# Patient Record
Sex: FEMALE | Race: WHITE | HISPANIC OR LATINO | Employment: OTHER | URBAN - METROPOLITAN AREA
[De-identification: names, ages, dates, MRNs, and addresses within clinical notes are randomized per-mention and may not be internally consistent; named-entity substitution may affect disease eponyms.]

---

## 2017-01-05 ENCOUNTER — OFFICE VISIT (OUTPATIENT)
Dept: OBGYN | Facility: CLINIC | Age: 32
End: 2017-01-05
Attending: OBSTETRICS & GYNECOLOGY
Payer: COMMERCIAL

## 2017-01-05 ENCOUNTER — OFFICE VISIT (OUTPATIENT)
Dept: OBGYN | Facility: CLINIC | Age: 32
End: 2017-01-05
Attending: ADVANCED PRACTICE MIDWIFE
Payer: COMMERCIAL

## 2017-01-05 VITALS
SYSTOLIC BLOOD PRESSURE: 102 MMHG | BODY MASS INDEX: 42.69 KG/M2 | WEIGHT: 226.1 LBS | HEIGHT: 61 IN | DIASTOLIC BLOOD PRESSURE: 70 MMHG

## 2017-01-05 DIAGNOSIS — O26.21 HIGH RISK PREGNANCY IN FIRST TRIMESTER DUE TO RECURRENT PREGNANCY LOSS: ICD-10-CM

## 2017-01-05 DIAGNOSIS — O26.21 HIGH RISK PREGNANCY IN FIRST TRIMESTER DUE TO RECURRENT PREGNANCY LOSS: Primary | ICD-10-CM

## 2017-01-05 DIAGNOSIS — R05.9 COUGH: ICD-10-CM

## 2017-01-05 DIAGNOSIS — R30.0 DYSURIA: ICD-10-CM

## 2017-01-05 DIAGNOSIS — K21.9 GASTROESOPHAGEAL REFLUX DISEASE WITHOUT ESOPHAGITIS: ICD-10-CM

## 2017-01-05 PROCEDURE — 99211 OFF/OP EST MAY X REQ PHY/QHP: CPT | Mod: 25

## 2017-01-05 PROCEDURE — 76819 FETAL BIOPHYS PROFIL W/O NST: CPT | Mod: ZF

## 2017-01-05 PROCEDURE — 87086 URINE CULTURE/COLONY COUNT: CPT | Performed by: ADVANCED PRACTICE MIDWIFE

## 2017-01-05 RX ORDER — LORATADINE 10 MG/1
10 TABLET ORAL DAILY
Qty: 20 TABLET | Refills: 1 | Status: ON HOLD | OUTPATIENT
Start: 2017-01-05 | End: 2017-01-27

## 2017-01-05 ASSESSMENT — PAIN SCALES - GENERAL: PAINLEVEL: MODERATE PAIN (4)

## 2017-01-05 NOTE — Clinical Note
2017       RE: Dai Guadarrama  [address]  [place] [address]     Dear Colleague,    Thank you for referring your patient, Dai Guadarrama, to the [facility] at [facility]. Please see a copy of my visit note below.    31 year old,  , presents at 33 3/7 weeks in pregnancy complicated by h/o IUFD  for biophysical assessment.    Fetal Breathing Movements (FBM): Normal - 2  Gross Body Movements (GBM): Normal - 2  Fetal Tone (FT): Normal - 2  AFV: Pocket of amniotic fluid > or = to 2 cm x 2 cm - 2  Quantitative Amniotic Fluid Volume Total: 19.2 cm      BPP 8/8.  FHR = 133bpm Normal.   MCA Not done.  NST Not done.     Single fetus in cephalic presentation.  Placenta anterior and grade 0.    Recommend weekly assessment.    ANGEL Watson MD

## 2017-01-05 NOTE — PROGRESS NOTES
"Subjective:      31 year old  at 33w3d presentst for a routine prenatal appointment.  Pt c/o of cough,  sore throat, runny nose for past week. Has trouble sleeping d/t cough.   No vaginal bleeding or leakage of fluid.  Notes contractions, 1 per hour. Pos fetal movement., though she endoreses less movement.    Had BPP today.   Does have HAs with her URI, NO visual changes, RUQ or Has lots of heartburn.      Had Tdap  Objective:  Filed Vitals:    17 1625   BP: 102/70   Height: 1.562 m (5' 1.5\")   Weight: 102.558 kg (226 lb 1.6 oz)   , see ob flowsheet    Lungs CTA bilaterally  HR RRR  Throat: mild erythema, tonsils +2, no exudate seen    Assessment/Plan     Encounter Diagnoses   Name Primary?     High risk pregnancy in first trimester due to recurrent pregnancy loss Yes     Cough      Gastroesophageal reflux disease without esophagitis      Dysuria      No orders of the defined types were placed in this encounter.     Orders Placed This Encounter   Medications     loratadine (CLARITIN) 10 MG tablet     Sig: Take 1 tablet (10 mg) by mouth daily     Dispense:  20 tablet     Refill:  1     ranitidine (ZANTAC) 150 MG tablet     Sig: Take 1 tablet (150 mg) by mouth 2 times daily     Dispense:  60 tablet     Refill:  1     ABO   Date Value Ref Range Status   2016 O  Final     RH(D)   Date Value Ref Range Status   2016  Pos  Final     ANTIBODY SCREEN   Date Value Ref Range Status   2016 Neg  Final     - Reviewed total weight gain, encouraged continued healthy diet and exercise.     - Discussed OTC meds that safe for her URI. Encourage fluids, honey for cough suppression, Claritin.  -Zantac BID for heartburn  - Reviewed importance of daily fetal kick count and why/how to contact provider. Emphasized calling or coming if patient has any fetal movement concerns.     - Reviewed why/how to contact provider if headache/visual changes/RUQ or epigastric pain, decreased fetal movement, vaginal " bleeding, leakage of fluid or more than 4 contractions in an hour.     Patient education/orders or handouts today:  PTL signs/symptoms  Reviewed GBS screening at 35-36 wks.    Return to clinic in 2 weeks and prn if questions or concerns.     CHANELLE KhalilM

## 2017-01-05 NOTE — NURSING NOTE
Chief Complaint   Patient presents with     Prenatal Care   33.3 weeks ob visit   Noticing more pelvic pressure and cramping.    has a cold   With a cough for last week.  Unable to sleep-  Will give recommendations for pregnancy safe cough medications.

## 2017-01-05 NOTE — PROGRESS NOTES
31 year old,  , presents at 33 3/7 weeks in pregnancy complicated by h/o IUFD  for biophysical assessment.    Fetal Breathing Movements (FBM): Normal - 2  Gross Body Movements (GBM): Normal - 2  Fetal Tone (FT): Normal - 2  AFV: Pocket of amniotic fluid > or = to 2 cm x 2 cm - 2  Quantitative Amniotic Fluid Volume Total: 19.2 cm      BPP 8/8.  FHR = 133bpm Normal.   MCA Not done.  NST Not done.     Single fetus in cephalic presentation.  Placenta anterior and grade 0.    Recommend weekly assessment.    ANGEL Watson MD

## 2017-01-07 LAB
BACTERIA SPEC CULT: NORMAL
MICRO REPORT STATUS: NORMAL
SPECIMEN SOURCE: NORMAL

## 2017-01-12 ENCOUNTER — HOSPITAL ENCOUNTER (OUTPATIENT)
Facility: CLINIC | Age: 32
Discharge: HOME | End: 2017-01-12
Attending: OBSTETRICS & GYNECOLOGY | Admitting: OBSTETRICS & GYNECOLOGY
Payer: COMMERCIAL

## 2017-01-12 ENCOUNTER — OFFICE VISIT (OUTPATIENT)
Dept: OBGYN | Facility: CLINIC | Age: 32
End: 2017-01-12
Attending: OBSTETRICS & GYNECOLOGY
Payer: COMMERCIAL

## 2017-01-12 VITALS
DIASTOLIC BLOOD PRESSURE: 57 MMHG | BODY MASS INDEX: 42.69 KG/M2 | TEMPERATURE: 97.6 F | WEIGHT: 226.1 LBS | RESPIRATION RATE: 20 BRPM | SYSTOLIC BLOOD PRESSURE: 99 MMHG | HEIGHT: 61 IN

## 2017-01-12 DIAGNOSIS — O26.21 HIGH RISK PREGNANCY IN FIRST TRIMESTER DUE TO RECURRENT PREGNANCY LOSS: ICD-10-CM

## 2017-01-12 PROBLEM — Z36.89 ENCOUNTER FOR TRIAGE IN PREGNANT PATIENT: Status: ACTIVE | Noted: 2017-01-12

## 2017-01-12 LAB — A1 MICROGLOB PLACENTAL VAG QL: NEGATIVE

## 2017-01-12 PROCEDURE — T1013 SIGN LANG/ORAL INTERPRETER: HCPCS | Mod: U3,ZF

## 2017-01-12 PROCEDURE — 76819 FETAL BIOPHYS PROFIL W/O NST: CPT | Mod: ZF

## 2017-01-12 PROCEDURE — T1013 SIGN LANG/ORAL INTERPRETER: HCPCS | Mod: U3

## 2017-01-12 PROCEDURE — 84112 EVAL AMNIOTIC FLUID PROTEIN: CPT | Performed by: OBSTETRICS & GYNECOLOGY

## 2017-01-12 PROCEDURE — 99214 OFFICE O/P EST MOD 30 MIN: CPT

## 2017-01-12 NOTE — IP AVS SNAPSHOT
[facility]    [address]    [place] [address]    Phone:  [phone]                                       After Visit Summary   1/12/2017    Dai Guadarrama    MRN: [id number]           After Visit Summary Signature Page     I have received my discharge instructions, and my questions have been answered. I have discussed any challenges I see with this plan with the nurse or doctor.    ..........................................................................................................................................  Patient/Patient Representative Signature      ..........................................................................................................................................  Patient Representative Print Name and Relationship to Patient    ..................................................               ................................................  Date                                            Time    ..........................................................................................................................................  Reviewed by Signature/Title    ...................................................              ..............................................  Date                                                            Time

## 2017-01-12 NOTE — IP AVS SNAPSHOT
MRN:[id number]                      After Visit Summary   1/12/2017    Dai Guadarrama    MRN: [id number]           Thank you!     Thank you for choosing [facility] for your care. Our goal is always to provide you with excellent care. Hearing back from our patients is one way we can continue to improve our services. Please take a few minutes to complete the written survey that you may receive in the mail after you visit with us. Thank you!        Patient Information     Date Of Birth          1985        About your hospital stay     You were admitted on:  January 12, 2017 You last received care in the:   [facility]    You were discharged on:  January 12, 2017        Reason for your hospital stay       You were here for a decrease in the fluid surrounding your baby. Your bag of water is still intact.                  Who to Call     For medical emergencies, please call 911.  For non-urgent questions about your medical care, please call your primary care provider or clinic, None          Attending Provider     Provider    Sharmin Louis MD       Primary Care Provider    Physician No Ref-Primary       No address on file        After Care Instructions     Activity       Your activity upon discharge: activity as tolerated            Diet       Regular diet            Discharge Instructions       Return to triage if you have decreased fetal movement, leaking of fluid, vaginal bleeding, contractions, headache, change in vision, or severe abdominal pain.                  Follow-up Appointments     [facility]ult [facility] Follow-up and recommended labs and tests       Follow up with your OB/GYN as scheduled.                  Your next 10 appointments already scheduled     Jan 12, 2017  6:00 PM   [facility] with Dai Saab    Services Department ([facility])    [address] [place] [id number]               Jan 19, 2017   3:00 PM   [facility]   [facility] ([facility])    [facility] [address]  [address]  [facility] [address]   [phone]            Jan 19, 2017  3:30 PM   RETURN OB with Vicky Salazar MD   [facility] ([facility])    [facility] [address]  [address]  [facility] [address]   [phone]            Jan 26, 2017  3:00 PM   [facility]   [facility] ([facility])    [facility] [address]  [address]  [facility] [address]   [phone]            Jan 26, 2017  3:30 PM   RETURN OB with Vicky Salazar MD   [facility] ([facility])    [facility] [address]  [address]  [facility] [address]   [phone]            Feb 02, 2017  3:00 PM   [facility]   [facility] ([facility])    [facility] [address]  [address]  [facility] [address]   [phone]            Feb 02, 2017  3:30 PM   RETURN OB with Vicky Salazar MD   [facility] ([facility])    [facility] [address]  [address]  [facility] [address]   [phone]            Feb 09, 2017  3:00 PM   [facility]   [facility] ([facility])    [facility] [address]  [address]  [facility] [address]   [phone]            Feb 09, 2017  3:30 PM   RETURN OB with Madelyn Awad MD   [facility] ([facility])    [facility] [address]  [address]  [facility] [address]   [phone]            Feb 16, 2017  3:00 PM   ULTRASOUND with [facility] ULTRASOUND    [facility] ([facility])    [facility] [address]  [address]  [address]  [facility] [address] [phone]              Further instructions from your care team       Discharge Instruction for Undelivered Patients      You were seen for: Membrane Assessment  We Consulted: Dr. Louis  You had (Test or Medicine):monitioring, Amnisure,      Diet:   Drink 8 to 12 glasses of liquids (milk, juice, water) every day.  You may eat meals and snacks.     Activity:  Call your doctor or nurse midwife if your baby is moving less than usual.     Call your provider if you notice:  Swelling in your face or increased swelling in your hands or legs.  Headaches that are not relieved by Tylenol (acetaminophen).  Changes in your vision (blurring: seeing spots or stars.)  Nausea (sick to your stomach) and vomiting (throwing up).   Weight gain of 5 pounds or more per week.  Heartburn that doesn't go away.  Signs of bladder infection: pain when you urinate (use the toilet), need to go more often and more urgently.  The bag of parada (rupture of membranes) breaks, or you notice leaking in your underwear.  Bright red blood in your underwear.  Abdominal (lower belly) or stomach pain.  For first baby: Contractions (tightening) less than 5 minutes apart for one hour or more.  Second (plus) baby: Contractions (tightening) less than 10 minutes apart and getting stronger.  *If less than 34 weeks: Contractions (tightenings) more than 6 times in one hour.  Increase or change in vaginal discharge (note the color and amount)      Follow-up:  As scheduled in the clinic          Pending Results     No orders found from 1/11/2017 to 1/13/2017.            Statement of Approval     Ordered          01/12/17 1739  I have reviewed and agree with all the recommendations and orders detailed in this document.   EFFECTIVE NOW     Approved and electronically signed by:  Yessenia Barros MD            "  Admission Information        Provider Department Dept Phone    2017 Sharmin Louis MD [facility] [phone]      Your Vitals Were     Blood Pressure Last Period                99/57 mmHg 2016 (Approximate)          MyChart Information     "[employer]hart lets you send messages to your doctor, view your test results, renew your prescriptions, schedule appointments and more. To sign up, go to [email or web address] . Click on \"Log in\" on the left side of the screen, which will take you to the Welcome page. Then click on \"Sign up Now\" on the right side of the page.     You will be asked to enter the access code listed below, as well as some personal information. Please follow the directions to create your username and password.     Your access code is: [email or web address]  Expires: 3/22/2017  9:30 AM     Your access code will  in 90 days. If you need help or a new code, please call your [facility] clinic or [phone].        Care EveryWhere ID     This is your Care EveryWhere ID. This could be used by other organizations to access your [facility] medical records  [id number]           Review of your medicines      CONTINUE these medicines which have NOT CHANGED        Dose / Directions    loratadine 10 MG tablet   Commonly known as:  CLARITIN   Used for:  Cough        Dose:  10 mg   Take 1 tablet (10 mg) by mouth daily   Quantity:  20 tablet   Refills:  1       prenatal multivitamin  plus iron 27-0.8 MG Tabs per tablet   Used for:  Recurrent pregnancy loss, delivered        Dose:  1 tablet   Take 1 tablet by mouth daily   Quantity:  100 tablet   Refills:  3       ranitidine 150 MG tablet   Commonly known as:  ZANTAC   Used for:  Gastroesophageal reflux disease without esophagitis        Dose:  150 mg   Take 1 tablet (150 mg) by mouth 2 times daily   Quantity:  60 tablet   Refills:  1                Protect others around you: Learn how to safely use, store and throw away your medicines at " www.disposemymeds.org.             Medication List: This is a list of all your medications and when to take them. Check marks below indicate your daily home schedule. Keep this list as a reference.      Medications           Morning Afternoon Evening Bedtime As Needed    loratadine 10 MG tablet   Commonly known as:  CLARITIN   Take 1 tablet (10 mg) by mouth daily                                prenatal multivitamin  plus iron 27-0.8 MG Tabs per tablet   Take 1 tablet by mouth daily                                ranitidine 150 MG tablet   Commonly known as:  ZANTAC   Take 1 tablet (150 mg) by mouth 2 times daily

## 2017-01-12 NOTE — Clinical Note
2017       RE: Dai Guadarrama  [address]  [facility] [facility] [address]     Dear Colleague,    Thank you for referring your patient, Dai Guadarrama, to the [facility] at [facility]. Please see a copy of my visit note below.    31 year old,  , presents at 34 3/7 weeks in pregnancy complicated by h/o IUFD for biophysical assessment.    Fetal Breathing Movements (FBM): Normal - 2  Gross Body Movements (GBM): Normal - 2  Fetal Tone (FT): Normal - 2  AFV: Pocket of amniotic fluid > or = to 2 cm x 2 cm - 2  Quantitative Amniotic Fluid Volume Total: 8.5cm, decreased from 19.2cm.       BPP 8/8.  FHR = 161bpm    MCA Not done.  NST Not done.     Single fetus in transverse presentation, head to maternal right.  Placenta anterior and grade 1.    Recommend evaluation for possible PPROM with decrease in YARIZTA.    ANGEL Nicholson MD

## 2017-01-12 NOTE — PROGRESS NOTES
31 year old,  , presents at 34 3/7 weeks in pregnancy complicated by h/o IUFD for biophysical assessment.    Fetal Breathing Movements (FBM): Normal - 2  Gross Body Movements (GBM): Normal - 2  Fetal Tone (FT): Normal - 2  AFV: Pocket of amniotic fluid > or = to 2 cm x 2 cm - 2  Quantitative Amniotic Fluid Volume Total: 8.5cm, decreased from 19.2cm.       BPP 8/8.  FHR = 161bpm    MCA Not done.  NST Not done.     Single fetus in transverse presentation, head to maternal right.  Placenta anterior and grade 1.    Recommend evaluation for possible PPROM with decrease in YARITZA.    ANGEL Nicholson MD

## 2017-01-12 NOTE — DISCHARGE INSTRUCTIONS
Discharge Instruction for Undelivered Patients      You were seen for: Membrane Assessment  We Consulted: Dr. Louis  You had (Test or Medicine):monitioring, Amnisure,      Diet:   Drink 8 to 12 glasses of liquids (milk, juice, water) every day.  You may eat meals and snacks.     Activity:  Call your doctor or nurse midwife if your baby is moving less than usual.     Call your provider if you notice:  Swelling in your face or increased swelling in your hands or legs.  Headaches that are not relieved by Tylenol (acetaminophen).  Changes in your vision (blurring: seeing spots or stars.)  Nausea (sick to your stomach) and vomiting (throwing up).   Weight gain of 5 pounds or more per week.  Heartburn that doesn't go away.  Signs of bladder infection: pain when you urinate (use the toilet), need to go more often and more urgently.  The bag of parada (rupture of membranes) breaks, or you notice leaking in your underwear.  Bright red blood in your underwear.  Abdominal (lower belly) or stomach pain.  For first baby: Contractions (tightening) less than 5 minutes apart for one hour or more.  Second (plus) baby: Contractions (tightening) less than 10 minutes apart and getting stronger.  *If less than 34 weeks: Contractions (tightenings) more than 6 times in one hour.  Increase or change in vaginal discharge (note the color and amount)      Follow-up:  As scheduled in the clinic

## 2017-01-12 NOTE — PROGRESS NOTES
OB Triage Note    CC: Change in YARITZA    Subjective: Dai Guadarrama is a 31 year old  at 34w3d by 7w3d US who presents from US for decreased YARITZA (was 19.2 on ; now 8.5) . She denies loss of fluid, contractions, vaginal bleeding. Normal fetal movement.  On ROS, she endorses mild nausea, a headache yesterday, abdominal soreness, and intermittent anterior thigh numbness.     Her pregnancy has been complicated by:   - Hx of IUFD at 32wga  - Placenta previa, now resolved  - Morbid obesity, BMI 42  - Failed GCT, passed GTT  - Small, 3x2mm Aneurysm. Per neurology, surgical treatment not indicated per neurology; she is cleared to labor and bear down for delivery.    OB history:  Obstetric History       T0      TAB0   SAB2   E0   M0   L0       # Outcome Date GA Lbr Spike/2nd Weight Sex Delivery Anes PTL Lv   4 Current            3   32w0d   F I.U. FETAL D   ND   2 SAB            1 SAB               Obstetric Comments   No children currently       Past Medical History   Diagnosis Date     Stillbirth      32 weeks     Recurrent pregnancy loss (CODE)      Retained placenta      Pregnancy related nausea and vomiting, antepartum 2016     two ED visits.     Migraine with aura      Tachycardia        Past Surgical History   Procedure Laterality Date     No history of surgery       C each add tooth extraction       bad reaction to anesthia for local        Prescriptions prior to admission   Medication Sig Dispense Refill Last Dose     loratadine (CLARITIN) 10 MG tablet Take 1 tablet (10 mg) by mouth daily 20 tablet 1 2017 at Unknown time     ranitidine (ZANTAC) 150 MG tablet Take 1 tablet (150 mg) by mouth 2 times daily 60 tablet 1 2017 at Unknown time     Prenatal Vit-Fe Fumarate-FA (PRENATAL MULTIVITAMIN  PLUS IRON) 27-0.8 MG TABS Take 1 tablet by mouth daily 100 tablet 3 2017 at Unknown time        No Known Allergies    Objective   Filed Vitals:    17 1600   BP: 99/57      General: alert, NAD  CV: regular rate and rhythm, no murmurs noted  Lungs: clear bilaterally, no crackles or wheezes  Abdomen: soft, gravid, non-tender  Extremities: non-tender, trace edema     FHT: baseline 150, moderate variability, no accelerations, no decelerations  [place]: 0 ctx in 10 min    Labs/imaging: Amnisure negative    A/P: 31 year old  at 34w3d by 7w3d US presenting for decreased YARITZA, need to r/o ROM. Category 1 FHT    Decreased YARITZA  - Was 19.2 on ; 8.5 on   - Amnisure negative    Fetal well-being  - Category 1 FHT    Rupture of membranes ruled out. Discharged patient with labor precautions. Instructed to follow up in clinic on  as scheduled.     Patient seen and care plan discussed under supervision of Dr. Louis.     Yessenia Barros MD   Resident Physician, PGY1  Obstetrics, Gynecology, and Women's Health

## 2017-01-13 NOTE — PLAN OF CARE
D: Patient presents to labor and delivery stating she had a ultrasound today and the fluid was low in the clinic. Admission completed, placed on the monitor, and Dr. Clemons notified of patient arrival.

## 2017-01-13 NOTE — PLAN OF CARE
D: Written discharge instructions reviewed with  and patient. No questions at this time and will follow up on January 19th in the clinic. P: Discharge to home.

## 2017-01-19 ENCOUNTER — OFFICE VISIT (OUTPATIENT)
Dept: OBGYN | Facility: CLINIC | Age: 32
End: 2017-01-19
Attending: OBSTETRICS & GYNECOLOGY
Payer: COMMERCIAL

## 2017-01-19 VITALS
DIASTOLIC BLOOD PRESSURE: 68 MMHG | BODY MASS INDEX: 42.18 KG/M2 | SYSTOLIC BLOOD PRESSURE: 101 MMHG | HEART RATE: 65 BPM | HEIGHT: 62 IN | WEIGHT: 229.2 LBS

## 2017-01-19 DIAGNOSIS — O09.291 HISTORY OF INTRAUTERINE FETAL DEATH, CURRENTLY PREGNANT IN FIRST TRIMESTER: Primary | ICD-10-CM

## 2017-01-19 DIAGNOSIS — O26.21 HIGH RISK PREGNANCY IN FIRST TRIMESTER DUE TO RECURRENT PREGNANCY LOSS: Primary | ICD-10-CM

## 2017-01-19 DIAGNOSIS — O26.21 HIGH RISK PREGNANCY IN FIRST TRIMESTER DUE TO RECURRENT PREGNANCY LOSS: ICD-10-CM

## 2017-01-19 PROBLEM — Z36.89 ENCOUNTER FOR TRIAGE IN PREGNANT PATIENT: Status: RESOLVED | Noted: 2017-01-12 | Resolved: 2017-01-19

## 2017-01-19 PROCEDURE — 76816 OB US FOLLOW-UP PER FETUS: CPT | Mod: ZF

## 2017-01-19 PROCEDURE — 99212 OFFICE O/P EST SF 10 MIN: CPT | Mod: ZF

## 2017-01-19 PROCEDURE — 76819 FETAL BIOPHYS PROFIL W/O NST: CPT | Mod: ZF

## 2017-01-19 PROCEDURE — 87653 STREP B DNA AMP PROBE: CPT | Performed by: OBSTETRICS & GYNECOLOGY

## 2017-01-19 NOTE — PROGRESS NOTES
31 year old,  , presents at 35 3/7 weeks in pregnancy complicated by h/o IUFD for biophysical assessment.    Fetal Breathing Movements (FBM): Normal - 2  Gross Body Movements (GBM): Normal - 2  Fetal Tone (FT): Normal - 2  AFV: Pocket of amniotic fluid > or = to 2 cm x 2 cm - 2  Quantitative Amniotic Fluid Volume Total: 11.8 cm      BPP 8/8.  UAR = 150bpm Normal.   MCA Not done.  NST Not done.       USEGA = 36 2/7 weeks.  EFW = 2,986 grams, 79 % for 35 weeks.        Single fetus in transverse presentation.  Placenta anterior and grade 1.    Appropriate interval growth, AGA  Recommend weekly assessment.    ANGEL Nicholson MD

## 2017-01-19 NOTE — NURSING NOTE
Chief Complaint   Patient presents with     Prenatal Care     35 weeks and 3 days     Patient is feeling very tired lately.

## 2017-01-19 NOTE — Clinical Note
"2017       RE: Dai Guadarrama  [address]  [place] [address]     Dear Colleague,    Thank you for referring your patient, Dai Guadarrama, to the [facility] at [facility]. Please see a copy of my visit note below.    Doing well. Good movement, no ctx, lof, vb. Transverse today on US.   O: /68 mmHg  Pulse 65  Ht 1.562 m (5' 1.5\")  Wt 103.964 kg (229 lb 3.2 oz)  BMI 42.61 kg/m2  LMP 2016 (Approximate)  Cvx: closed/ long, no presenting part  A/P: 31 year old  @35w3d HRP  H/o IUFD: weekly BPP, plan delivery at 39 weeks  Transverse presentation: Reviewed option of ECV, due to h/o IUFD declines. Will continue to monitor presentation with weekly BPP. CS if remains transverse.   GBS today  Labor precautions reviewed  RTC 1 week    Vicky Salazar MD        "

## 2017-01-19 NOTE — Clinical Note
2017       RE: Dai Guadarrama  [address]  [place] [address]     Dear Colleague,    Thank you for referring your patient, Dai Guadarrama, to the [facility] at [facility]. Please see a copy of my visit note below.    31 year old,  , presents at 35 3/7 weeks in pregnancy complicated by h/o IUFD for biophysical assessment.    Fetal Breathing Movements (FBM): Normal - 2  Gross Body Movements (GBM): Normal - 2  Fetal Tone (FT): Normal - 2  AFV: Pocket of amniotic fluid > or = to 2 cm x 2 cm - 2  Quantitative Amniotic Fluid Volume Total: 11.8 cm      BPP 8/8.  UAR = 150bpm Normal.   MCA Not done.  NST Not done.       USEGA = 36 2/7 weeks.  EFW = 2,986 grams, 79 % for 35 weeks.        Single fetus in transverse presentation.  Placenta anterior and grade 1.    Appropriate interval growth, AGA  Recommend weekly assessment.    ANGEL Nicholson MD    Again, thank you for allowing me to participate in the care of your patient.      Sincerely,    [employer]

## 2017-01-20 LAB
GP B STREP DNA SPEC QL NAA+PROBE: ABNORMAL
SPECIMEN SOURCE: ABNORMAL

## 2017-01-20 NOTE — PROGRESS NOTES
"Doing well. Good movement, no ctx, lof, vb. Transverse today on US.   O: /68 mmHg  Pulse 65  Ht 1.562 m (5' 1.5\")  Wt 103.964 kg (229 lb 3.2 oz)  BMI 42.61 kg/m2  LMP 2016 (Approximate)  Cvx: closed/ long, no presenting part  A/P: 31 year old  @35w3d HRP  H/o IUFD: weekly BPP, plan delivery at 39 weeks  Transverse presentation: Reviewed option of ECV, due to h/o IUFD declines. Will continue to monitor presentation with weekly BPP. CS if remains transverse.   GBS today  Labor precautions reviewed  RTC 1 week    Vicky Salazar MD    "

## 2017-01-22 ENCOUNTER — HOSPITAL ENCOUNTER (OUTPATIENT)
Facility: CLINIC | Age: 32
Discharge: HOME | End: 2017-01-22
Attending: OBSTETRICS & GYNECOLOGY | Admitting: OBSTETRICS & GYNECOLOGY
Payer: COMMERCIAL

## 2017-01-22 VITALS — TEMPERATURE: 97.7 F | RESPIRATION RATE: 18 BRPM | SYSTOLIC BLOOD PRESSURE: 116 MMHG | DIASTOLIC BLOOD PRESSURE: 76 MMHG

## 2017-01-22 DIAGNOSIS — O21.9 NAUSEA/VOMITING IN PREGNANCY: Primary | ICD-10-CM

## 2017-01-22 PROBLEM — O47.9 IRREGULAR UTERINE CONTRACTIONS: Status: ACTIVE | Noted: 2017-01-22

## 2017-01-22 LAB
ALBUMIN UR-MCNC: 10 MG/DL
APPEARANCE UR: CLEAR
BACTERIA SPEC CULT: NORMAL
BILIRUB UR QL STRIP: NEGATIVE
C TRACH DNA SPEC QL NAA+PROBE: NORMAL
COLOR UR AUTO: YELLOW
GLUCOSE UR STRIP-MCNC: NEGATIVE MG/DL
HGB UR QL STRIP: NEGATIVE
KETONES UR STRIP-MCNC: 40 MG/DL
LEUKOCYTE ESTERASE UR QL STRIP: NEGATIVE
MICRO REPORT STATUS: NORMAL
MICRO REPORT STATUS: NORMAL
MUCOUS THREADS #/AREA URNS LPF: PRESENT /LPF
N GONORRHOEA DNA SPEC QL NAA+PROBE: NORMAL
NITRATE UR QL: NEGATIVE
PH UR STRIP: 6 PH (ref 5–7)
RBC #/AREA URNS AUTO: 2 /HPF (ref 0–2)
SP GR UR STRIP: 1.02 (ref 1–1.03)
SPECIMEN SOURCE: NORMAL
SQUAMOUS #/AREA URNS AUTO: 1 /HPF (ref 0–1)
URN SPEC COLLECT METH UR: ABNORMAL
UROBILINOGEN UR STRIP-MCNC: NORMAL MG/DL (ref 0–2)
WBC #/AREA URNS AUTO: 3 /HPF (ref 0–2)
WET PREP SPEC: NORMAL

## 2017-01-22 PROCEDURE — 87591 N.GONORRHOEAE DNA AMP PROB: CPT | Performed by: OBSTETRICS & GYNECOLOGY

## 2017-01-22 PROCEDURE — 99214 OFFICE O/P EST MOD 30 MIN: CPT | Mod: 25

## 2017-01-22 PROCEDURE — 87210 SMEAR WET MOUNT SALINE/INK: CPT | Performed by: OBSTETRICS & GYNECOLOGY

## 2017-01-22 PROCEDURE — 25000125 ZZHC RX 250: Performed by: OBSTETRICS & GYNECOLOGY

## 2017-01-22 PROCEDURE — 81001 URINALYSIS AUTO W/SCOPE: CPT | Performed by: OBSTETRICS & GYNECOLOGY

## 2017-01-22 PROCEDURE — 87491 CHLMYD TRACH DNA AMP PROBE: CPT | Performed by: OBSTETRICS & GYNECOLOGY

## 2017-01-22 PROCEDURE — 59025 FETAL NON-STRESS TEST: CPT

## 2017-01-22 RX ORDER — ONDANSETRON 4 MG/1
4 TABLET, ORALLY DISINTEGRATING ORAL EVERY 6 HOURS PRN
Qty: 40 TABLET | Refills: 1 | Status: ON HOLD
Start: 2017-01-22 | End: 2017-01-27

## 2017-01-22 RX ORDER — ONDANSETRON 4 MG/1
4-8 TABLET, ORALLY DISINTEGRATING ORAL EVERY 6 HOURS PRN
Qty: 40 TABLET | Refills: 1 | Status: SHIPPED | OUTPATIENT
Start: 2017-01-22 | End: 2017-01-22

## 2017-01-22 RX ORDER — ONDANSETRON 4 MG/1
4-8 TABLET, ORALLY DISINTEGRATING ORAL EVERY 6 HOURS PRN
Status: DISCONTINUED | OUTPATIENT
Start: 2017-01-22 | End: 2017-01-22 | Stop reason: HOSPADM

## 2017-01-22 RX ORDER — ONDANSETRON 2 MG/ML
4 INJECTION INTRAMUSCULAR; INTRAVENOUS EVERY 6 HOURS PRN
Status: DISCONTINUED | OUTPATIENT
Start: 2017-01-22 | End: 2017-01-22 | Stop reason: HOSPADM

## 2017-01-22 RX ADMIN — ONDANSETRON 8 MG: 4 TABLET, ORALLY DISINTEGRATING ORAL at 02:19

## 2017-01-22 NOTE — IP AVS SNAPSHOT
[facility]    [address]    [place] [address]    Phone:  [phone]                                       After Visit Summary   1/22/2017    Dai Guadarrama    MRN: [id number]           After Visit Summary Signature Page     I have received my discharge instructions, and my questions have been answered. I have discussed any challenges I see with this plan with the nurse or doctor.    ..........................................................................................................................................  Patient/Patient Representative Signature      ..........................................................................................................................................  Patient Representative Print Name and Relationship to Patient    ..................................................               ................................................  Date                                            Time    ..........................................................................................................................................  Reviewed by Signature/Title    ...................................................              ..............................................  Date                                                            Time

## 2017-01-22 NOTE — DISCHARGE INSTRUCTIONS
Discharge Instruction for Undelivered Patients      You were seen for: Labor Assessment  We Consulted: Dr. Clemons  You had (Test or Medicine): Electronic fetal monitoring, urine analysis    Diet:   Drink 8 to 12 glasses of liquids (milk, juice, water) every day.  You may eat meals and snacks.     Activity:  Count fetal kicks everyday (see handout)  Call your doctor or nurse midwife if your baby is moving less than usual.     Call your provider if you notice:  Swelling in your face or increased swelling in your hands or legs.  Headaches that are not relieved by Tylenol (acetaminophen).  Changes in your vision (blurring: seeing spots or stars.)  Nausea (sick to your stomach) and vomiting (throwing up).   Weight gain of 5 pounds or more per week.  Heartburn that doesn't go away.  Signs of bladder infection: pain when you urinate (use the toilet), need to go more often and more urgently.  The bag of parada (rupture of membranes) breaks, or you notice leaking in your underwear.  Bright red blood in your underwear.  Abdominal (lower belly) or stomach pain.  For first baby: Contractions (tightening) less than 5 minutes apart for one hour or more.  Second (plus) baby: Contractions (tightening) less than 10 minutes apart and getting stronger.  *If less than 34 weeks: Contractions (tightenings) more than 6 times in one hour.  Increase or change in vaginal discharge (note the color and amount)      Follow-up:  As scheduled in the clinic

## 2017-01-22 NOTE — PROGRESS NOTES
Data: [facility] at 0[facility].   Reason for maternal/fetal assessment per patient is Rule Out Labor  . Patient is a . Prenatal record reviewed.      Obstetric History       T0      TAB0   SAB2   E0   M0   L0       # Outcome Date GA Lbr Spike/2nd Weight Sex Delivery Anes PTL Lv   4 Current            3   32w0d   F I.U. FETAL D   ND   2 SAB            1 SAB               Obstetric Comments   No children currently      Medical History:   Past Medical History   Diagnosis Date     Stillbirth      32 weeks     Recurrent pregnancy loss (CODE)      Retained placenta      Pregnancy related nausea and vomiting, antepartum 2016     two ED visits.     Migraine with aura      Tachycardia    . Gestational Age 35w6d. VSS. Cervix: closed, long and high.  Fetal movement present. Patient denies backache, vaginal discharge, pelvic pressure, UTI symptoms, GI problems, bloody show, vaginal bleeding, edema, headache, visual disturbances, epigastric or URQ pain, abdominal pain, rupture of membranes. Pt reports cramping and nausea. Support persons present.  Action: Verbal consent for EFM. Triage assessment completed. EFM applied for 2.5 hours. Uterine assessment revealed occasional irritability and a few contractions. Fetal assessment: Presumed adequate fetal oxygenation documented (see flow record). Patient education pamphlets given on fetal movement counts. Patient instructed to report change in fetal movement, vaginal leaking of fluid or bleeding, abdominal pain, or any concerns related to the pregnancy to her nurse/physician.   Response: Dr. Clemons informed of UA/lab results. Plan per provider is to discharge the patient to home. Patient verbalized understanding of education and verbalized agreement with plan. Discharged ambulatory at 0420.

## 2017-01-22 NOTE — PROGRESS NOTES
[facility]  OB Triage Note    CC: Contractions     HPI: Ms. Dai Guadarrama is a 31 year old  at 35w6d by LMP c/w 7w3d US who presents to triage with contractions. She reports that she has been brett every few minutes for the past hour. She denies any loss of fluid, vaginal bleeding  She denies any leaking fluid, vaginal bleeding or vaginal discharge.  + Good fetal movement. She reports that she has also been feeling nauseous today and threw upx2. She continues to feel nauseous and has acid reflux. She is taking zantac which she feels helps.     Obstetric Complications  - Hx of IUFD at 32wga  - Placenta previa, now resolved  - Morbid obesity, BMI 42  - Failed GCT, passed GTT  - Small, 3x2mm Aneurysm. Per neurology, surgical treatment not indicated per neurology; she is cleared to labor and bear down for delivery.    O:  Patient Vitals for the past 24 hrs:   BP Resp   17 0130 116/76 mmHg 18   Gen: Well-appearing, NAD  CV: Regular rate  Pulm: Breathing comfortably on room air  Abd: Soft, gravid, non-tender    Cervix: Closed/long/high with no presenting part    FHT: , mod fermin, + accels, no decels  Sauget: none    Labs:  Wet prep: neg  Component      Latest Ref Rng 2017   Color Urine       Yellow   Appearance Urine       Clear   Glucose Urine      NEG mg/dL Negative   Bilirubin Urine      NEG Negative   Ketones Urine      NEG mg/dL 40 (A)   Specific Gravity Urine      1.003 - 1.035 1.016   Blood Urine      NEG Negative   pH Urine      5.0 - 7.0 pH 6.0   Protein Albumin Urine      NEG mg/dL 10 (A)   Urobilinogen mg/dL      0.0 - 2.0 mg/dL Normal   Nitrite Urine      NEG Negative   Leukocyte Esterase Urine      NEG Negative   Source       Midstream Urine   WBC Urine      0 - 2 /HPF 3 (H)   RBC Urine      0 - 2 /HPF 2   Squamous Epithelial /HPF Urine      0 - 1 /HPF 1   Mucous Urine      NEG /LPF Present (A)       A/P:  31 year old  at 35w6d by LMP c/w  7w3d US who presents to triage for evaluation of  labor.   Serial cervical exams revealed cervix is closed/long/high. Tocometry quiet. Pt reports resolution of contractions during her triage evaluation.   Zofran ODT given with relief of nausea. Prescription sent to pharmacy.   UA and wet prep without signs of infection. GC/Chlam pending.   Labor precautions discussed.     FWB:  - Category 1 FHT, reactive    Follow-up as scheduled next week.     Nichole Clemons MD  Ob/Gyn, PGY-3  2017, 2:00 AM    Staff MD Note    I appreciate the note by Dr. Clemons.  Any necessary changes have been made by me.  I evaluated the patient with the resident and agree with the assessment and plan.    Noreen Harris MD

## 2017-01-22 NOTE — IP AVS SNAPSHOT
MRN:[id number]                      After Visit Summary   1/22/2017    Dai Guadarrama    MRN: [id number]           Thank you!     Thank you for choosing [facility] for your care. Our goal is always to provide you with excellent care. Hearing back from our patients is one way we can continue to improve our services. Please take a few minutes to complete the written survey that you may receive in the mail after you visit with us. Thank you!        Patient Information     Date Of Birth          1985        About your hospital stay     You were admitted on:  January 22, 2017 You last received care in the:   [facility]    You were discharged on:  January 22, 2017        Reason for your hospital stay       Evaluations of your contractions.                  Who to Call     For medical emergencies, please call 911.  For non-urgent questions about your medical care, please call your primary care provider or clinic, None          Attending Provider     Provider    Noreen Harris MD       Primary Care Provider    Physician No Ref-Primary       No address on file         When to contact your care team       Worsening contractions, vaginal bleeding/leaking, decreased fetal movement.                  After Care Instructions     Activity       Your activity upon discharge: as tolerated            Diet       Follow this diet upon discharge: Regular                  Follow-up Appointments     Follow Up and recommended labs and tests       As scheduled with your primary OB this week.                  Your next 10 appointments already scheduled     Jan 26, 2017  3:00 PM   ULTRASOUND with [facility] ULTRASOUND   [facility] ([facility])    [facility] [address] [facility] [address]   [phone]            Jan 26, 2017  3:30 PM   RETURN OB with Vicky Salazar MD   [facility] ([facility])     [facility] [address]  [address]  [facility] [address]   [phone]            Feb 02, 2017  3:00 PM   [facility]   [facility] ([facility])    [facility]  [facility] [address]   [phone]            Feb 02, 2017  3:30 PM   RETURN OB with Vicky Salazar MD   [facility] ([facility])    [facility] [address]  [facility] [address]   [phone]            Feb 09, 2017  3:00 PM   ULTRASOUND with [facility] ULTRASOUND   [facility] ([facility])    [facility] [address]  [facility] [address]   [phone]            Feb 09, 2017  3:30 PM   RETURN OB with Madelyn Awad MD   [facility] ([facility])    [facility] [address]  [address]  [facility] [address]   [phone]            Feb 16, 2017  3:00 PM   ULTRASOUND with [facility] ULTRASOUND   [facility] ([facility])    [facility] [address]  [address]  [facility] [address]   [phone]            Feb 16, 2017  3:30 PM   RETURN OB with Sharmin Louis MD   [facility] ([facility])    [facility]  [facility] [address]   [phone]            Feb 23, 2017  3:00 PM   [facility]   [facility] ([facility])    [facility] [address]  [address]  [facility] [address]   [phone]            Feb 23, 2017  3:30 PM   RETURN OB with Silvina Barrera MD   [facility] ([facility])    [facility] [address]  [address]  [place] [place]  [facility] [address] [phone]              Further instructions from your care team       Discharge Instruction for Undelivered Patients      You were seen for: Labor Assessment  We Consulted: Dr. Clemons  You had (Test or Medicine): Electronic fetal monitoring, urine analysis    Diet:   Drink 8 to 12 glasses of liquids (milk, juice, water) every day.  You may eat meals and snacks.     Activity:  Count fetal kicks everyday (see handout)  Call your doctor or nurse midwife if your baby is moving less than usual.     Call your provider if you notice:  Swelling in your face or increased swelling in your hands or legs.  Headaches that are not relieved by Tylenol (acetaminophen).  Changes in your vision (blurring: seeing spots or stars.)  Nausea (sick to your stomach) and vomiting (throwing up).   Weight gain of 5 pounds or more per week.  Heartburn that doesn't go away.  Signs of bladder infection: pain when you urinate (use the toilet), need to go more often and more urgently.  The bag of parada (rupture of membranes) breaks, or you notice leaking in your underwear.  Bright red blood in your underwear.  Abdominal (lower belly) or stomach pain.  For first baby: Contractions (tightening) less than 5 minutes apart for one hour or more.  Second (plus) baby: Contractions (tightening) less than 10 minutes apart and getting stronger.  *If less than 34 weeks: Contractions (tightenings) more than 6 times in one hour.  Increase or change in vaginal discharge (note the color and amount)      Follow-up:  As scheduled in the clinic          Pending Results     Date and Time Order Name Status Description    1/22/2017 0203 Chlamydia trachomatis PCR In process     1/22/2017 0203 Neisseria gonorrhoea PCR In process             Statement of Approval     Ordered          01/22/17 0407  I have reviewed and agree with all the recommendations and orders detailed in this document.   EFFECTIVE NOW     Approved and  "electronically signed by:  Nichole Clemons MD             Admission Information        Provider Department Dept Phone    2017 Noreen Harris MD  [facility] [phone]      Your Vitals Were     Blood Pressure Temperature Respirations Last Period          116/76 mmHg 97.7  F (36.5  C) (Oral) 18 2016 (Approximate)        MyChart Information     [facility] lets you send messages to your doctor, view your test results, renew your prescriptions, schedule appointments and more. To sign up, go to [email or web address] . Click on \"Log in\" on the left side of the screen, which will take you to the Welcome page. Then click on \"Sign up Now\" on the right side of the page.     You will be asked to enter the access code listed below, as well as some personal information. Please follow the directions to create your username and password.     Your access code is: [email or web address]  Expires: 3/22/2017  9:30 AM     Your access code will  in 90 days. If you need help or a new code, please call your [facility] clinic or [phone].        Care EveryWhere ID     This is your Care EveryWhere ID. This could be used by other organizations to access your [facility] medical records  [id number]           Review of your medicines      START taking        Dose / Directions    ondansetron 4 MG ODT tab   Commonly known as:  ZOFRAN-ODT   Used for:  Nausea/vomiting in pregnancy        Dose:  4 mg   Take 1 tablet (4 mg) by mouth every 6 hours as needed for nausea   Quantity:  40 tablet   Refills:  1         CONTINUE these medicines which have NOT CHANGED        Dose / Directions    loratadine 10 MG tablet   Commonly known as:  CLARITIN   Used for:  Cough        Dose:  10 mg   Take 1 tablet (10 mg) by mouth daily   Quantity:  20 tablet   Refills:  1       prenatal multivitamin  plus iron 27-0.8 MG Tabs per tablet   Used for:  Recurrent pregnancy loss, delivered        Dose:  1 tablet   Take 1 tablet by mouth daily   Quantity:  100 " tablet   Refills:  3       ranitidine 150 MG tablet   Commonly known as:  ZANTAC   Used for:  Gastroesophageal reflux disease without esophagitis        Dose:  150 mg   Take 1 tablet (150 mg) by mouth 2 times daily   Quantity:  60 tablet   Refills:  1            Where to get your medicines      These medications were [facility]  [address] [facility] [address]     Phone:  [phone]    - ondansetron 4 MG ODT tab             Protect others around you: Learn how to safely use, store and throw away your medicines at www.disposemymeds.org.             Medication List: This is a list of all your medications and when to take them. Check marks below indicate your daily home schedule. Keep this list as a reference.      Medications           Morning Afternoon Evening Bedtime As Needed    loratadine 10 MG tablet   Commonly known as:  CLARITIN   Take 1 tablet (10 mg) by mouth daily                                ondansetron 4 MG ODT tab   Commonly known as:  ZOFRAN-ODT   Take 1 tablet (4 mg) by mouth every 6 hours as needed for nausea   Last time this was given:  8 mg on 1/22/2017  2:19 AM                                prenatal multivitamin  plus iron 27-0.8 MG Tabs per tablet   Take 1 tablet by mouth daily                                ranitidine 150 MG tablet   Commonly known as:  ZANTAC   Take 1 tablet (150 mg) by mouth 2 times daily                                          More Information        Oncología: cómo controlar las náuseas y el vómito  Las náuseas y el vómito son efectos secundarios muy comunes de la quimioterapia y de la radioterapia. Los efectos secundarios se presentan cuando el tratamiento afecta también a ciertas células normales además de las células cancerosas. En kennedi cristal, se arlette afectadas las células que recubren el estómago y la parte del cerebro que controla el vómito.  Llame al médico si:    Las náuseas o el vómito fierro 24 horas o más.    Tiene  dificultad para retener los líquidos.   Los medicamentos le pueden ayudar    Muchas veces se pueden prevenir o controlar las náuseas o el vómito tomando ciertos medicamentos (antieméticos). El médico puede indicarle que tome los antieméticos antes o después del tratamiento.  Consejos sobre comidas    Si ya tiene un medicamento para controlar las náuseas, tómelo antes de las comidas, según lo indicado.    Evite las comidas con mucha grasa o aceite cuando sienta náuseas.    Coma porciones pequeñas a lo karen de todo el día; coma despacio.    Pídale a alguien que se siente con usted mientras come para evitar pensar en que siente náuseas.    Coma alimentos que estén a temperatura ambiente o más fríos para evitar los olores eliezer.    Coma alimentos secos, tatiana tostadas, galletas o pretzels; alimentos ligeros y frescos tatiana puré de manzana; y comidas blandas, tatiana lorraine o alex sin piel.  Otras maneras de sentirse mejor    Salga a suman aire fresco, o a caminar.    Hable con un amigo, escuche música o korin televisión.    Respire lenta y profundamente varias veces.    Coma a la maikel de justa hollis o en un ambiente que le resulte relajante.    Siga alguna técnica, tatiana imaginaciones guiadas, que lo ayude a relajarse. Imagínese que está en un lugar hermoso y tranquilo, o en el lugar en donde más le gustaría estar.    [id number] [employer]. [address] [place] [address]. Todos los derechos reservados. Esta información no pretende sustituir la atención médica profesional. Sólo martin médico puede diagnosticar y tratar un problema de chino.                Recuento de patadas  Es normal que le preocupe la chino de martin bebé. Para saber si el bebé está elissa, usted puede anotar las veces que usted siente avelina pataditas en un registro de movimientos todos los días. Normalmente es posible sentir que el bebé se mueve a partir de la semana 20 del embarazo. No olvide llevar los registros de los movimientos del bebé a  todas las citas que tenga con martin proveedor de atención médica.     Cómo contar los movimientos    Escoja justa hora cuando el bebé esté activo, tatiana por ejemplo después de justa comida.    Siéntese cómodamente o acuéstese de lado.    La primera vez que el bebé se mueva, anote la hora.    Cuente cada movimiento hasta que el bebé se haya movido 10 veces. ([place] puede llevar entre 20 minutos y 2 horas.)    Si el bebé no se ha movido 4 veces en 1 hora, dése justa palmadita en el abdomen para despertarlo.    Anote la hora en que sienta el décimo movimiento del bebé.    Trate de hacer esto a la misma hora todos los días.  Cuándo debe llamar al proveedor de atención médica  Llame a martin proveedor de atención médica en el acto en cualquiera de los siguientes casos:     Si el bebé se mueve menos de 10 veces en 1 horas mientras usted está llevando la cuenta de las pataditas.    Si el bebé se mueve con mucha menos frecuencia que en días anteriores.    Si usted no ha sentido movimientos del bebé en todo el día.    [id number] [facility], [address] [place] [address]. Todos los derechos reservados. Esta información no pretende sustituir la atención médica profesional. Sólo martin médico puede diagnosticar y tratar un problema de chino.

## 2017-01-26 ENCOUNTER — OFFICE VISIT (OUTPATIENT)
Dept: OBGYN | Facility: CLINIC | Age: 32
End: 2017-01-26
Attending: OBSTETRICS & GYNECOLOGY
Payer: COMMERCIAL

## 2017-01-26 ENCOUNTER — HOSPITAL ENCOUNTER (OUTPATIENT)
Facility: CLINIC | Age: 32
Discharge: HOME | End: 2017-01-27
Attending: OBSTETRICS & GYNECOLOGY | Admitting: OBSTETRICS & GYNECOLOGY
Payer: COMMERCIAL

## 2017-01-26 VITALS
SYSTOLIC BLOOD PRESSURE: 117 MMHG | HEART RATE: 106 BPM | BODY MASS INDEX: 42.4 KG/M2 | HEIGHT: 62 IN | DIASTOLIC BLOOD PRESSURE: 75 MMHG | WEIGHT: 230.4 LBS

## 2017-01-26 DIAGNOSIS — O26.21 HIGH RISK PREGNANCY IN FIRST TRIMESTER DUE TO RECURRENT PREGNANCY LOSS: ICD-10-CM

## 2017-01-26 DIAGNOSIS — O09.93 HRP (HIGH RISK PREGNANCY), THIRD TRIMESTER: Primary | ICD-10-CM

## 2017-01-26 PROBLEM — Z36.89 ENCOUNTER FOR TRIAGE IN PREGNANT PATIENT: Status: ACTIVE | Noted: 2017-01-26

## 2017-01-26 PROBLEM — O47.9 IRREGULAR UTERINE CONTRACTIONS: Status: RESOLVED | Noted: 2017-01-22 | Resolved: 2017-01-26

## 2017-01-26 PROCEDURE — 76819 FETAL BIOPHYS PROFIL W/O NST: CPT | Mod: ZF

## 2017-01-26 NOTE — Clinical Note
"2017       RE: Dai Guadarrama  [address]  [facility] [address]     Dear Colleague,    Thank you for referring your patient, Dai Guadarrama, to the [facility] at [facility]. Please see a copy of my visit note below.    Seen in triage last week with contractions, no evidence of labor and cervix closed. Continuing to have irregular contractions and pelvic pressure. Good movement, no lof, vb. Some swelling.     O: /75 mmHg  Pulse 106  Ht 1.562 m (5' 1.5\")  Wt 104.509 kg (230 lb 6.4 oz)  BMI 42.83 kg/m2  LMP 2016 (Approximate)  BPP 8/8, cephalic  Cvx: 1/long    A/P: 31 year old  @36w3d h/o IUFD  Reviewed labor precautions, has IOL scheduled for 39 weeks   GBS positive, discussed need for PCN in labor  RTC 1 week      Vicky Salazar MD        "

## 2017-01-26 NOTE — PROGRESS NOTES
31 year old,  , presents at 36 3/7 weeks in pregnancy complicated by h/o IUFD for biophysical assessment.    Fetal Breathing Movements (FBM): Normal - 2  Gross Body Movements (GBM): Normal - 2  Fetal Tone (FT): Normal - 2  AFV: Pocket of amniotic fluid > or = to 2 cm x 2 cm - 2  Quantitative Amniotic Fluid Volume Total: 10.3 cm      BPP 8/8.  FHR = 160bpm Normal.   MCA Not done.  NST Not done.     SIngle fetus in cephalic presentation.  Placenta anterior and grade 1.    Recommend continued weekly assessment.    ANGEL Nicholson MD

## 2017-01-26 NOTE — IP AVS SNAPSHOT
MRN:[id number]                      After Visit Summary   1/26/2017    Dai Guadarrama    MRN: [id number]           Thank you!     Thank you for choosing [facility] for your care. Our goal is always to provide you with excellent care. Hearing back from our patients is one way we can continue to improve our services. Please take a few minutes to complete the written survey that you may receive in the mail after you visit with us. Thank you!        Patient Information     Date Of Birth          1985        About your hospital stay     You were admitted on:  January 26, 2017 You last received care in the:   [facility]    You were discharged on:  January 27, 2017        Reason for your hospital stay       Decreased fetal movement.                  Who to Call     For medical emergencies, please call 911.  For non-urgent questions about your medical care, please call your primary care provider or clinic, None          Attending Provider     Provider    Joya Beck MD       Primary Care Provider    Physician No Ref-Primary       No address on file         When to contact your care team       Regular painful contractions, vaginal bleeding/leaking, decreased fetal movement.                  After Care Instructions     Activity       Your activity upon discharge: Normal activity            Diet       Follow this diet upon discharge: Regular                  Follow-up Appointments     Adult [facility] Follow-up and recommended labs and tests       As scheduled.     Appointments on [facility] and/or [facility] (with [facility] or [facility] provider or service). Call [phone] if you haven't heard regarding these appointments within 7 days of discharge.                  Your next 10 appointments already scheduled     Feb 02, 2017  3:00 PM   ULTRASOUND with [facility] ULTRASOUND   [facility] ([facility])    [facility] [address]  [place] [address]   [phone]            Feb 02, 2017  3:30 PM   RETURN OB with Vicky Salazar MD   [facility] ([facility])    [facility] [address]  [place] [address]   [phone]            Feb 09, 2017  3:00 PM   ULTRASOUND with [facility] ULTRASOUND   [facility] ([facility])    [facility] [address]  [place] [address]   [phone]            Feb 09, 2017  3:30 PM   RETURN OB with Madelyn Awad MD   [facility] ([facility])    [facility] [address]  [place] [address]   [phone]            Feb 16, 2017  3:00 PM   ULTRASOUND with [facility] ULTRASOUND   [facility] ([facility])    [facility] [address]  [place] [address]   [phone]            Feb 16, 2017  3:30 PM   RETURN OB with Sharmin Louis MD   [facility] ([facility])    [facility] [address]  [place] [address]   [phone]            Feb 23, 2017  3:00 PM   ULTRASOUND with [facility] ULTRASOUND   [facility] ([facility])    [facility] [address]  [place] [address]   [phone]            Feb 23, 2017  3:30 PM   RETURN OB with Silvina Barrera MD   [facility] ([facility])    [facility] [address]  [place] [address]   [phone]              Further instructions from your care team       Discharge Instruction for Undelivered Patients      You were seen for: Fetal Assessment  We Consulted: Dr Beck and Dr Clemons  You had (Test or Medicine):NST     Diet:   Drink 8 to 12 glasses  "of liquids (milk, juice, water) every day.  You may eat meals and snacks.     Activity:  Count fetal kicks everyday (see handout)  Call your doctor or nurse midwife if your baby is moving less than usual.     Call your provider if you notice:  Swelling in your face or increased swelling in your hands or legs.  Headaches that are not relieved by Tylenol (acetaminophen).  Changes in your vision (blurring: seeing spots or stars.)  Nausea (sick to your stomach) and vomiting (throwing up).   Weight gain of 5 pounds or more per week.  Heartburn that doesn't go away.  Signs of bladder infection: pain when you urinate (use the toilet), need to go more often and more urgently.  The bag of parada (rupture of membranes) breaks, or you notice leaking in your underwear.  Bright red blood in your underwear.  Abdominal (lower belly) or stomach pain.  For first baby: Contractions (tightening) less than 5 minutes apart for one hour or more.  Increase or change in vaginal discharge (note the color and amount)      Follow-up:  As scheduled in the clinic          Pending Results     No orders found for last 2 day(s).            Statement of Approval     Ordered          01/26/17 9428  I have reviewed and agree with all the recommendations and orders detailed in this document.   EFFECTIVE NOW     Approved and electronically signed by:  Nichole Clemons MD             Admission Information        Provider Department Dept Phone    1/26/2017 Joya Beck MD [facility] [phone]      Your Vitals Were     Blood Pressure Pulse Temperature    96/58 mmHg 106 97.6  F (36.4  C) (Oral)    Respirations Height Weight    18 1.52 m (4' 11.84\") 113.399 kg (250 lb)    BMI (Body Mass Index) Last Period       49.08 kg/m2 05/16/2016 (Approximate)       MyChart Information     [employer] lets you send messages to your doctor, view your test results, renew your prescriptions, schedule appointments and more. To sign up, go to " "[email or web address] . Click on \"Log in\" on the left side of the screen, which will take you to the Welcome page. Then click on \"Sign up Now\" on the right side of the page.     You will be asked to enter the access code listed below, as well as some personal information. Please follow the directions to create your username and password.     Your access code is: [email or web address]  Expires: 3/22/2017  9:30 AM     Your access code will  in 90 days. If you need help or a new code, please call your [facility] clinic or [phone].        Care EveryWhere ID     This is your Care EveryWhere ID. This could be used by other organizations to access your [facility] medical records  [id number]           Review of your medicines      CONTINUE these medicines which have NOT CHANGED        Dose / Directions    loratadine 10 MG tablet   Commonly known as:  CLARITIN   Used for:  Cough        Dose:  10 mg   Take 1 tablet (10 mg) by mouth daily   Quantity:  20 tablet   Refills:  1       ondansetron 4 MG ODT tab   Commonly known as:  ZOFRAN-ODT   Used for:  Nausea/vomiting in pregnancy        Dose:  4 mg   Take 1 tablet (4 mg) by mouth every 6 hours as needed for nausea   Quantity:  40 tablet   Refills:  1       prenatal multivitamin  plus iron 27-0.8 MG Tabs per tablet   Used for:  Recurrent pregnancy loss, delivered        Dose:  1 tablet   Take 1 tablet by mouth daily   Quantity:  100 tablet   Refills:  3       ranitidine 150 MG tablet   Commonly known as:  ZANTAC   Used for:  Gastroesophageal reflux disease without esophagitis        Dose:  150 mg   Take 1 tablet (150 mg) by mouth 2 times daily   Quantity:  60 tablet   Refills:  1                Protect others around you: Learn how to safely use, store and throw away your medicines at www.disposemymeds.org.             Medication List: This is a list of all your medications and when to take them. Check marks below indicate your daily home schedule. Keep this list as a " reference.      Medications           Morning Afternoon Evening Bedtime As Needed    loratadine 10 MG tablet   Commonly known as:  CLARITIN   Take 1 tablet (10 mg) by mouth daily                                ondansetron 4 MG ODT tab   Commonly known as:  ZOFRAN-ODT   Take 1 tablet (4 mg) by mouth every 6 hours as needed for nausea                                prenatal multivitamin  plus iron 27-0.8 MG Tabs per tablet   Take 1 tablet by mouth daily                                ranitidine 150 MG tablet   Commonly known as:  ZANTAC   Take 1 tablet (150 mg) by mouth 2 times daily                                          More Information        Recuento de patadas  Es normal que le preocupe la chino de martin bebé. Para saber si el bebé está elissa, usted puede anotar las veces que usted siente avelina pataditas en un registro de movimientos todos los días. Normalmente es posible sentir que el bebé se mueve a partir de la semana 20 del embarazo. No olvide llevar los registros de los movimientos del bebé a todas las citas que tenga con martin proveedor de atención médica.     Cómo contar los movimientos    Escoja justa hora cuando el bebé esté activo, tatiana por ejemplo después de justa comida.    Siéntese cómodamente o acuéstese de lado.    La primera vez que el bebé se mueva, anote la hora.    Cuente cada movimiento hasta que el bebé se haya movido 10 veces. ([place] puede llevar entre 20 minutos y 2 horas.)    Si el bebé no se ha movido 4 veces en 1 hora, dése justa palmadita en el abdomen para despertarlo.    Anote la hora en que sienta el décimo movimiento del bebé.    Trate de hacer esto a la misma hora todos los días.  Cuándo debe llamar al proveedor de atención médica  Llame a martin proveedor de atención médica en el acto en cualquiera de los siguientes casos:     Si el bebé se mueve menos de 10 veces en 1 horas mientras usted está llevando la cuenta de las pataditas.    Si el bebé se mueve con mucha menos frecuencia que en días  anteriores.    Si usted no ha sentido movimientos del bebé en todo el día.    2716-0717 [facility], [address] [place] [address], [place] [address]. Todos los derechos reservados. Esta información no pretende sustituir la atención médica profesional. Sólo martin médico puede diagnosticar y tratar un problema de chino.

## 2017-01-26 NOTE — Clinical Note
2017       RE: Dai Guadarrama  [address]  [place] [address]     Dear Colleague,    Thank you for referring your patient, Dai Guadarrama, to the [facility] at [facility]. Please see a copy of my visit note below.    31 year old,  , presents at 36 3/7 weeks in pregnancy complicated by h/o IUFD for biophysical assessment.    Fetal Breathing Movements (FBM): Normal - 2  Gross Body Movements (GBM): Normal - 2  Fetal Tone (FT): Normal - 2  AFV: Pocket of amniotic fluid > or = to 2 cm x 2 cm - 2  Quantitative Amniotic Fluid Volume Total: 10.3 cm      BPP 8/8.  FHR = 160bpm Normal.   MCA Not done.  NST Not done.     SIngle fetus in cephalic presentation.  Placenta anterior and grade 1.    Recommend continued weekly assessment.    ANGEL Nicholsno MD

## 2017-01-26 NOTE — PROGRESS NOTES
"Seen in triage last week with contractions, no evidence of labor and cervix closed. Continuing to have irregular contractions and pelvic pressure. Good movement, no lof, vb. Some swelling.     O: /75 mmHg  Pulse 106  Ht 1.562 m (5' 1.5\")  Wt 104.509 kg (230 lb 6.4 oz)  BMI 42.83 kg/m2  LMP 2016 (Approximate)  BPP 8/8, cephalic  Cvx: 1/long    A/P: 31 year old  @36w3d h/o IUFD  Reviewed labor precautions, has IOL scheduled for 39 weeks   GBS positive, discussed need for PCN in labor  RTC 1 week  Vicky Salazar MD    "

## 2017-01-26 NOTE — IP AVS SNAPSHOT
[facility]    [address]    [place] [address]    Phone:  [phone]                                       After Visit Summary   1/26/2017    Dai Guadarrama    MRN: [id number]           After Visit Summary Signature Page     I have received my discharge instructions, and my questions have been answered. I have discussed any challenges I see with this plan with the nurse or doctor.    ..........................................................................................................................................  Patient/Patient Representative Signature      ..........................................................................................................................................  Patient Representative Print Name and Relationship to Patient    ..................................................               ................................................  Date                                            Time    ..........................................................................................................................................  Reviewed by Signature/Title    ...................................................              ..............................................  Date                                                            Time

## 2017-01-27 VITALS
WEIGHT: 250 LBS | BODY MASS INDEX: 49.08 KG/M2 | TEMPERATURE: 97.6 F | HEART RATE: 106 BPM | HEIGHT: 60 IN | SYSTOLIC BLOOD PRESSURE: 96 MMHG | DIASTOLIC BLOOD PRESSURE: 58 MMHG | RESPIRATION RATE: 18 BRPM

## 2017-01-27 PROCEDURE — 99213 OFFICE O/P EST LOW 20 MIN: CPT | Mod: 25

## 2017-01-27 PROCEDURE — 59025 FETAL NON-STRESS TEST: CPT

## 2017-01-27 NOTE — PROVIDER NOTIFICATION
01/27/17 0000   Provider Notification   Provider Name/Title Kaci   Method of Notification In Department   Notification Reason Other (Comment)   Difficult to get cont. tracing and have not recorded a reactive NST as a result. Will extend monitoring and wait to discharge until obtain reactive NST.

## 2017-01-27 NOTE — DISCHARGE INSTRUCTIONS
Discharge Instruction for Undelivered Patients      You were seen for: Fetal Assessment  We Consulted: Dr Beck and Dr Clemons  You had (Test or Medicine):NST     Diet:   Drink 8 to 12 glasses of liquids (milk, juice, water) every day.  You may eat meals and snacks.     Activity:  Count fetal kicks everyday (see handout)  Call your doctor or nurse midwife if your baby is moving less than usual.     Call your provider if you notice:  Swelling in your face or increased swelling in your hands or legs.  Headaches that are not relieved by Tylenol (acetaminophen).  Changes in your vision (blurring: seeing spots or stars.)  Nausea (sick to your stomach) and vomiting (throwing up).   Weight gain of 5 pounds or more per week.  Heartburn that doesn't go away.  Signs of bladder infection: pain when you urinate (use the toilet), need to go more often and more urgently.  The bag of parada (rupture of membranes) breaks, or you notice leaking in your underwear.  Bright red blood in your underwear.  Abdominal (lower belly) or stomach pain.  For first baby: Contractions (tightening) less than 5 minutes apart for one hour or more.  Increase or change in vaginal discharge (note the color and amount)      Follow-up:  As scheduled in the clinic

## 2017-01-27 NOTE — PLAN OF CARE
Data: [facility]32.   Reason for maternal/fetal assessment per patient is Decreased Fetal Movement  . Patient is a . Prenatal record reviewed.      Obstetric History       T0      TAB0   SAB2   E0   M0   L0       # Outcome Date GA Lbr Spike/2nd Weight Sex Delivery Anes PTL Lv   4 Current            3   32w0d   F I.U. FETAL D   ND   2 SAB            1 SAB               Obstetric Comments   No children currently      Medical History:   Past Medical History   Diagnosis Date     Stillbirth      32 weeks     Recurrent pregnancy loss (CODE)      Retained placenta      Pregnancy related nausea and vomiting, antepartum 2016     two ED visits.     Migraine with aura      Tachycardia      Aneurysm (H)    . Gestational Age 36w4d. VSS. Cervix: closed.  Fetal movement increased since Pt arrived. Patient denies backache, vaginal discharge, pelvic pressure, UTI symptoms, GI problems, bloody show, vaginal bleeding, headache, visual disturbances, epigastric or URQ pain, rupture of membranes. Support persons Kevin is present.  Action: Verbal consent for EFM. Triage assessment completed. EFM applied for decreased fetal movement. Uterine assessment shows occasional contraction. Fetal assessment: Presumed adequate fetal oxygenation documented (see flow record). Patient education pamphlets given on fetal movement counts and when to seek medical attention. Patient instructed to report change in fetal movement, vaginal leaking of fluid or bleeding, abdominal pain, or any concerns related to the pregnancy to her nurse/physician.   Response: Dr. Clemons and Dr Beck informed of c/o decreased fetal movement and lower abdominal pain. Plan per provider is discharge home after reactive NST. Will not repeat labs as they were done a couple of days age and WNL. Comfort measures for lower abdominal pain. Patient verbalized understanding of education and verbalized agreement with  plan. Discharged ambulatory at 0045.

## 2017-01-27 NOTE — PROGRESS NOTES
Data: Patient presented to [facility] at 2232.   Reason for maternal/fetal assessment per patient is Decreased Fetal Movement  .  Patient is a . Prenatal record reviewed.      Obstetric History       T0      TAB0   SAB2   E0   M0   L0       # Outcome Date GA Lbr Spike/2nd Weight Sex Delivery Anes PTL Lv   4 Current            3   32w0d   F I.U. FETAL D   ND   2 SAB            1 SAB               Obstetric Comments   No children currently   . Medical history:   Past Medical History   Diagnosis Date     Stillbirth      32 weeks     Recurrent pregnancy loss (CODE)      Retained placenta      Pregnancy related nausea and vomiting, antepartum 2016     two ED visits.     Migraine with aura      Tachycardia    . Gestational Age 36w3d. VSS. Patient reports decreased fetal movement, states she last felt the baby move 30 minutes ago. She also reports constant, throbbing lower abdominal pain. She states she is brett once and hour and they aren't too painful. Patient denies cramping, backache, vaginal discharge, pelvic pressure, UTI symptoms, GI problems, bloody show, vaginal bleeding, headache, visual disturbances, epigastric or URQ pain, abdominal pain, rupture of membranes. Support persons Perry present. She is eating and drinking normally.   Action: Verbal consent for EFM. Triage assessment completed. EFM applied. Uterine assessment TOCO. Fetal assessment: Presumed adequate fetal oxygenation documented (see flow record).   Response: Dr. Clemons informed of patient arrival and reported symptoms. Plan per provider is to continue fetal monitoring. Patient verbalized agreement with plan. Oriented to room and call light.

## 2017-01-27 NOTE — PROGRESS NOTES
"[facility]  OB Triage Note    CC:       Contractions, decreased fetal movement     HPI:      Ms. Dai Guadarrama is a 31 year old  at 36w3d by LMP c/w 7w3d US who presents to triage with decreased fetal movement. Ms. Granados reports that her baby is typically very active at night and she has not yet felt her move this evening. She also notes rare contractions and a pressure/pain sensation across her entire uterus. She is very nervous because she was told she was 1cm dilated in clinic. She is worried this is putting her baby at risk of IUFD. She denies any loss of fluid, vaginal bleeding or leaking, or vaginal discharge. She denies any urinary symptoms. Eating and drinking without issue. Feeling tired.     Obstetric Complications  - Hx of IUFD at 32wga  - Placenta previa, now resolved  - Morbid obesity, BMI 42  - Failed GCT, passed GTT  - Small, 3x2mm Aneurysm. Per neurology, surgical treatment not indicated per neurology; she is cleared to labor and bear down for delivery.    O:  VS: /69 mmHg  Pulse 106  Temp(Src) 97.6  F (36.4  C) (Oral)  Resp 18  Ht 1.52 m (4' 11.84\")  Wt 113.399 kg (250 lb)  BMI 49.08 kg/m2  LMP 2016 (Approximate)  Gen:     Well-appearing, NAD  CV:       Mild tachycardia   Pulm:    Breathing comfortably on room air  Abd:     Soft, gravid, non-tender    Cervix:  External cervical os 1cm dilated, internal cervical os closed/long/high without a presenting part.   FHT:     , mod fermin, + accels, no decels  [place]:    none    Labs: None    A/P:  31 year old  at 36w3d by LMP c/w 7w3d US who presents to triage for evaluation of decreased fetal movement. FHT Cat 1 reactive and patient noted fetal movement at the end of her triage evaluation.   Serial cervical exams revealed cervix is closed/long/high. Tocometry quiet.   Previous triage evaluation on  with UA, wet prep and GC/Chlam negative - no indication to repeat these examinations " at this time.     Labor precautions discussed.      Follow-up as scheduled 2/2/2017.   Discussed with Dr. Beck.     Nichole Clemons MD  Ob/Gyn, PGY-3  1/26/2017          Appreciate Dr. Clemons' note above, patient's history and physical exam findings reviewed by me. I agree with the note above.   Joya Beck MD

## 2017-02-02 ENCOUNTER — HOSPITAL ENCOUNTER (INPATIENT)
Facility: CLINIC | Age: 32
LOS: 3 days | Discharge: HOME HEALTH | End: 2017-02-05
Attending: OBSTETRICS & GYNECOLOGY | Admitting: OBSTETRICS & GYNECOLOGY
Payer: COMMERCIAL

## 2017-02-02 ENCOUNTER — OFFICE VISIT (OUTPATIENT)
Dept: OBGYN | Facility: CLINIC | Age: 32
End: 2017-02-02
Attending: OBSTETRICS & GYNECOLOGY
Payer: COMMERCIAL

## 2017-02-02 VITALS
BODY MASS INDEX: 44.76 KG/M2 | DIASTOLIC BLOOD PRESSURE: 71 MMHG | SYSTOLIC BLOOD PRESSURE: 105 MMHG | HEART RATE: 104 BPM | WEIGHT: 228 LBS

## 2017-02-02 DIAGNOSIS — O26.21 HIGH RISK PREGNANCY IN FIRST TRIMESTER DUE TO RECURRENT PREGNANCY LOSS: Primary | ICD-10-CM

## 2017-02-02 DIAGNOSIS — O09.291 HISTORY OF INTRAUTERINE FETAL DEATH, CURRENTLY PREGNANT IN FIRST TRIMESTER: ICD-10-CM

## 2017-02-02 DIAGNOSIS — O26.20 RECURRENT PREGNANCY LOSS, DELIVERED: ICD-10-CM

## 2017-02-02 DIAGNOSIS — O09.93 HRP (HIGH RISK PREGNANCY), THIRD TRIMESTER: Primary | ICD-10-CM

## 2017-02-02 DIAGNOSIS — D62 ANEMIA DUE TO BLOOD LOSS, ACUTE: ICD-10-CM

## 2017-02-02 PROBLEM — O28.3 ABNORMAL FETAL ULTRASOUND: Status: ACTIVE | Noted: 2017-02-02

## 2017-02-02 LAB
ABO + RH BLD: NORMAL
ABO + RH BLD: NORMAL
ALBUMIN UR-MCNC: NEGATIVE MG/DL
APPEARANCE UR: CLEAR
BASOPHILS # BLD AUTO: 0 10E9/L (ref 0–0.2)
BASOPHILS NFR BLD AUTO: 0.2 %
BILIRUB UR QL STRIP: NEGATIVE
BLD GP AB SCN SERPL QL: NORMAL
BLOOD BANK CMNT PATIENT-IMP: NORMAL
COLOR UR AUTO: NORMAL
DIFFERENTIAL METHOD BLD: ABNORMAL
EOSINOPHIL # BLD AUTO: 0.1 10E9/L (ref 0–0.7)
EOSINOPHIL NFR BLD AUTO: 0.8 %
ERYTHROCYTE [DISTWIDTH] IN BLOOD BY AUTOMATED COUNT: 15.6 % (ref 10–15)
GLUCOSE UR STRIP-MCNC: NEGATIVE MG/DL
HCT VFR BLD AUTO: 35.5 % (ref 35–47)
HGB BLD-MCNC: 11.2 G/DL (ref 11.7–15.7)
HGB UR QL STRIP: NEGATIVE
IMM GRANULOCYTES # BLD: 0.1 10E9/L (ref 0–0.4)
IMM GRANULOCYTES NFR BLD: 0.9 %
KETONES UR STRIP-MCNC: NEGATIVE MG/DL
LEUKOCYTE ESTERASE UR QL STRIP: NEGATIVE
LYMPHOCYTES # BLD AUTO: 2.5 10E9/L (ref 0.8–5.3)
LYMPHOCYTES NFR BLD AUTO: 25.8 %
MCH RBC QN AUTO: 28.4 PG (ref 26.5–33)
MCHC RBC AUTO-ENTMCNC: 31.5 G/DL (ref 31.5–36.5)
MCV RBC AUTO: 90 FL (ref 78–100)
MONOCYTES # BLD AUTO: 0.4 10E9/L (ref 0–1.3)
MONOCYTES NFR BLD AUTO: 3.8 %
NEUTROPHILS # BLD AUTO: 6.6 10E9/L (ref 1.6–8.3)
NEUTROPHILS NFR BLD AUTO: 68.5 %
NITRATE UR QL: NEGATIVE
NRBC # BLD AUTO: 0 10*3/UL
NRBC BLD AUTO-RTO: 0 /100
PH UR STRIP: 6.5 PH (ref 5–7)
PLATELET # BLD AUTO: 384 10E9/L (ref 150–450)
RBC # BLD AUTO: 3.95 10E12/L (ref 3.8–5.2)
SP GR UR STRIP: 1.01 (ref 1–1.03)
SPECIMEN EXP DATE BLD: NORMAL
URN SPEC COLLECT METH UR: NORMAL
UROBILINOGEN UR STRIP-MCNC: NORMAL MG/DL (ref 0–2)
WBC # BLD AUTO: 9.6 10E9/L (ref 4–11)

## 2017-02-02 PROCEDURE — 12000032 ZZH R&B OB CRITICAL UMMC

## 2017-02-02 PROCEDURE — 76819 FETAL BIOPHYS PROFIL W/O NST: CPT | Mod: ZF

## 2017-02-02 PROCEDURE — 86850 RBC ANTIBODY SCREEN: CPT | Performed by: OBSTETRICS & GYNECOLOGY

## 2017-02-02 PROCEDURE — 85025 COMPLETE CBC W/AUTO DIFF WBC: CPT | Performed by: OBSTETRICS & GYNECOLOGY

## 2017-02-02 PROCEDURE — 86901 BLOOD TYPING SEROLOGIC RH(D): CPT | Performed by: OBSTETRICS & GYNECOLOGY

## 2017-02-02 PROCEDURE — 86900 BLOOD TYPING SEROLOGIC ABO: CPT | Performed by: OBSTETRICS & GYNECOLOGY

## 2017-02-02 PROCEDURE — 99212 OFFICE O/P EST SF 10 MIN: CPT | Mod: ZF

## 2017-02-02 PROCEDURE — 86780 TREPONEMA PALLIDUM: CPT | Performed by: OBSTETRICS & GYNECOLOGY

## 2017-02-02 PROCEDURE — 36415 COLL VENOUS BLD VENIPUNCTURE: CPT | Performed by: OBSTETRICS & GYNECOLOGY

## 2017-02-02 PROCEDURE — 25900017 H RX MED GY IP 259 OP 259 PS 637: Performed by: OBSTETRICS & GYNECOLOGY

## 2017-02-02 PROCEDURE — 81003 URINALYSIS AUTO W/O SCOPE: CPT | Performed by: OBSTETRICS & GYNECOLOGY

## 2017-02-02 RX ORDER — NALOXONE HYDROCHLORIDE 0.4 MG/ML
.1-.4 INJECTION, SOLUTION INTRAMUSCULAR; INTRAVENOUS; SUBCUTANEOUS
Status: DISCONTINUED | OUTPATIENT
Start: 2017-02-02 | End: 2017-02-04

## 2017-02-02 RX ORDER — OXYTOCIN/0.9 % SODIUM CHLORIDE 30/500 ML
100-340 PLASTIC BAG, INJECTION (ML) INTRAVENOUS CONTINUOUS PRN
Status: DISCONTINUED | OUTPATIENT
Start: 2017-02-02 | End: 2017-02-04

## 2017-02-02 RX ORDER — ONDANSETRON 2 MG/ML
4 INJECTION INTRAMUSCULAR; INTRAVENOUS EVERY 6 HOURS PRN
Status: DISCONTINUED | OUTPATIENT
Start: 2017-02-02 | End: 2017-02-04

## 2017-02-02 RX ORDER — PENICILLIN G POTASSIUM 5000000 [IU]/1
5 INJECTION, POWDER, FOR SOLUTION INTRAMUSCULAR; INTRAVENOUS ONCE
Status: COMPLETED | OUTPATIENT
Start: 2017-02-02 | End: 2017-02-03

## 2017-02-02 RX ORDER — OXYTOCIN 10 [USP'U]/ML
10 INJECTION, SOLUTION INTRAMUSCULAR; INTRAVENOUS
Status: DISCONTINUED | OUTPATIENT
Start: 2017-02-02 | End: 2017-02-04

## 2017-02-02 RX ORDER — ACETAMINOPHEN 325 MG/1
650 TABLET ORAL EVERY 4 HOURS PRN
Status: DISCONTINUED | OUTPATIENT
Start: 2017-02-02 | End: 2017-02-04

## 2017-02-02 RX ORDER — LIDOCAINE 40 MG/G
CREAM TOPICAL
Status: DISCONTINUED | OUTPATIENT
Start: 2017-02-02 | End: 2017-02-04

## 2017-02-02 RX ORDER — METHYLERGONOVINE MALEATE 0.2 MG/ML
200 INJECTION INTRAVENOUS
Status: DISCONTINUED | OUTPATIENT
Start: 2017-02-02 | End: 2017-02-04

## 2017-02-02 RX ORDER — PRENATAL VIT/IRON FUM/FOLIC AC 27MG-0.8MG
1 TABLET ORAL DAILY
Qty: 100 TABLET | Refills: 3 | Status: CANCELLED | OUTPATIENT
Start: 2017-02-02

## 2017-02-02 RX ORDER — OXYCODONE AND ACETAMINOPHEN 5; 325 MG/1; MG/1
1 TABLET ORAL
Status: DISCONTINUED | OUTPATIENT
Start: 2017-02-02 | End: 2017-02-04

## 2017-02-02 RX ORDER — MISOPROSTOL 100 UG/1
25 TABLET ORAL
Status: DISCONTINUED | OUTPATIENT
Start: 2017-02-02 | End: 2017-02-04

## 2017-02-02 RX ORDER — CARBOPROST TROMETHAMINE 250 UG/ML
250 INJECTION, SOLUTION INTRAMUSCULAR
Status: DISCONTINUED | OUTPATIENT
Start: 2017-02-02 | End: 2017-02-04

## 2017-02-02 RX ORDER — IBUPROFEN 800 MG/1
800 TABLET, FILM COATED ORAL
Status: DISCONTINUED | OUTPATIENT
Start: 2017-02-02 | End: 2017-02-04

## 2017-02-02 RX ORDER — FENTANYL CITRATE 50 UG/ML
50-100 INJECTION, SOLUTION INTRAMUSCULAR; INTRAVENOUS
Status: DISCONTINUED | OUTPATIENT
Start: 2017-02-02 | End: 2017-02-04

## 2017-02-02 RX ADMIN — Medication 25 MCG: at 20:40

## 2017-02-02 RX ADMIN — Medication 25 MCG: at 22:48

## 2017-02-02 ASSESSMENT — PAIN SCALES - GENERAL: PAINLEVEL: MILD PAIN (2)

## 2017-02-02 NOTE — Clinical Note
2017       RE: Dai Guadarrama  [address]  [place] [address]     Dear Colleague,    Thank you for referring your patient, Dai Guadarrama, to the [facility] at [facility]. Please see a copy of my visit note below.    Seen in triage for contractions a few times, still uncomfortable and not sleeping well. Good movement.   O: /71 mmHg  Pulse 104  Wt 103.42 kg (228 lb)  LMP 2016 (Approximate)  Breastfeeding? No  BPP , YARITZA 6 (<5%ile for GA)  A/P: 31 year old  @37w3d HRP  [place]: recommend IOL, patient agreeable  GBS negative  Will go home to get things and present to BP this evening.     Vicky Salazar MD

## 2017-02-02 NOTE — IP AVS SNAPSHOT
MRN:[id number]                      After Visit Summary   2/2/2017    Dai Guadarrama    MRN: [id number]           Thank you!     Thank you for choosing [facility] for your care. Our goal is always to provide you with excellent care. Hearing back from our patients is one way we can continue to improve our services. Please take a few minutes to complete the written survey that you may receive in the mail after you visit with us. Thank you!        Patient Information     Date Of Birth          1985        About your hospital stay     You were admitted on:  February 2, 2017 [facility]    You were discharged on:  February 5, 2017       Who to Call     For medical emergencies, please call 911.  For non-urgent questions about your medical care, please call your primary care provider or clinic, None          Attending Provider     Provider    Vicky Salazar MD [place], MD Renan Diaz, Maria Eugenia Hare MD       Primary Care Provider    Physician No Ref-Primary       No address on file        Your next 10 appointments already scheduled     Feb 06, 2017  8:30 AM   [facility] with Elissa Peralta    Services Department ([facility])    [address] [place] [id number]               Feb 09, 2017  3:00 PM   ULTRASOUND with [facility] ULTRASOUND   [facility] ([facility])    [place] [facility] [address]  [address] [place] [id number]   [phone]            Feb 09, 2017  3:30 PM   RETURN OB with Madelyn Awad MD   [facility] ([facility])    [place] [facility] [address]  [address] [place] [id number]   [phone]            Feb 16, 2017  3:00 PM   ULTRASOUND with [facility] ULTRASOUND   [facility] ([facility]  [facility] [address]  [address]  [address]  [facility] [address]   [phone]            Feb 16, 2017  3:30 PM   RETURN OB with Sharmin Louis MD   [facility] ([facility])    [place] [facility] [address]  [address]  [facility] [address]   [phone]            Feb 23, 2017  3:00 PM   ULTRASOUND with [facility] ULTRASOUND   [facility] ([facility])    [place] [facility] [address]  [address]  [address]  [facility] [address]   [phone]            Feb 23, 2017  3:30 PM   RETURN OB with Silvina Barrera MD   [facility] ([facility])    [place] [facility] [address]  [address]  [address]  [facility] [address]   [phone]              Further instructions from your care team       Vaginal Delivery Discharge Instructions: Setswana  Actividad:     Pida a los miembros de martin charlene y amigos que la ayuden cuando lo necesite.    No ponga nada en martin vagina hasta que martin médico lo permita.    Tómese las próximas semanas con calma para que martin cuerpo tenga tiempo de recuperarse. En chuck momento puede hacer cualquier actividad que sienta que puede.    No conduzca si está tomando píldoras para el dolor recetadas por martin médico. Puede conducir si está tomando píldoras de venta mark para el dolor.    Llame a martin proveedor de atención médica si tiene alguno de estos síntomas:    Empapa justa toalla femenina con ermelinda en el correr de 1 hora o ve coágulos más grandes que justa pelota de golf.    Sangrado que dura más de 6 semanas.    Tiene justa secreción vaginal que huele mal.     Fiebre de 100.4  F (38  C) o más (temperatura tomada bajo martin lengua) con o sin escalofríos     Dolor, calambres o sensibilidad graves en la región inferior de martin vientre.    Aumento del dolor, hinchazón, enrojecimiento o líquido alrededor de avelina puntos.    Necesidad más frecuente o urgente  de orinar (hacer pis), o ardor al hacerlo.    Enrojecimiento, hinchazón o dolor alrededor de justa vena en martin pierna.    Problemas para amamantar o un área enrojecida o dolorosa en martin pecho.    Dolor que aumenta o no se va de justa episiotomía o desgarro en el perineo.    Náuseas y vómitos    Dolor en el pecho y tos o dificultad para respirar.    Problemas para manejar la tristeza, ansiedad o depresión.     Si le preocupa hacerse daño o hacerle daño al bebé, llame al médico de inmediato.     Tiene preguntas o inquietudes después de regresar a casa.    Mantenga avelina celestina limpias:  Lávese siempre las celestina antes de tocar el área de martin perineo y los puntos.  Griffith ayuda a reducir martin riesgo de infección.  Si avelina celestina no están sucias, puede usar un gel de alcohol para limpiarse las celestina. Mantenga avelina uñas cortas y limpias.      Vaginal Delivery Discharge Instructions  Activity:     Ask family and friends for help when you need it.    Do not place anything in your vagina until your doctor approves.    Take it easy for the next few weeks to allow your body to recover. You may do any activities you feel up to at that point.    Do not drive while taking pain pills prescribed by your doctor. You may drive if taking over-the-counter pain pills.    Call your health care provider if you have any of these symptoms:    You soak a sanitary pad with blood within 1 hour, or you see blood clots larger than a golf ball.    Bleeding that lasts more than 6 weeks.    You have vaginal discharge that smells bad.     A fever of 100.4  F (38  C) or higher (temperature taken under your tongue), with or without chills     Severe, pain, cramping or tenderness in your lower belly area.    Increased pain, swelling, redness or fluid around your stitches.    A more frequent or urgent need to urinate (pee), or it burns when you pee.    Redness, swelling or pain around a vein in your leg.    Problems breastfeeding, or a red or painful area on your  breast.    Pain that increases or does not go away from an episiotomy or perineal tear.    Nausea and vomiting.    Chest pain and cough or are gasping for air.    Problems coping with sadness, anxiety, or depression.     If you have any concerns about hurting yourself or the baby, call your doctor right away.     You have questions or concerns after you return home.    Keep your hands clean:  Always wash your hands before touching your perineal area and stitches.  This helps reduce your risk of infection.  If your hands aren t dirty, you may use an alcohol hand-rub to clean your hands. Keep your nails clean and short.      Postpartum Vaginal Delivery Instructions    Activity       Ask family and friends for help when you need it.    Do not place anything in your vagina for 6 weeks.    You are not restricted on other activities, but take it easy for a few weeks to allow your body to recover from delivery.  You are able to do any activities you feel up to that point.    No driving until you have stopped taking your pain medications (usually two weeks after delivery).     Call your health care provider if you have any of these symptoms:       Increased pain, swelling, redness, or fluid around your stiches from an episiotomy or perineal tear.    A fever above 100.4 F (38 C) with or without chills when placing a thermometer under your tongue.    You soak a sanitary pad with blood within 1 hour, or you see blood clots larger than a golf ball.    Bleeding that lasts more than 6 weeks.    Vaginal discharge that smells bad.    Severe pain, cramping or tenderness in your lower belly area.    A need to urinate more frequently (use the toilet more often), more urgently (use the toilet very quickly), or it burns when you urinate.    Nausea and vomiting.    Redness, swelling or pain around a vein in your leg.    Problems breastfeeding or a red or painful area on your breast.    Chest pain and cough or are gasping for  "air.    Problems coping with sadness, anxiety, or depression.  If you have any concerns about hurting yourself or the baby, call your provider immediately.     You have questions or concerns after you return home.     Keep your hands clean:  Always wash your hands before touching your perineal area and stitches.  This helps reduce your risk of infection.  If your hands aren't dirty, you may use an alcohol hand-rub to clean your hands. Keep your nails clean and short.        Pending Results     No orders found from 2017 to 2/3/2017.            Statement of Approval     Ordered          17 1023  I have reviewed and agree with all the recommendations and orders detailed in this document.   EFFECTIVE NOW     Approved and electronically signed by:  Maria Eugenia Urrutia MD             Admission Information        Provider Department Dept Phone    2017 Maria Eugenia Urrutia MD [facility] [phone]      Your Vitals Were     Blood Pressure Pulse Temperature Respirations    113/65 mmHg 87 98.3  F (36.8  C) (Oral) 20    Height Weight BMI (Body Mass Index) Pulse Oximetry    1.6 m (5' 2.99\") 102.967 kg (227 lb) 40.22 kg/m2 98%    Last Period             2016 (Approximate)         MyChart Information     [facility] lets you send messages to your doctor, view your test results, renew your prescriptions, schedule appointments and more. To sign up, go to [email or web address] . Click on \"Log in\" on the left side of the screen, which will take you to the Welcome page. Then click on \"Sign up Now\" on the right side of the page.     You will be asked to enter the access code listed below, as well as some personal information. Please follow the directions to create your username and password.     Your access code is: [email or web address]  Expires: 3/22/2017  9:30 AM     Your access code will  in 90 days. If you need help or a new code, please call your [place] clinic or [phone].        Care EveryWhere ID  "    This is your Care EveryWhere ID. This could be used by other organizations to access your [place] medical records  [id number]           Review of your medicines      START taking        Dose / Directions    ferrous sulfate 325 (65 FE) MG tablet   Commonly known as:  IRON SUPPLEMENT   Used for:  Anemia due to blood loss, acute        Dose:  325 mg   Take 1 tablet (325 mg) by mouth 2 times daily   Quantity:  90 tablet   Refills:  0       ibuprofen 400 MG tablet   Commonly known as:  ADVIL/MOTRIN   Used for:   (normal spontaneous vaginal delivery)        Dose:  400-800 mg   Take 1-2 tablets (400-800 mg) by mouth every 6 hours as needed for other (cramping)   Quantity:  60 tablet   Refills:  1       senna-docusate 8.6-50 MG per tablet   Commonly known as:  SENOKOT-S;PERICOLACE   Used for:   (normal spontaneous vaginal delivery)        Dose:  1-2 tablet   Take 1-2 tablets by mouth 2 times daily   Quantity:  60 tablet   Refills:  1         CONTINUE these medicines which have NOT CHANGED        Dose / Directions    prenatal multivitamin  plus iron 27-0.8 MG Tabs per tablet   Used for:  Recurrent pregnancy loss, delivered        Dose:  1 tablet   Take 1 tablet by mouth daily   Quantity:  100 tablet   Refills:  3       ranitidine 150 MG tablet   Commonly known as:  ZANTAC   Used for:  Gastroesophageal reflux disease without esophagitis        Dose:  150 mg   Take 1 tablet (150 mg) by mouth 2 times daily   Quantity:  60 tablet   Refills:  1            Where to get your medicines      These medications were sent to [facility] [place], [place] - [address]  [address] [address]     Phone:  [phone]    - ferrous sulfate 325 (65 FE) MG tablet  - ibuprofen 400 MG tablet  - senna-docusate 8.6-50 MG per tablet             Protect others around you: Learn how to safely use, store and throw away your medicines at www.disposemymeds.org.             Medication List: This is  a list of all your medications and when to take them. Check marks below indicate your daily home schedule. Keep this list as a reference.      Medications           Morning Afternoon Evening Bedtime As Needed    ferrous sulfate 325 (65 FE) MG tablet   Commonly known as:  IRON SUPPLEMENT   Take 1 tablet (325 mg) by mouth 2 times daily                                ibuprofen 400 MG tablet   Commonly known as:  ADVIL/MOTRIN   Take 1-2 tablets (400-800 mg) by mouth every 6 hours as needed for other (cramping)   Last time this was given:  800 mg on 2/5/2017  7:56 AM                                prenatal multivitamin  plus iron 27-0.8 MG Tabs per tablet   Take 1 tablet by mouth daily                                ranitidine 150 MG tablet   Commonly known as:  ZANTAC   Take 1 tablet (150 mg) by mouth 2 times daily   Last time this was given:  150 mg on 2/3/2017  6:34 PM                                senna-docusate 8.6-50 MG per tablet   Commonly known as:  SENOKOT-S;PERICOLACE   Take 1-2 tablets by mouth 2 times daily   Last time this was given:  2 tablets on 2/5/2017  7:56 AM

## 2017-02-02 NOTE — PROGRESS NOTES
31 year old,  , presents at 37 3/7 weeks in pregnancy complicated by h/o IUFD for biophysical assessment.    Fetal Breathing Movements (FBM): Normal - 2  Gross Body Movements (GBM): Normal - 2  Fetal Tone (FT): Normal - 2  AFV: Pocket of amniotic fluid > or = to 2 cm x 2 cm - 2  Quantitative Amniotic Fluid Volume Total: 6.6 cm. <5%      BPP 8/8.  FHR = 162bpm Normal.   MCA Not done.  NST Not done.     Single fetus in cephalic presentation.  Placenta anterior and grade 1.    Recommend induction of labor given oligohydramnios.    ANGEL Watson MD

## 2017-02-02 NOTE — Clinical Note
2017       RE: Dai Guadarrama  [address]  [facility] [address]     Dear Colleague,    Thank you for referring your patient, Dai Guadarrama, to the [facility] at [facility]. Please see a copy of my visit note below.    31 year old,  , presents at 37 3/7 weeks in pregnancy complicated by h/o IUFD for biophysical assessment.    Fetal Breathing Movements (FBM): Normal - 2  Gross Body Movements (GBM): Normal - 2  Fetal Tone (FT): Normal - 2  AFV: Pocket of amniotic fluid > or = to 2 cm x 2 cm - 2  Quantitative Amniotic Fluid Volume Total: 6.6 cm. <5%      BPP 8/8.  FHR = 162bpm Normal.   MCA Not done.  NST Not done.     Single fetus in cephalic presentation.  Placenta anterior and grade 1.    Recommend induction of labor given oligohydramnios.    ANGEL Watson MD

## 2017-02-02 NOTE — NURSING NOTE
Chief Complaint   Patient presents with     Prenatal Care     FREDY 37 weeks and 3 days   Alis Arora LPN

## 2017-02-02 NOTE — IP AVS SNAPSHOT
[facility]    [address] [facility] [address]    Phone:  [phone]                                       After Visit Summary   2/2/2017    Dai Guadarrama    MRN: [id number]           After Visit Summary Signature Page     I have received my discharge instructions, and my questions have been answered. I have discussed any challenges I see with this plan with the nurse or doctor.    ..........................................................................................................................................  Patient/Patient Representative Signature      ..........................................................................................................................................  Patient Representative Print Name and Relationship to Patient    ..................................................               ................................................  Date                                            Time    ..........................................................................................................................................  Reviewed by Signature/Title    ...................................................              ..............................................  Date                                                            Time

## 2017-02-03 ENCOUNTER — OFFICE VISIT (OUTPATIENT)
Dept: INTERPRETER SERVICES | Facility: CLINIC | Age: 32
End: 2017-02-03

## 2017-02-03 ENCOUNTER — ANESTHESIA (OUTPATIENT)
Dept: OBGYN | Facility: CLINIC | Age: 32
End: 2017-02-03
Payer: COMMERCIAL

## 2017-02-03 ENCOUNTER — ANESTHESIA EVENT (OUTPATIENT)
Dept: OBGYN | Facility: CLINIC | Age: 32
End: 2017-02-03
Payer: COMMERCIAL

## 2017-02-03 LAB — T PALLIDUM IGG+IGM SER QL: NEGATIVE

## 2017-02-03 PROCEDURE — 25000132 ZZH RX MED GY IP 250 OP 250 PS 637: Performed by: OBSTETRICS & GYNECOLOGY

## 2017-02-03 PROCEDURE — T1013 SIGN LANG/ORAL INTERPRETER: HCPCS | Mod: U3

## 2017-02-03 PROCEDURE — 25000125 ZZHC RX 250: Performed by: ANESTHESIOLOGY

## 2017-02-03 PROCEDURE — 25000125 ZZHC RX 250

## 2017-02-03 PROCEDURE — 25800025 ZZH RX 258: Performed by: OBSTETRICS & GYNECOLOGY

## 2017-02-03 PROCEDURE — 25000125 ZZHC RX 250: Performed by: OBSTETRICS & GYNECOLOGY

## 2017-02-03 PROCEDURE — 25000128 H RX IP 250 OP 636: Performed by: OBSTETRICS & GYNECOLOGY

## 2017-02-03 PROCEDURE — 12000032 ZZH R&B OB CRITICAL UMMC

## 2017-02-03 PROCEDURE — 25900017 H RX MED GY IP 259 OP 259 PS 637: Performed by: OBSTETRICS & GYNECOLOGY

## 2017-02-03 RX ORDER — ONDANSETRON 2 MG/ML
4 INJECTION INTRAMUSCULAR; INTRAVENOUS EVERY 6 HOURS PRN
Status: DISCONTINUED | OUTPATIENT
Start: 2017-02-03 | End: 2017-02-04

## 2017-02-03 RX ORDER — NALOXONE HYDROCHLORIDE 0.4 MG/ML
.1-.4 INJECTION, SOLUTION INTRAMUSCULAR; INTRAVENOUS; SUBCUTANEOUS
Status: DISCONTINUED | OUTPATIENT
Start: 2017-02-03 | End: 2017-02-04

## 2017-02-03 RX ORDER — OXYTOCIN 10 [USP'U]/ML
INJECTION, SOLUTION INTRAMUSCULAR; INTRAVENOUS
Status: COMPLETED
Start: 2017-02-03 | End: 2017-02-04

## 2017-02-03 RX ORDER — LIDOCAINE 40 MG/G
CREAM TOPICAL
Status: DISCONTINUED | OUTPATIENT
Start: 2017-02-03 | End: 2017-02-03

## 2017-02-03 RX ORDER — OXYTOCIN/0.9 % SODIUM CHLORIDE 30/500 ML
PLASTIC BAG, INJECTION (ML) INTRAVENOUS
Status: DISCONTINUED
Start: 2017-02-03 | End: 2017-02-04 | Stop reason: HOSPADM

## 2017-02-03 RX ORDER — TERBUTALINE SULFATE 1 MG/ML
0.25 INJECTION, SOLUTION SUBCUTANEOUS
Status: DISCONTINUED | OUTPATIENT
Start: 2017-02-03 | End: 2017-02-04

## 2017-02-03 RX ORDER — CALCIUM CARBONATE 500 MG/1
500 TABLET, CHEWABLE ORAL 3 TIMES DAILY PRN
Status: DISCONTINUED | OUTPATIENT
Start: 2017-02-03 | End: 2017-02-04

## 2017-02-03 RX ORDER — NALBUPHINE HYDROCHLORIDE 10 MG/ML
2.5-5 INJECTION, SOLUTION INTRAMUSCULAR; INTRAVENOUS; SUBCUTANEOUS EVERY 6 HOURS PRN
Status: DISCONTINUED | OUTPATIENT
Start: 2017-02-03 | End: 2017-02-04

## 2017-02-03 RX ORDER — EPHEDRINE SULFATE 50 MG/ML
5 INJECTION, SOLUTION INTRAMUSCULAR; INTRAVENOUS; SUBCUTANEOUS
Status: DISCONTINUED | OUTPATIENT
Start: 2017-02-03 | End: 2017-02-04

## 2017-02-03 RX ORDER — SODIUM CHLORIDE, SODIUM LACTATE, POTASSIUM CHLORIDE, CALCIUM CHLORIDE 600; 310; 30; 20 MG/100ML; MG/100ML; MG/100ML; MG/100ML
INJECTION, SOLUTION INTRAVENOUS CONTINUOUS
Status: DISCONTINUED | OUTPATIENT
Start: 2017-02-03 | End: 2017-02-04

## 2017-02-03 RX ORDER — ONDANSETRON 4 MG/1
4 TABLET, ORALLY DISINTEGRATING ORAL EVERY 6 HOURS PRN
Status: DISCONTINUED | OUTPATIENT
Start: 2017-02-03 | End: 2017-02-04

## 2017-02-03 RX ORDER — LIDOCAINE HYDROCHLORIDE AND EPINEPHRINE 15; 5 MG/ML; UG/ML
INJECTION, SOLUTION EPIDURAL PRN
Status: DISCONTINUED | OUTPATIENT
Start: 2017-02-03 | End: 2017-02-04 | Stop reason: HOSPADM

## 2017-02-03 RX ORDER — OXYTOCIN/0.9 % SODIUM CHLORIDE 30/500 ML
1-24 PLASTIC BAG, INJECTION (ML) INTRAVENOUS CONTINUOUS
Status: DISCONTINUED | OUTPATIENT
Start: 2017-02-03 | End: 2017-02-04

## 2017-02-03 RX ORDER — ALUMINA, MAGNESIA, AND SIMETHICONE 2400; 2400; 240 MG/30ML; MG/30ML; MG/30ML
30 SUSPENSION ORAL EVERY 4 HOURS PRN
Status: DISCONTINUED | OUTPATIENT
Start: 2017-02-03 | End: 2017-02-04

## 2017-02-03 RX ADMIN — SODIUM CHLORIDE, POTASSIUM CHLORIDE, SODIUM LACTATE AND CALCIUM CHLORIDE: 600; 310; 30; 20 INJECTION, SOLUTION INTRAVENOUS at 17:20

## 2017-02-03 RX ADMIN — RANITIDINE HYDROCHLORIDE 150 MG: 150 TABLET, FILM COATED ORAL at 18:34

## 2017-02-03 RX ADMIN — SODIUM CHLORIDE, POTASSIUM CHLORIDE, SODIUM LACTATE AND CALCIUM CHLORIDE: 600; 310; 30; 20 INJECTION, SOLUTION INTRAVENOUS at 11:10

## 2017-02-03 RX ADMIN — Medication 10 ML/HR: at 21:42

## 2017-02-03 RX ADMIN — Medication 25 MCG: at 03:00

## 2017-02-03 RX ADMIN — PENICILLIN G POTASSIUM 5 MILLION UNITS: 5000000 POWDER, FOR SOLUTION INTRAMUSCULAR; INTRAPLEURAL; INTRATHECAL; INTRAVENOUS at 11:10

## 2017-02-03 RX ADMIN — Medication 2.5 MILLION UNITS: at 23:08

## 2017-02-03 RX ADMIN — Medication 2.5 MILLION UNITS: at 19:20

## 2017-02-03 RX ADMIN — Medication 2.5 MILLION UNITS: at 15:15

## 2017-02-03 RX ADMIN — Medication 10 ML/HR: at 21:41

## 2017-02-03 RX ADMIN — Medication 25 MCG: at 00:52

## 2017-02-03 RX ADMIN — FENTANYL CITRATE 100 MCG: 50 INJECTION, SOLUTION INTRAMUSCULAR; INTRAVENOUS at 19:23

## 2017-02-03 RX ADMIN — OXYTOCIN-SODIUM CHLORIDE 0.9% IV SOLN 30 UNIT/500ML 2 MILLI-UNITS/MIN: 30-0.9/5 SOLUTION at 11:55

## 2017-02-03 RX ADMIN — LIDOCAINE HYDROCHLORIDE,EPINEPHRINE BITARTRATE 3 ML: 15; .005 INJECTION, SOLUTION EPIDURAL; INFILTRATION; INTRACAUDAL; PERINEURAL at 21:33

## 2017-02-03 RX ADMIN — SODIUM CHLORIDE, POTASSIUM CHLORIDE, SODIUM LACTATE AND CALCIUM CHLORIDE 250 ML: 600; 310; 30; 20 INJECTION, SOLUTION INTRAVENOUS at 21:27

## 2017-02-03 RX ADMIN — Medication 25 MCG: at 05:00

## 2017-02-03 RX ADMIN — FENTANYL CITRATE 100 MCG: 50 INJECTION, SOLUTION INTRAMUSCULAR; INTRAVENOUS at 16:30

## 2017-02-03 NOTE — PROVIDER NOTIFICATION
02/02/17 2242   Provider Notification   Provider Name/Title G2   Method of Notification Electronic Page   Request Evaluate - Remote   Notification Reason Status Update   Pt brett every 4-5 minutes. Spoke with G3 Dr. Clemons in department. Ok per Dr. Clemons for patient to have another dose of miso. Miso given at 2249.

## 2017-02-03 NOTE — PROGRESS NOTES
Seen in triage for contractions a few times, still uncomfortable and not sleeping well. Good movement.   O: /71 mmHg  Pulse 104  Wt 103.42 kg (228 lb)  LMP 2016 (Approximate)  Breastfeeding? No  BPP /, YARITZA 6 (<5%ile for GA)  A/P: 31 year old  @37w3d HRP  Oligo: recommend IOL, patient agreeable  GBS negative  Will go home to get things and present to BP this evening.     Vicky Salazar MD

## 2017-02-03 NOTE — PLAN OF CARE
Problem: Goal Outcome Summary  Goal: Goal Outcome Summary  Outcome: No Change  Patient came in this evening for IOL for oligohydramnios. Starting induction with cervical ripening. Oral miso given x2.  VSS. Category one tracing. Pt complaining of s.sx of UTI. Urine to be sent to lab for UA. Patient's  Perry is supportive and will spend the night here with her. Continue cares as ordered.

## 2017-02-03 NOTE — PROGRESS NOTES
[facility]  Labor Progress Note    Subjective:  Using fentanyl for pain with good relief.    Objective:   Patient Vitals for the past 4 hrs:   BP Temp Temp src Resp   17 1545 111/69 mmHg 98  F (36.7  C) Oral -   17 1515 114/60 mmHg - - 20   17 1430 111/67 mmHg - - -   17 1400 - 98.1  F (36.7  C) Oral -   17 1335 111/73 mmHg - - -     SVE: /-2    FHT: Baseline 145, moderate variability, accelerations present, no decelerations  [place]: q3-5 min contractions    A/P: 31 year old  at 37w4d by LMP c/w 7w3d US who presents for induction of labor due to YARITZA <5%ile  in the setting of an IUFD at 32wk in her previous pregnancy.  Her pregnancy is also complicated by: morbid obesity, failed GCT/Passed GTT, and history of small aneurysm for which she was cleared by neurology.    1. Labor  - s/p cytotec x5  - Now on pitocin, currently at 12mu. Continue titration.  - PCN started for GBS positive status.  - Anticipate   2. Pain: -per patient request  3.  PNC:   - Rh positive. Rubella immune. No intervention indicated.  - GBS positive. PCN in active labor.  4. Fetal Well Being   - Category I FHT. Reactive and reassuring   - Continue EFM and [place]    Mel Davila MD PGY3  Department of OB/GYN  2/3/2017

## 2017-02-03 NOTE — PROVIDER NOTIFICATION
02/03/17 0137   Provider Notification   Provider Name/Title Dr. Clemons   Method of Notification Phone   Notification Reason Status Update   Plan for a 4th dose of oral Miso at 0300 and SVE at 0500

## 2017-02-03 NOTE — PROGRESS NOTES
"G3 Strip Review:   VS: /76 mmHg  Temp(Src) 97.6  F (36.4  C) (Oral)  Resp 20  Ht 1.6 m (5' 2.99\")  Wt 102.967 kg (227 lb)  BMI 40.22 kg/m2  LMP 05/16/2016 (Approximate)  FHT: Baseline 130, mod variability, +accels, no decels  [place]: no contractions  Cat 1 FHT - continue po miso for cervical ripening  Nichoel Clemons  OB/GYN Resident  PGY-3  "

## 2017-02-03 NOTE — PROGRESS NOTES
[facility]  Labor Progress Note    Subjective:  Patient complains of increasing contractions    Objective:   Patient Vitals for the past 4 hrs:   BP Temp Temp src Resp   17 0755 109/59 mmHg 98.3  F (36.8  C) Oral -   17 0630 109/69 mmHg - - 20     SVE: 3/50/-3/ant/soft    FHT: Baseline 140, moderate variability, accelerations present, no decelerations  [place]: q3-4 min contractions    Assessment/Plan:  Ms. Dai Guadarrama is a 31 year old  at 37w4d by LMP c/w 7w3d US who presents for induction of labor due to YARITZA <5%ile  in the setting of an IUFD at 32wk in her previous pregnancy.  Her pregnancy is also complicated by: morbid obesity, failed GCT/Passed GTT, and history of small aneurysm for which she was cleared by neurology.    - Labor   - Admit to L&D for IOL d/t oligohydramnios and h/o IUFD.   - PNC:     - Rh positive. Rubella immune. No intervention indicated.    - GBS positive. PCN in active labor.   - Fen/GI: Clear liquid diet, IVF   - SVE: 3/50/-3. Membranes: intact   - Plan:     - S/p Cytotec x 5.    - Begin pitocin augmentation. Titrate per protocol.   - Pain management: undecided    -Fetal Well Being   - Category I FHT. Reactive and reassuring   - Continue EFM and [place]    Loretta Mccall MD  OB/GYN Resident, PGY-2  2/3/2017 9:06 AM

## 2017-02-03 NOTE — PLAN OF CARE
Problem: Goal Outcome Summary  Goal: Goal Outcome Summary  Outcome: Improving  Pt stable, VS WDL. Ctx increasing in discomfort for pt, declined pain medication. PO miso x3 throughout shift. SVE 2/50/-3 per Dr. Clemons.

## 2017-02-03 NOTE — PROVIDER NOTIFICATION
02/03/17 1350   Provider Notification   Provider Name/Title Dr Giovanni Garcia notified of pt c/o pressure and pain in bladder.  ml fluid bolus ordered and started.

## 2017-02-03 NOTE — H&P
[facility]  OB History and Physical      Dai Guadarrama MRN# [id number]   Age: 31 year old YOB: 1985     CC:  Induction of labor    HPI:  Ms. Dai Guadarrama is a 31 year old  at 37w3d by LMP c/w 7w3d US who presents for induction of labor due to YARITZA <5%ile  in the setting of an IUFD at 32wk in her previous pregnancy.  She reports feeling very well and is excited to be here for induction. She denies regular contractions, vaginal bleeding, and loss of fluid.   + normal fetal movement.    Pregnancy Complications:  - Hx of IUFD at 32wga  - Placenta previa, now resolved  - Morbid obesity, BMI 42  - Failed GCT, passed GTT  - Small, 3x2mm Aneurysm. Per neurology, surgical treatment not indicated per neurology; she is cleared to labor and bear down for delivery.    Prenatal Labs:   Lab Results   Component Value Date    ABO O 2016    RH  Pos 2016    AS Neg 2016    HEPBANG Nonreactive 2016    CHPCRT  2017     Negative   Negative for C. trachomatis rRNA by transcription mediated amplification.   A negative result by transcription mediated amplification does not preclude the   presence of C. trachomatis infection because results are dependent on proper   and adequate collection, absence of inhibitors, and sufficient rRNA to be   detected.      GCPCRT  2017     Negative   Negative for N. gonorrhoeae rRNA by transcription mediated amplification.   A negative result by transcription mediated amplification does not preclude the   presence of N. gonorrhoeae infection because results are dependent on proper   and adequate collection, absence of inhibitors, and sufficient rRNA to be   detected.      TREPAB Negative 2016    HGB 11.5* 2016       GBS Status:   Lab Results   Component Value Date    GBS * 2017     Positive  Positive: GBS DNA detected, presumed positive for GBS.   Assay performed on incubated broth culture  of specimen using mParticle real-time   PCR.         Ultrasounds  2017 7w3d Dating US, HORTENCIA 2017 MFM Comp 19w0d single living IUP, cephalic, anterior placenta - complete previa, YARITZA 12.4cm, normal anatomy  2016 29w3d BPP 8/8, placenta anterior 3.9cm from internal os  2017 37w2d 8/8 BPP, YARITZA 6/6cm, cephalic presentation       OB History  Obstetric History       T0      TAB0   SAB2   E0   M0   L0       # Outcome Date GA Lbr Spike/2nd Weight Sex Delivery Anes PTL Lv   4 Current            3   32w0d   F I.U. FETAL D   ND   2 SAB            1 SAB               Obstetric Comments   No children currently       PMHx:   Past Medical History   Diagnosis Date     Stillbirth      32 weeks     Recurrent pregnancy loss (CODE)      Retained placenta      Pregnancy related nausea and vomiting, antepartum 2016     two ED visits.     Migraine with aura      Tachycardia      Aneurysm (H)      PSHx:   Past Surgical History   Procedure Laterality Date     No history of surgery       C each add tooth extraction       bad reaction to anesthia for local      Meds:   Prescriptions prior to admission   Medication Sig Dispense Refill Last Dose     ranitidine (ZANTAC) 150 MG tablet Take 1 tablet (150 mg) by mouth 2 times daily 60 tablet 1 2017 at Unknown time     Prenatal Vit-Fe Fumarate-FA (PRENATAL MULTIVITAMIN  PLUS IRON) 27-0.8 MG TABS Take 1 tablet by mouth daily 100 tablet 3 2017 at Unknown time     Allergies:  No Known Allergies   FmHx:   Family History   Problem Relation Age of Onset     Anxiety Disorder No family hx of      MENTAL ILLNESS No family hx of      Substance Abuse No family hx of      Anesthesia Reaction No family hx of      Asthma No family hx of      OSTEOPOROSIS No family hx of      Genetic Disorder No family hx of      Thyroid Disease No family hx of      Hyperlipidemia No family hx of      CEREBROVASCULAR DISEASE No family hx of      Breast Cancer No family  "hx of      Colon Cancer No family hx of      Prostate Cancer No family hx of      Other Cancer No family hx of      Depression No family hx of      DIABETES No family hx of      Coronary Artery Disease No family hx of      Hypertension No family hx of      SocHx: She denies any tobacco, alcohol, or other drug use during this pregnancy.    ROS:   Complete 10-point ROS negative except as noted in HPI.     PE:  Vit: Patient Vitals for the past 4 hrs:   BP Temp Temp src Resp Height Weight   17 - 97.5  F (36.4  C) Oral 22 1.6 m (5' 2.99\") 102.967 kg (227 lb)   17 116/72 mmHg - - - - -      Gen: Well-appearing, NAD, comfortable   CV: Regular rate  Pulm: Breathing comfortably on room air   Abd: Soft, gravid, non-tender, abdominal obesity   Cx: C/long/high     Pres:  cephalic by BSUS  EFW:  7lbs by Leopold's   Memb: intact              FHT: Baseline 145, mod variability, + accelerations, no decelerations   [place]: no contractions in 10 minutes      Assessment  Ms. Dai Guadarrama is a 31 year old  at 37w3d by LMP c/w 7w3d US who presents for induction of labor due to decreased YARITZA (6.6cm) in the setting of an IUFD at 32wk in her previous pregnancy.     Plan  Admit to L&D. Place PIV. Draw T&S, CBC, RPR.   Labor: Anticipate . Plan for cervical ripening with po miso. Plan to recheck cervix once uncomfortable.   FWB: Category 1 FHT.  Continue EFM and toco  Pain: Discussed options, at maternal discretion.   GBS+: start PCN in active labor   Failed GCT, passed GTT  PNC: Rh +, Rubella immune, Placenta anterior   Fen/GI: Reg diet, prn anti-emetics     The patient was discussed with Dr. Salazar who is in agreement with the treatment plan.    Nichole Clemons  OB/GYN Resident  PGY-3    Appreciate note by Dr. Clemons. Patient has been seen and examined by me separate from the resident, agree with above note.     Vicky Salazar MD  11:02 PM      "

## 2017-02-03 NOTE — PROGRESS NOTES
[facility]  Labor Progress Note    Subjective:  In to see patient- she was sleeping soundly. Did not awaken.    Objective:   Patient Vitals for the past 4 hrs:   BP Temp Temp src   17 1230 116/72 mmHg - -   17 1155 103/63 mmHg - -   17 1130 - 98.2  F (36.8  C) Oral   17 1000 109/66 mmHg - -     SVE: 3/50/-3/ant/soft, did not recheck at this time    FHT: Baseline 145, moderate variability, accelerations present, no decelerations  [place]: q3-5 min contractions    A/P: 31 year old  at 37w4d by LMP c/w 7w3d US who presents for induction of labor due to YARITZA <5%ile  in the setting of an IUFD at 32wk in her previous pregnancy.  Her pregnancy is also complicated by: morbid obesity, failed GCT/Passed GTT, and history of small aneurysm for which she was cleared by neurology.    1. Labor  - s/p cytotec x5  - Now on pitocin, currently at 6mu. Continue titration.  - PCN started for GBS positive status.  - Anticipate   2. Pain: -per patient request  3.  PNC:   - Rh positive. Rubella immune. No intervention indicated.  - GBS positive. PCN in active labor.  4. Fetal Well Being   - Category I FHT. Reactive and reassuring   - Continue EFM and [place]    Mel Davila MD PGY3  Department of OB/GYN  2/3/2017 1:36 PM

## 2017-02-03 NOTE — PROGRESS NOTES
SPIRITUAL HEALTH SERVICES  SPIRITUAL ASSESSMENT Progress Note  [facility] ([facility]) Labor and Delivery     REFERRAL SOURCE: Patient request by listing hospital  at admission.    Dai declined [facility] support at this time but will let her RN know if she would like us to return.    PLAN: [facility] remains available for the duration of Dai's hospitalization.     Philomena Em  Chaplain Resident  Pager [phone]

## 2017-02-03 NOTE — PROVIDER NOTIFICATION
02/03/17 0435   Provider Notification   Provider Name/Title Dr. Clemons   Method of Notification At Bedside   Notification Reason SVE   2/50/-3  Plan for another oral Miso at 0500

## 2017-02-03 NOTE — PROGRESS NOTES
"Labor Progress Note:  S: Pt feeling some cramping. No LOF or vaginal bleeding.   VS: /73 mmHg  Temp(Src) 97.7  F (36.5  C) (Oral)  Resp 20  Ht 1.6 m (5' 2.99\")  Wt 102.967 kg (227 lb)  BMI 40.22 kg/m2  LMP 05/16/2016 (Approximate)  FHT: Baseline 150, mod variability, +accels, no decels  [place]: 1-2 ctx in 10 min   SVE: 2/50/-3, soft anterior   Cat 1 FHT - continue po miso for cervical ripening  Nichole Clemons  OB/GYN Resident  PGY-3    Staff MD Note    I appreciate the note by Dr. Clemons.  Any necessary changes have been made by me.  I evaluated the patient with the resident and agree with the assessment and plan.    Noreen Harris MD    "

## 2017-02-04 PROBLEM — O41.00X0 OLIGOHYDRAMNIOS: Status: ACTIVE | Noted: 2017-02-04

## 2017-02-04 LAB
APTT PPP: 32 SEC (ref 22–37)
ERYTHROCYTE [DISTWIDTH] IN BLOOD BY AUTOMATED COUNT: 15.5 % (ref 10–15)
FIBRINOGEN PPP-MCNC: 608 MG/DL (ref 200–420)
HCT VFR BLD AUTO: 35.9 % (ref 35–47)
HGB BLD-MCNC: 11.2 G/DL (ref 11.7–15.7)
INR PPP: 1.05 (ref 0.86–1.14)
MCH RBC QN AUTO: 28.1 PG (ref 26.5–33)
MCHC RBC AUTO-ENTMCNC: 31.2 G/DL (ref 31.5–36.5)
MCV RBC AUTO: 90 FL (ref 78–100)
PLATELET # BLD AUTO: 334 10E9/L (ref 150–450)
RBC # BLD AUTO: 3.99 10E12/L (ref 3.8–5.2)
WBC # BLD AUTO: 12.5 10E9/L (ref 4–11)

## 2017-02-04 PROCEDURE — T1013 SIGN LANG/ORAL INTERPRETER: HCPCS | Mod: U3

## 2017-02-04 PROCEDURE — 25000132 ZZH RX MED GY IP 250 OP 250 PS 637

## 2017-02-04 PROCEDURE — 85730 THROMBOPLASTIN TIME PARTIAL: CPT | Performed by: OBSTETRICS & GYNECOLOGY

## 2017-02-04 PROCEDURE — 3E0R3CZ INTRODUCTION OF REGIONAL ANESTHETIC INTO SPINAL CANAL, PERCUTANEOUS APPROACH: ICD-10-PCS | Performed by: OBSTETRICS & GYNECOLOGY

## 2017-02-04 PROCEDURE — 36415 COLL VENOUS BLD VENIPUNCTURE: CPT | Performed by: OBSTETRICS & GYNECOLOGY

## 2017-02-04 PROCEDURE — 25000132 ZZH RX MED GY IP 250 OP 250 PS 637: Performed by: OBSTETRICS & GYNECOLOGY

## 2017-02-04 PROCEDURE — 85610 PROTHROMBIN TIME: CPT | Performed by: OBSTETRICS & GYNECOLOGY

## 2017-02-04 PROCEDURE — 25000128 H RX IP 250 OP 636

## 2017-02-04 PROCEDURE — 25000125 ZZHC RX 250: Performed by: OBSTETRICS & GYNECOLOGY

## 2017-02-04 PROCEDURE — 25000128 H RX IP 250 OP 636: Performed by: OBSTETRICS & GYNECOLOGY

## 2017-02-04 PROCEDURE — 00HU33Z INSERTION OF INFUSION DEVICE INTO SPINAL CANAL, PERCUTANEOUS APPROACH: ICD-10-PCS | Performed by: OBSTETRICS & GYNECOLOGY

## 2017-02-04 PROCEDURE — 85384 FIBRINOGEN ACTIVITY: CPT | Performed by: OBSTETRICS & GYNECOLOGY

## 2017-02-04 PROCEDURE — 12000030 ZZH R&B OB INTERMEDIATE UMMC

## 2017-02-04 PROCEDURE — 10907ZC DRAINAGE OF AMNIOTIC FLUID, THERAPEUTIC FROM PRODUCTS OF CONCEPTION, VIA NATURAL OR ARTIFICIAL OPENING: ICD-10-PCS | Performed by: OBSTETRICS & GYNECOLOGY

## 2017-02-04 PROCEDURE — 72200001 ZZH LABOR CARE VAGINAL DELIVERY SINGLE

## 2017-02-04 PROCEDURE — 85027 COMPLETE CBC AUTOMATED: CPT | Performed by: OBSTETRICS & GYNECOLOGY

## 2017-02-04 RX ORDER — AMOXICILLIN 250 MG
1-2 CAPSULE ORAL 2 TIMES DAILY
Status: DISCONTINUED | OUTPATIENT
Start: 2017-02-04 | End: 2017-02-05 | Stop reason: HOSPADM

## 2017-02-04 RX ORDER — NALOXONE HYDROCHLORIDE 0.4 MG/ML
.1-.4 INJECTION, SOLUTION INTRAMUSCULAR; INTRAVENOUS; SUBCUTANEOUS
Status: DISCONTINUED | OUTPATIENT
Start: 2017-02-04 | End: 2017-02-05 | Stop reason: HOSPADM

## 2017-02-04 RX ORDER — ONDANSETRON 4 MG/1
4 TABLET, ORALLY DISINTEGRATING ORAL EVERY 6 HOURS PRN
Status: DISCONTINUED | OUTPATIENT
Start: 2017-02-04 | End: 2017-02-05 | Stop reason: HOSPADM

## 2017-02-04 RX ORDER — OXYCODONE HYDROCHLORIDE 5 MG/1
5-10 TABLET ORAL
Status: DISCONTINUED | OUTPATIENT
Start: 2017-02-04 | End: 2017-02-05 | Stop reason: HOSPADM

## 2017-02-04 RX ORDER — HYDROCORTISONE 2.5 %
CREAM (GRAM) TOPICAL 3 TIMES DAILY PRN
Status: DISCONTINUED | OUTPATIENT
Start: 2017-02-04 | End: 2017-02-05 | Stop reason: HOSPADM

## 2017-02-04 RX ORDER — ACETAMINOPHEN 325 MG/1
650 TABLET ORAL EVERY 4 HOURS PRN
Status: DISCONTINUED | OUTPATIENT
Start: 2017-02-04 | End: 2017-02-05 | Stop reason: HOSPADM

## 2017-02-04 RX ORDER — LANOLIN 100 %
OINTMENT (GRAM) TOPICAL
Status: DISCONTINUED | OUTPATIENT
Start: 2017-02-04 | End: 2017-02-05 | Stop reason: HOSPADM

## 2017-02-04 RX ORDER — OXYTOCIN 10 [USP'U]/ML
10 INJECTION, SOLUTION INTRAMUSCULAR; INTRAVENOUS
Status: DISCONTINUED | OUTPATIENT
Start: 2017-02-04 | End: 2017-02-05 | Stop reason: HOSPADM

## 2017-02-04 RX ORDER — MISOPROSTOL 200 UG/1
400 TABLET ORAL
Status: DISCONTINUED | OUTPATIENT
Start: 2017-02-04 | End: 2017-02-05 | Stop reason: HOSPADM

## 2017-02-04 RX ORDER — IBUPROFEN 400 MG/1
400-800 TABLET, FILM COATED ORAL EVERY 6 HOURS PRN
Status: DISCONTINUED | OUTPATIENT
Start: 2017-02-04 | End: 2017-02-05 | Stop reason: HOSPADM

## 2017-02-04 RX ORDER — OXYTOCIN/0.9 % SODIUM CHLORIDE 30/500 ML
340 PLASTIC BAG, INJECTION (ML) INTRAVENOUS CONTINUOUS PRN
Status: DISCONTINUED | OUTPATIENT
Start: 2017-02-04 | End: 2017-02-05 | Stop reason: HOSPADM

## 2017-02-04 RX ORDER — BISACODYL 10 MG
10 SUPPOSITORY, RECTAL RECTAL DAILY PRN
Status: DISCONTINUED | OUTPATIENT
Start: 2017-02-06 | End: 2017-02-05 | Stop reason: HOSPADM

## 2017-02-04 RX ORDER — OXYTOCIN/0.9 % SODIUM CHLORIDE 30/500 ML
100 PLASTIC BAG, INJECTION (ML) INTRAVENOUS CONTINUOUS
Status: DISCONTINUED | OUTPATIENT
Start: 2017-02-04 | End: 2017-02-05 | Stop reason: HOSPADM

## 2017-02-04 RX ADMIN — IBUPROFEN 800 MG: 400 TABLET ORAL at 08:02

## 2017-02-04 RX ADMIN — IBUPROFEN 800 MG: 400 TABLET ORAL at 19:58

## 2017-02-04 RX ADMIN — ACETAMINOPHEN 650 MG: 325 TABLET, FILM COATED ORAL at 19:58

## 2017-02-04 RX ADMIN — FENTANYL CITRATE 100 MCG: 50 INJECTION, SOLUTION INTRAMUSCULAR; INTRAVENOUS at 04:33

## 2017-02-04 RX ADMIN — ACETAMINOPHEN 650 MG: 325 TABLET, FILM COATED ORAL at 00:25

## 2017-02-04 RX ADMIN — ACETAMINOPHEN 650 MG: 325 TABLET, FILM COATED ORAL at 10:04

## 2017-02-04 RX ADMIN — MISOPROSTOL: 200 TABLET ORAL at 04:34

## 2017-02-04 RX ADMIN — Medication 2.5 MILLION UNITS: at 03:34

## 2017-02-04 RX ADMIN — SENNOSIDES AND DOCUSATE SODIUM 2 TABLET: 8.6; 5 TABLET ORAL at 19:58

## 2017-02-04 RX ADMIN — SENNOSIDES AND DOCUSATE SODIUM 2 TABLET: 8.6; 5 TABLET ORAL at 08:02

## 2017-02-04 RX ADMIN — IBUPROFEN 800 MG: 400 TABLET ORAL at 14:28

## 2017-02-04 RX ADMIN — OXYTOCIN: 10 INJECTION, SOLUTION INTRAMUSCULAR; INTRAVENOUS at 04:34

## 2017-02-04 RX ADMIN — ACETAMINOPHEN 650 MG: 325 TABLET, FILM COATED ORAL at 14:28

## 2017-02-04 NOTE — PLAN OF CARE
Problem: Goal Outcome Summary  Goal: Goal Outcome Summary  Outcome: Therapy, progress toward functional goals as expected  VSS. Membranes intact, no bleeding or mucus present. Category 1 fetal tracing. Pt speaks Romanian and has had  present throughout shift. Pt continuously complains of pain,aching, and pressure in bladder and urinating 150-200 mL every 30-90 minutes - MD aware. Pt comfortable with an epidural. Plan is to progress towards .

## 2017-02-04 NOTE — L&D DELIVERY NOTE
Delivery Summary  Ms. Darwin Guadarrama is a 30 yo  who was admitted at 37w5d by LMP c/w 11w1d US for induction of labor as indicated by low YARITZA with a history of an IUFD at 32wk gestation. Her pregnancy was further complicated by a resolved placenta previa, morbid obesity (BMMI 40.2), failed GCT/passed GTT, and a small 3mm brain aneurysm. She was cleared for vaginal delivery and pushing by Neuro Surgery. Her cervix was unfavorable on admission and she received 5x doses of po misoporstol. Once favorable, pitocin was started and AROM was completed. She progressed to complete dilation and pushed to a slow crown. She delivered a vigorous female infant via , APGARs 9 and 9. Weight pending due to skin-to-skin. The placenta delivered via gentle cord traction and was noted to be intact with a 3V cord. She had no vaginal lacerations. She was noted to have a postpartum hemorrhage which was managed IM pitocin (IV had infiltrated), rectal cytotec and IM methergine. The cervix was run with ring forceps and a lower uterine sweep was performed. Her fundus became firm with the uterotonics. QBL 914cc. Plan for STAT coags now. Dr. Urrutia was present for the delivery.     I was present for the above  and delivery of placenta complicated by PPH secondary to uterine atony that resolved with the above measure.    Maria Eugenia Urrutia MD, FACOG      aDi Guadarrama MRN# [id number]   Age: 31 year old YOB: 1985     Labor Event Times:    Labor Onset Date 2017       Labor Onset Time 1:30 AM    Dilation Complete Date 2017    Dilation Complete Time 4:00 AM       Start Pushing Date         Start Pushing Time             Labor Length:    1st Stage (hrs/min) 2.00  30.00    2nd Stage (hrs/min) 0.00  13.00    3rd Stage (hrs/min) 0.00  10.00        Labor Events:     Labor No    Indicator      Rupture Date 2017    Rupture Time 1:30 am    Rupture Type Artificial rupture of membranes [5]    Fluid Color  Clear    Labor Type Induction    Induction Misoprostol;Oxytocin    Induction Indication Medical        Augmentation      Labor Complications      Additional Complications      Management of Labor IV Antibiotics       Antibiotics Penicillin    IV Antibiotic Given Greater than 4 hours prior to delivery    Additional Management      Fetal Status Prior to  Delivery Category 2    Fetal Status Comments Late decelerations once complete dilation, resolved with pushing.         Cervical Ripening:    Date       Time       Type           Delivery:    Episiotomy None    Local Anesthetic         Lacerations None    Sponge Count Correct        Needle Count Correct      Final Count by: Kadie Brown, RN  Dr. Clemons    Sutures      Vaginal delivery est. blood loss (mL      Surgical or additional est. blood loss (mL)      Combined est. blood loss (mL):      Packing Intentionally Left In            Information for the patient's :  Darwin Guadarrama, Baby1 Dai [[id number]]       Delivery  2017 4:13 AM by  Vaginal, Spontaneous Delivery  Sex:  female Gestational Age: 37w5d  Delivery Clinician:  Maria Eugenia Urrutia  Living?: Yes          APGARS  One minute Five minutes Ten minutes   Skin color: 1   1        Heart rate: 2   2        Grimace: 2   2        Muscle tone: 2   2        Breathin   2        Totals: 9  9         Presentation/position: Vertex    Occiput Anterior  Resuscitation and Interventions: Method:  None  Oxygen Type:     Intubation Time:   # of Attempts:     ETT Size:        Tracheal Suction:     Tracheal returns:      [place] Care at Delivery:   viable female with lusty cry, baby to mothers abdomen, dried and stimulated.        Cord information: 3 Vessels   Disposition of cord blood: Lab    Blood gases sent? No  Complications: None   Placenta: Delivered: 2017 4:23 AM  Spontaneous    appearance.  Comments:  .  Disposition: Hospital disposal  [place] Measurements:  Weight: 7 lb 7 oz (3374 g)   "Height: 21.25\"  Head circumference: 34.3 cm  Chest circumference:     Temperature:     Other providers: Resident Nichole Clemons  OB/GYN Resident  PGY-3        "

## 2017-02-04 NOTE — PLAN OF CARE
Problem: Goal Outcome Summary  Goal: Goal Outcome Summary  Outcome: No Change  Transferred to [facility].  present, briefly oriented to room. Vitals sign and fundus WDL.

## 2017-02-04 NOTE — PLAN OF CARE
Patient reporting increased pain and discomfort.  Requesting an epidural for pain relief. Dr. Leif KENT Resident called and in room at 2100. Patient and procedure correctly identified/ verified with DONTE. Consent signed. 1000 mL fluid bolus given prior to epidural placement.  Epidural placed by DONTE Resident without complication. Test dose/ bolus given by DONTE, patient tolerated well. Patient rates pain after procedure as negligible.

## 2017-02-04 NOTE — ANESTHESIA PREPROCEDURE EVALUATION
Anesthesia Evaluation       history and physical reviewed .      No history of anesthetic complications     ROS/MED HX    ENT/Pulmonary:  - neg pulmonary ROS     Neurologic: Comment: Hx of 3 mm brain aneurysm, cleared for VD by neurology      Cardiovascular:  - neg cardiovascular ROS       METS/Exercise Tolerance:     Hematologic:         Musculoskeletal:         GI/Hepatic:     (+) GERD       Renal/Genitourinary:         Endo:         Psychiatric:         Infectious Disease:         Malignancy:         Other:                          Hx of obesity           Anesthesia Plan      History & Physical Review      ASA Status:  .  OB Epidural Asa: 2       Plan for      Relevant risks, benefits, alternatives and the anesthetic plan were discussed with patient/family or family representative. All questions were answered and there was agreement to proceed.    Naima Yadav MD  Anesthesiology Resident CA-2        Postoperative Care      Consents  Anesthetic plan, risks, benefits and alternatives discussed with:  Patient..

## 2017-02-04 NOTE — PROGRESS NOTES
"[facility]  MATERNAL CHILD HEALTH   SOCIAL WORK PSYCHOSOCIAL ASSESSMENT       DATA:     Dai Guadarrama is a 31 year old, , single, partnered woman who just delivered her first living child, Marjorie, at 37+4 weeks gestation. Dai came in for an early IOL due to a history of an IUFD at 35+4 weeks gestation several years ago. Dai also has a history of 2 SABs. Dai is Tunisian speaking and needs an . FOB is Perry Keane, he is involved and supportive. Dai reports she is doing \"well\" and is \"happy\". She denied any current psychosocial concerns or mental health symptoms.     INTERVENTION:     SW consult due to maternal anxiety/history of IUFD. SW completed a chart review. SW attempted to meet with the family with a Tunisian in person , but the  did not arrive. SW then met with the family with the help of an iPad  service. SW introduced myself and my role. Gathered a brief psychosocial assessment. Assessed for current coping, mood, concerns, strengths, mental health symptoms. Provided  psychoeducation on  mood disorders and when to seek treatment.     ASSESSMENT:     Parents were reserved and minimally engaged with SW. They did not have any stated needs.     PLAN:     No further SW intervention planned.       Alea Craft [facility]   Social Worker  Maternal Child Health    Phone: [phone]  Pager:  [phone]  "

## 2017-02-04 NOTE — PLAN OF CARE
Data: Dai Guadarrama transferred to [id number] via wheelchair at 0630. Baby transferred via parent's arms.  Action: Receiving unit notified of transfer: Yes. Patient and family notified of room change. Report given to NEFTALY Diaz at 0635. Belongings sent to receiving unit. Accompanied by Registered Nurse. Oriented patient to surroundings. Call light within reach. ID bands double-checked with receiving RN.  Response: Patient tolerated transfer and is stable.

## 2017-02-04 NOTE — PLAN OF CARE
Vaginal Delivery Note   of viable Female with Dr. Clemons and Dr. Urrutia in attendance.  NICU/Nursery RN not present.  Infant with spontaneous cry, to mother's abdomen, dried and stimulated.    Placenta delivered, 991 ml hemorrhage post delivery, IM Methergine, rectal miso, IM pitocin (d/t IV infiltrating) and IV pitocin administered .  No laceration, no repair, henri cares provided.  Mother and baby in stable condition.

## 2017-02-04 NOTE — PLAN OF CARE
Problem: Goal Outcome Summary  Goal: Goal Outcome Summary  Outcome: Improving  Pt vitals & PP assessments are stable. Up to BR & voiding. Breast feeding with staff assist. Will continue on supportive cares & assist with family needs.

## 2017-02-04 NOTE — ANESTHESIA PROCEDURE NOTES
Epidural Procedure Note    Staff:     Anesthesiologist:  MANNY PIERCE    Resident/CRNA:  LOIS GOVEA    Procedure performed by resident/CRNA in the presence of a teaching physician    Location: OB and floor   Pre-procedure checklist:   patient identified, IV checked, site marked, risks and benefits discussed, informed consent, monitors and equipment checked, pre-op evaluation, at physician/surgeon's request and post-op pain management      Correct Patient: Yes      Correct Position: Yes      Correct Site: Yes      Correct Procedure: Yes      Correct Laterality:  Yes    Site Marked:  Yes  Procedure:     Procedure:  Epidural catheter    ASA:  2    Diagnosis:  Labor pain    Position:  Sitting    Sterile Prep: chloraprep, mask, sterile gloves and patient draped      Insertion site:  L3-4    Local skin infiltration:  1% lidocaine    amount (mL):  4    Approach:  Midline    Needle gauge (G):  17    Block Needle Type:  Touhy    Injection Technique:  LORT saline    YARELI at (cm):  6    Attempts:  1    Redirects:  0    Catheter gauge (G):  19    Threaded to cm at skin:  11    Paresthesias:  No    Aspiration negative for Heme or CSF: Yes      Test dose (mL):  3     Local anesthetic:  Lidocaine 1.5% w/ 1:200,000 epinephrine    Test dose time:  21:33    Test dose negative for signs of intravascular, subdural or intrathecal injection: Yes

## 2017-02-04 NOTE — DISCHARGE SUMMARY
[facility] Discharge Summary    Dai Guadarrama MRN# [id number]   Age: 31 year old YOB: 1985     Date of Admission:  2017  Date of Discharge:  2017  Admitting Physician:  Vicky Salazar MD  Discharge Physician:  Maria Eugenia Urrutia MD    Admit Dx:   - Intrauterine pregnancy at 37w5d   - IOL d/t YARITZA <5% in setting of prior IUFD  - Hx of IUFD at 32w  - Placenta previa, resolved  - Morbid obesity, BMI 42  - Failed GCT, passed GTT  - Small, 3x2mm Aneurysm. Per neurology, surgical treatment not indicated per neurology; she is cleared to labor and bear down for delivery.    Discharge Dx:  - Same as above, s/p   - Post-partum hemorrhage (EBL: 914 cc) with acute blood loss anemia    Procedures:  - Spontaneous vaginal delivery  - Epidural analgesia    Admit HPI:  Ms. Dai Guadarrama is a 31 year old  at 37w3d by LMP c/w 7w3d US who presents for induction of labor due to YARITZA <5%ile  in the setting of an IUFD at 32wk in her previous pregnancy.  She reports feeling very well and is excited to be here for induction. She denies regular contractions, vaginal bleeding, and loss of fluid. + normal fetal movement.    Please see her Admission H&P and Delivery Summary for further details.    Labor Course:  Ms. Darwin Guadarrama is a 30 yo  who was admitted at 37w5d by LMP c/w 11w1d US for induction of labor as indicated by low YARITZA with a history of an IUFD at 32wk gestation. Her pregnancy was further complicated by a resolved placenta previa, morbid obesity (BMI 40.2), failed GCT/passed GTT, and a small 3mm brain aneurysm. She was cleared for vaginal delivery and pushing by Neuro Surgery. Her cervix was unfavorable on admission and she received 5x doses of po misoporstol. Once favorable, pitocin was started and AROM was completed. She progressed to complete dilation and pushed to a slow crown. She delivered a vigorous female infant via , APGARs 9 and  9. Weight pending due to skin-to-skin. The placenta delivered via gentle cord traction and was noted to be intact with a 3V cord. She had no vaginal lacerations. She was noted to have a postpartum hemorrhage which was managed IM pitocin (IV had infiltrated), rectal cytotec and IM methergine. The cervix was run with ring forceps and a lower uterine sweep was performed. Her fundus became firm with the uterotonics. QBL 914cc. Plan for STAT coags now. Dr. Urrutia was present for the delivery.    Postpartum Course:  Her postpartum course was complicated by post-partum hemorrhage, for which she received pitocin, methergine and misoprostol. On PPD#1, she was meeting all of her postpartum goals and deemed stable for discharge. She was voiding without difficulty, tolerating a regular diet without nausea and vomiting, her pain was well controlled on oral pain medicines and her lochia was appropriate. Her hemoglobin prior to delivery was 11.2 and after delivery was 9.5. Her Rh status was positive, and Rhogam was not indicated.     Discharge Medications:   Dai Grace   Home Medication Instructions REED:[id number]    Printed on:02/05/17 1230   Medication Information                      ferrous sulfate (IRON SUPPLEMENT) 325 (65 FE) MG tablet  Take 1 tablet (325 mg) by mouth 2 times daily             ibuprofen (ADVIL/MOTRIN) 400 MG tablet  Take 1-2 tablets (400-800 mg) by mouth every 6 hours as needed for other (cramping)             Prenatal Vit-Fe Fumarate-FA (PRENATAL MULTIVITAMIN  PLUS IRON) 27-0.8 MG TABS  Take 1 tablet by mouth daily             ranitidine (ZANTAC) 150 MG tablet  Take 1 tablet (150 mg) by mouth 2 times daily             senna-docusate (SENOKOT-S;PERICOLACE) 8.6-50 MG per tablet  Take 1-2 tablets by mouth 2 times daily                   Discharge/Disposition:  Dai Guadarrama was discharged to home in stable condition with the following instructions/medications:  1) Call for temperature >  100.4, bright red vaginal bleeding >1 pad an hour x 2 hours, foul smelling vaginal discharge, pain not controlled by usual oral pain meds, persistent nausea and vomiting not controlled on medications  2) She declines contraception, will discuss at 6 week PP visit.  3) For feeding she decided to breastfeed.  4) She was instructed to follow-up with her primary OB in 6 weeks for a routine postpartum visit.    Caitlyn Palmer MD  OB/GYN Resident PGY3  02/05/2017      The patient was seen and examined by me on the day of discharge.  I have reviewed and agree with the above note.    Maria Eugenia Urrutia MD, FACOG

## 2017-02-05 VITALS
HEIGHT: 63 IN | WEIGHT: 227 LBS | SYSTOLIC BLOOD PRESSURE: 113 MMHG | RESPIRATION RATE: 20 BRPM | HEART RATE: 87 BPM | BODY MASS INDEX: 40.22 KG/M2 | TEMPERATURE: 98.3 F | OXYGEN SATURATION: 98 % | DIASTOLIC BLOOD PRESSURE: 65 MMHG

## 2017-02-05 LAB — HGB BLD-MCNC: 9.5 G/DL (ref 11.7–15.7)

## 2017-02-05 PROCEDURE — 25000132 ZZH RX MED GY IP 250 OP 250 PS 637: Performed by: OBSTETRICS & GYNECOLOGY

## 2017-02-05 PROCEDURE — 36415 COLL VENOUS BLD VENIPUNCTURE: CPT | Performed by: OBSTETRICS & GYNECOLOGY

## 2017-02-05 PROCEDURE — 85018 HEMOGLOBIN: CPT | Performed by: OBSTETRICS & GYNECOLOGY

## 2017-02-05 PROCEDURE — T1013 SIGN LANG/ORAL INTERPRETER: HCPCS | Mod: U3

## 2017-02-05 RX ORDER — FERROUS SULFATE 325(65) MG
325 TABLET ORAL 2 TIMES DAILY
Qty: 90 TABLET | Refills: 0 | Status: SHIPPED
Start: 2017-02-05 | End: 2017-04-03

## 2017-02-05 RX ORDER — AMOXICILLIN 250 MG
1-2 CAPSULE ORAL 2 TIMES DAILY
Qty: 60 TABLET | Refills: 1 | Status: SHIPPED
Start: 2017-02-05 | End: 2017-04-03

## 2017-02-05 RX ORDER — IBUPROFEN 400 MG/1
400-800 TABLET, FILM COATED ORAL EVERY 6 HOURS PRN
Qty: 60 TABLET | Refills: 1 | Status: SHIPPED
Start: 2017-02-05 | End: 2017-04-20

## 2017-02-05 RX ADMIN — ACETAMINOPHEN 650 MG: 325 TABLET, FILM COATED ORAL at 02:01

## 2017-02-05 RX ADMIN — IBUPROFEN 800 MG: 400 TABLET ORAL at 02:01

## 2017-02-05 RX ADMIN — IBUPROFEN 800 MG: 400 TABLET ORAL at 07:56

## 2017-02-05 RX ADMIN — SENNOSIDES AND DOCUSATE SODIUM 2 TABLET: 8.6; 5 TABLET ORAL at 07:56

## 2017-02-05 NOTE — PROGRESS NOTES
"S:  Doing well this morning, denies pain, minimal bleeding.  Feels ready to go home.    O: /65 mmHg  Pulse 87  Temp(Src) 98.3  F (36.8  C) (Oral)  Resp 20  Ht 1.6 m (5' 2.99\")  Wt 102.967 kg (227 lb)  BMI 40.22 kg/m2  SpO2 98%  LMP 2016 (Approximate)  Breastfeeding? Unknown  gen-pleasant, NAD  abd-soft, FF, NT  extr-NT, tr edema harper  HEMOGLOBIN   Date Value Ref Range Status   2017 9.5* 11.7 - 15.7 g/dL Final     A:  PPD #1 s/p  complicated by PPH.  Her bleeding has been minimal since the immediate postpartum period and she is asymptomatic from her ABLA.  Declines contraception at this time, will discuss at her postpartum visit.    P:  Discharge home today, home care referral for early discharge.  Follow up in clinic in 6 weeks for routine postpartum visit.    Maria Eugenia Urrutia MD, FACOG    "

## 2017-02-05 NOTE — PLAN OF CARE
Problem: Goal Outcome Summary  Goal: Goal Outcome Summary  Outcome: Adequate for Discharge Date Met:  02/05/17  Pt discharged to home with baby. D/c instructions given & reviewed with . ID bands double checked. [facility] to visit with family. Pt verbalized understanding her discharge plan & had no further questions. Instructed to F/U at clinic within 6 weeks for PP check.

## 2017-02-05 NOTE — PLAN OF CARE
Problem: Goal Outcome Summary  Goal: Goal Outcome Summary  Outcome: Improving  Data: Vital signs within normal limits. Postpartum checks within normal limits - see flow record. Patient eating and drinking normally. Patient able to empty bladder independently and is up ambulating. No apparent signs of infection.  Patient performing self cares and is able to care for infant.  Action: Patient medicated during the shift for pain-See MAR.   Response: Positive attachment behaviors observed with infant.  scheduled for morning.    Plan: Continue to assess and follow plan of care.

## 2017-02-05 NOTE — DISCHARGE INSTRUCTIONS
Vaginal Delivery Discharge Instructions: Faroese  Actividad:     Pida a los miembros de martin charlene y amigos que la ayuden cuando lo necesite.    No ponga nada en martin vagina hasta que martin médico lo permita.    Tómese las próximas semanas con calma para que martin cuerpo tenga tiempo de recuperarse. En chuck momento puede hacer cualquier actividad que sienta que puede.    No conduzca si está tomando píldoras para el dolor recetadas por martin médico. Puede conducir si está tomando píldoras de venta mark para el dolor.    Llame a martin proveedor de atención médica si tiene alguno de estos síntomas:    Empapa justa toalla femenina con ermelinda en el correr de 1 hora o ve coágulos más grandes que justa pelota de golf.    Sangrado que dura más de 6 semanas.    Tiene justa secreción vaginal que huele mal.     Fiebre de 100.4  F (38  C) o más (temperatura tomada bajo martin lengua) con o sin escalofríos     Dolor, calambres o sensibilidad graves en la región inferior de martin vientre.    Aumento del dolor, hinchazón, enrojecimiento o líquido alrededor de avelina puntos.    Necesidad más frecuente o urgente de orinar (hacer pis), o ardor al hacerlo.    Enrojecimiento, hinchazón o dolor alrededor de justa vena en martin pierna.    Problemas para amamantar o un área enrojecida o dolorosa en martin pecho.    Dolor que aumenta o no se va de justa episiotomía o desgarro en el perineo.    Náuseas y vómitos    Dolor en el pecho y tos o dificultad para respirar.    Problemas para manejar la tristeza, ansiedad o depresión.     Si le preocupa hacerse daño o hacerle daño al bebé, llame al médico de inmediato.     Tiene preguntas o inquietudes después de regresar a casa.    Mantenga avelina celestina limpias:  Lávese siempre las celestina antes de tocar el área de martin perineo y los puntos.  [place] ayuda a reducir martin riesgo de infección.  Si avelina celestina no están sucias, puede usar un gel de alcohol para limpiarse las celestina. Mantenga avelina uñas cortas y limpias.      Vaginal Delivery Discharge  Instructions  Activity:     Ask family and friends for help when you need it.    Do not place anything in your vagina until your doctor approves.    Take it easy for the next few weeks to allow your body to recover. You may do any activities you feel up to at that point.    Do not drive while taking pain pills prescribed by your doctor. You may drive if taking over-the-counter pain pills.    Call your health care provider if you have any of these symptoms:    You soak a sanitary pad with blood within 1 hour, or you see blood clots larger than a golf ball.    Bleeding that lasts more than 6 weeks.    You have vaginal discharge that smells bad.     A fever of 100.4  F (38  C) or higher (temperature taken under your tongue), with or without chills     Severe, pain, cramping or tenderness in your lower belly area.    Increased pain, swelling, redness or fluid around your stitches.    A more frequent or urgent need to urinate (pee), or it burns when you pee.    Redness, swelling or pain around a vein in your leg.    Problems breastfeeding, or a red or painful area on your breast.    Pain that increases or does not go away from an episiotomy or perineal tear.    Nausea and vomiting.    Chest pain and cough or are gasping for air.    Problems coping with sadness, anxiety, or depression.     If you have any concerns about hurting yourself or the baby, call your doctor right away.     You have questions or concerns after you return home.    Keep your hands clean:  Always wash your hands before touching your perineal area and stitches.  This helps reduce your risk of infection.  If your hands aren t dirty, you may use an alcohol hand-rub to clean your hands. Keep your nails clean and short.      Postpartum Vaginal Delivery Instructions    Activity       Ask family and friends for help when you need it.    Do not place anything in your vagina for 6 weeks.    You are not restricted on other activities, but take it easy for a  few weeks to allow your body to recover from delivery.  You are able to do any activities you feel up to that point.    No driving until you have stopped taking your pain medications (usually two weeks after delivery).     Call your health care provider if you have any of these symptoms:       Increased pain, swelling, redness, or fluid around your stiches from an episiotomy or perineal tear.    A fever above 100.4 F (38 C) with or without chills when placing a thermometer under your tongue.    You soak a sanitary pad with blood within 1 hour, or you see blood clots larger than a golf ball.    Bleeding that lasts more than 6 weeks.    Vaginal discharge that smells bad.    Severe pain, cramping or tenderness in your lower belly area.    A need to urinate more frequently (use the toilet more often), more urgently (use the toilet very quickly), or it burns when you urinate.    Nausea and vomiting.    Redness, swelling or pain around a vein in your leg.    Problems breastfeeding or a red or painful area on your breast.    Chest pain and cough or are gasping for air.    Problems coping with sadness, anxiety, or depression.  If you have any concerns about hurting yourself or the baby, call your provider immediately.     You have questions or concerns after you return home.     Keep your hands clean:  Always wash your hands before touching your perineal area and stitches.  This helps reduce your risk of infection.  If your hands aren't dirty, you may use an alcohol hand-rub to clean your hands. Keep your nails clean and short.

## 2017-02-05 NOTE — PLAN OF CARE
Problem: Goal Outcome Summary  Goal: Goal Outcome Summary  Outcome: No Change  Extensive teaching about colostrum and infant needs along with how to set up a pump and pump use given to pt and FOB via ipad .  Pt. States cramping with breast stimulation.  Needs more reinforcement that colostrum is enough and exactly what the baby needs.  Pt. States through FOB she will breast feed when she feels like she can.

## 2017-02-06 ENCOUNTER — DOCUMENTATION ONLY (OUTPATIENT)
Dept: CARE COORDINATION | Facility: CLINIC | Age: 32
End: 2017-02-06

## 2017-02-06 NOTE — PROGRESS NOTES
[facility] utilizes a different electronic medical record.  The documentation below has been copied from the home care chart following a Maternal Child Health Home visit.      DATE OF ASSESSMENT 2/6/17  DATE OF DELIVERY 2/4/17  SAFETY CONCERNS/CAREGIVER SUPPORT none, safe home, working smoke detector, supportive spouse.  OTHER CHILDREN IN HOME primip  TYPE OF DELIVERY vaginal with perineum intact,  PP hemmorhage.  3mm brain aneurysm.  Vaginal delivery cleared by neuro, Patient needs to call Neuro Clinic for follow up appt.   Patient c/o of muscle soreness on abdomen and back.  States it feels like her abdomen is bruised.  Small quarter size bruise noted below umbilicus.  States she thinks it is related to the hemmorhage when they were massing her abdomen.  Reviewed.  Patient is afebrile and denies other s/s of infection.  Educated on s/s of infection and when to notify MD.      VITAL SIGNS  BLOOD PRESSURE 124/64  HEART RATE 64  TEMPERATURE 98.1  RESPIRATION RATE 16  PAIN muscle soreness. 3 /10 without medication, 1 /10 after medication. She is taking Ibuprofen as directed PNR  for pain control and states that it is effective in managing her pain. States she is getting mild stomach upset when she is taking Ibuprofen.  Educated patient to take with food and if does not improve to contact MD.  CARDIOPULMONARY regular rate and rhythm no murmur heard, breath sounds normal.   GI ASSESSMENT soft nontender abdomen.  Bowel Movements soft, regular.  Stopping stool softeners.    ASSESSMENT voiding without difficulty  APPETITE good, reinforced eating 300 - 500 extra calories per day, staying well hydrated   BREAST ASSESSMENT nipples are sore and red but not cracked or bleeding.   LACTATION breasts have not transitioned in milk , non tender, attempting to breastfeeding baby and then supplementing with formula via bottle after feeding.  States baby will stay at breast for about 10 minutes.  Encouraged  patient to pump for 10 minutes after feeding to help encourage milk supply.  Patient has not been pumping as no milk has been present.  Reviewed rationale for pumping after feeding.    LOCHIA light period like flow, red, no clots. Knows to call provider for large clots or soaking through a pad in an hour  NEUROLOGICAL denies numbness, tingling and headaches   MUSCULOSKELETAL no issues, active range of motion in all extremities, talked about blood clot prevention in the the legs and calves   SKIN negative for pigmentation, brusing, lesions, or rashes   COPING SKILLS/MOOD/ANXIETY Postpartum mood disorders discussed, left screening sheet with patient to fill out. Patient states she feels well supported right now. Difference between postpartum blues, depression, and psychosis all discussed,   EMOTIONAL/SOCIAL/SPIRITUAL CONCERNS none   EDUCATION PROVIDED see above remarks, [place] breastfeeding resources, breastfeeding teaching done, Reviewed latch, feeding positions, pumping and storage of breastmilk, perineum care, general postpartum warning signs to notify MD for,  postpartum depression screening, hand hygeine,  sleep patterns.   REFERRALS/PLAN OF CARE mom will call to make 6 week PP appt with Dr. Urrutia  EMERGENCY PLAN call provider office with any questions or concerns, mom knows OB and PEDS nurse triage line phone number for any questions or concerns. Family aware to call 911 in case of emergency.

## 2017-02-08 ENCOUNTER — HOSPITAL ENCOUNTER (EMERGENCY)
Facility: CLINIC | Age: 32
Discharge: HOME | End: 2017-02-08
Attending: EMERGENCY MEDICINE

## 2017-02-08 VITALS
RESPIRATION RATE: 18 BRPM | HEART RATE: 108 BPM | SYSTOLIC BLOOD PRESSURE: 115 MMHG | TEMPERATURE: 98.7 F | DIASTOLIC BLOOD PRESSURE: 66 MMHG | OXYGEN SATURATION: 97 %

## 2017-04-03 ENCOUNTER — OFFICE VISIT (OUTPATIENT)
Dept: OBGYN | Facility: CLINIC | Age: 32
End: 2017-04-03
Attending: MIDWIFE
Payer: COMMERCIAL

## 2017-04-03 VITALS
HEIGHT: 63 IN | WEIGHT: 208.7 LBS | BODY MASS INDEX: 36.98 KG/M2 | SYSTOLIC BLOOD PRESSURE: 120 MMHG | DIASTOLIC BLOOD PRESSURE: 83 MMHG | HEART RATE: 76 BPM

## 2017-04-03 DIAGNOSIS — G43.709 CHRONIC MIGRAINE WITHOUT AURA WITHOUT STATUS MIGRAINOSUS, NOT INTRACTABLE: ICD-10-CM

## 2017-04-03 DIAGNOSIS — R10.2 PELVIC PAIN IN FEMALE: ICD-10-CM

## 2017-04-03 DIAGNOSIS — B96.89 BV (BACTERIAL VAGINOSIS): ICD-10-CM

## 2017-04-03 DIAGNOSIS — I72.9 ANEURYSM (H): ICD-10-CM

## 2017-04-03 DIAGNOSIS — R30.0 DYSURIA: ICD-10-CM

## 2017-04-03 DIAGNOSIS — Z30.09 GENERAL COUNSELING AND ADVICE ON FEMALE CONTRACEPTION: ICD-10-CM

## 2017-04-03 DIAGNOSIS — N76.0 BV (BACTERIAL VAGINOSIS): ICD-10-CM

## 2017-04-03 LAB
CLUE CELLS: ABNORMAL
TRICHOMONAS (WET PREP): NEGATIVE
YEAST (WET PREP): NEGATIVE

## 2017-04-03 PROCEDURE — 87591 N.GONORRHOEAE DNA AMP PROB: CPT | Performed by: ADVANCED PRACTICE MIDWIFE

## 2017-04-03 PROCEDURE — 87210 SMEAR WET MOUNT SALINE/INK: CPT | Mod: ZF | Performed by: ADVANCED PRACTICE MIDWIFE

## 2017-04-03 PROCEDURE — 87088 URINE BACTERIA CULTURE: CPT | Performed by: ADVANCED PRACTICE MIDWIFE

## 2017-04-03 PROCEDURE — 87086 URINE CULTURE/COLONY COUNT: CPT | Performed by: ADVANCED PRACTICE MIDWIFE

## 2017-04-03 PROCEDURE — 87186 SC STD MICRODIL/AGAR DIL: CPT | Performed by: ADVANCED PRACTICE MIDWIFE

## 2017-04-03 PROCEDURE — 87491 CHLMYD TRACH DNA AMP PROBE: CPT | Performed by: ADVANCED PRACTICE MIDWIFE

## 2017-04-03 NOTE — NURSING NOTE
SUBJECTIVE:   Dai Guadarrama is here for her 6-week postpartum checkup.     PHQ-9 score:   Hx of Abuse:  No    Delivery Date: 17.    Delivering provider:  Maria Eugenia Urrutia MD.    Type of delivery:  .  Perineum:  tear, with repair.     Delivery complications: None  Infant gender:  girl, weight 7 pounds 7 oz.  Feeding Method:  Bottlefed.  Complications reported with feeding:  none, infant thriving .    Bleeding:  None.  Duration:  2 weeks.  Menses resumed:  Yes   Bowel/Urinary problems:  No    Contraception Planned:  oral contraceptive  She  has had intercourse since delivery and experienced  No discomfort.  .

## 2017-04-03 NOTE — MR AVS SNAPSHOT
After Visit Summary   4/3/2017    Dai Guadarrama    MRN: [id number]           Patient Information     Date Of Birth          1985        Visit Information        Provider Department      4/3/2017 1:30 PM Umm Lopes APRN CNM; [facility]        Today's Diagnoses     Pelvic pain in female    -  1    Routine postpartum follow-up        Postpartum care and examination of lactating mother        Chronic migraine without aura without status migrainosus, not intractable        Aneurysm (H)        Dysuria           Follow-ups after your visit        Follow-up notes from your care team     Call patient with results Return in about 2 weeks (around 4/17/2017) for Follow up on pelvic discomfort .      Your next 10 appointments already scheduled     Apr 20, 2017  3:00 PM CDT   Return Visit with Nhi Barry MD   [facility] ([facility])    [facility] [address]  [address]  [facility] [id number] [phone]              Who to contact     Please call your clinic at [phone] to:    Ask questions about your health    Make or cancel appointments    Discuss your medicines    Learn about your test results    Speak to your doctor   If you have compliments or concerns about an experience at your clinic, or if you wish to file a complaint, please contact [facility] Patient Relations at [phone] or email us at [email or web address]         Additional Information About Your Visit        MyChart Information     Ecinityhart is an electronic gateway that provides easy, online access to your medical records. With Empressrt, you can request a clinic appointment, read your test results, renew a prescription or communicate with your care team.     To sign up for Ecinityhart visit the website at [email or web address]   You will be asked to  "enter the access code listed below, as well as some personal information. Please follow the directions to create your username and password.     Your access code is: [email or web address]  Expires: 2017  9:00 AM     Your access code will  in 90 days. If you need help or a new code, please contact your [facility] or call [phone] for assistance.        Care EveryWhere ID     This is your Care EveryWhere ID. This could be used by other organizations to access your [facility] medical records  [id number]        Your Vitals Were     Pulse Height Last Period BMI (Body Mass Index)          76 1.6 m (5' 2.99\") 2017 (Exact Date) 36.98 kg/m2         Blood Pressure from Last 3 Encounters:   17 120/83   17 115/66   17 113/65    Weight from Last 3 Encounters:   17 94.7 kg (208 lb 11.2 oz)   17 103 kg (227 lb)   17 103.4 kg (228 lb)              We Performed the Following     Chlamydia by PCR     Neisseria gonorrhoeae PCR     Urine Culture Aerobic Bacterial     Wet Prep POCT          Today's Medication Changes          These changes are accurate as of: 4/3/17  3:17 PM.  If you have any questions, ask your nurse or doctor.               Stop taking these medicines if you haven't already. Please contact your care team if you have questions.     ferrous sulfate 325 (65 FE) MG tablet   Commonly known as:  IRON SUPPLEMENT   Stopped by:  Umm Lopes APRN CNM           prenatal multivitamin  plus iron 27-0.8 MG Tabs per tablet   Stopped by:  Umm Lopes APRN CNM           ranitidine 150 MG tablet   Commonly known as:  ZANTAC   Stopped by:  Umm Lopes APRN CNM           senna-docusate 8.6-50 MG per tablet   Commonly known as:  SENOKOT-S;PERICOLACE   Stopped by:  Umm Lopes APRN CNM                    Primary Care Provider    Physician No Ref-Primary       No address on file        Thank you!     Thank you for choosing " [facility]  for your care. Our goal is always to provide you with excellent care. Hearing back from our patients is one way we can continue to improve our services. Please take a few minutes to complete the written survey that you may receive in the mail after your visit with us. Thank you!             Your Updated Medication List - Protect others around you: Learn how to safely use, store and throw away your medicines at www.disposemymeds.org.          This list is accurate as of: 4/3/17  3:17 PM.  Always use your most recent med list.                   Brand Name Dispense Instructions for use    ibuprofen 400 MG tablet    ADVIL/MOTRIN    60 tablet    Take 1-2 tablets (400-800 mg) by mouth every 6 hours as needed for other (cramping)

## 2017-04-03 NOTE — LETTER
"4/3/2017       RE: Dai Guadarrama  [address]  [place] [address]     Dear Colleague,    Thank you for referring your patient, Dai Guadarrama, to the [facility] at [facility]. Please see a copy of my visit note below.      Nursing Notes:   Hailey Reidfer  4/3/2017  2:36 PM  Signed  SUBJECTIVE:   Dai Guadarrama is here for her 6-week postpartum checkup.     PHQ-9 score:   Hx of Abuse:  No    Delivery Date: 17.    Delivering provider:  Maria Eugenia Urrutia MD.    Type of delivery:  .  Perineum:  tear, with repair.     Delivery complications: None  Infant gender:  girl, weight 7 pounds 7 oz.  Feeding Method:  Bottlefed.  Complications reported with feeding:  none, infant thriving .    Bleeding:  None.  Duration:  2 weeks.  Menses resumed:  Yes   Bowel/Urinary problems:  No    Contraception Planned:  oral contraceptive  She  has had intercourse since delivery and experienced  No discomfort.  .         ================================================================  1) Pt would like to discuss BC options. Worried about weight gain.     2) Sleep has been better in last month, taking some naps with baby during day too. Pt is home alone with baby during week and has trouble eating well. Skipping breakfast as not hungry. Eating no vegetables or fruit. Diet is largely rice, salmon, ham, eggs.    ROS: 10 point ROS neg other than the symptoms noted above in the HPI.   CONSTITUTIONAL: negative for, sweats, chills and fevers  : negative for dyspareunia  MUSCULO-SKELETAL: back pain  PSYCHIATRIC: negative for, anxiety and depression    EXAM:  /83  Pulse 76  Ht 1.6 m (5' 2.99\")  Wt 94.7 kg (208 lb 11.2 oz)  LMP 2017 (Exact Date)  BMI 36.98 kg/m2    General: healthy, alert and no distress  Psych: NEGATIVE  Last PHQ-9 score on record= 0  Breasts:  No S/S of yeast or mastitis. No longer lactating.   Abdomen: Benign, " Soft, non-tender, No masses, organomegaly and Diastasis less than 1-2 FB  Incision:  None   Vulva:  Normal genitalia and Bartholin's, Urethra, [place]'s normal  Vagina:  normal with good muscle tone and with clear discharge  Cervix:  Multiparous,, no lesions and pink, moist, closed, without lesion. +CMT    Uterus:  fully involuted. Slightly tender to deep palpation.   Adnexa:  Within normal limits and No masses, nodularity, tenderness  Recto-vaginal:   anus normal    Wet prep - +clue cells, negative Trich, Negative yeast.  PH 5.5.  +KOH whiff.     ASSESSMENT:   Encounter Diagnoses   Name Primary?     Postpartum care and examination of lactating mother Yes     Routine postpartum follow-up      Pelvic pain in female      Chronic migraine without aura without status migrainosus, not intractable      Aneurysm (H)      Dysuria      General counseling and advice on female contraception      BV (bacterial vaginosis)       Normal postpartum exam after   Pregnancy was complicated by:  Group B Strep, Hx of IUFD, brain aneurysm.      PLAN:  (R10.2) Pelvic pain in female  - Plan: Wet Prep POCT, Neisseria gonorrhoeae PCR,   Chlamydia by PCR, Urine Culture Aerobic         Bacterial.   Flagyl PO BID x7 days for BV.  RX sent to pharmacy on file.  Avoid alcohol while taking pills and for 24 hours after last dose.  Pt refused presumptive treatment for PID.   Urine culture in process, will treat based on sensitivities.  Reviewed warning s/s and how/why to access emergency services prn if fever, flu like symptoms, or increase in pelvic discomfort.       Contraception methods discussed at length. Pt desires method with no side effect of weight gain, but uneasy about insertion of Nexplanon or IUDs. Wants to conceive in about a year, would like OCPs. Discussed weight gain as side effect, pt OK with this. Recommended referral to Neuro to discuss hx of aneurysm and safety of OCPs. Pt will see same physician she saw in pregnancy (see EPIC  note Dec 2016).  Plans condoms, feels she can use consistently.    Discussed need for healthy diet. Leafy greens with iron for her recovery postpartum, and possible preconception period. Advised keeping fruits at arms length at home so she can snack when feeling hungry and taking care of Marjorie.    Signs and symptoms of postpartum depression/anxiety discussed and resources offered    Exercise encouraged, walking outside. Aim for 30 minutes of activity 5 days a week.   Return to clinic in 2 weeks for follow-up and prn. Call with questions or concerns.     I, Rizwana Venegas, am serving as a scribe; to document services personally performed by CHANELLE Merrill CNM based on data collection and the provider's statements to me.     Rizwana Venegas, MPH RN [place]    The encounter was performed by me and scribed by the SNM. The scribed note accurately reflects my personal services and decisions made by me. -   Umm ROCA CNM

## 2017-04-03 NOTE — PROGRESS NOTES
"  Nursing Notes:   Merry Reid  4/3/2017  2:36 PM  Signed  SUBJECTIVE:   Dai Guadarrama is here for her 6-week postpartum checkup.     PHQ-9 score:   Hx of Abuse:  No    Delivery Date: 17.    Delivering provider:  Maria Eugenia Urrutia MD.    Type of delivery:  .  Perineum:  tear, with repair.     Delivery complications: None  Infant gender:  girl, weight 7 pounds 7 oz.  Feeding Method:  Bottlefed.  Complications reported with feeding:  none, infant thriving .    Bleeding:  None.  Duration:  2 weeks.  Menses resumed:  Yes   Bowel/Urinary problems:  No    Contraception Planned:  oral contraceptive  She  has had intercourse since delivery and experienced  No discomfort.  .         ================================================================  1) Pt would like to discuss BC options. Worried about weight gain.     2) Sleep has been better in last month, taking some naps with baby during day too. Pt is home alone with baby during week and has trouble eating well. Skipping breakfast as not hungry. Eating no vegetables or fruit. Diet is largely rice, salmon, ham, eggs.    ROS: 10 point ROS neg other than the symptoms noted above in the HPI.   CONSTITUTIONAL: negative for, sweats, chills and fevers  : negative for dyspareunia  MUSCULO-SKELETAL: back pain  PSYCHIATRIC: negative for, anxiety and depression    EXAM:  /83  Pulse 76  Ht 1.6 m (5' 2.99\")  Wt 94.7 kg (208 lb 11.2 oz)  LMP 2017 (Exact Date)  BMI 36.98 kg/m2    General: healthy, alert and no distress  Psych: NEGATIVE  Last PHQ-9 score on record= 0  Breasts:  No S/S of yeast or mastitis. No longer lactating.   Abdomen: Benign, Soft, non-tender, No masses, organomegaly and Diastasis less than 1-2 FB  Incision:  None   Vulva:  Normal genitalia and Bartholin's, Urethra, [place]'s normal  Vagina:  normal with good muscle tone and with clear discharge  Cervix:  Multiparous,, no lesions and pink, moist, closed, without lesion. +CMT  "   Uterus:  fully involuted. Slightly tender to deep palpation.   Adnexa:  Within normal limits and No masses, nodularity, tenderness  Recto-vaginal:   anus normal    Wet prep - +clue cells, negative Trich, Negative yeast.  PH 5.5.  +KOH whiff.     ASSESSMENT:   Encounter Diagnoses   Name Primary?     Postpartum care and examination of lactating mother Yes     Routine postpartum follow-up      Pelvic pain in female      Chronic migraine without aura without status migrainosus, not intractable      Aneurysm (H)      Dysuria      General counseling and advice on female contraception      BV (bacterial vaginosis)       Normal postpartum exam after   Pregnancy was complicated by:  Group B Strep, Hx of IUFD, brain aneurysm.      PLAN:  (R10.2) Pelvic pain in female  - Plan: Wet Prep POCT, Neisseria gonorrhoeae PCR,   Chlamydia by PCR, Urine Culture Aerobic         Bacterial.   Flagyl PO BID x7 days for BV.  RX sent to pharmacy on file.  Avoid alcohol while taking pills and for 24 hours after last dose.  Pt refused presumptive treatment for PID.   Urine culture in process, will treat based on sensitivities.  Reviewed warning s/s and how/why to access emergency services prn if fever, flu like symptoms, or increase in pelvic discomfort.       Contraception methods discussed at length. Pt desires method with no side effect of weight gain, but uneasy about insertion of Nexplanon or IUDs. Wants to conceive in about a year, would like OCPs. Discussed weight gain as side effect, pt OK with this. Recommended referral to Neuro to discuss hx of aneurysm and safety of OCPs. Pt will see same physician she saw in pregnancy (see EPIC note Dec 2016).  Plans condoms, feels she can use consistently.    Discussed need for healthy diet. Leafy greens with iron for her recovery postpartum, and possible preconception period. Advised keeping fruits at arms length at home so she can snack when feeling hungry and taking care of  Marjorie.    Signs and symptoms of postpartum depression/anxiety discussed and resources offered    Exercise encouraged, walking outside. Aim for 30 minutes of activity 5 days a week.   Return to clinic in 2 weeks for follow-up and prn. Call with questions or concerns.     I, Rizwana Veneags, am serving as a scribe; to document services personally performed by CHANELLE Merrill CNM based on data collection and the provider's statements to me.     Rizwana Venegas, MPH RN [place]    The encounter was performed by me and scribed by the SNM. The scribed note accurately reflects my personal services and decisions made by me. -   Umm ROCA CNM

## 2017-04-04 LAB
C TRACH DNA SPEC QL NAA+PROBE: NORMAL
N GONORRHOEA DNA SPEC QL NAA+PROBE: NORMAL
SPECIMEN SOURCE: NORMAL
SPECIMEN SOURCE: NORMAL

## 2017-04-05 DIAGNOSIS — R35.0 URINARY FREQUENCY: Primary | ICD-10-CM

## 2017-04-05 PROBLEM — Z30.9 CONTRACEPTIVE MANAGEMENT: Status: ACTIVE | Noted: 2017-04-05

## 2017-04-05 PROBLEM — N76.0 BV (BACTERIAL VAGINOSIS): Status: ACTIVE | Noted: 2017-04-05

## 2017-04-05 PROBLEM — Z36.89 ENCOUNTER FOR TRIAGE IN PREGNANT PATIENT: Status: RESOLVED | Noted: 2017-01-26 | Resolved: 2017-04-05

## 2017-04-05 PROBLEM — O28.3 ABNORMAL FETAL ULTRASOUND: Status: RESOLVED | Noted: 2017-02-02 | Resolved: 2017-04-05

## 2017-04-05 PROBLEM — O41.00X0 OLIGOHYDRAMNIOS: Status: RESOLVED | Noted: 2017-02-04 | Resolved: 2017-04-05

## 2017-04-05 PROBLEM — B96.89 BV (BACTERIAL VAGINOSIS): Status: ACTIVE | Noted: 2017-04-05

## 2017-04-05 LAB
BACTERIA SPEC CULT: ABNORMAL
Lab: ABNORMAL
MICRO REPORT STATUS: ABNORMAL
MICROORGANISM SPEC CULT: ABNORMAL
SPECIMEN SOURCE: ABNORMAL

## 2017-04-05 RX ORDER — NITROFURANTOIN 25; 75 MG/1; MG/1
100 CAPSULE ORAL 2 TIMES DAILY
Qty: 20 CAPSULE | Refills: 0 | Status: SHIPPED | OUTPATIENT
Start: 2017-04-05 | End: 2018-05-03

## 2017-04-05 RX ORDER — METRONIDAZOLE 500 MG/1
500 TABLET ORAL 2 TIMES DAILY
Qty: 14 TABLET | Refills: 0 | Status: SHIPPED | OUTPATIENT
Start: 2017-04-05 | End: 2017-04-20

## 2017-04-11 ENCOUNTER — TELEPHONE (OUTPATIENT)
Dept: OBGYN | Facility: CLINIC | Age: 32
End: 2017-04-11

## 2017-04-11 NOTE — TELEPHONE ENCOUNTER
----- Message from CHANELLE Allen CNM sent at 4/10/2017  5:10 PM CDT -----  Eddie Danielle for the delay - I have consulted our physician team who will discuss and update.  I recommend her following up as well with her neurology team for a routine postpartum follow up visit.      We discussed at her postpartum visit and she said she had the contact information and would call.    Thank you!  Umm  ----- Message -----     From: Sabra Norton RN     Sent: 4/6/2017   1:44 PM       To: CHANELLE Allen CNM,  I called this patient with , and she was not sure if you were going to check with Neurologist about the safety of OCP. She thought you would talk to them, or does she need to make another consult with them? Please clarify, thank you Sabra HIGGINBOTHAM

## 2017-04-11 NOTE — TELEPHONE ENCOUNTER
Spoke to Dai via [facility]  and informed her she needs to make a f/u appt with her Neurologist.  Also informed her providers should have an answer about whether she can take an OCP or not at her scheduled next visit at [facility] 4/20/17.Pt indicated understanding and agreed with plan.

## 2017-04-20 ENCOUNTER — OFFICE VISIT (OUTPATIENT)
Dept: OBGYN | Facility: CLINIC | Age: 32
End: 2017-04-20
Payer: COMMERCIAL

## 2017-04-20 VITALS
DIASTOLIC BLOOD PRESSURE: 78 MMHG | HEART RATE: 87 BPM | WEIGHT: 207 LBS | BODY MASS INDEX: 36.68 KG/M2 | SYSTOLIC BLOOD PRESSURE: 114 MMHG

## 2017-04-20 DIAGNOSIS — N96 RECURRENT MISCARRIAGES DUE TO LUTEAL PHASE DEFECT, NOT PREGNANT: Primary | ICD-10-CM

## 2017-04-20 PROCEDURE — 99212 OFFICE O/P EST SF 10 MIN: CPT | Mod: ZF

## 2017-04-20 ASSESSMENT — PAIN SCALES - GENERAL: PAINLEVEL: NO PAIN (0)

## 2017-04-20 NOTE — NURSING NOTE
Chief Complaint   Patient presents with     RECHECK     Concerns about medications   Alis Arora LPN

## 2017-04-20 NOTE — MR AVS SNAPSHOT
After Visit Summary   2017    Dai Guadarrama    MRN: [id number]           Patient Information     Date Of Birth          1985        Visit Information        Provider Department      2017 2:45 PM Nhi Barry MD; [facility] [employer] [facility]        Care Instructions    You will need the progesterone medication for future pregnancies.        Follow-ups after your visit        Who to contact     Please call your clinic at [phone] to:    Ask questions about your health    Make or cancel appointments    Discuss your medicines    Learn about your test results    Speak to your doctor   If you have compliments or concerns about an experience at your clinic, or if you wish to file a complaint, please contact [facility] Patient Relations at [phone] or email us at [email or web address]         Additional Information About Your Visit        MyChart Information     [email or web address] is an electronic gateway that provides easy, online access to your medical records. With K[employer], you can request a clinic appointment, read your test results, renew a prescription or communicate with your care team.     To sign up for [email or web address] visit the website at [email or web address]   You will be asked to enter the access code listed below, as well as some personal information. Please follow the directions to create your username and password.     Your access code is: [email or web address]  Expires: 2017  9:00 AM     Your access code will  in 90 days. If you need help or a new code, please contact your [facility] or call [phone] for assistance.        Care EveryWhere ID     This is your Care EveryWhere ID. This could be used by other organizations to access your [place] medical records  [id number]        Your Vitals Were     Pulse Last Period Breastfeeding? BMI (Body Mass Index)           87 03/22/2017 (Exact Date) No 36.68 kg/m2         Blood Pressure from Last 3 Encounters:   04/20/17 114/78   04/03/17 120/83   02/07/17 115/66    Weight from Last 3 Encounters:   04/20/17 93.9 kg (207 lb)   04/03/17 94.7 kg (208 lb 11.2 oz)   02/02/17 103 kg (227 lb)              Today, you had the following     No orders found for display         Today's Medication Changes          These changes are accurate as of: 4/20/17  3:30 PM.  If you have any questions, ask your nurse or doctor.               Stop taking these medicines if you haven't already. Please contact your care team if you have questions.     ibuprofen 400 MG tablet   Commonly known as:  ADVIL/MOTRIN   Stopped by:  Nhi Barry MD           metroNIDAZOLE 500 MG tablet   Commonly known as:  FLAGYL   Stopped by:  Nhi Barry MD                    Primary Care Provider    Physician No Ref-Primary       No address on file        Thank you!     Thank you for choosing [facility]  for your care. Our goal is always to provide you with excellent care. Hearing back from our patients is one way we can continue to improve our services. Please take a few minutes to complete the written survey that you may receive in the mail after your visit with us. Thank you!             Your Updated Medication List - Protect others around you: Learn how to safely use, store and throw away your medicines at www.disposemymeds.org.          This list is accurate as of: 4/20/17  3:30 PM.  Always use your most recent med list.                   Brand Name Dispense Instructions for use    nitrofurantoin (macrocrystal-monohydrate) 100 MG capsule    MACROBID    20 capsule    Take 1 capsule (100 mg) by mouth 2 times daily

## 2017-04-21 NOTE — PROGRESS NOTES
Patient presents with a medication question    Patient name: Dai Guadarrama  MRN: [id number]  : 1985      S: Patient is feeling well. She is wondering if she will need to use progesterone at the beginning of a period to prevent early pregnancy loss    O: /78  Pulse 87  Wt 93.9 kg (207 lb)  LMP 2017 (Exact Date)  Breastfeeding? No  BMI 36.68 kg/m2  Gen: comfortable, no distress  Affect appropriate.    A/P: 31 year old  who presents for a medication question. Interview performed with . We discussed the recommendation of beginning progesterone with a positive UPT and continuing through 8 weeks.  It does not ensure that pregnancy will occur as this a presumed lack of hormone supplementation. We discussed starting PNC when coming to that point and patient has received a flu shot this year.     Patient voiced her understanding. Will return for annual visit in 1 year or preconception visit, whichever comes first.  Patient discussed with Dr. Urrutia.  Nhi Barry MD 2017 7:23 PM     The patient was seen in resident continuity clinic by Dr. Barry.  I have reviewed the history and exam, the assessment and plan were jointly made.     Maria Eugenia Urrutia MD, FACOG

## 2018-04-09 ENCOUNTER — TELEPHONE (OUTPATIENT)
Dept: OBGYN | Facility: CLINIC | Age: 33
End: 2018-04-09

## 2018-04-09 DIAGNOSIS — N96 HISTORY OF RECURRENT MISCARRIAGES: Primary | ICD-10-CM

## 2018-04-09 NOTE — TELEPHONE ENCOUNTER
"Spoke with patient's  via  services who states Maxine has positive UPT and it was discussed to begin progesterone when she becomes pregnant. Reports LMP 2/28/18. Positive UPT today.     Dr. Barry's plan from 4/2017: \"We discussed the recommendation of beginning progesterone with a positive UPT and continuing through 8 weeks.\"    Advised we can order progesterone for patient. They would also like a pregnancy consultation appt with an MD this week. Advised can schedule this    At close of phone call pt's  informed nurse that they do not have active insurance. Advised we will hold off on moving forward with appt and progesterone at this time and will have financial counselor call them today. Pt's  agrees with this plan    Spoke with Ramesh in financial counseling who reports he will call patient today  "

## 2018-04-09 NOTE — TELEPHONE ENCOUNTER
Ramesh in financial counselling called to advise it is appropriate to schedule patient.    Sent progesterone to [facility] [address] per patient request.    Scheduled patient with Dr. Mcintosh on Friday at 2pm.

## 2018-04-13 ENCOUNTER — OFFICE VISIT (OUTPATIENT)
Dept: OBGYN | Facility: CLINIC | Age: 33
End: 2018-04-13
Payer: MEDICAID

## 2018-04-13 VITALS
DIASTOLIC BLOOD PRESSURE: 76 MMHG | SYSTOLIC BLOOD PRESSURE: 109 MMHG | HEIGHT: 63 IN | BODY MASS INDEX: 40.56 KG/M2 | WEIGHT: 228.9 LBS | HEART RATE: 96 BPM

## 2018-04-13 DIAGNOSIS — O09.291 HISTORY OF INTRAUTERINE FETAL DEATH, CURRENTLY PREGNANT IN FIRST TRIMESTER: ICD-10-CM

## 2018-04-13 DIAGNOSIS — O26.20 RECURRENT PREGNANCY LOSS, CURRENTLY PREGNANT: ICD-10-CM

## 2018-04-13 DIAGNOSIS — O26.21 RECURRENT PREGNANCY LOSS IN PREGNANT PATIENT IN FIRST TRIMESTER, ANTEPARTUM: ICD-10-CM

## 2018-04-13 DIAGNOSIS — Z3A.01 6 WEEKS GESTATION OF PREGNANCY: Primary | ICD-10-CM

## 2018-04-13 LAB
HCG UR QL: POSITIVE
INTERNAL QC OK POCT: YES

## 2018-04-13 PROCEDURE — 81025 URINE PREGNANCY TEST: CPT | Mod: ZF | Performed by: INTERNAL MEDICINE

## 2018-04-13 PROCEDURE — G0463 HOSPITAL OUTPT CLINIC VISIT: HCPCS | Mod: ZF

## 2018-04-13 PROCEDURE — 76801 OB US < 14 WKS SINGLE FETUS: CPT | Mod: ZF

## 2018-04-13 NOTE — PROGRESS NOTES
32 year old female, , with LMP 18, 6 3/7  Estimated Date of Delivery: 18 presents for confirmation of dates and assessment of viability. This study was done transvaginally.    Measurements     CRL = 2.0 mm = 5 3/ weeks  EGA.   HORTENCIA = 18.     Fetal anatomy appears normal for gestational age.     Fetal/Fetal Cardiac Activity: Absent.      Implantation: Intrauterine.     Cervix = 4.2 cm      Maternal structures :      Left ovarian cysts x 2:    1). Simple cyst- 3.1 x 2.5 x 2.8cm    2). Hemorrhagic cyst with internal echoes- 2.4 x 2.4 x 2.0cm    Impression: Intrauterine pregnancy at 6w3d by LMP consistent with crown rump length today. Two left ovarian cysts, one simple 3.1cm, one hemorrhagic in appearance, 2.4cm. No fetal cardiac activity seen today.    Recommend repeat US with next visit to document cardiac activity.    Recommend comprehensive fetal scan at 18 to 20 weeks with re-evaluation of left ovarian cysts.    ANGEL Watson MD

## 2018-04-13 NOTE — MR AVS SNAPSHOT
"              After Visit Summary   4/13/2018    Dai Guadarrama    MRN: [id number]           Patient Information     Date Of Birth          1985        Visit Information        Provider Department      4/13/2018 2:30 PM [facility] ULTRASOUND [facility]        Today's Diagnoses     Recurrent pregnancy loss in pregnant patient in first trimester, antepartum           Follow-ups after your visit        Your next 10 appointments already scheduled     May 03, 2018  2:30 PM CDT   ULTRASOUND with [facility] ULTRASOUND II   [facility] ([facility])    [facility] [address]  [facility] [address]   [phone]            May 03, 2018  3:00 PM CDT   OB INTAKE with CHANELLE Mary CNM   [facility]    [facility] [address]  [facility] [address]   [phone]           Congratulations! You're Pregnant.  At [facility] we think of you as part of our team, working together toward a successful pregnancy and a healthy baby.  You might already have questions about all the different things that are happening to your body.  We have attached a link to our book \"The Expectant Family- From Pregnancy through Childbirth\" [email or web address] [email or web address] Depot/403666.pdf to help answer some of those concerns. (You will be given a hard copy at your first visit in clinic)  Chapter 2 (page 20) is a must read- from questions about morning sickness, headaches, warning signs to look for, to:\"why do I have to go to the bathroom so often?\"   Our Doctors, Resident Physicians, Certified nurse midwives do work closely together as a team both in clinic and in the hospital to make this experience remarkable. Our patients have on numerous occasions expressed their satisfaction in knowing that there is always a provider at the hospital or on the phone no " matter what time of the day it is, to talk, triage or deliver their babies.  Your time is very important to us, so we will like you to know what to expect.  Routine Prenatal Visit Schedule:  Visit 1: 8 Weeks - Dating Ultrasound and Intake with Nurse  Visit 2: 10-12 Weeks- First Prenatal Visit & Exam with Midwife or Resident Physician, scheduling for early genetic screening at [facility] (optional)  Visit 3: 16-18 Weeks 18-20 Weeks- Level II Ultrasound done at [facility]- 4th Floor  Visit 4: 22-24 weeks  Visit 5: 28 Weeks- Extended Education Visit with Midwife or Resident Physician  Visit 6: 32 Weeks  Visit 7: 36 Weeks  Visit 8-11: 38-41 Weeks (Weekly Visits)  Changes can or may occur during your pregnancy. Your provider may ask you to come in more often for testing or monitoring frequently than stated above.  If you still have questions our triage nurses will be more than welcome to help you. Send us a message via MY Chart, our secure health Portal or give us a call anytime at [phone].  Thank You for allowing us to be part of your care.  Yours sincerely,  [facility]              Who to contact     Please call your clinic at [phone] to:    Ask questions about your health    Make or cancel appointments    Discuss your medicines    Learn about your test results    Speak to your doctor            Additional Information About Your Visit        MyChart Information     Ezoic is an electronic gateway that provides easy, online access to your medical records. With Ezoic, you can request a clinic appointment, read your test results, renew a prescription or communicate with your care team.     To sign up for AGRIMAPSt visit the website at [email or web address]   You will be asked to enter the access code listed below, as well as some personal information. Please follow the directions to create your username and password.     Your access code is: [email or web address]  Expires: 7/9/2018   6:31 AM     Your access code will  in 90 days. If you need help or a new code, please contact your [facility] or call [phone] for assistance.        Care EveryWhere ID     This is your Care EveryWhere ID. This could be used by other organizations to access your [place] medical records  [id number]        Your Vitals Were     Last Period                   2018 (Exact Date)            Blood Pressure from Last 3 Encounters:   18 109/76   17 114/78   17 120/83    Weight from Last 3 Encounters:   18 103.8 kg (228 lb 14.4 oz)   17 93.9 kg (207 lb)   17 94.7 kg (208 lb 11.2 oz)              We Performed the Following     Dating ultrasound less than 14 weeks gestation Transab (Single) - 28780 (In Clinic)          Today's Medication Changes          These changes are accurate as of 18  4:27 PM.  If you have any questions, ask your nurse or doctor.               These medicines have changed or have updated prescriptions.        Dose/Directions    * progesterone 200 MG VA SUPP   This may have changed:  Another medication with the same name was added. Make sure you understand how and when to take each.   Used for:  History of recurrent miscarriages   Changed by:  Naima Mcintosh MD        Dose:  200 mg   Place 1 suppository (200 mg) vaginally At Bedtime for 8 weeks   Quantity:  60 suppository   Refills:  0       * progesterone 200 MG VA SUPP   Commonly known as:  FIRST-PROGESTERONE    This may have changed:  You were already taking a medication with the same name, and this prescription was added. Make sure you understand how and when to take each.   Used for:  Recurrent pregnancy loss, currently pregnant   Changed by:  Naima Mcintosh MD        Dose:  200 mg   Place 1 suppository (200 mg) vaginally daily   Quantity:  20 suppository   Refills:  0       * Notice:  This list has 2 medication(s) that are the same as other  medications prescribed for you. Read the directions carefully, and ask your doctor or other care provider to review them with you.         Where to get your medicines      These medications were sent to [facility] [address] [facility] [address] [place] [address] AT [facility] [address]  [address] [facility], [facility] [address]     Phone:  [phone]     progesterone 200 MG VA SUPP                Primary Care Provider Fax #    Physician No Ref-Primary [phone]       No address on file        Equal Access to Services     AISHA SONG : Hadii isaiah ku hadasho Soomaali, waaxda luqadaha, qaybta kaalmada adeegyada, waxay shardain hayvaleria moreno . So [facility] [phone].    ATENCIÓN: Si habla español, tiene a martin disposición servicios gratuitos de asistencia lingüística. Llame al [phone].    We comply with applicable federal civil rights laws and [place] laws. We do not discriminate on the basis of race, color, national origin, age, disability, sex, sexual orientation, or gender identity.            Thank you!     Thank you for choosing [facility]  for your care. Our goal is always to provide you with excellent care. Hearing back from our patients is one way we can continue to improve our services. Please take a few minutes to complete the written survey that you may receive in the mail after your visit with us. Thank you!             Your Updated Medication List - Protect others around you: Learn how to safely use, store and throw away your medicines at www.disposemymeds.org.          This list is accurate as of 4/13/18  4:27 PM.  Always use your most recent med list.                   Brand Name Dispense Instructions for use Diagnosis    nitroFURantoin (macrocrystal-monohydrate) 100 MG capsule    MACROBID    20 capsule    Take 1 capsule (100 mg) by mouth 2 times daily    Urinary frequency       * progesterone 200 MG VA SUPP     60 suppository    Place 1 suppository  (200 mg) vaginally At Bedtime for 8 weeks    History of recurrent miscarriages       * progesterone 200 MG VA SUPP    FIRST-PROGESTERONE     20 suppository    Place 1 suppository (200 mg) vaginally daily    Recurrent pregnancy loss, currently pregnant       * Notice:  This list has 2 medication(s) that are the same as other medications prescribed for you. Read the directions carefully, and ask your doctor or other care provider to review them with you.

## 2018-04-13 NOTE — PROGRESS NOTES
[facility]    HPI: 32 year old  at 6w3d by LMP of 18 here to confirm pregnancy. She had a positive pregnancy test at home and is hoping for an ultrasound today. She has a history of two first trimester spontaneous abortions and an IUFD at 32w0d and is concerned about whether this is a healthy pregnancy or not. She was seen by Dr. Barry in 2017 to discuss the use of progesterone in pregnancy. Dr. Barry advised her to start progesterone at the time of a positive pregnancy test and to continue through 8 weeks of pregnancy. Ms. Kinjal Guadarraam called the clinic for a prescription but did not pick the medication up due to lack of insurance.     Ms. Kinjal Guadarrama reports feeling well overall. She denies vaginal bleeding. She reports occasional lower abdominal cramping that is worse when she is walking. She denies alcohol, tobacco, and drug use. She is not currently taking a prenatal vitamin.    Pregnancy history notable for:  - Stillbirth in    - 2 first trimester SABs  - Term  ()    OBHx  Obstetric History       T1      L1     SAB2   TAB0   Ectopic0   Multiple0   Live Births2       # Outcome Date GA Lbr Spike/2nd Weight Sex Delivery Anes PTL Lv   5 Current            4 Term 17 37w5d 02:30 / 00:13 3.374 kg (7 lb 7 oz) F Vag-Spont EPI N KASEY      Name: KINJAL GUADARRAMA,BABYIva SANCHEZ      Apgar1:  9                Apgar5: 9   3   32w0d   F I.U. 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 Denies alcohol, tobacco, and drug use.    ROS: 10-Point ROS negative except as noted in HPI    Physical Exam  /76 (BP Location: Right arm, Patient Position:  "Chair)  Pulse 96  Ht 1.588 m (5' 2.5\")  Wt 103.8 kg (228 lb 14.4 oz)  LMP 2018 (Exact Date)  BMI 41.2 kg/m2  Gen: Well-appearing, NAD  Lungs: Breathing comfortably on room air     TVUS (18): CRL 2.0 mm = 5w3d. LMP places patient at 6w3d. No cardiac activity seen.    Assessment/Plan:  Ms. Dai Guadarrama is a 32 year old  at 6w3d by LMP here to discuss early pregnancy. TVUS showed an IUP with CRL of 2mm, still too small to reliably see cardiac activity. Patient strongly desires progesterone supplementation after her discussion with Dr. Barry one year ago. Prescription sent for vaginal progesterone to take until 8 weeks of pregnancy. She will return to clinic in approximately 2 weeks for a repeat early pregnancy ultrasound and for her OB intake visit.     Naima Mcintosh MD  OB/GYN, PGY2  18    [facility] Staff Note:  Patient was seen by the resident in Continuity of Care Clinic.  I reviewed the history & exam.  The patient's assessment and plan were made jointly.    Denice Smith MD  18    "

## 2018-04-13 NOTE — NURSING NOTE
Chief Complaint   Patient presents with     Consult For     Positive home pregnancy test. Consult for progesterone.       See TESSA Sheriff 4/13/2018

## 2018-04-13 NOTE — MR AVS SNAPSHOT
"              After Visit Summary   4/13/2018    Dai Guadarrama    MRN: [id number]           Patient Information     Date Of Birth          1985        Visit Information        Provider Department      4/13/2018 1:45 PM Naima Mcintosh MD; [facility] LANGUAGE SERVICES [facility]        Today's Diagnoses     6 weeks gestation of pregnancy    -  1    History of intrauterine fetal death, currently pregnant in first trimester        Recurrent pregnancy loss, currently pregnant          Care Instructions    A prescription was sent to [facility] for vaginal progesterone. You can take this daily until 8 weeks of pregnancy (for the next 12 days).    Please schedule an appointment in 2-3 weeks for a repeat ultrasound and to have your first prenatal visit          Follow-ups after your visit        Your next 10 appointments already scheduled     May 03, 2018  2:30 PM CDT   [facility]   [facility] ([facility])    [place] [facility] [address]  [facility] [address] [phone]            May 03, 2018  3:00 PM CDT   OB INTAKE with CHANELLE Mary CNM   [facility] ([facility])    [place] [facility] [address]  [address]  [facility] [id number] [phone]           Congratulations! You're Pregnant.  At [facility] we think of you as part of our team, working together toward a successful pregnancy and a healthy baby.  You might already have questions about all the different things that are happening to your body.  We have attached a link to our book \"The Expectant Family- From Pregnancy through Childbirth\" http://www.easyfolio/693691.pdf to help answer some of those concerns. (You will be given a hard copy at your first visit in clinic)  Chapter 2 (page 20) is a must read- from questions about morning sickness, headaches, " "warning signs to look for, to:\"why do I have to go to the bathroom so often?\"   Our Doctors, Resident Physicians, Certified nurse midwives do work closely together as a team both in clinic and in the hospital to make this experience remarkable. Our patients have on numerous occasions expressed their satisfaction in knowing that there is always a provider at the hospital or on the phone no matter what time of the day it is, to talk, triage or deliver their babies.  Your time is very important to us, so we will like you to know what to expect.  Routine Prenatal Visit Schedule:  Visit 1: 8 Weeks - Dating Ultrasound and Intake with Nurse  Visit 2: 10-12 Weeks- First Prenatal Visit & Exam with Midwife or Resident Physician, scheduling for early genetic screening at [facility] (optional)  Visit 3: 16-18 Weeks 18-20 Weeks- Level II Ultrasound done at [facility]- 4th Floor  Visit 4: 22-24 weeks  Visit 5: 28 Weeks- Extended Education Visit with Midwife or Resident Physician  Visit 6: 32 Weeks  Visit 7: 36 Weeks  Visit 8-11: 38-41 Weeks (Weekly Visits)  Changes can or may occur during your pregnancy. Your provider may ask you to come in more often for testing or monitoring frequently than stated above.  If you still have questions our triage nurses will be more than welcome to help you. Send us a message via MY Chart, our secure health Portal or give us a call anytime at [phone].  Thank You for allowing us to be part of your care.  Yours sincerely,  [facility]              Who to contact     Please call your clinic at [phone] to:    Ask questions about your health    Make or cancel appointments    Discuss your medicines    Learn about your test results    Speak to your doctor            Additional Information About Your Visit        [employer] Information     [employer] is an electronic gateway that provides easy, online access to your medical records. With [employer], you can request a clinic " "appointment, read your test results, renew a prescription or communicate with your care team.     To sign up for [employer] visit the website at [email or web address]   You will be asked to enter the access code listed below, as well as some personal information. Please follow the directions to create your username and password.     Your access code is: [email or web address]  Expires: 2018  6:31 AM     Your access code will  in 90 days. If you need help or a new code, please contact your [facility] or call [phone] for assistance.        Care EveryWhere ID     This is your Care EveryWhere ID. This could be used by other organizations to access your [place] medical records  [id number]        Your Vitals Were     Pulse Height Last Period BMI (Body Mass Index)          96 1.588 m (5' 2.5\") 2018 (Exact Date) 41.2 kg/m2         Blood Pressure from Last 3 Encounters:   18 109/76   17 114/78   17 120/83    Weight from Last 3 Encounters:   18 103.8 kg (228 lb 14.4 oz)   17 93.9 kg (207 lb)   17 94.7 kg (208 lb 11.2 oz)              We Performed the Following     hCG qual urine POCT          Today's Medication Changes          These changes are accurate as of 18 11:59 PM.  If you have any questions, ask your nurse or doctor.               These medicines have changed or have updated prescriptions.        Dose/Directions    * progesterone 200 MG VA SUPP   This may have changed:  Another medication with the same name was added. Make sure you understand how and when to take each.   Used for:  History of recurrent miscarriages   Changed by:  Naima Mcintosh MD        Dose:  200 mg   Place 1 suppository (200 mg) vaginally At Bedtime for 8 weeks   Quantity:  60 suppository   Refills:  0       * progesterone 200 MG VA SUPP   Commonly known as:  FIRST-PROGESTERONE    This may have changed:  You were already taking a medication with the " same name, and this prescription was added. Make sure you understand how and when to take each.   Used for:  Recurrent pregnancy loss, currently pregnant   Changed by:  Naima Mcintosh MD        Dose:  200 mg   Place 1 suppository (200 mg) vaginally daily   Quantity:  12 suppository   Refills:  0       * Notice:  This list has 2 medication(s) that are the same as other medications prescribed for you. Read the directions carefully, and ask your doctor or other care provider to review them with you.         Where to get your medicines      These medications were sent to [facility] [address] [facility] [address] [place] [address] AVE [address] AT [facility] [address] [facility]  [address], [facility] [address]     Phone:  [phone]     progesterone 200 MG VA SUPP                Primary Care Provider Fax #    Physician No Ref-Primary [phone]       No address on file        Equal Access to Services     AISHA SONG : Mariano croona Sojakob, waaxda mar, qaybta kaalrosario stauffer, kodi moreno . So [facility] [phone].    ATENCIÓN: Si habla español, tiene a martin disposición servicios gratuitos de asistencia lingüística. Llame al [phone].    We comply with applicable federal civil rights laws and [place] laws. We do not discriminate on the basis of race, color, national origin, age, disability, sex, sexual orientation, or gender identity.            Thank you!     Thank you for choosing [facility]  for your care. Our goal is always to provide you with excellent care. Hearing back from our patients is one way we can continue to improve our services. Please take a few minutes to complete the written survey that you may receive in the mail after your visit with us. Thank you!             Your Updated Medication List - Protect others around you: Learn how to safely use, store and throw away your medicines at www.disposemymeds.org.          This list is  accurate as of 4/13/18 11:59 PM.  Always use your most recent med list.                   Brand Name Dispense Instructions for use Diagnosis    nitroFURantoin (macrocrystal-monohydrate) 100 MG capsule    MACROBID    20 capsule    Take 1 capsule (100 mg) by mouth 2 times daily    Urinary frequency       * progesterone 200 MG VA SUPP     60 suppository    Place 1 suppository (200 mg) vaginally At Bedtime for 8 weeks    History of recurrent miscarriages       * progesterone 200 MG VA SUPP    FIRST-PROGESTERONE     12 suppository    Place 1 suppository (200 mg) vaginally daily    Recurrent pregnancy loss, currently pregnant       * Notice:  This list has 2 medication(s) that are the same as other medications prescribed for you. Read the directions carefully, and ask your doctor or other care provider to review them with you.

## 2018-04-13 NOTE — PATIENT INSTRUCTIONS
A prescription was sent to [facility] for vaginal progesterone. You can take this daily until 8 weeks of pregnancy (for the next 12 days).    Please schedule an appointment in 2-3 weeks for a repeat ultrasound and to have your first prenatal visit

## 2018-05-03 ENCOUNTER — OFFICE VISIT (OUTPATIENT)
Dept: OBGYN | Facility: CLINIC | Age: 33
End: 2018-05-03
Attending: MIDWIFE
Payer: MEDICAID

## 2018-05-03 VITALS
WEIGHT: 225 LBS | HEIGHT: 63 IN | HEART RATE: 100 BPM | DIASTOLIC BLOOD PRESSURE: 70 MMHG | BODY MASS INDEX: 39.87 KG/M2 | SYSTOLIC BLOOD PRESSURE: 114 MMHG

## 2018-05-03 DIAGNOSIS — O36.80X0 ENCOUNTER TO DETERMINE FETAL VIABILITY OF PREGNANCY, SINGLE OR UNSPECIFIED FETUS: Primary | ICD-10-CM

## 2018-05-03 DIAGNOSIS — O09.91 SUPERVISION OF HIGH RISK PREGNANCY IN FIRST TRIMESTER: Primary | ICD-10-CM

## 2018-05-03 DIAGNOSIS — O09.291 HISTORY OF INTRAUTERINE FETAL DEATH, CURRENTLY PREGNANT IN FIRST TRIMESTER: ICD-10-CM

## 2018-05-03 DIAGNOSIS — I72.9 ANEURYSM (H): ICD-10-CM

## 2018-05-03 DIAGNOSIS — O21.0 HYPEREMESIS GRAVIDARUM: ICD-10-CM

## 2018-05-03 PROBLEM — N76.0 BV (BACTERIAL VAGINOSIS): Status: RESOLVED | Noted: 2017-04-05 | Resolved: 2018-05-03

## 2018-05-03 PROBLEM — R35.0 URINARY FREQUENCY: Status: RESOLVED | Noted: 2017-04-05 | Resolved: 2018-05-03

## 2018-05-03 PROBLEM — B96.89 BV (BACTERIAL VAGINOSIS): Status: RESOLVED | Noted: 2017-04-05 | Resolved: 2018-05-03

## 2018-05-03 PROBLEM — Z30.9 CONTRACEPTIVE MANAGEMENT: Status: RESOLVED | Noted: 2017-04-05 | Resolved: 2018-05-03

## 2018-05-03 LAB
ABO + RH BLD: NORMAL
ABO + RH BLD: NORMAL
ANION GAP SERPL CALCULATED.3IONS-SCNC: 7 MMOL/L (ref 3–14)
BASOPHILS # BLD AUTO: 0 10E9/L (ref 0–0.2)
BASOPHILS NFR BLD AUTO: 0.1 %
BLD GP AB SCN SERPL QL: NORMAL
BLOOD BANK CMNT PATIENT-IMP: NORMAL
BUN SERPL-MCNC: 10 MG/DL (ref 7–30)
CALCIUM SERPL-MCNC: 8.7 MG/DL (ref 8.5–10.1)
CHLORIDE SERPL-SCNC: 106 MMOL/L (ref 94–109)
CO2 SERPL-SCNC: 23 MMOL/L (ref 20–32)
CREAT SERPL-MCNC: 0.7 MG/DL (ref 0.52–1.04)
DIFFERENTIAL METHOD BLD: ABNORMAL
EOSINOPHIL # BLD AUTO: 0.1 10E9/L (ref 0–0.7)
EOSINOPHIL NFR BLD AUTO: 0.9 %
ERYTHROCYTE [DISTWIDTH] IN BLOOD BY AUTOMATED COUNT: 14.7 % (ref 10–15)
GFR SERPL CREATININE-BSD FRML MDRD: >90 ML/MIN/1.7M2
GLUCOSE SERPL-MCNC: 86 MG/DL (ref 70–99)
HBA1C MFR BLD: 5.8 % (ref 0–6.4)
HCT VFR BLD AUTO: 41.4 % (ref 35–47)
HGB BLD-MCNC: 13.3 G/DL (ref 11.7–15.7)
IMM GRANULOCYTES # BLD: 0 10E9/L (ref 0–0.4)
IMM GRANULOCYTES NFR BLD: 0.3 %
LYMPHOCYTES # BLD AUTO: 3 10E9/L (ref 0.8–5.3)
LYMPHOCYTES NFR BLD AUTO: 27.2 %
MCH RBC QN AUTO: 27.7 PG (ref 26.5–33)
MCHC RBC AUTO-ENTMCNC: 32.1 G/DL (ref 31.5–36.5)
MCV RBC AUTO: 86 FL (ref 78–100)
MONOCYTES # BLD AUTO: 0.6 10E9/L (ref 0–1.3)
MONOCYTES NFR BLD AUTO: 5.6 %
NEUTROPHILS # BLD AUTO: 7.4 10E9/L (ref 1.6–8.3)
NEUTROPHILS NFR BLD AUTO: 65.9 %
NRBC # BLD AUTO: 0 10*3/UL
NRBC BLD AUTO-RTO: 0 /100
PLATELET # BLD AUTO: 407 10E9/L (ref 150–450)
POTASSIUM SERPL-SCNC: 3.8 MMOL/L (ref 3.4–5.3)
RBC # BLD AUTO: 4.8 10E12/L (ref 3.8–5.2)
SODIUM SERPL-SCNC: 136 MMOL/L (ref 133–144)
SPECIMEN EXP DATE BLD: NORMAL
TSH SERPL DL<=0.005 MIU/L-ACNC: 2.36 MU/L (ref 0.4–4)
WBC # BLD AUTO: 11.2 10E9/L (ref 4–11)

## 2018-05-03 PROCEDURE — G0463 HOSPITAL OUTPT CLINIC VISIT: HCPCS | Mod: 25,ZF

## 2018-05-03 PROCEDURE — 86780 TREPONEMA PALLIDUM: CPT | Performed by: MIDWIFE

## 2018-05-03 PROCEDURE — 86850 RBC ANTIBODY SCREEN: CPT | Performed by: MIDWIFE

## 2018-05-03 PROCEDURE — 86762 RUBELLA ANTIBODY: CPT | Performed by: MIDWIFE

## 2018-05-03 PROCEDURE — 82306 VITAMIN D 25 HYDROXY: CPT | Performed by: MIDWIFE

## 2018-05-03 PROCEDURE — 86787 VARICELLA-ZOSTER ANTIBODY: CPT | Performed by: MIDWIFE

## 2018-05-03 PROCEDURE — 86901 BLOOD TYPING SEROLOGIC RH(D): CPT | Performed by: MIDWIFE

## 2018-05-03 PROCEDURE — 86900 BLOOD TYPING SEROLOGIC ABO: CPT | Performed by: MIDWIFE

## 2018-05-03 PROCEDURE — 80048 BASIC METABOLIC PNL TOTAL CA: CPT | Performed by: MIDWIFE

## 2018-05-03 PROCEDURE — 84443 ASSAY THYROID STIM HORMONE: CPT | Performed by: MIDWIFE

## 2018-05-03 PROCEDURE — 87389 HIV-1 AG W/HIV-1&-2 AB AG IA: CPT | Performed by: MIDWIFE

## 2018-05-03 PROCEDURE — 83036 HEMOGLOBIN GLYCOSYLATED A1C: CPT | Performed by: MIDWIFE

## 2018-05-03 PROCEDURE — 85025 COMPLETE CBC W/AUTO DIFF WBC: CPT | Performed by: MIDWIFE

## 2018-05-03 PROCEDURE — 87086 URINE CULTURE/COLONY COUNT: CPT | Performed by: MIDWIFE

## 2018-05-03 PROCEDURE — T1013 SIGN LANG/ORAL INTERPRETER: HCPCS | Mod: U3,ZF

## 2018-05-03 PROCEDURE — G0499 HEPB SCREEN HIGH RISK INDIV: HCPCS | Performed by: MIDWIFE

## 2018-05-03 PROCEDURE — 36415 COLL VENOUS BLD VENIPUNCTURE: CPT | Performed by: MIDWIFE

## 2018-05-03 PROCEDURE — 76817 TRANSVAGINAL US OBSTETRIC: CPT | Mod: ZF

## 2018-05-03 RX ORDER — ONDANSETRON 4 MG/1
4-8 TABLET, ORALLY DISINTEGRATING ORAL EVERY 8 HOURS PRN
Qty: 20 TABLET | Refills: 3 | Status: SHIPPED | OUTPATIENT
Start: 2018-05-03 | End: 2018-08-01

## 2018-05-03 RX ORDER — PRENATAL VIT/IRON FUM/FOLIC AC 27MG-0.8MG
1 TABLET ORAL DAILY
COMMUNITY
End: 2018-12-05

## 2018-05-03 ASSESSMENT — PAIN SCALES - GENERAL: PAINLEVEL: NO PAIN (0)

## 2018-05-03 NOTE — PROGRESS NOTES
[facility] OB Intake note  Subjective   32 year old woman presents to clinic for initiation of OB care.  Patient's last menstrual period was 2018 (exact date).    at 7w6d by Estimated Date of Delivery: Dec 14, 2018 based on US confirms.  Reviewed dating ultrasound.   Pregnancy is planned.      Symptoms since LMP include nausea and vomiting.  Patient has tried these relief measures: diet modification, small frequent meals, increased rest and increased fluids.    - Genetic/Infection questionnaire completed, risks include hx stillbirth at 32 wks    Have you traveled during the pregnancy?No  Have your sexual partner(s) travelled during the pregnancy?No      - Current Medications PNV    Current Outpatient Prescriptions   Medication Sig Dispense Refill     ondansetron (ZOFRAN ODT) 4 MG ODT tab Take 1-2 tablets (4-8 mg) by mouth every 8 hours as needed for nausea 20 tablet 3     Prenatal Vit-Fe Fumarate-FA (PRENATAL MULTIVITAMIN PLUS IRON) 27-0.8 MG TABS per tablet Take 1 tablet by mouth daily       progesterone 200 MG VA SUPP Place 1 suppository (200 mg) vaginally At Bedtime for 8 weeks (Patient not taking: Reported on 2018) 60 suppository 0         - Co-morbids hx stillbirth , hx aneurysm, hx IOL oligohydramnios 37 wks  BMI 41  Plan early GCT when nausea resolves   Past Medical History:   Diagnosis Date     Aneurysm (H)      Migraine headache without aura      Pregnancy related nausea and vomiting, antepartum     two ED visits.     Recurrent pregnancy loss (CODE)      Retained placenta      Stillbirth     32 weeks     Tachycardia      - Risk for GDM -  Personal history of GDM, BMI>30, h/o prediabetes/glucose intolerance, first degree relative with GDM or DM     - The patient does not h/o Pre Eclampsia, Current multi fetal gestation, Pre Gestational Diabetes (Type 1 or Type 2), chronic hypertension, renal disease, Autoimmune disease (systematic lupus erythematosus, antiphospholipid syndrome) so WILL  NOT start low dose aspirin (81mg) starting between 12 and 28 weeks to prevent preeclampsia.    - The patient  does not have a history of spontaneous  birth so  WILL NOT consider progesterone starting at 16-20 weeks and/or serial transvaginal cervical length ultrasounds from 16-24 weeks.         PERSONAL/SOCIAL HISTORY    Lives lives with their family.  Employment: Homemaker .  Her job involves moderate activity . Busy toddler   Additional items: None    Objective  -VS: reviewed and within normal limits   -General appearance: no acute distress, patient is comfortable   NEUROLOGICAL/PSYCHIATRIC   - Orientated x3,   -Mood and affect: : normal     Assessment/Plan  Dai was seen today for prenatal care.    Diagnoses and all orders for this visit:    Supervision of high risk pregnancy in first trimester  -     ondansetron (ZOFRAN ODT) 4 MG ODT tab; Take 1-2 tablets (4-8 mg) by mouth every 8 hours as needed for nausea  -     TSH with free T4 reflex  -     Basic Metabolic Panel  -     25- OH-Vitamin D  -     ABO/Rh Type and Screen  -     CBC with Platelets Differential  -     Hepatitis B Surface Antigen  -     HIV Antigen Antibody Combo  -     Rubella Antibody IgG Quantitative  -     Treponema Abs w Reflex to RPR and Titer  -     Urine Culture Aerobic Bacterial  -     Varicella Zoster Virus Antibody IgG    Hyperemesis gravidarum  -     ondansetron (ZOFRAN ODT) 4 MG ODT tab; Take 1-2 tablets (4-8 mg) by mouth every 8 hours as needed for nausea  -     TSH with free T4 reflex  -     Basic Metabolic Panel  -     25- OH-Vitamin D  -     ABO/Rh Type and Screen  -     CBC with Platelets Differential  -     Hepatitis B Surface Antigen  -     HIV Antigen Antibody Combo  -     Rubella Antibody IgG Quantitative  -     Treponema Abs w Reflex to RPR and Titer  -     Urine Culture Aerobic Bacterial  -     Varicella Zoster Virus Antibody IgG    Aneurysm (H)    History of intrauterine fetal death, currently pregnant in first  trimester        32 year old  7w6d weeks of pregnancy with HORTENCIA of Dec 14, 2018 by LMP of Patient's last menstrual period was 2018 (exact date)..  c/w  Outpatient Encounter Prescriptions as of 5/3/2018   Medication Sig Dispense Refill     ondansetron (ZOFRAN ODT) 4 MG ODT tab Take 1-2 tablets (4-8 mg) by mouth every 8 hours as needed for nausea 20 tablet 3     Prenatal Vit-Fe Fumarate-FA (PRENATAL MULTIVITAMIN PLUS IRON) 27-0.8 MG TABS per tablet Take 1 tablet by mouth daily       progesterone 200 MG VA SUPP Place 1 suppository (200 mg) vaginally At Bedtime for 8 weeks (Patient not taking: Reported on 2018) 60 suppository 0     [DISCONTINUED] nitrofurantoin, macrocrystal-monohydrate, (MACROBID) 100 MG capsule Take 1 capsule (100 mg) by mouth 2 times daily (Patient not taking: Reported on 2018) 20 capsule 0     [DISCONTINUED] progesterone (FIRST-PROGESTERONE ) 200 MG VA SUPP Place 1 suppository (200 mg) vaginally daily 12 suppository 0     No facility-administered encounter medications on file as of 5/3/2018.       Orders Placed This Encounter   Procedures     TSH with free T4 reflex     Basic Metabolic Panel     25- OH-Vitamin D     CBC with Platelets Differential     Hepatitis B Surface Antigen     HIV Antigen Antibody Combo     Rubella Antibody IgG Quantitative     Treponema Abs w Reflex to RPR and Titer     Varicella Zoster Virus Antibody IgG     ABO/Rh Type and Screen                   Orders Placed This Encounter   Procedures     TSH with free T4 reflex     Basic Metabolic Panel     25- OH-Vitamin D     CBC with Platelets Differential     Hepatitis B Surface Antigen     HIV Antigen Antibody Combo     Rubella Antibody IgG Quantitative     Treponema Abs w Reflex to RPR and Titer     Varicella Zoster Virus Antibody IgG     ABO/Rh Type and Screen     - Oriented to Practice, types of care, and how to reach a provider.  Pt prefers  MD .  - Patient received 1st trimester new OB education  packet complete with aide of The Expectant Family booklet including information on genetic screening test options.  - Patient declines all genetic screening.   Couple will consider and notify if desires to schedule 1st trim screen   - Patient was encouraged to start prenatal vitamins as tolerated.    - Patient was sent to lab for routine OB labs including Hgb A1C, BMP, TSHR,  plan early GCT in future.      -- Reviewed risk for diabetes in pregnancy, pt agrees to early 1 hour  or plan for NOB visit. hgb A1C drawn   - Discuss with MD ? recommendation for low dose aspirin daily follow up at NOB visit.  After 1st trimester   - Pregnancy concerns to be addressed by provider at new OB exam include: hx Stillbirth   Reviewed MFM consultation from 2016 per Dr. Quintero recommendation for growth US, and weekly BPPs at 32 wks.     Pt to RTO for NOB visit in 2-3 weeks and prn if questions or concerns    CHANELLE Mary CNM

## 2018-05-03 NOTE — LETTER
5/3/2018       RE: Dai Guadarrama  [address]  [facility] [address]     Dear Colleague,    Thank you for referring your patient, Dai Guadarrama, to the [facility] at [facility]. Please see a copy of my visit note below.    32 year old female, ,8 2/7 weeks by early ultrasound. Estimated Date of Delivery: 2018,  presents for confirmation of dates and assessment of viability. This study was done transvaginally.    Measurements     CRL = 14.5 mm = 7 6/7 weeks  EGA.        Fetal anatomy appears normal for gestational age.     Fetal/Fetal Cardiac Activity: Present.  FHR = 156bpm.     Implantation: Intrauterine.     Cervix = 3.9 cm      Maternal structures - left ovarian cysts - 1.) Simple - 3.0 x 2.3 x 1.7cm  2.) hemorrhagic - 2.0 x 1.9 x 1.7cm .    Impression: IUP at 8w2d by LMP c/w today's ultrasound.  2 left ovarian cysts noted    Recommend comprehensive scan at 18 to 20 weeks.    ANGEL Nicholson MD    Again, thank you for allowing me to participate in the care of your patient.      Sincerely,    Yamile Anderson

## 2018-05-03 NOTE — LETTER
May 3, 2018    To Whom It May Concern:    Dai Guadarrama is being seen in our clinic for prenatal care.      Her Estimated Date of Delivery: Dec 11, 2018.    The first day the third trimester: Sept 15, 2018  LMP:  Patient's last menstrual period was 02/28/2018 (exact date).     Sincerely,        Rosa Ansari CNM

## 2018-05-03 NOTE — MR AVS SNAPSHOT
After Visit Summary   5/3/2018    Dai Guadarrama    MRN: [id number]           Patient Information     Date Of Birth          1985        Visit Information        Provider Department      5/3/2018 3:00 PM Yamile Anderson; Manasa Ansari APRN CNM [facility]        Today's Diagnoses     Supervision of high risk pregnancy in first trimester    -  1    Hyperemesis gravidarum           Follow-ups after your visit        Follow-up notes from your care team     Return in about 2 weeks (around 5/17/2018) for New OB.      Your next 10 appointments already scheduled     May 24, 2018  3:00 PM CDT   NEW OB with CHANELLE Mary CNM   [facility] ([facility])    [facility] [address]  [address] [facility] [address] [phone]              Who to contact     Please call your clinic at [phone] to:    Ask questions about your health    Make or cancel appointments    Discuss your medicines    Learn about your test results    Speak to your doctor            Additional Information About Your Visit        MyChart Information     [employer] [facility] [employer] gives you secure access to your electronic health record. If you see a primary care provider, you can also send messages to your care team and make appointments. If you have questions, please call your primary care clinic.  If you do not have a primary care provider, please call [phone] and they will assist you.      [employer] is an electronic gateway that provides easy, online access to your medical records. With [employer] [facility] [employer], you can request a clinic appointment, read your test results, renew a prescription or communicate with your care team.     To access your existing account, please contact your [facility] or call [phone] for assistance.        Care EveryWhere ID     This is your Care EveryWhere ID. This could be used by other  "organizations to access your [facility] medical records  [id number]        Your Vitals Were     Pulse Height Last Period BMI (Body Mass Index)          100 1.588 m (5' 2.52\") 02/28/2018 (Exact Date) 40.47 kg/m2         Blood Pressure from Last 3 Encounters:   05/03/18 114/70   04/13/18 109/76   04/20/17 114/78    Weight from Last 3 Encounters:   05/03/18 102.1 kg (225 lb)   04/13/18 103.8 kg (228 lb 14.4 oz)   04/20/17 93.9 kg (207 lb)              We Performed the Following     25- OH-Vitamin D     ABO/Rh Type and Screen     Basic Metabolic Panel     CBC with Platelets Differential     Hepatitis B Surface Antigen     HIV Antigen Antibody Combo     Rubella Antibody IgG Quantitative     Treponema Abs w Reflex to RPR and Titer     TSH with free T4 reflex     Urine Culture Aerobic Bacterial     Varicella Zoster Virus Antibody IgG          Today's Medication Changes          These changes are accurate as of 5/3/18  3:54 PM.  If you have any questions, ask your nurse or doctor.               Start taking these medicines.        Dose/Directions    ondansetron 4 MG ODT tab   Commonly known as:  ZOFRAN ODT   Used for:  Hyperemesis gravidarum, Supervision of high risk pregnancy in first trimester   Started by:  Manasa Ansari APRN CNM        Dose:  4-8 mg   Take 1-2 tablets (4-8 mg) by mouth every 8 hours as needed for nausea   Quantity:  20 tablet   Refills:  3         These medicines have changed or have updated prescriptions.        Dose/Directions    progesterone 200 MG VA SUPP   This may have changed:  Another medication with the same name was removed. Continue taking this medication, and follow the directions you see here.   Used for:  History of recurrent miscarriages   Changed by:  Manasa Ansari APRN CNM        Dose:  200 mg   Place 1 suppository (200 mg) vaginally At Bedtime for 8 weeks   Quantity:  60 suppository   Refills:  0         Stop taking these medicines if you haven't already. Please contact your care " team if you have questions.     nitroFURantoin (macrocrystal-monohydrate) 100 MG capsule   Commonly known as:  MACROBID   Stopped by:  Manasa Ansari APRN CNM                Where to get your medicines      These medications [facility] to [id number] - [facility] [address] AT [facility] [address]  [address], [place] [address]     Phone:  [phone]     ondansetron 4 MG ODT tab                Primary Care Provider Fax #    Physician No Ref-Primary [phone]       No address on file        Equal Access to Services     [facility] : Hadii isaiah bailey hadasho Soomaali, waaxda luqadaha, qaybta kaalmada gaganyada, kodi moreno . So [facility] [phone].    ATENCIÓN: Si habla español, tiene a martin disposición servicios gratuitos de asistencia lingüística. [facility] [phone].    We comply with applicable federal civil rights laws and [place] laws. We do not discriminate on the basis of race, color, national origin, age, disability, sex, sexual orientation, or gender identity.            Thank you!     Thank you for choosing [facility]  for your care. Our goal is always to provide you with excellent care. Hearing back from our patients is one way we can continue to improve our services. Please take a few minutes to complete the written survey that you may receive in the mail after your visit with us. Thank you!             Your Updated Medication List - Protect others around you: Learn how to safely use, store and throw away your medicines at www.disposemymeds.org.          This list is accurate as of 5/3/18  3:54 PM.  Always use your most recent med list.                   Brand Name Dispense Instructions for use Diagnosis    ondansetron 4 MG ODT tab    ZOFRAN ODT    20 tablet    Take 1-2 tablets (4-8 mg) by mouth every 8 hours as needed for nausea    Hyperemesis gravidarum, Supervision of high risk pregnancy in first  trimester       prenatal multivitamin plus iron 27-0.8 MG Tabs per tablet      Take 1 tablet by mouth daily    Supervision of high risk pregnancy in first trimester, Hyperemesis gravidarum       progesterone 200 MG VA SUPP     60 suppository    Place 1 suppository (200 mg) vaginally At Bedtime for 8 weeks    History of recurrent miscarriages

## 2018-05-03 NOTE — MR AVS SNAPSHOT
After Visit Summary   5/3/2018    Dai Guadarrama    MRN: [id number]           Patient Information     Date Of Birth          1985        Visit Information        Provider Department      5/3/2018 2:30 PM Yamile Anderson; [facility]  Services Department        Today's Diagnoses     Encounter to determine fetal viability of pregnancy, single or unspecified fetus    -  1       Follow-ups after your visit        Your next 10 appointments already scheduled     May 24, 2018  3:00 PM CDT   NEW OB with CHANELLE Mary CNM   [facility] ([facility])    [facility] [address]  [address]  [address]  [facility] [place] [id number] [phone]              Who to contact     Please call your clinic at [phone] to:    Ask questions about your health    Make or cancel appointments    Discuss your medicines    Learn about your test results    Speak to your doctor            Additional Information About Your Visit        [employer] Information     [employer] gives you secure access to your electronic health record. If you see a primary care provider, you can also send messages to your care team and make appointments. If you have questions, please call your primary care clinic.  If you do not have a primary care provider, please call [phone] and they will assist you.      [employer] is an electronic gateway that provides easy, online access to your medical records. With [employer], you can request a clinic appointment, read your test results, renew a prescription or communicate with your care team.     To access your existing account, please contact your [facility] or call [phone] for assistance.        Care EveryWhere ID     This is your Care EveryWhere ID. This could be used by other organizations to access your [place] medical records  [id number]        Your Vitals Were     Last Period                    02/28/2018 (Exact Date)            Blood Pressure from Last 3 Encounters:   05/03/18 114/70   04/13/18 109/76   04/20/17 114/78    Weight from Last 3 Encounters:   05/03/18 102.1 kg (225 lb)   04/13/18 103.8 kg (228 lb 14.4 oz)   04/20/17 93.9 kg (207 lb)                 Today's Medication Changes          These changes are accurate as of 5/3/18  4:30 PM.  If you have any questions, ask your nurse or doctor.               Start taking these medicines.        Dose/Directions    ondansetron 4 MG ODT tab   Commonly known as:  ZOFRAN ODT   Used for:  Hyperemesis gravidarum, Supervision of high risk pregnancy in first trimester, Aneurysm (H), History of intrauterine fetal death, currently pregnant in first trimester   Started by:  Manasa Ansari APRN CNM        Dose:  4-8 mg   Take 1-2 tablets (4-8 mg) by mouth every 8 hours as needed for nausea   Quantity:  20 tablet   Refills:  3         These medicines have changed or have updated prescriptions.        Dose/Directions    progesterone 200 MG VA SUPP   This may have changed:  Another medication with the same name was removed. Continue taking this medication, and follow the directions you see here.   Used for:  History of recurrent miscarriages   Changed by:  Manasa Ansari APRN CNM        Dose:  200 mg   Place 1 suppository (200 mg) vaginally At Bedtime for 8 weeks   Quantity:  60 suppository   Refills:  0         Stop taking these medicines if you haven't already. Please contact your care team if you have questions.     nitroFURantoin (macrocrystal-monohydrate) 100 MG capsule   Commonly known as:  MACROBID   Stopped by:  Manasa Ansari APRN CNM                Where to get your medicines      These medications were sent to [facility] [address] [place], [place] - [address] AT [facility] [address]  [address], [facility] [address]     Phone:  [phone]     ondansetron 4 MG ODT tab                Primary Care Provider Fax #     Physician No Ref-Primary [phone]       No address on file        Equal Access to Services     GRACENITA NOHEMI : Hadii aad ku hadmollysumi Kamran, washayda ludavinaleonardha, qareubenta stefanisree estuardojimiluis, kodi espinal dilanpilar marteconor mattcecilyasiya alcaraz. So [facility] [phone].    ATENCIÓN: Si habla español, tiene a martin disposición servicios gratuitos de asistencia lingüística. Llame al [phone].    We comply with applicable federal civil rights laws and [place] laws. We do not discriminate on the basis of race, color, national origin, age, disability, sex, sexual orientation, or gender identity.            Thank you!     Thank you for choosing [facility]  for your care. Our goal is always to provide you with excellent care. Hearing back from our patients is one way we can continue to improve our services. Please take a few minutes to complete the written survey that you may receive in the mail after your visit with us. Thank you!             Your Updated Medication List - Protect others around you: Learn how to safely use, store and throw away your medicines at www.disposemymeds.org.          This list is accurate as of 5/3/18  4:30 PM.  Always use your most recent med list.                   Brand Name Dispense Instructions for use Diagnosis    ondansetron 4 MG ODT tab    ZOFRAN ODT    20 tablet    Take 1-2 tablets (4-8 mg) by mouth every 8 hours as needed for nausea    Hyperemesis gravidarum, Supervision of high risk pregnancy in first trimester, Aneurysm (H), History of intrauterine fetal death, currently pregnant in first trimester       prenatal multivitamin plus iron 27-0.8 MG Tabs per tablet      Take 1 tablet by mouth daily    Supervision of high risk pregnancy in first trimester, Hyperemesis gravidarum, Aneurysm (H), History of intrauterine fetal death, currently pregnant in first trimester       progesterone 200 MG VA SUPP     60 suppository    Place 1 suppository (200 mg) vaginally At Bedtime for 8  weeks    History of recurrent miscarriages

## 2018-05-03 NOTE — PROGRESS NOTES
32 year old female, ,8 2/7 weeks by early ultrasound. Estimated Date of Delivery: 2018,  presents for confirmation of dates and assessment of viability. This study was done transvaginally.    Measurements     CRL = 14.5 mm = 7 6/7 weeks  EGA.        Fetal anatomy appears normal for gestational age.     Fetal/Fetal Cardiac Activity: Present.  FHR = 156bpm.     Implantation: Intrauterine.     Cervix = 3.9 cm      Maternal structures - left ovarian cysts - 1.) Simple - 3.0 x 2.3 x 1.7cm  2.) hemorrhagic - 2.0 x 1.9 x 1.7cm .    Impression: IUP at 8w2d by LMP c/w today's ultrasound.  2 left ovarian cysts noted    Recommend comprehensive scan at 18 to 20 weeks.    ANGEL Nicholson MD

## 2018-05-03 NOTE — LETTER
5/3/2018       RE: Dai Guadarrama  [address]  [facility] [address]     Dear Colleague,    Thank you for referring your patient, Dai Guadarrama, to the [facility] at [facility]. Please see a copy of my visit note below.    [facility] OB Intake note  Subjective   32 year old woman presents to clinic for initiation of OB care.  Patient's last menstrual period was 2018 (exact date).    at 7w6d by Estimated Date of Delivery: Dec 14, 2018 based on US confirms.  Reviewed dating ultrasound.   Pregnancy is planned.      Symptoms since LMP include nausea and vomiting.  Patient has tried these relief measures: diet modification, small frequent meals, increased rest and increased fluids.    - Genetic/Infection questionnaire completed, risks include hx stillbirth at 32 wks    Have you traveled during the pregnancy?No  Have your sexual partner(s) travelled during the pregnancy?No      - Current Medications PNV    Current Outpatient Prescriptions   Medication Sig Dispense Refill     ondansetron (ZOFRAN ODT) 4 MG ODT tab Take 1-2 tablets (4-8 mg) by mouth every 8 hours as needed for nausea 20 tablet 3     Prenatal Vit-Fe Fumarate-FA (PRENATAL MULTIVITAMIN PLUS IRON) 27-0.8 MG TABS per tablet Take 1 tablet by mouth daily       progesterone 200 MG VA SUPP Place 1 suppository (200 mg) vaginally At Bedtime for 8 weeks (Patient not taking: Reported on 2018) 60 suppository 0         - Co-morbids hx stillbirth , hx aneurysm, hx IOL oligohydramnios 37 wks  BMI 41  Plan early GCT when nausea resolves   Past Medical History:   Diagnosis Date     Aneurysm (H)      Migraine headache without aura      Pregnancy related nausea and vomiting, antepartum     two ED visits.     Recurrent pregnancy loss (CODE)      Retained placenta      Stillbirth     32 weeks     Tachycardia      - Risk for GDM -  Personal history of GDM, BMI>30, h/o  prediabetes/glucose intolerance, first degree relative with GDM or DM     - The patient does not h/o Pre Eclampsia, Current multi fetal gestation, Pre Gestational Diabetes (Type 1 or Type 2), chronic hypertension, renal disease, Autoimmune disease (systematic lupus erythematosus, antiphospholipid syndrome) so WILL NOT start low dose aspirin (81mg) starting between 12 and 28 weeks to prevent preeclampsia.    - The patient  does not have a history of spontaneous  birth so  WILL NOT consider progesterone starting at 16-20 weeks and/or serial transvaginal cervical length ultrasounds from 16-24 weeks.         PERSONAL/SOCIAL HISTORY    Lives lives with their family.  Employment: Homemaker .  Her job involves moderate activity . Busy toddler   Additional items: None    Objective  -VS: reviewed and within normal limits   -General appearance: no acute distress, patient is comfortable   NEUROLOGICAL/PSYCHIATRIC   - Orientated x3,   -Mood and affect: : normal     Assessment/Plan  Dai was seen today for prenatal care.    Diagnoses and all orders for this visit:    Supervision of high risk pregnancy in first trimester  -     ondansetron (ZOFRAN ODT) 4 MG ODT tab; Take 1-2 tablets (4-8 mg) by mouth every 8 hours as needed for nausea  -     TSH with free T4 reflex  -     Basic Metabolic Panel  -     25- OH-Vitamin D  -     ABO/Rh Type and Screen  -     CBC with Platelets Differential  -     Hepatitis B Surface Antigen  -     HIV Antigen Antibody Combo  -     Rubella Antibody IgG Quantitative  -     Treponema Abs w Reflex to RPR and Titer  -     Urine Culture Aerobic Bacterial  -     Varicella Zoster Virus Antibody IgG    Hyperemesis gravidarum  -     ondansetron (ZOFRAN ODT) 4 MG ODT tab; Take 1-2 tablets (4-8 mg) by mouth every 8 hours as needed for nausea  -     TSH with free T4 reflex  -     Basic Metabolic Panel  -     25- OH-Vitamin D  -     ABO/Rh Type and Screen  -     CBC with Platelets Differential  -      Hepatitis B Surface Antigen  -     HIV Antigen Antibody Combo  -     Rubella Antibody IgG Quantitative  -     Treponema Abs w Reflex to RPR and Titer  -     Urine Culture Aerobic Bacterial  -     Varicella Zoster Virus Antibody IgG    Aneurysm (H)    History of intrauterine fetal death, currently pregnant in first trimester        32 year old  7w6d weeks of pregnancy with HORTENCIA of Dec 14, 2018 by LMP of Patient's last menstrual period was 2018 (exact date)..  c/w  Outpatient Encounter Prescriptions as of 5/3/2018   Medication Sig Dispense Refill     ondansetron (ZOFRAN ODT) 4 MG ODT tab Take 1-2 tablets (4-8 mg) by mouth every 8 hours as needed for nausea 20 tablet 3     Prenatal Vit-Fe Fumarate-FA (PRENATAL MULTIVITAMIN PLUS IRON) 27-0.8 MG TABS per tablet Take 1 tablet by mouth daily       progesterone 200 MG VA SUPP Place 1 suppository (200 mg) vaginally At Bedtime for 8 weeks (Patient not taking: Reported on 2018) 60 suppository 0     [DISCONTINUED] nitrofurantoin, macrocrystal-monohydrate, (MACROBID) 100 MG capsule Take 1 capsule (100 mg) by mouth 2 times daily (Patient not taking: Reported on 2018) 20 capsule 0     [DISCONTINUED] progesterone (FIRST-PROGESTERONE ) 200 MG VA SUPP Place 1 suppository (200 mg) vaginally daily 12 suppository 0     No facility-administered encounter medications on file as of 5/3/2018.       Orders Placed This Encounter   Procedures     TSH with free T4 reflex     Basic Metabolic Panel     25- OH-Vitamin D     CBC with Platelets Differential     Hepatitis B Surface Antigen     HIV Antigen Antibody Combo     Rubella Antibody IgG Quantitative     Treponema Abs w Reflex to RPR and Titer     Varicella Zoster Virus Antibody IgG     ABO/Rh Type and Screen                   Orders Placed This Encounter   Procedures     TSH with free T4 reflex     Basic Metabolic Panel     25- OH-Vitamin D     CBC with Platelets Differential     Hepatitis B Surface Antigen      HIV Antigen Antibody Combo     Rubella Antibody IgG Quantitative     Treponema Abs w Reflex to RPR and Titer     Varicella Zoster Virus Antibody IgG     ABO/Rh Type and Screen     - Oriented to Practice, types of care, and how to reach a provider.  Pt prefers  MD .  - Patient received 1st trimester new OB education packet complete with aide of [employer] Expectant [employer] booklet including information on genetic screening test options.  - Patient declines all genetic screening.   Couple will consider and notify if desires to schedule 1st trim screen   - Patient was encouraged to start prenatal vitamins as tolerated.    - Patient was sent to lab for routine OB labs including Hgb A1C, BMP, TSHR,  plan early GCT in future.      -- Reviewed risk for diabetes in pregnancy, pt agrees to early 1 hour  or plan for NOB visit. hgb A1C drawn   - Discuss with MD ? recommendation for low dose aspirin daily follow up at NOB visit.  After 1st trimester   - Pregnancy concerns to be addressed by provider at new OB exam include: hx Stillbirth   Reviewed MFM consultation from 2016 per Dr. Quintero recommendation for growth US, and weekly BPPs at 32 wks.     Pt to RTO for NOB visit in 2-3 weeks and prn if questions or concerns      Again, thank you for allowing me to participate in the care of your patient.      Sincerely,    CHANELLE Mary CNM

## 2018-05-04 LAB
BACTERIA SPEC CULT: ABNORMAL
DEPRECATED CALCIDIOL+CALCIFEROL SERPL-MC: 21 UG/L (ref 20–75)
HBV SURFACE AG SERPL QL IA: NONREACTIVE
HIV 1+2 AB+HIV1 P24 AG SERPL QL IA: NONREACTIVE
Lab: ABNORMAL
RUBV IGG SERPL IA-ACNC: 175 IU/ML
SPECIMEN SOURCE: ABNORMAL
T PALLIDUM AB SER QL: NONREACTIVE
VZV IGG SER QL IA: 5.2 AI (ref 0–0.8)

## 2018-05-06 DIAGNOSIS — B95.1 GROUP B STREPTOCOCCUS URINARY TRACT INFECTION AFFECTING PREGNANCY IN FIRST TRIMESTER: Primary | ICD-10-CM

## 2018-05-06 DIAGNOSIS — O23.41 GROUP B STREPTOCOCCUS URINARY TRACT INFECTION AFFECTING PREGNANCY IN FIRST TRIMESTER: Primary | ICD-10-CM

## 2018-05-06 RX ORDER — AMOXICILLIN 500 MG/1
500 CAPSULE ORAL 2 TIMES DAILY
Qty: 30 CAPSULE | Refills: 0 | Status: SHIPPED | OUTPATIENT
Start: 2018-05-06 | End: 2018-05-24

## 2018-05-11 ENCOUNTER — TELEPHONE (OUTPATIENT)
Dept: OBGYN | Facility: CLINIC | Age: 33
End: 2018-05-11

## 2018-05-11 ENCOUNTER — HOSPITAL ENCOUNTER (EMERGENCY)
Facility: CLINIC | Age: 33
Discharge: HOME | End: 2018-05-11
Attending: EMERGENCY MEDICINE | Admitting: EMERGENCY MEDICINE
Payer: MEDICAID

## 2018-05-11 VITALS
TEMPERATURE: 98.1 F | DIASTOLIC BLOOD PRESSURE: 74 MMHG | BODY MASS INDEX: 22.21 KG/M2 | OXYGEN SATURATION: 99 % | SYSTOLIC BLOOD PRESSURE: 104 MMHG | WEIGHT: 123.5 LBS | RESPIRATION RATE: 14 BRPM | HEART RATE: 82 BPM

## 2018-05-11 DIAGNOSIS — O21.9 NAUSEA AND VOMITING IN PREGNANCY PRIOR TO 22 WEEKS GESTATION: Primary | ICD-10-CM

## 2018-05-11 DIAGNOSIS — O21.0 HYPEREMESIS GRAVIDARUM: ICD-10-CM

## 2018-05-11 LAB
ALBUMIN UR-MCNC: 30 MG/DL
APPEARANCE UR: ABNORMAL
BACTERIA #/AREA URNS HPF: ABNORMAL /HPF
BILIRUB UR QL STRIP: NEGATIVE
COLOR UR AUTO: YELLOW
GLUCOSE UR STRIP-MCNC: NEGATIVE MG/DL
HGB UR QL STRIP: NEGATIVE
KETONES UR STRIP-MCNC: NEGATIVE MG/DL
LEUKOCYTE ESTERASE UR QL STRIP: ABNORMAL
MUCOUS THREADS #/AREA URNS LPF: PRESENT /LPF
NITRATE UR QL: NEGATIVE
PH UR STRIP: 7.5 PH (ref 5–7)
RBC #/AREA URNS AUTO: 2 /HPF (ref 0–2)
SOURCE: ABNORMAL
SP GR UR STRIP: 1.03 (ref 1–1.03)
SQUAMOUS #/AREA URNS AUTO: 32 /HPF (ref 0–1)
UROBILINOGEN UR STRIP-MCNC: 2 MG/DL (ref 0–2)
WBC #/AREA URNS AUTO: 28 /HPF (ref 0–5)

## 2018-05-11 PROCEDURE — 96374 THER/PROPH/DIAG INJ IV PUSH: CPT | Performed by: EMERGENCY MEDICINE

## 2018-05-11 PROCEDURE — 96361 HYDRATE IV INFUSION ADD-ON: CPT | Performed by: EMERGENCY MEDICINE

## 2018-05-11 PROCEDURE — 87086 URINE CULTURE/COLONY COUNT: CPT | Performed by: EMERGENCY MEDICINE

## 2018-05-11 PROCEDURE — 81001 URINALYSIS AUTO W/SCOPE: CPT | Performed by: EMERGENCY MEDICINE

## 2018-05-11 PROCEDURE — 99284 EMERGENCY DEPT VISIT MOD MDM: CPT | Mod: 25 | Performed by: EMERGENCY MEDICINE

## 2018-05-11 PROCEDURE — 99284 EMERGENCY DEPT VISIT MOD MDM: CPT | Mod: Z6 | Performed by: EMERGENCY MEDICINE

## 2018-05-11 PROCEDURE — 25000128 H RX IP 250 OP 636: Performed by: EMERGENCY MEDICINE

## 2018-05-11 RX ORDER — PROCHLORPERAZINE MALEATE 5 MG
TABLET ORAL
Qty: 90 TABLET | Refills: 1 | Status: SHIPPED | OUTPATIENT
Start: 2018-05-11 | End: 2018-05-24

## 2018-05-11 RX ADMIN — PROCHLORPERAZINE EDISYLATE 5 MG: 5 INJECTION INTRAMUSCULAR; INTRAVENOUS at 16:12

## 2018-05-11 RX ADMIN — SODIUM CHLORIDE 1000 ML: 9 INJECTION, SOLUTION INTRAVENOUS at 16:12

## 2018-05-11 ASSESSMENT — ENCOUNTER SYMPTOMS
DYSURIA: 0
WEAKNESS: 1
EYE REDNESS: 0
LIGHT-HEADEDNESS: 1
COLOR CHANGE: 0
ARTHRALGIAS: 0
ABDOMINAL PAIN: 0
FREQUENCY: 0
CONFUSION: 0
FEVER: 0
SHORTNESS OF BREATH: 0
DIFFICULTY URINATING: 0
HEADACHES: 0
VOMITING: 1
NECK STIFFNESS: 0
CHILLS: 1
NAUSEA: 1

## 2018-05-11 NOTE — ED PROVIDER NOTES
[facility] EMERGENCY DEPARTMENT ([facility])    18       History     Chief Complaint   Patient presents with     Hyperemesis     8 weeks pregnant and vomiting.     HPI  Dai Guadarrama is a 32 year old  female who is currently 8 weeks pregnant by ultrasound and presents to the Emergency Department today for evaluation of hyperemesis.  The patient states that for the past 4 days she has been unable to eat or drink anything as she vomits everything back up.  She states that she is vomiting approximately 10 times a day.  Patient has Zofran at home; however, this has not been improving her symptoms.  Patient called her OB/GYN today and patient was told to come to the Emergency Department to get IV fluids.  The patient reports that she has had nausea and vomiting with previous pregnancies; however, she has never required IV fluids in the past.  She states that she is vomiting more and feels weak this time.  She also notes that she is having some mild lightheadedness with standing for prolonged periods of time.  Patient was also recently started on amoxicillin for a UTI.  She was told to receive her IV fluids before starting this amoxicillin.  The patient states that she has been having chills, but denies any fevers, dysuria, urgency or frequency.    I have reviewed the Medications, Allergies, Past Medical and Surgical History, and Social History in the [facility] system.    Past Medical History:   Diagnosis Date     Aneurysm (H)      Migraine headache without aura      Pregnancy related nausea and vomiting, antepartum 2016    two ED visits.     Recurrent pregnancy loss (CODE)      Retained placenta 2014     Stillbirth 2014    32 weeks     Tachycardia        Past Surgical History:   Procedure Laterality Date     C EACH ADD TOOTH EXTRACTION      bad reaction to anesthia for local      NO HISTORY OF SURGERY         Family History   Problem Relation Age of Onset     Anxiety Disorder No family hx of       MENTAL ILLNESS No family hx of      Substance Abuse No family hx of      Anesthesia Reaction No family hx of      Asthma No family hx of      OSTEOPOROSIS No family hx of      Genetic Disorder No family hx of      Thyroid Disease No family hx of      Hyperlipidemia No family hx of      CEREBROVASCULAR DISEASE No family hx of      Breast Cancer No family hx of      Colon Cancer No family hx of      Prostate Cancer No family hx of      Other Cancer No family hx of      Depression No family hx of      DIABETES No family hx of      Coronary Artery Disease No family hx of      Hypertension No family hx of        Social History   Substance Use Topics     Smoking status: Never Smoker     Smokeless tobacco: Never Used     Alcohol use 0.0 oz/week     0 Standard drinks or equivalent per week      Comment: rare alcohol use now nothing in pregnancy       No current facility-administered medications for this encounter.      Current Outpatient Prescriptions   Medication     ondansetron (ZOFRAN ODT) 4 MG ODT tab     Prenatal Vit-Fe Fumarate-FA (PRENATAL MULTIVITAMIN PLUS IRON) 27-0.8 MG TABS per tablet     amoxicillin (AMOXIL) 500 MG capsule     prochlorperazine (COMPAZINE) 5 MG tablet     progesterone 200 MG VA SUPP      No Known Allergies      Review of Systems   Constitutional: Positive for chills. Negative for fever.   HENT: Negative for congestion.    Eyes: Negative for redness.   Respiratory: Negative for shortness of breath.    Cardiovascular: Negative for chest pain.   Gastrointestinal: Positive for nausea and vomiting. Negative for abdominal pain.   Genitourinary: Negative for difficulty urinating, dysuria, frequency and urgency.   Musculoskeletal: Negative for arthralgias and neck stiffness.   Skin: Negative for color change.   Neurological: Positive for weakness (generalized) and light-headedness. Negative for headaches.   Psychiatric/Behavioral: Negative for confusion.   All other systems reviewed and are  negative.      Physical Exam   BP: 114/76  Pulse: 97  Temp: 98  F (36.7  C)  Resp: 16  Weight: 56 kg (123 lb 8 oz)  SpO2: 98 %      Physical Exam   Constitutional: No distress.   HENT:   Head: Atraumatic.   Mouth/Throat: Oropharynx is clear and moist. No oropharyngeal exudate.   Eyes: Pupils are equal, round, and reactive to light. No scleral icterus.   Cardiovascular: Normal heart sounds and intact distal pulses.    Pulmonary/Chest: Breath sounds normal. No respiratory distress.   Abdominal: Soft. Bowel sounds are normal. There is no tenderness.   Musculoskeletal: She exhibits no edema or tenderness.   Skin: Skin is warm. No rash noted. She is not diaphoretic.       ED Course   3:27 PM  The patient was seen and examined by Nando Flowers MD in Room ED20.     ED Course     Procedures        Results for orders placed or performed during the hospital encounter of 05/11/18   UA with Microscopic reflex to Culture   Result Value Ref Range    Color Urine Yellow     Appearance Urine Slightly Cloudy     Glucose Urine Negative NEG^Negative mg/dL    Bilirubin Urine Negative NEG^Negative    Ketones Urine Negative NEG^Negative mg/dL    Specific Gravity Urine 1.028 1.003 - 1.035    Blood Urine Negative NEG^Negative    pH Urine 7.5 (H) 5.0 - 7.0 pH    Protein Albumin Urine 30 (A) NEG^Negative mg/dL    Urobilinogen mg/dL 2.0 0.0 - 2.0 mg/dL    Nitrite Urine Negative NEG^Negative    Leukocyte Esterase Urine Large (A) NEG^Negative    Source Midstream Urine     WBC Urine 28 (H) 0 - 5 /HPF    RBC Urine 2 0 - 2 /HPF    Bacteria Urine Many (A) NEG^Negative /HPF    Squamous Epithelial /HPF Urine 32 (H) 0 - 1 /HPF    Mucous Urine Present (A) NEG^Negative /LPF            Labs Ordered and Resulted from Time of ED Arrival Up to the Time of Departure from the ED   ROUTINE UA WITH MICROSCOPIC REFLEX TO CULTURE - Abnormal; Notable for the following:        Result Value    pH Urine 7.5 (*)     Protein Albumin Urine 30 (*)     Leukocyte Esterase  Urine Large (*)     WBC Urine 28 (*)     Bacteria Urine Many (*)     Squamous Epithelial /HPF Urine 32 (*)     Mucous Urine Present (*)     All other components within normal limits   URINE CULTURE AEROBIC BACTERIAL            Assessments & Plan (with Medical Decision Making)   32-year-old female who is approximately 8 weeks pregnant presents with recurrent nausea and vomiting for the past 4 days.  Differential includes gastroenteritis, hyperemesis gravidarum, pregnancy related vomiting, other.  Her exam reveals normal vital signs and is otherwise unremarkable.  Urinalysis was contaminated but revealed no urinary ketones but slightly high specific gravity.  Patient was treated with 1 L of IV fluid as well as Compazine and felt improved.  She has a prescription for Compazine waiting for her at the pharmacy which she was encouraged to use as Zofran was ineffective in controlling her symptoms.  I recommended follow-up with the primary physician.    I have reviewed the nursing notes.    I have reviewed the findings, diagnosis, plan and need for follow up with the patient.    New Prescriptions    No medications on file       Final diagnoses:   Hyperemesis gravidarum     IGary, am serving as a trained medical scribe to document services personally performed by Nando Flowers MD, based on the provider's statements to me.   Nando SOL MD, was physically present and have reviewed and verified the accuracy of this note documented by Gary Hopkins.    5/11/2018   [facility], [place], EMERGENCY DEPARTMENT     Nando Flowers MD  05/11/18 [id number]

## 2018-05-11 NOTE — ED AVS SNAPSHOT
[facility], [place], Emergency Department    [address]    [facility] [facility] [place] [place] [address]    Phone:  [phone]    Fax:  [phone]                                       Dai Guadarrama   MRN: [id number]    Department:  [facility], Emergency Department   Date of Visit:  5/11/2018           After Visit Summary Signature Page     I have received my discharge instructions, and my questions have been answered. I have discussed any challenges I see with this plan with the nurse or doctor.    ..........................................................................................................................................  Patient/Patient Representative Signature      ..........................................................................................................................................  Patient Representative Print Name and Relationship to Patient    ..................................................               ................................................  Date                                            Time    ..........................................................................................................................................  Reviewed by Signature/Title    ...................................................              ..............................................  Date                                                            Time

## 2018-05-11 NOTE — DISCHARGE INSTRUCTIONS
"  Hiperemesis gravídica  La hiperemesis gravídica es justa forma grave de \"náusea matutina\" que puede afectar a algunas mujeres lilibeth el embarazo. Puede desarrollarse alrededor de la quinta semana y durar hasta la semana número dieciséis del embarazo. En algunas mujeres, puede durar más tiempo. Los síntomas incluyen vómito y náuseas graves. Eso puede llevar a problemas tales tatiana pérdida de peso y deshidratación.  No se sabe con claridad qué es lo que causa la hiperemesis gravídica. Puede deberse a los niveles de hormonas que suben al comienzo del embarazo. Si los síntomas son graves, puede convertirse en justa seria amenaza para la mamá y para el feto. Por lo tanto, siga atentamente las sugerencias que se describen abajo.  Si los síntomas no logran controlarse con las medidas que usted avelino en martin casa, quizás necesiten hospitalizarla. Puede que le den líquidos y medicamentos por vía intravenosa (IV).  Cuidados en martin casa    Dieta  ? Lleve un registro de los alimentos que come y de cómo estos afectan avelina síntomas. Evite los alimentos que le provocan los síntomas.  ? Coma varias comidas más pequeñas a lo karen del día en lugar de lanie comidas grandes. [place] ayuda a evitar que el estómago quede vacío, lo que puede empeorar las náuseas.  ? Elija comidas altas en carbohidratos. También puede ayudarle comer alimentos altos en proteínas. Evite los alimentos grasosos o muy condimentados.  ? Antes de levantarse de la cama por las mañanas, intente comer unas galletas o pan dhiraj seco. Eso puede ayudarle a asentar el estómago.  ? Elinor líquidos transparentes, fríos. Elinor pequeñas cantidades de líquidos con electrolitos, tatiana las bebidas deportivas. Eso también puede ayudarle.    Medicamentos  ? De ser necesario, martin proveedor de atención médica puede indicarle ciertos medicamentos para ayudar a aliviar las náuseas y el vómito. También puede que le aconsejen consumir vitamina B6 y jengibre. No pruebe ningún medicamento de venta " mark ni remedio casero sin hablar damir con martin proveedor.  Visita de control  Programe justa visita de control con martin proveedor de atención médica o según le hayan indicado.  Cuándo debe buscar atención médica  Llame a martin proveedor de atención médica de inmediato si sucede cualquiera de las siguientes cosas:    Signos de deshidratación (boca seca, sed extrema, orina oscura o poca orina, mareo, debilidad o desmayo)    Vómito que no se detiene    Incapacidad de mantener los líquidos en el estómago    Diarrea frecuente    Pérdida de peso o no aumentar de peso en un período de dos semanas    Dolor intenso y carla en la parte inferior derecha del abdomen    Fiebre de 100.4  F (38  C) o más, o según le haya indicado martin proveedor  Date Last Reviewed: 8/20/2015 2000-2017 [employer]. [address] [place] [id number]. Todos los derechos reservados. Esta información no pretende sustituir la atención médica profesional. Sólo martin médico puede diagnosticar y tratar un problema de chino.          Hiperemesis gravídica (náuseas matutinas eliezer)    Las náuseas y el vómito son comunes lilibeth el embarazo y generalmente se los conoce tatiana  náuseas matutinas , adriano pueden presentarse en cualquier momento del día. Sin embargo, las náuseas y el vómito eliezer que no se alivian no son normales y pueden llevar a justa deshidratación y pérdida de peso. [place] puede ser peligroso tanto para la madre tatiana para el bebé. La hiperemesis gravídica (hiperemesis gravidarum o HEG) es un término médico que designa al vómito y las náuseas intensos del embarazo. Si usted tiene hiperemesis gravídica, martin proveedor de atención médica puede suman medidas para mantenerlos a usted y a martin bebé seguros. Además, también puede ayudar a aliviar lo síntomas.   Síntomas de la hiperemesis gravídica  Llame a martin proveedor de atención médica de inmediato si sospecha que tiene hiperemesis gravídica: Los síntomas incluyen:    Incapacidad de  mantener los líquidos en el estómago    Náuseas intensas y que fierro más allá de los primeros meses    Incapacidad de vaciar la vejiga     La orina es oscura y concentrada     Desmayos   Qué causa la hiperemesis gravídica?  Se annalise que las náuseas y los vómitos del embarazo se deben a un aumento de ciertos niveles de hormonas. No se conoce a ciencia cierta qué la causa, adriano es más probable que se presente en mujeres que están gestando dos o más bebés. Martin proveedor de atención médica le solicitará algunas pruebas para descartar ciertas afecciones que pueden provocar náuseas y vómitos intensos.  Cómo aliviar las náuseas matutinas  Para ayudar a combatir las náuseas, samson comidas más pequeñas con frecuencia. [place] ayuda a evitar que el estómago quede vacío, lo que puede empeorar las náuseas. Elija alimentos secos, por ejemplo, galletas. Pruebe tomando líquidos fríos y amberly de a sorbos. Y consulte a martin proveedor de atención médica sobre la posibilidad de suman vitamina B6 o jengibre para ayudar a aliviar las náuseas. Martin proveedor podría recomendarle que pruebe tomando vitamina B6 y un medicamento llamado doxilamina para aliviar las náuseas. En algunos casos, los tratamientos alternativos tatiana la acupuntura son efectivos para ayudar a manejar las náuseas del embarazo.  El tratamiento de la hiperemesis gravídica  Apunta a aliviar los síntomas y a prevenir la pérdida de peso y la deshidratación. Si usted está deshidratada o está bajando de peso, es necesario suman medidas para protegerla a usted y proteger a martin bebé. Lo más probable es que la ingresen en un hospital por lo menos por un período breve. Es posible que allí le administren líquidos intravenosos (IV) para rehidratarla. También es posible que le receten medicamentos para aliviar las náuseas. En los casos muy graves, puede ser necesaria justa hospitalización más larga. En chuck cristal se podría usar nutrición IV o alimentación por sonda. Si esto es necesario, martin  proveedor de atención médica le dará más información.  Recuperación y seguimiento  La hiperemesis gravídica se puede manejar con tratamiento. Rex los controles con martin proveedor de atención médica para asegurarse de no devolver los líquidos y de subir de peso en un evelin saludable.  Cuándo llamar a martin proveedor de atención médica  Llame a martin proveedor de atención médica de inmediato si tiene alguno de los siguientes síntomas:    Señales de deshidratación, que incluyen sed extrema, dolor de [place], poca cantidad de orina, orina muy oscura o boca seca y pegajosa.    Pérdida de peso    Mareos o desmayos    Latidos eliezer o acelerados    Tyron en martin vómito   Date Last Reviewed: 5/1/2016 2000-2017 [employer]. [address] [place] [id number]. Todos los derechos reservados. Esta información no pretende sustituir la atención médica profesional. Sólo martin médico puede diagnosticar y tratar un problema de chino.

## 2018-05-11 NOTE — ED TRIAGE NOTES
Pt states she was suppose to start Amoxicillin for UTI after she gets hydrated today.  States she has  Epigastric pain but no vaginal bleeding or cramping.

## 2018-05-11 NOTE — TELEPHONE ENCOUNTER
Spoke with Dai who is 9 weeks pregnant and still experiencing extreme nausea and vomiting. Endorses that she is able to get a few sips of water in but that she has been vomiting all of her food and most of her water lately. She endorses symptoms of dehydration such as rapid heart beat, fatigue, exhaustion, weakness, and dry mouth. Nurse advised patient to report to ED for fluid resuscitation. Patient agreeable to this plan.    Prior to discussing vomiting, patient stated she would like different medicine be sent to her pharmacy because zofran is not working. She also states she has not started the antibiotics yet because of vomiting and she was unsure of the dosing. Nurse went through the instructions for taking antibiotics and stated a new medicine would be sent to her pharmacy for nausea. Per protocol compazine was sent.

## 2018-05-11 NOTE — ED AVS SNAPSHOT
" [facility], Emergency Department    [address]    [place] [address]    Phone:  [phone]    Fax:  [phone]                                       Dai Guadarrama   MRN: [id number]    Department:  [facility], Emergency Department   Date of Visit:  5/11/2018           Patient Information     Date Of Birth          1985        Your diagnoses for this visit were:     Hyperemesis gravidarum        You were seen by Nando Flowers MD.      Follow-up Information     Follow up with Your doctor.        Discharge Instructions         Hiperemesis gravídica  La hiperemesis gravídica es justa forma grave de \"náusea matutina\" que puede afectar a algunas mujeres lilibeth el embarazo. Puede desarrollarse alrededor de la quinta semana y durar hasta la semana número dieciséis del embarazo. En algunas mujeres, puede durar más tiempo. Los síntomas incluyen vómito y náuseas graves. Eso puede llevar a problemas tales tatiana pérdida de peso y deshidratación.  No se sabe con claridad qué es lo que causa la hiperemesis gravídica. Puede deberse a los niveles de hormonas que suben al comienzo del embarazo. Si los síntomas son graves, puede convertirse en justa seria amenaza para la mamá y para el feto. Por lo tanto, siga atentamente las sugerencias que se describen abajo.  Si los síntomas no logran controlarse con las medidas que usted avelino en martin casa, quizás necesiten hospitalizarla. Puede que le den líquidos y medicamentos por vía intravenosa (IV).  Cuidados en martin casa    Dieta  ? Lleve un registro de los alimentos que come y de cómo estos afectan avelina síntomas. Evite los alimentos que le provocan los síntomas.  ? Coma varias comidas más pequeñas a lo karen del día en lugar de lanie comidas grandes. [place] ayuda a evitar que el estómago quede vacío, lo que puede empeorar las náuseas.  ? Elija comidas altas en carbohidratos. También puede ayudarle comer alimentos altos en proteínas. Evite los alimentos grasosos o muy " condimentados.  ? Antes de levantarse de la cama por las mañanas, intente comer unas galletas o pan dhiraj seco. Eso puede ayudarle a asentar el estómago.  ? Elinor líquidos transparentes, fríos. Elinor pequeñas cantidades de líquidos con electrolitos, tatiana las bebidas deportivas. Eso también puede ayudarle.    Medicamentos  ? De ser necesario, martin proveedor de atención médica puede indicarle ciertos medicamentos para ayudar a aliviar las náuseas y el vómito. También puede que le aconsejen consumir vitamina B6 y jengibre. No pruebe ningún medicamento de venta mark ni remedio casero sin hablar damir con martin proveedor.  Visita de control  Programe justa visita de control con martin proveedor de atención médica o según le hayan indicado.  Cuándo debe buscar atención médica  Llame a martin proveedor de atención médica de inmediato si sucede cualquiera de las siguientes cosas:    Signos de deshidratación (boca seca, sed extrema, orina oscura o poca orina, mareo, debilidad o desmayo)    Vómito que no se detiene    Incapacidad de mantener los líquidos en el estómago    Diarrea frecuente    Pérdida de peso o no aumentar de peso en un período de dos semanas    Dolor intenso y carla en la parte inferior derecha del abdomen    Fiebre de 100.4  F (38  C) o más, o según le haya indicado martin proveedor  Date Last Reviewed: 8/20/2015 2000-2017 [employer]. [address] [place] [id number]. Todos los derechos reservados. Esta información no pretende sustituir la atención médica profesional. Sólo martin médico puede diagnosticar y tratar un problema de chino.          Hiperemesis gravídica (náuseas matutinas eliezer)    Las náuseas y el vómito son comunes lilibeth el embarazo y generalmente se los conoce tatiana  náuseas matutinas , adriano pueden presentarse en cualquier momento del día. Sin embargo, las náuseas y el vómito eliezer que no se alivian no son normales y pueden llevar a justa deshidratación y pérdida de peso. [place]  puede ser peligroso tanto para la madre tatiana para el bebé. La hiperemesis gravídica (hiperemesis gravidarum o HEG) es un término médico que designa al vómito y las náuseas intensos del embarazo. Si usted tiene hiperemesis gravídica, graham proveedor de atención médica puede suman medidas para mantenerlos a usted y a graham bebé seguros. Además, también puede ayudar a aliviar lo síntomas.   Síntomas de la hiperemesis gravídica  Llame a graham proveedor de atención médica de inmediato si sospecha que tiene hiperemesis gravídica: Los síntomas incluyen:    Incapacidad de mantener los líquidos en el estómago    Náuseas intensas y que fierro más allá de los primeros meses    Incapacidad de vaciar la vejiga     La orina es oscura y concentrada     Desmayos   Qué causa la hiperemesis gravídica?  Se annalise que las náuseas y los vómitos del embarazo se deben a un aumento de ciertos niveles de hormonas. No se conoce a ciencia cierta qué la causa, adriano es más probable que se presente en mujeres que están gestando dos o más bebés. Graham proveedor de atención médica le solicitará algunas pruebas para descartar ciertas afecciones que pueden provocar náuseas y vómitos intensos.  Cómo aliviar las náuseas matutinas  Para ayudar a combatir las náuseas, samson comidas más pequeñas con frecuencia. [place] ayuda a evitar que el estómago quede vacío, lo que puede empeorar las náuseas. Elija alimentos secos, por ejemplo, galletas. Pruebe tomando líquidos fríos y amberly de a sorbos. Y consulte a graham proveedor de atención médica sobre la posibilidad de suman vitamina B6 o jengibre para ayudar a aliviar las náuseas. [place] proveedor podría recomendarle que pruebe tomando vitamina B6 y un medicamento llamado doxilamina para aliviar las náuseas. En algunos casos, los tratamientos alternativos tatiana la acupuntura son efectivos para ayudar a manejar las náuseas del embarazo.  El tratamiento de la hiperemesis gravídica  Apunta a aliviar los síntomas y a prevenir la pérdida de  peso y la deshidratación. Si usted está deshidratada o está bajando de peso, es necesario suman medidas para protegerla a usted y proteger a martin bebé. Lo más probable es que la ingresen en un hospital por lo menos por un período breve. Es posible que allí le administren líquidos intravenosos (IV) para rehidratarla. También es posible que le receten medicamentos para aliviar las náuseas. En los casos muy graves, puede ser necesaria justa hospitalización más larga. En chuck cristal se podría usar nutrición IV o alimentación por sonda. Si esto es necesario, martin proveedor de atención médica le dará más información.  Recuperación y seguimiento  La hiperemesis gravídica se puede manejar con tratamiento. Rex los controles con martin proveedor de atención médica para asegurarse de no devolver los líquidos y de subir de peso en un evelin saludable.  Cuándo llamar a martin proveedor de atención médica  Llame a martin proveedor de atención médica de inmediato si tiene alguno de los siguientes síntomas:    Señales de deshidratación, que incluyen sed extrema, dolor de [place]annah, poca cantidad de orina, orina muy oscura o boca seca y pegajosa.    Pérdida de peso    Mareos o desmayos    Latidos eliezer o acelerados    Tyron en martin vómito   Date Last Reviewed: 5/1/2016 2000-2017 [employer]. [address] [place] [address], [place] [address]. Todos los derechos reservados. Esta información no pretende sustituir la atención médica profesional. Sólo martin médico puede diagnosticar y tratar un problema de chino.          Your next 10 appointments already scheduled     May 24, 2018  3:00 PM CDT   NEW OB with Manasa Reinisch, APRN CNM   [facility] ([facility])    [facility] [address]  [address]  [facility] [place] [address] [phone]              24 Hour Appointment Hotline       To make an appointment at any [facility], call [phone] ([phone]). If you don't have a family  doctor or clinic, we will help you find one. [facility] are conveniently located to serve the needs of you and your family.             Review of your medicines      Our records show that you are taking the medicines listed below. If these are incorrect, please call your family doctor or clinic.        Dose / Directions Last dose taken    amoxicillin 500 MG capsule   Commonly known as:  AMOXIL   Dose:  500 mg   Quantity:  30 capsule        Take 1 capsule (500 mg) by mouth 2 times daily   Refills:  0        ondansetron 4 MG ODT tab   Commonly known as:  ZOFRAN ODT   Dose:  4-8 mg   Quantity:  20 tablet        Take 1-2 tablets (4-8 mg) by mouth every 8 hours as needed for nausea   Refills:  3        prenatal multivitamin plus iron 27-0.8 MG Tabs per tablet   Dose:  1 tablet        Take 1 tablet by mouth daily   Refills:  0        prochlorperazine 5 MG tablet   Commonly known as:  COMPAZINE   Quantity:  90 tablet        May take 1-2 tablets by mouth every 6-8 hours as needed   Refills:  1        progesterone 200 MG VA SUPP   Dose:  200 mg   Quantity:  60 suppository        Place 1 suppository (200 mg) vaginally At Bedtime for 8 weeks   Refills:  0                Procedures and tests performed during your visit     UA with Microscopic reflex to Culture    Urine Culture Aerobic Bacterial      Orders Needing Specimen Collection     None      Pending Results     Date and Time Order Name Status Description    5/11/2018 1604 Urine Culture Aerobic Bacterial In process             Pending Culture Results     Date and Time Order Name Status Description    5/11/2018 1604 Urine Culture Aerobic Bacterial In process             Pending Results Instructions     If you had any lab results that were not finalized at the time of your Discharge, you can call the ED Lab Result RN at [phone]. You will be contacted by this team for any positive Lab results or changes in treatment. The nurses are available 7 days a week from 10A  to 6:30P.  You can leave a message 24 hours per day and they will return your call.        Thank you for choosing [facility]       Thank you for choosing [facility] for your care. Our goal is always to provide you with excellent care. Hearing back from our patients is one way we can continue to improve our services. Please take a few minutes to complete the written survey that you may receive in the mail after you visit with us. Thank you!        "[email or web address] Information     [employer] gives you secure access to your electronic health record. If you see a primary care provider, you can also send messages to your care team and make appointments. If you have questions, please call your primary care clinic.  If you do not have a primary care provider, please call [phone] and they will assist you.        Care EveryWhere ID     This is your Care EveryWhere ID. This could be used by other organizations to access your [facility] medical records  [id number]        Equal Access to Services     AISHA SONG : Mariano Andrew, cuco manuel, primo stauffer, kodi moreno . So [facility] [phone].    ATENCIÓN: Si habla español, tiene a martin disposición servicios gratuitos de asistencia lingüística. Llame al [phone].    We comply with applicable federal civil rights laws and [place] laws. We do not discriminate on the basis of race, color, national origin, age, disability, sex, sexual orientation, or gender identity.            After Visit Summary       This is your record. Keep this with you and show to your community pharmacist(s) and doctor(s) at your next visit.

## 2018-05-12 LAB
BACTERIA SPEC CULT: NORMAL
Lab: NORMAL
SPECIMEN SOURCE: NORMAL

## 2018-05-24 ENCOUNTER — OFFICE VISIT (OUTPATIENT)
Dept: INTERPRETER SERVICES | Facility: CLINIC | Age: 33
End: 2018-05-24
Payer: MEDICAID

## 2018-05-24 ENCOUNTER — OFFICE VISIT (OUTPATIENT)
Dept: OBGYN | Facility: CLINIC | Age: 33
End: 2018-05-24
Attending: MIDWIFE
Payer: MEDICAID

## 2018-05-24 ENCOUNTER — HOSPITAL ENCOUNTER (EMERGENCY)
Facility: CLINIC | Age: 33
Discharge: HOME | End: 2018-05-24
Attending: EMERGENCY MEDICINE | Admitting: EMERGENCY MEDICINE
Payer: MEDICAID

## 2018-05-24 VITALS
OXYGEN SATURATION: 99 % | SYSTOLIC BLOOD PRESSURE: 100 MMHG | TEMPERATURE: 98.4 F | DIASTOLIC BLOOD PRESSURE: 63 MMHG | RESPIRATION RATE: 16 BRPM | HEART RATE: 90 BPM

## 2018-05-24 VITALS
WEIGHT: 221.4 LBS | HEART RATE: 114 BPM | BODY MASS INDEX: 40.74 KG/M2 | SYSTOLIC BLOOD PRESSURE: 100 MMHG | HEIGHT: 62 IN | DIASTOLIC BLOOD PRESSURE: 70 MMHG

## 2018-05-24 DIAGNOSIS — R10.13 EPIGASTRIC PAIN: ICD-10-CM

## 2018-05-24 DIAGNOSIS — E66.01 MORBID OBESITY DUE TO EXCESS CALORIES (H): ICD-10-CM

## 2018-05-24 DIAGNOSIS — O21.0 HYPEREMESIS GRAVIDARUM: ICD-10-CM

## 2018-05-24 DIAGNOSIS — B95.1 GROUP B STREPTOCOCCUS URINARY TRACT INFECTION AFFECTING PREGNANCY IN FIRST TRIMESTER: ICD-10-CM

## 2018-05-24 DIAGNOSIS — O09.91 SUPERVISION OF HIGH RISK PREGNANCY IN FIRST TRIMESTER: Primary | ICD-10-CM

## 2018-05-24 DIAGNOSIS — O09.291 HISTORY OF INTRAUTERINE FETAL DEATH, CURRENTLY PREGNANT IN FIRST TRIMESTER: ICD-10-CM

## 2018-05-24 DIAGNOSIS — E87.6 HYPOKALEMIA: ICD-10-CM

## 2018-05-24 DIAGNOSIS — O23.41 GROUP B STREPTOCOCCUS URINARY TRACT INFECTION AFFECTING PREGNANCY IN FIRST TRIMESTER: ICD-10-CM

## 2018-05-24 LAB
ALBUMIN SERPL-MCNC: 3.2 G/DL (ref 3.4–5)
ALBUMIN UR-MCNC: 10 MG/DL
ALBUMIN UR-MCNC: 30 MG/DL
ALP SERPL-CCNC: 93 U/L (ref 40–150)
ALT SERPL W P-5'-P-CCNC: 16 U/L (ref 0–50)
ANION GAP SERPL CALCULATED.3IONS-SCNC: 10 MMOL/L (ref 3–14)
ANION GAP SERPL CALCULATED.3IONS-SCNC: 9 MMOL/L (ref 3–14)
APPEARANCE UR: ABNORMAL
APPEARANCE UR: CLEAR
AST SERPL W P-5'-P-CCNC: 10 U/L (ref 0–45)
BACTERIA #/AREA URNS HPF: ABNORMAL /HPF
BACTERIA #/AREA URNS HPF: ABNORMAL /HPF
BILIRUB SERPL-MCNC: 0.3 MG/DL (ref 0.2–1.3)
BILIRUB UR QL STRIP: NEGATIVE
BILIRUB UR QL STRIP: NEGATIVE
BUN SERPL-MCNC: 8 MG/DL (ref 7–30)
BUN SERPL-MCNC: 9 MG/DL (ref 7–30)
CALCIUM SERPL-MCNC: 8.5 MG/DL (ref 8.5–10.1)
CALCIUM SERPL-MCNC: 8.8 MG/DL (ref 8.5–10.1)
CHLORIDE SERPL-SCNC: 108 MMOL/L (ref 94–109)
CHLORIDE SERPL-SCNC: 108 MMOL/L (ref 94–109)
CO2 SERPL-SCNC: 22 MMOL/L (ref 20–32)
CO2 SERPL-SCNC: 25 MMOL/L (ref 20–32)
COLOR UR AUTO: YELLOW
COLOR UR AUTO: YELLOW
CREAT SERPL-MCNC: 0.49 MG/DL (ref 0.52–1.04)
CREAT SERPL-MCNC: 0.51 MG/DL (ref 0.52–1.04)
GFR SERPL CREATININE-BSD FRML MDRD: >90 ML/MIN/1.7M2
GFR SERPL CREATININE-BSD FRML MDRD: >90 ML/MIN/1.7M2
GLUCOSE SERPL-MCNC: 91 MG/DL (ref 70–99)
GLUCOSE SERPL-MCNC: 92 MG/DL (ref 70–99)
GLUCOSE UR STRIP-MCNC: NEGATIVE MG/DL
GLUCOSE UR STRIP-MCNC: NEGATIVE MG/DL
HGB UR QL STRIP: NEGATIVE
HGB UR QL STRIP: NEGATIVE
KETONES UR STRIP-MCNC: >150 MG/DL
KETONES UR STRIP-MCNC: NEGATIVE MG/DL
LEUKOCYTE ESTERASE UR QL STRIP: ABNORMAL
LEUKOCYTE ESTERASE UR QL STRIP: ABNORMAL
LIPASE SERPL-CCNC: 135 U/L (ref 73–393)
MAGNESIUM SERPL-MCNC: 2 MG/DL (ref 1.6–2.3)
MAGNESIUM SERPL-MCNC: 2.2 MG/DL (ref 1.6–2.3)
MUCOUS THREADS #/AREA URNS LPF: PRESENT /LPF
MUCOUS THREADS #/AREA URNS LPF: PRESENT /LPF
NITRATE UR QL: NEGATIVE
NITRATE UR QL: NEGATIVE
PH UR STRIP: 6.5 PH (ref 5–7)
PH UR STRIP: 7.5 PH (ref 5–7)
POTASSIUM SERPL-SCNC: 3.2 MMOL/L (ref 3.4–5.3)
POTASSIUM SERPL-SCNC: 3.5 MMOL/L (ref 3.4–5.3)
PROT SERPL-MCNC: 7.3 G/DL (ref 6.8–8.8)
RBC #/AREA URNS AUTO: 1 /HPF (ref 0–2)
RBC #/AREA URNS AUTO: 1 /HPF (ref 0–2)
SODIUM SERPL-SCNC: 140 MMOL/L (ref 133–144)
SODIUM SERPL-SCNC: 142 MMOL/L (ref 133–144)
SOURCE: ABNORMAL
SOURCE: ABNORMAL
SP GR UR STRIP: 1.02 (ref 1–1.03)
SP GR UR STRIP: 1.03 (ref 1–1.03)
SQUAMOUS #/AREA URNS AUTO: 21 /HPF (ref 0–1)
SQUAMOUS #/AREA URNS AUTO: 7 /HPF (ref 0–1)
UROBILINOGEN UR STRIP-MCNC: 2 MG/DL (ref 0–2)
UROBILINOGEN UR STRIP-MCNC: NORMAL MG/DL (ref 0–2)
WBC #/AREA URNS AUTO: 22 /HPF (ref 0–5)
WBC #/AREA URNS AUTO: 33 /HPF (ref 0–5)

## 2018-05-24 PROCEDURE — 96375 TX/PRO/DX INJ NEW DRUG ADDON: CPT | Performed by: EMERGENCY MEDICINE

## 2018-05-24 PROCEDURE — 36415 COLL VENOUS BLD VENIPUNCTURE: CPT | Performed by: MIDWIFE

## 2018-05-24 PROCEDURE — 83735 ASSAY OF MAGNESIUM: CPT | Performed by: MIDWIFE

## 2018-05-24 PROCEDURE — G0463 HOSPITAL OUTPT CLINIC VISIT: HCPCS | Mod: ZF

## 2018-05-24 PROCEDURE — 80053 COMPREHEN METABOLIC PANEL: CPT | Performed by: EMERGENCY MEDICINE

## 2018-05-24 PROCEDURE — T1013 SIGN LANG/ORAL INTERPRETER: HCPCS | Mod: U3,ZF | Performed by: MIDWIFE

## 2018-05-24 PROCEDURE — 87591 N.GONORRHOEAE DNA AMP PROB: CPT | Performed by: MIDWIFE

## 2018-05-24 PROCEDURE — 96361 HYDRATE IV INFUSION ADD-ON: CPT | Performed by: EMERGENCY MEDICINE

## 2018-05-24 PROCEDURE — T1013 SIGN LANG/ORAL INTERPRETER: HCPCS | Mod: U3

## 2018-05-24 PROCEDURE — 99284 EMERGENCY DEPT VISIT MOD MDM: CPT | Mod: 25

## 2018-05-24 PROCEDURE — 83690 ASSAY OF LIPASE: CPT | Performed by: EMERGENCY MEDICINE

## 2018-05-24 PROCEDURE — 87491 CHLMYD TRACH DNA AMP PROBE: CPT | Performed by: MIDWIFE

## 2018-05-24 PROCEDURE — 87088 URINE BACTERIA CULTURE: CPT | Performed by: EMERGENCY MEDICINE

## 2018-05-24 PROCEDURE — 25000128 H RX IP 250 OP 636: Performed by: EMERGENCY MEDICINE

## 2018-05-24 PROCEDURE — 80048 BASIC METABOLIC PNL TOTAL CA: CPT | Performed by: MIDWIFE

## 2018-05-24 PROCEDURE — 25000132 ZZH RX MED GY IP 250 OP 250 PS 637: Performed by: EMERGENCY MEDICINE

## 2018-05-24 PROCEDURE — 87086 URINE CULTURE/COLONY COUNT: CPT | Performed by: EMERGENCY MEDICINE

## 2018-05-24 PROCEDURE — 99284 EMERGENCY DEPT VISIT MOD MDM: CPT | Mod: Z6 | Performed by: EMERGENCY MEDICINE

## 2018-05-24 PROCEDURE — 83735 ASSAY OF MAGNESIUM: CPT | Performed by: EMERGENCY MEDICINE

## 2018-05-24 PROCEDURE — 81001 URINALYSIS AUTO W/SCOPE: CPT | Performed by: EMERGENCY MEDICINE

## 2018-05-24 PROCEDURE — 96374 THER/PROPH/DIAG INJ IV PUSH: CPT | Performed by: EMERGENCY MEDICINE

## 2018-05-24 PROCEDURE — 81001 URINALYSIS AUTO W/SCOPE: CPT | Performed by: MIDWIFE

## 2018-05-24 RX ORDER — ONDANSETRON 2 MG/ML
4 INJECTION INTRAMUSCULAR; INTRAVENOUS ONCE
Status: COMPLETED | OUTPATIENT
Start: 2018-05-24 | End: 2018-05-24

## 2018-05-24 RX ORDER — METOCLOPRAMIDE 10 MG/1
10 TABLET ORAL
Qty: 120 TABLET | Refills: 1 | Status: SHIPPED | OUTPATIENT
Start: 2018-05-24 | End: 2018-08-27

## 2018-05-24 RX ORDER — ONDANSETRON 2 MG/ML
4 INJECTION INTRAMUSCULAR; INTRAVENOUS EVERY 6 HOURS PRN
Status: CANCELLED
Start: 2018-05-24

## 2018-05-24 RX ORDER — DEXTROSE, SODIUM CHLORIDE, SODIUM LACTATE, POTASSIUM CHLORIDE, AND CALCIUM CHLORIDE 5; .6; .31; .03; .02 G/100ML; G/100ML; G/100ML; G/100ML; G/100ML
INJECTION, SOLUTION INTRAVENOUS CONTINUOUS
Status: CANCELLED
Start: 2018-05-24

## 2018-05-24 RX ORDER — METOCLOPRAMIDE HYDROCHLORIDE 5 MG/ML
5 INJECTION INTRAMUSCULAR; INTRAVENOUS ONCE
Status: COMPLETED | OUTPATIENT
Start: 2018-05-24 | End: 2018-05-24

## 2018-05-24 RX ORDER — POTASSIUM CHLORIDE 1.5 G/1.58G
20 POWDER, FOR SOLUTION ORAL ONCE
Status: COMPLETED | OUTPATIENT
Start: 2018-05-24 | End: 2018-05-24

## 2018-05-24 RX ADMIN — SODIUM CHLORIDE 1000 ML: 9 INJECTION, SOLUTION INTRAVENOUS at 20:26

## 2018-05-24 RX ADMIN — SODIUM CHLORIDE 1000 ML: 9 INJECTION, SOLUTION INTRAVENOUS at 18:03

## 2018-05-24 RX ADMIN — ONDANSETRON 4 MG: 2 INJECTION INTRAMUSCULAR; INTRAVENOUS at 20:26

## 2018-05-24 RX ADMIN — METOCLOPRAMIDE 5 MG: 5 INJECTION, SOLUTION INTRAMUSCULAR; INTRAVENOUS at 18:28

## 2018-05-24 RX ADMIN — POTASSIUM CHLORIDE 20 MEQ: 1.5 POWDER, FOR SOLUTION ORAL at 20:26

## 2018-05-24 ASSESSMENT — ENCOUNTER SYMPTOMS
ABDOMINAL PAIN: 1
ARTHRALGIAS: 0
VOMITING: 1
DIFFICULTY URINATING: 0
UNEXPECTED WEIGHT CHANGE: 1
FEVER: 0
COLOR CHANGE: 0
SHORTNESS OF BREATH: 0
CONFUSION: 0
NECK STIFFNESS: 0
EYE REDNESS: 0
NAUSEA: 1
HEADACHES: 0

## 2018-05-24 NOTE — ED AVS SNAPSHOT
[facility], [place], Emergency Department    [address]    [facility] [place] [address]    Phone:  [phone]    Fax:  [phone]                                       Dai Guadarrama   MRN: [id number]    Department:  [facility], Emergency Department   Date of Visit:  5/24/2018           After Visit Summary Signature Page     I have received my discharge instructions, and my questions have been answered. I have discussed any challenges I see with this plan with the nurse or doctor.    ..........................................................................................................................................  Patient/Patient Representative Signature      ..........................................................................................................................................  Patient Representative Print Name and Relationship to Patient    ..................................................               ................................................  Date                                            Time    ..........................................................................................................................................  Reviewed by Signature/Title    ...................................................              ..............................................  Date                                                            Time

## 2018-05-24 NOTE — MR AVS SNAPSHOT
After Visit Summary   5/24/2018    Dai Guadarrama    MRN: [id number]           Patient Information     Date Of Birth          1985        Visit Information        Provider Department      5/24/2018 2:45 PM Manasa Ansari APRN CNM; [facility] LANGUAGE SERVICES [facility]        Today's Diagnoses     Supervision of high risk pregnancy in first trimester    -  1    History of intrauterine fetal death, currently pregnant in first trimester        Morbid obesity due to excess calories (H)        Hyperemesis gravidarum        Group B Streptococcus urinary tract infection affecting pregnancy in first trimester           Follow-ups after your visit        Additional Services     MAT FETAL MED CTR REFERRAL-PREGNANCY       Body mass index is 40.49 kg/(m^2). Hx 32 wk stillbirth and second preg OLIGO IOL at 37 wks.     >> Patient may proceed with recommendations for further testing as directed by the Maternal Fetal Medicine Specialist >>    >> If requesting Fetal Echo: MFM will determine appropriate location for exam due to indication.    >> If requesting Lung Maturity Amnio:  If results indicate fetal lung maturity, induction or C/S is recommended within 36 hours.  Please schedule accordingly.     Dear Patient:   Please be aware that coverage of these services is subject to the terms and limitations of your health insurance plan.  Call member services at your health plan with any benefit or coverage questions.      Please bring the following to your appointment:    >>  Any x-rays, CTs or MRIs which have been performed.  Contact the facility where they were done to arrange for  prior to your scheduled appointment.  Any new CT, MRI or other procedures ordered by your specialist must be performed at a [facility] facility or coordinated by your clinic's referral office.  >>  List of current medications   >>  This referral request   >>  Any documents/labs given to you for this  "referral                  Follow-up notes from your care team     Return in about 2 weeks (around 6/7/2018), or if symptoms worsen or fail to improve, for FREDY.      Your next 10 appointments already scheduled     Jun 07, 2018  3:00 PM CDT   RETURN OB with Viridiana Lenz CNM   [facility] ([facility])    [facility] [address]  [address]  [facility] [id number] [phone]              Who to contact     Please call your clinic at [phone] to:    Ask questions about your health    Make or cancel appointments    Discuss your medicines    Learn about your test results    Speak to your doctor            Additional Information About Your Visit        [employer] Information     [employer] gives you secure access to your electronic health record. If you see a primary care provider, you can also send messages to your care team and make appointments. If you have questions, please call your primary care clinic.  If you do not have a primary care provider, please call [phone] and they will assist you.      [employer] is an electronic gateway that provides easy, online access to your medical records. With [employer], you can request a clinic appointment, read your test results, renew a prescription or communicate with your care team.     To access your existing account, please contact your [facility] or call [phone] for assistance.        Care EveryWhere ID     This is your Care EveryWhere ID. This could be used by other organizations to access your [facility] medical records  [id number]        Your Vitals Were     Pulse Height Last Period BMI (Body Mass Index)          114 1.575 m (5' 2\") 02/28/2018 (Exact Date) 40.49 kg/m2         Blood Pressure from Last 3 Encounters:   05/24/18 100/70   05/11/18 104/74   05/03/18 114/70    Weight from Last 3 Encounters:   05/24/18 100.4 kg (221 lb 6.4 oz)   05/11/18 56 kg " (123 lb 8 oz)   05/03/18 102.1 kg (225 lb)              We Performed the Following     Basic Metabolic Panel     Chlamydia PCR (Clinic Collect)     Gonorrhea PCR     MAT FETAL MED CTR REFERRAL-PREGNANCY     Routine UA with Microscopic          Today's Medication Changes          These changes are accurate as of 5/24/18  3:59 PM.  If you have any questions, ask your nurse or doctor.               Start taking these medicines.        Dose/Directions    metoclopramide 10 MG tablet   Commonly known as:  REGLAN   Used for:  Supervision of high risk pregnancy in first trimester, Hyperemesis gravidarum, History of intrauterine fetal death, currently pregnant in first trimester, Morbid obesity due to excess calories (H)   Started by:  Manasa Ansari APRN CNM        Dose:  10 mg   Take 1 tablet (10 mg) by mouth 4 times daily (before meals and nightly)   Quantity:  120 tablet   Refills:  1            Where to get your medicines      These medications were sent to [facility] [address] [facility] [address] [place] [address] AVE [address] AT [facility]  [address] [facility] [address]     Phone:  [phone]     metoclopramide 10 MG tablet                Primary Care Provider Fax #    Physician No Ref-Primary [phone]       No address on file        Equal Access to Services     AISHA SONG AH: Mariano Andrew, waaxda luqadaha, qaybta kaalmada adeconoryada, kodi alcaraz. So [facility] [phone].    ATENCIÓN: Si habla español, tiene a martin disposición servicios gratuitos de asistencia lingüística. Llame al [phone].    We comply with applicable federal civil rights laws and [place] laws. We do not discriminate on the basis of race, color, national origin, age, disability, sex, sexual orientation, or gender identity.            Thank you!     Thank you for choosing [facility]  for your care. Our goal is always to provide you with excellent care.  Hearing back from our patients is one way we can continue to improve our services. Please take a few minutes to complete the written survey that you may receive in the mail after your visit with us. Thank you!             Your Updated Medication List - Protect others around you: Learn how to safely use, store and throw away your medicines at www.disposemymeds.org.          This list is accurate as of 5/24/18  3:59 PM.  Always use your most recent med list.                   Brand Name Dispense Instructions for use Diagnosis    metoclopramide 10 MG tablet    REGLAN    120 tablet    Take 1 tablet (10 mg) by mouth 4 times daily (before meals and nightly)    Supervision of high risk pregnancy in first trimester, Hyperemesis gravidarum, History of intrauterine fetal death, currently pregnant in first trimester, Morbid obesity due to excess calories (H)       ondansetron 4 MG ODT tab    ZOFRAN ODT    20 tablet    Take 1-2 tablets (4-8 mg) by mouth every 8 hours as needed for nausea    Hyperemesis gravidarum, Supervision of high risk pregnancy in first trimester, Aneurysm (H), History of intrauterine fetal death, currently pregnant in first trimester       prenatal multivitamin plus iron 27-0.8 MG Tabs per tablet      Take 1 tablet by mouth daily    Supervision of high risk pregnancy in first trimester, Hyperemesis gravidarum, Aneurysm (H), History of intrauterine fetal death, currently pregnant in first trimester       prochlorperazine 5 MG tablet    COMPAZINE    90 tablet    May take 1-2 tablets by mouth every 6-8 hours as needed    Nausea and vomiting in pregnancy prior to 22 weeks gestation

## 2018-05-24 NOTE — LETTER
2018       RE: Dai Guadarrama  [address]  [facility] [address]     Dear Colleague,    Thank you for referring your patient, Dai Guadarrama, to the [facility] at [facility]. Please see a copy of my visit note below.    SUBJECTIVE:   Dai is a 33 year old female who presents to clinic for a new OB visit.     at 10w6d with Estimated Date of Delivery: Dec 14, 2018   based on US confirms. Feels well. Has not started PNV.  She complains of severe nausea and vomiting feeling dehydrated. Not keeping food or fluids down  Pt has lost 5# since last visit.   She was seen in ED for IV fluid hydration x 1.    She has had not hadbleeding since her LMP.    She has had nausea resulting in non-bloody Weight loss has occurred, for a total of 6 pounds.   This was a planned pregnancy.   FOB is involved,  Here with pt      OTHER CONCERNS: feeling awful.      ===========================================  ROS:    ROS: 10 point ROS neg other than the symptoms noted above in the HPI.      PSYCHIATRIC:  Denies fatigue related to N & V.   PHQ-9 score:    PHQ-9 SCORE 2016   Total Score 7     No flowsheet data found.        Past History:  Her past medical history   Past Medical History:   Diagnosis Date     Aneurysm (H)      Migraine headache without aura      Pregnancy related nausea and vomiting, antepartum 2016    two ED visits.     Recurrent pregnancy loss (CODE)      Retained placenta 2014     Stillbirth 2014    32 weeks     Tachycardia    .   She has a history of  pre-term delivery at 32 weeks  IUFD     Since her last LMP she denies use of alcohol, tobacco and street drugs.  HISTORY:  Family History   Problem Relation Age of Onset     Anxiety Disorder No family hx of      MENTAL ILLNESS No family hx of      Substance Abuse No family hx of      Anesthesia Reaction No family hx of      Asthma No family hx of      OSTEOPOROSIS No  family hx of      Genetic Disorder No family hx of      Thyroid Disease No family hx of      Hyperlipidemia No family hx of      CEREBROVASCULAR DISEASE No family hx of      Breast Cancer No family hx of      Colon Cancer No family hx of      Prostate Cancer No family hx of      Other Cancer No family hx of      Depression No family hx of      DIABETES No family hx of      Coronary Artery Disease No family hx of      Hypertension No family hx of      Social History     Social History     Marital status: Single     Spouse name: Perry     Number of children: 1     Years of education: 16     Occupational History           studied psychology      Social History Main Topics     Smoking status: Never Smoker     Smokeless tobacco: Never Used     Alcohol use 0.0 oz/week     0 Standard drinks or equivalent per week      Comment: rare alcohol use now nothing in pregnancy     Drug use: No     Sexual activity: Yes     Partners: Male     Other Topics Concern      Service No     Blood Transfusions No     Caffeine Concern Yes     coffee   stopped in pregnancy     Occupational Exposure No     Hobby Hazards No     Sleep Concern No     Stress Concern No     Weight Concern No     Special Diet No     Back Care Yes     has back pain in this pregnancy     Exercise Yes     walking actvities     Bike Helmet Yes     Seat Belt Yes     Self-Exams No     Social History Narrative      Of  [place]ry [place]- 84     How much exercise per week? Daily activiites    How much calcium per day? Does not eat dairy       How much caffeine per day? None in pregnancy    How much vitamin D per day? In sunlight    Do you/your family wear seatbelts?  Yes    Do you/your family use safety helmets? Yes    Do you/your family use sunscreen? Yes    Do you/your family keep firearms in the home? No    Do you/your family have a smoke detector(s)? Yes        Do you feel safe in your home? Yes    Has anyone ever touched you in an unwanted manner? No     reviewed [facility]     Reviewed [facility] 5-3-2018     Current Outpatient Prescriptions   Medication Sig     amoxicillin (AMOXIL) 500 MG capsule Take 1 capsule (500 mg) by mouth 2 times daily     ondansetron (ZOFRAN ODT) 4 MG ODT tab Take 1-2 tablets (4-8 mg) by mouth every 8 hours as needed for nausea     Prenatal Vit-Fe Fumarate-FA (PRENATAL MULTIVITAMIN PLUS IRON) 27-0.8 MG TABS per tablet Take 1 tablet by mouth daily     prochlorperazine (COMPAZINE) 5 MG tablet May take 1-2 tablets by mouth every 6-8 hours as needed (Patient not taking: Reported on 2018)     progesterone 200 MG VA SUPP Place 1 suppository (200 mg) vaginally At Bedtime for 8 weeks (Patient not taking: Reported on 2018)     No current facility-administered medications for this visit.      No Known Allergies    ============================================  MEDICAL HISTORY  Past Medical History:   Diagnosis Date     Aneurysm (H)      Migraine headache without aura      Pregnancy related nausea and vomiting, antepartum 2016    two ED visits.     Recurrent pregnancy loss (CODE)      Retained placenta 2014     Stillbirth 2014    32 weeks     Tachycardia      Past Surgical History:   Procedure Laterality Date     C EACH ADD TOOTH EXTRACTION      bad reaction to anesthia for local      NO HISTORY OF SURGERY         Obstetric History       T1      L1     SAB2   TAB0   Ectopic0   Multiple0   Live Births2       # Outcome Date GA Lbr Spike/2nd Weight Sex Delivery Anes PTL Lv   5 Current            4 Term 17 37w5d 02:30 / 00:13 3.374 kg (7 lb 7 oz) F Vag-Spont EPI N KASEY      Name: Marjorie       Apgar1:  9                Apgar5: 9   3  14 32w0d   F I.U. FETAL D   ND   2 SAB            1 SAB               Obstetric Comments   No children currently   2nd pregnancy oligohydramnios IOL 37.5          GYN History-  Abnormal Pap Smears  LPS 2016 WNL                         Cervical procedures:                          "History of STI:     I personally reviewed the past social/family/medical and surgical history on the date of service.   I reviewed lab work done at Intake visit with patient.    EXAM:  /70 (BP Location: Left arm, Patient Position: Chair)  Pulse 114  Ht 1.575 m (5' 2\")  Wt 100.4 kg (221 lb 6.4 oz)  LMP 2018 (Exact Date)  BMI 40.49 kg/m2   EXAM:  GENERAL:  Pleasant pregnant female, alert, cooperative  and well groomed.  SKIN:  Warm and dry, without lesions or rashes  HEAD: Symmetrical features.  NECK:  Thyroid without enlargement and nodules.  Lymph nodes not palpable.   LUNGS:  Clear to auscultation.  BREAST:    No dominant, fixed or suspicious masses are noted.  No skin or nipple changes or axillary nodes.   Nipples everted.      HEART:  RRR without murmur.  ABDOMEN: Soft without masses , tenderness or organomegaly.  No CVA tenderness.  Uterus palpable at size equal to dates.  No scars noted.. Fetal heart tones present.  MUSCULOSKELETAL:  Full range of motion  EXTREMITIES:  No edema. No significant varicosities.   PELVIC EXAM: deferred pelvic      WET PREP:Not done  GC/CHLAMYDIA CULTURE OBTAINED:YES    Lab Results   Component Value Date    PAP NIL 2016          ASSESSMENT:  33 year old , 10w6d weeks of pregnancy with HORTENCIA of Dec 14, 2018 by US confirms  Intrauterine pregnancy 10w6d size  consistent with dates  Genetic Screening: Declines early screening    ICD-10-CM    1. Supervision of high risk pregnancy in first trimester O09.91        PLAN:  - Reviewed use of triage nurse line and contacting the on-call provider after hours for an urgent need such as fever, vagina bleeding, bladder or vaginal infection, rupture of membranes,  or term labor.    - Reviewed best evidence for: weight gain for her weight and height for pregnancy:  Based on pre-pregnancy weight of 103kg  and Body mass index is 40.49 kg/(m^2). RECOMMENDED WEIGHT GAIN: < 15 lbs.  - Reviewed healthy diet and foods to " avoid; exercise and activity during pregnancy; avoiding exposure to toxoplasmosis; and maintenance of a generally healthy lifestyle.   - Discussed the harms, benefits, side effects and alternative therapies for current prescribed and OTC medications.  - Reviewed high risk for gestational diabetes, early 1 hour unable to tolerate today   Attempt at next visit. .  - All pt's and partner's questions discussed and answered.  Pt verbalized understanding of and agreement to plan of care.   Advised ED visit today for IV fluid hydration if not keeping any fluids down.  Home IV fluid therapy ordered to start ASAP/   - Continue scheduled prenatal care and prn if questions or concerns    CHANELLE Mary CNM

## 2018-05-24 NOTE — ED AVS SNAPSHOT
" [facility], Emergency Department    [address]    [place] [address]    Phone:  [phone]    Fax:  [phone]                                       Dai Guadarrama   MRN: [id number]    Department:  [facility], Emergency Department   Date of Visit:  5/24/2018           Patient Information     Date Of Birth          1985        Your diagnoses for this visit were:     Hyperemesis gravidarum     Hypokalemia     Epigastric pain        You were seen by Davon Simpson MD.        Discharge Instructions         Hiperemesis gravídica  La hiperemesis gravídica es justa forma grave de \"náusea matutina\" que puede afectar a algunas mujeres lilibeth el embarazo. Puede desarrollarse alrededor de la quinta semana y durar hasta la semana número dieciséis del embarazo. En algunas mujeres, puede durar más tiempo. Los síntomas incluyen vómito y náuseas graves. Eso puede llevar a problemas tales tatiana pérdida de peso y deshidratación.  No se sabe con claridad qué es lo que causa la hiperemesis gravídica. Puede deberse a los niveles de hormonas que suben al comienzo del embarazo. Si los síntomas son graves, puede convertirse en justa seria amenaza para la mamá y para el feto. Por lo tanto, siga atentamente las sugerencias que se describen abajo.  Si los síntomas no logran controlarse con las medidas que usted avelino en martin casa, quizás necesiten hospitalizarla. Puede que le den líquidos y medicamentos por vía intravenosa (IV).  Cuidados en martin casa    Dieta  ? Lleve un registro de los alimentos que come y de cómo estos afectan avelina síntomas. Evite los alimentos que le provocan los síntomas.  ? Coma varias comidas más pequeñas a lo karen del día en lugar de lanie comidas grandes. [place] ayuda a evitar que el estómago quede vacío, lo que puede empeorar las náuseas.  ? Elija comidas altas en carbohidratos. También puede ayudarle comer alimentos altos en proteínas. Evite los alimentos grasosos o muy condimentados.  ? Antes " de levantarse de la cama por las mañanas, intente comer unas galletas o pan dhiraj seco. Eso puede ayudarle a asentar el estómago.  ? Elinor líquidos transparentes, fríos. Elinor pequeñas cantidades de líquidos con electrolitos, tatiana las bebidas deportivas. Eso también puede ayudarle.    Medicamentos  ? De ser necesario, martin proveedor de atención médica puede indicarle ciertos medicamentos para ayudar a aliviar las náuseas y el vómito. También puede que le aconsejen consumir vitamina B6 y jengibre. No pruebe ningún medicamento de venta mark ni remedio casero sin hablar damir con martin proveedor.  Visita de control  Programe justa visita de control con martin proveedor de atención médica o según le hayan indicado.  Cuándo debe buscar atención médica  Llame a martin proveedor de atención médica de inmediato si sucede cualquiera de las siguientes cosas:    Signos de deshidratación (boca seca, sed extrema, orina oscura o poca orina, mareo, debilidad o desmayo)    Vómito que no se detiene    Incapacidad de mantener los líquidos en el estómago    Diarrea frecuente    Pérdida de peso o no aumentar de peso en un período de dos semanas    Dolor intenso y carla en la parte inferior derecha del abdomen    Fiebre de 100.4  F (38  C) o más, o según le haya indicado martin proveedor  Date Last Reviewed: 8/20/2015 2000-2017 [employer]. [address] [place] [address], [place] [address]. Todos los derechos reservados. Esta información no pretende sustituir la atención médica profesional. Sólo martin médico puede diagnosticar y tratar un problema de chino.      Fill and take your new nausea medicine as directed.  Take ranitidine as directed for upper abdominal pain.    Please make an appointment to follow up with Your Primary Care Provider as planned.     Your next 10 appointments already scheduled     Jun 07, 2018  3:00 PM CDT   RETURN OB with Viridiana Lenz CNM   [facility] ([facility])     [facility] [address]  [address]  [facility] [id number] [phone]              24 Hour Appointment Hotline       To make an appointment at any [facility], call [phone] ([phone]). If you don't have a family doctor or clinic, we will help you find one. [facility] clinics are conveniently located to serve the needs of you and your family.             Review of your medicines      START taking        Dose / Directions Last dose taken    ranitidine 150 MG tablet   Commonly known as:  ZANTAC   Dose:  150 mg   Quantity:  30 tablet        Take 1 tablet (150 mg) by mouth 2 times daily for 15 days   Refills:  0          Our records show that you are taking the medicines listed below. If these are incorrect, please call your family doctor or clinic.        Dose / Directions Last dose taken    metoclopramide 10 MG tablet   Commonly known as:  REGLAN   Dose:  10 mg   Quantity:  120 tablet        Take 1 tablet (10 mg) by mouth 4 times daily (before meals and nightly)   Refills:  1        ondansetron 4 MG ODT tab   Commonly known as:  ZOFRAN ODT   Dose:  4-8 mg   Quantity:  20 tablet        Take 1-2 tablets (4-8 mg) by mouth every 8 hours as needed for nausea   Refills:  3        prenatal multivitamin plus iron 27-0.8 MG Tabs per tablet   Dose:  1 tablet        Take 1 tablet by mouth daily   Refills:  0                Prescriptions were sent or printed at these locations (1 Prescription)                   Other Prescriptions                Printed at Department/Unit printer (1 of 1)         ranitidine (ZANTAC) 150 MG tablet                Procedures and tests performed during your visit     Comprehensive metabolic panel    Lipase    Magnesium    UA with Microscopic reflex to Culture    Urine Culture Aerobic Bacterial      Orders Needing Specimen Collection     None      Pending Results     Date and Time Order Name Status Description    5/24/2018 2018 Urine Culture  Aerobic Bacterial In process     5/24/2018 1511 NEISSERIA GONORRHOEAE PCR In process     5/24/2018 1511 CHLAMYDIA TRACHOMATIS PCR In process             Pending Culture Results     Date and Time Order Name Status Description    5/24/2018 2018 Urine Culture Aerobic Bacterial In process     5/24/2018 1511 NEISSERIA GONORRHOEAE PCR In process     5/24/2018 1511 CHLAMYDIA TRACHOMATIS PCR In process             Pending Results Instructions     If you had any lab results that were not finalized at the time of your Discharge, you can call the ED Lab Result RN at [phone]. You will be contacted by this team for any positive Lab results or changes in treatment. The nurses are available 7 days a week from 10A to 6:30P.  You can leave a message 24 hours per day and they will return your call.        Thank you for choosing [facility]       Thank you for choosing [facility] for your care. Our goal is always to provide you with excellent care. Hearing back from our patients is one way we can continue to improve our services. Please take a few minutes to complete the written survey that you may receive in the mail after you visit with us. Thank you!        [email or web address] Information     [employer] gives you secure access to your electronic health record. If you see a primary care provider, you can also send messages to your care team and make appointments. If you have questions, please call your primary care clinic.  If you do not have a primary care provider, please call [phone] and they will assist you.        Care EveryWhere ID     This is your Care EveryWhere ID. This could be used by other organizations to access your [facility] medical records  [id number]        Equal Access to Services     NITA SONG : Hadii isaiah corona Sojakob, waaxda luqadaha, qaybta kaalmada kodi stauffer . So [facility] [phone].    ATENCIÓN: Si habla español, tiene a martin disposición servicios gratuitos de asistencia  beny [place] [phone].    We comply with applicable federal civil rights laws and [place] laws. We do not discriminate on the basis of race, color, national origin, age, disability, sex, sexual orientation, or gender identity.            After Visit Summary       This is your record. Keep this with you and show to your community pharmacist(s) and doctor(s) at your next visit.

## 2018-05-24 NOTE — ED PROVIDER NOTES
History     Chief Complaint   Patient presents with     Hyperemesis     Pt pregnant     The history is limited by a language barrier. A  was used.     Dai Guadarrama is a 33 year old female who is 10 weeks pregnant who presents to the emergency department from her OB clinic for IV fluids.  Patient has a history of hyperemesis gravidarum.  She has recently been prescribed a new anti-medic that she has not yet filled.  She was seen in the clinic today and has lost 5 pounds since her last visit.  She reports frequent nausea and vomiting.  She also complains of some burning epigastric abdominal pain.  Patient denies any chest pain or dyspnea.  No cough.  She denies fever.  Patient denies any diarrhea or constipation.  Patient denies any dysuria, urgency, frequency.  She denies any vaginal bleeding.  She is going to start home IV fluids through her primary OB clinic but was sent here for IV fluids today.  Patient denies any other concerns.    I have reviewed the Medications, Allergies, Past Medical and Surgical History, and Social History in the Epic system.    Review of Systems   Constitutional: Positive for unexpected weight change. Negative for fever.   HENT: Negative for congestion.    Eyes: Negative for redness.   Respiratory: Negative for shortness of breath.    Cardiovascular: Negative for chest pain.   Gastrointestinal: Positive for abdominal pain, nausea and vomiting.   Genitourinary: Negative for difficulty urinating.   Musculoskeletal: Negative for arthralgias and neck stiffness.   Skin: Negative for color change.   Neurological: Negative for headaches.   Psychiatric/Behavioral: Negative for confusion.   All other systems reviewed and are negative.      Physical Exam   BP: 105/71  Pulse: 105  Temp: 97.7  F (36.5  C)  Resp: 16  SpO2: 97 %      Physical Exam   Constitutional: She appears well-developed and well-nourished. No distress.   HENT:   Head: Atraumatic.   Eyes: Pupils are  equal, round, and reactive to light. No scleral icterus.   Cardiovascular: Normal rate, regular rhythm, normal heart sounds and intact distal pulses.    Pulmonary/Chest: Effort normal and breath sounds normal. No respiratory distress.   Abdominal: Soft. Bowel sounds are normal. There is no tenderness.   Musculoskeletal: Normal range of motion. She exhibits no edema or tenderness.   Neurological: She is alert. She has normal strength. Coordination normal.   Skin: Skin is warm. No rash noted. She is not diaphoretic.   Nursing note and vitals reviewed.      ED Course     ED Course     Procedures            Critical Care time:    Results for orders placed or performed during the hospital encounter of 05/24/18 (from the past 24 hour(s))   Comprehensive metabolic panel   Result Value Ref Range    Sodium 142 133 - 144 mmol/L    Potassium 3.2 (L) 3.4 - 5.3 mmol/L    Chloride 108 94 - 109 mmol/L    Carbon Dioxide 25 20 - 32 mmol/L    Anion Gap 9 3 - 14 mmol/L    Glucose 91 70 - 99 mg/dL    Urea Nitrogen 9 7 - 30 mg/dL    Creatinine 0.51 (L) 0.52 - 1.04 mg/dL    GFR Estimate >90 >60 mL/min/1.7m2    GFR Estimate If Black >90 >60 mL/min/1.7m2    Calcium 8.5 8.5 - 10.1 mg/dL    Bilirubin Total 0.3 0.2 - 1.3 mg/dL    Albumin 3.2 (L) 3.4 - 5.0 g/dL    Protein Total 7.3 6.8 - 8.8 g/dL    Alkaline Phosphatase 93 40 - 150 U/L    ALT 16 0 - 50 U/L    AST 10 0 - 45 U/L   Lipase   Result Value Ref Range    Lipase 135 73 - 393 U/L   Magnesium   Result Value Ref Range    Magnesium 2.0 1.6 - 2.3 mg/dL   UA with Microscopic reflex to Culture   Result Value Ref Range    Color Urine Yellow     Appearance Urine Clear     Glucose Urine Negative NEG^Negative mg/dL    Bilirubin Urine Negative NEG^Negative    Ketones Urine >150 (A) NEG^Negative mg/dL    Specific Gravity Urine 1.023 1.003 - 1.035    Blood Urine Negative NEG^Negative    pH Urine 6.5 5.0 - 7.0 pH    Protein Albumin Urine 10 (A) NEG^Negative mg/dL    Urobilinogen mg/dL Normal 0.0 -  2.0 mg/dL    Nitrite Urine Negative NEG^Negative    Leukocyte Esterase Urine Large (A) NEG^Negative    Source Midstream Urine     WBC Urine 33 (H) 0 - 5 /HPF    RBC Urine 1 0 - 2 /HPF    Bacteria Urine Few (A) NEG^Negative /HPF    Squamous Epithelial /HPF Urine 7 (H) 0 - 1 /HPF    Mucous Urine Present (A) NEG^Negative /LPF      Medications   metoclopramide (REGLAN) injection 5 mg (5 mg Intravenous Given 5/24/18 1828)   0.9% sodium chloride BOLUS (0 mLs Intravenous Stopped 5/24/18 2026)   0.9% sodium chloride BOLUS (0 mLs Intravenous Stopped 5/24/18 2052)   potassium chloride (KLOR-CON) Packet 20 mEq (20 mEq Oral Given 5/24/18 2026)   ondansetron (ZOFRAN) injection 4 mg (4 mg Intravenous Given 5/24/18 2026)           Assessments & Plan (with Medical Decision Making)   33 year old female with history of hyperemesis gravidarum who is 10 weeks pregnant referred to the emergency department by her OB clinic for IV fluids.  Patient also complains of epigastric abdominal pain.  She was prescribed a new antiemetic recently that she has not yet filled.  In the emergency department, the patient appears clinically well.  Her abdomen is soft and nontender.  She denies any vaginal bleeding or lower abdominal pain.  The patient's conference of metabolic panel and lipase are normal.  Her urine appears and contaminated.  She has had similar previous appearing urines that had a negative urine culture.  The patient was given 2 L of normal saline.  She is also given Reglan and ondansetron.  She is feeling improved and was able to take a oral potassium supplement for her potassium of 3.2.  Patient will be discharged home.  She was asked to fill her recently prescribed antiemetic.  Ranitidine prescribed for epigastric abdominal pain.  Primary OB follow-up recommended.    I have reviewed the nursing notes.    I have reviewed the findings, diagnosis, plan and need for follow up with the patient.    Discharge Medication List as of 5/24/2018   8:53 PM      START taking these medications    Details   ranitidine (ZANTAC) 150 MG tablet Take 1 tablet (150 mg) by mouth 2 times daily for 15 days, Disp-30 tablet, R-0, Local Print             Final diagnoses:   Hyperemesis gravidarum   Hypokalemia   Epigastric pain       5/24/2018   [facility], [place], EMERGENCY DEPARTMENT     Davon Simpson MD  05/24/18 2194

## 2018-05-24 NOTE — PROGRESS NOTES
SUBJECTIVE:   Dai is a 33 year old female who presents to clinic for a new OB visit.     at 10w6d with Estimated Date of Delivery: Dec 14, 2018   based on US confirms. Feels well. Has not started PNV.  She complains of severe nausea and vomiting feeling dehydrated. Not keeping food or fluids down  Pt has lost 5# since last visit.   She was seen in ED for IV fluid hydration x 1.    She has had not hadbleeding since her LMP.    She has had nausea resulting in non-bloody Weight loss has occurred, for a total of 6 pounds.   This was a planned pregnancy.   FOB is involved,  Here with pt      OTHER CONCERNS: feeling awful.      ===========================================  ROS:    ROS: 10 point ROS neg other than the symptoms noted above in the HPI.      PSYCHIATRIC:  Denies fatigue related to N & V.   PHQ-9 score:    PHQ-9 SCORE 2016   Total Score 7     No flowsheet data found.        Past History:  Her past medical history   Past Medical History:   Diagnosis Date     Aneurysm (H)      Migraine headache without aura      Pregnancy related nausea and vomiting, antepartum 2016    two ED visits.     Recurrent pregnancy loss (CODE)      Retained placenta      Stillbirth     32 weeks     Tachycardia    .   She has a history of  pre-term delivery at 32 weeks  IUFD     Since her last LMP she denies use of alcohol, tobacco and street drugs.  HISTORY:  Family History   Problem Relation Age of Onset     Anxiety Disorder No family hx of      MENTAL ILLNESS No family hx of      Substance Abuse No family hx of      Anesthesia Reaction No family hx of      Asthma No family hx of      OSTEOPOROSIS No family hx of      Genetic Disorder No family hx of      Thyroid Disease No family hx of      Hyperlipidemia No family hx of      CEREBROVASCULAR DISEASE No family hx of      Breast Cancer No family hx of      Colon Cancer No family hx of      Prostate Cancer No family hx of      Other Cancer No family hx of       Depression No family hx of      DIABETES No family hx of      Coronary Artery Disease No family hx of      Hypertension No family hx of      Social History     Social History     Marital status: Single     Spouse name: Perry     Number of children: 1     Years of education: 16     Occupational History           studied psychology      Social History Main Topics     Smoking status: Never Smoker     Smokeless tobacco: Never Used     Alcohol use 0.0 oz/week     0 Standard drinks or equivalent per week      Comment: rare alcohol use now nothing in pregnancy     Drug use: No     Sexual activity: Yes     Partners: Male     Other Topics Concern      Service No     Blood Transfusions No     Caffeine Concern Yes     coffee   stopped in pregnancy     Occupational Exposure No     Hobby Hazards No     Sleep Concern No     Stress Concern No     Weight Concern No     Special Diet No     Back Care Yes     has back pain in this pregnancy     Exercise Yes     walking actvities     Bike Helmet Yes     Seat Belt Yes     Self-Exams No     Social History Narrative      Of  [place]- 84     How much exercise per week? Daily activiites    How much calcium per day? Does not eat dairy       How much caffeine per day? None in pregnancy    How much vitamin D per day? In sunlight    Do you/your family wear seatbelts?  Yes    Do you/your family use safety helmets? Yes    Do you/your family use sunscreen? Yes    Do you/your family keep firearms in the home? No    Do you/your family have a smoke detector(s)? Yes        Do you feel safe in your home? Yes    Has anyone ever touched you in an unwanted manner? No    reviewed [facility]     Reviewed [facility] 5-3-2018     Current Outpatient Prescriptions   Medication Sig     amoxicillin (AMOXIL) 500 MG capsule Take 1 capsule (500 mg) by mouth 2 times daily     ondansetron (ZOFRAN ODT) 4 MG ODT tab Take 1-2 tablets (4-8 mg) by mouth every 8 hours as needed for  "nausea     Prenatal Vit-Fe Fumarate-FA (PRENATAL MULTIVITAMIN PLUS IRON) 27-0.8 MG TABS per tablet Take 1 tablet by mouth daily     prochlorperazine (COMPAZINE) 5 MG tablet May take 1-2 tablets by mouth every 6-8 hours as needed (Patient not taking: Reported on 2018)     progesterone 200 MG VA SUPP Place 1 suppository (200 mg) vaginally At Bedtime for 8 weeks (Patient not taking: Reported on 2018)     No current facility-administered medications for this visit.      No Known Allergies    ============================================  MEDICAL HISTORY  Past Medical History:   Diagnosis Date     Aneurysm (H)      Migraine headache without aura      Pregnancy related nausea and vomiting, antepartum 2016    two ED visits.     Recurrent pregnancy loss (CODE)      Retained placenta      Stillbirth 2014    32 weeks     Tachycardia      Past Surgical History:   Procedure Laterality Date     C EACH ADD TOOTH EXTRACTION      bad reaction to anesthia for local      NO HISTORY OF SURGERY         Obstetric History       T1      L1     SAB2   TAB0   Ectopic0   Multiple0   Live Births2       # Outcome Date GA Lbr Spike/2nd Weight Sex Delivery Anes PTL Lv   5 Current            4 Term 04/ 37w5d 02:30 / 00:13 3.374 kg (7 lb 7 oz) F Vag-Spont EPI N KASEY      Name: Marjorie       Apgar1:  9                Apgar5: 9   3  14 32w0d   F I.U. FETAL D   ND   2 SAB            1 SAB               Obstetric Comments   No children currently   2nd pregnancy oligohydramnios IOL 37.5          GYN History-  Abnormal Pap Smears  LPS 2016 WNL                         Cervical procedures:                         History of STI:     I personally reviewed the past social/family/medical and surgical history on the date of service.   I reviewed lab work done at Intake visit with patient.    EXAM:  /70 (BP Location: Left arm, Patient Position: Chair)  Pulse 114  Ht 1.575 m (5' 2\")  Wt 100.4 kg (221 lb 6.4 " oz)  LMP 2018 (Exact Date)  BMI 40.49 kg/m2   EXAM:  GENERAL:  Pleasant pregnant female, alert, cooperative  and well groomed.  SKIN:  Warm and dry, without lesions or rashes  HEAD: Symmetrical features.  NECK:  Thyroid without enlargement and nodules.  Lymph nodes not palpable.   LUNGS:  Clear to auscultation.  BREAST:    No dominant, fixed or suspicious masses are noted.  No skin or nipple changes or axillary nodes.   Nipples everted.      HEART:  RRR without murmur.  ABDOMEN: Soft without masses , tenderness or organomegaly.  No CVA tenderness.  Uterus palpable at size equal to dates.  No scars noted.. Fetal heart tones present.  MUSCULOSKELETAL:  Full range of motion  EXTREMITIES:  No edema. No significant varicosities.   PELVIC EXAM: deferred pelvic      WET PREP:Not done  GC/CHLAMYDIA CULTURE OBTAINED:YES    Lab Results   Component Value Date    PAP NIL 2016          ASSESSMENT:  33 year old , 10w6d weeks of pregnancy with HORTENCIA of Dec 14, 2018 by US confirms  Intrauterine pregnancy 10w6d size  consistent with dates  Genetic Screening: Declines early screening    ICD-10-CM    1. Supervision of high risk pregnancy in first trimester O09.91        PLAN:  - Reviewed use of triage nurse line and contacting the on-call provider after hours for an urgent need such as fever, vagina bleeding, bladder or vaginal infection, rupture of membranes,  or term labor.    - Reviewed best evidence for: weight gain for her weight and height for pregnancy:  Based on pre-pregnancy weight of 103kg  and Body mass index is 40.49 kg/(m^2). RECOMMENDED WEIGHT GAIN: < 15 lbs.  - Reviewed healthy diet and foods to avoid; exercise and activity during pregnancy; avoiding exposure to toxoplasmosis; and maintenance of a generally healthy lifestyle.   - Discussed the harms, benefits, side effects and alternative therapies for current prescribed and OTC medications.  - Reviewed high risk for gestational diabetes, early  1 hour unable to tolerate today   Attempt at next visit. .  - All pt's and partner's questions discussed and answered.  Pt verbalized understanding of and agreement to plan of care.   Advised ED visit today for IV fluid hydration if not keeping any fluids down.  Home IV fluid therapy ordered to start ASAP/   - Continue scheduled prenatal care and prn if questions or concerns    CHANELLE Mary CNM

## 2018-05-25 LAB
C TRACH DNA SPEC QL NAA+PROBE: NEGATIVE
N GONORRHOEA DNA SPEC QL NAA+PROBE: NEGATIVE
SPECIMEN SOURCE: NORMAL
SPECIMEN SOURCE: NORMAL

## 2018-05-25 NOTE — DISCHARGE INSTRUCTIONS
"  Hiperemesis gravídica  La hiperemesis gravídica es justa forma grave de \"náusea matutina\" que puede afectar a algunas mujeres lilibeth el embarazo. Puede desarrollarse alrededor de la quinta semana y durar hasta la semana número dieciséis del embarazo. En algunas mujeres, puede durar más tiempo. Los síntomas incluyen vómito y náuseas graves. Eso puede llevar a problemas tales tatiana pérdida de peso y deshidratación.  No se sabe con claridad qué es lo que causa la hiperemesis gravídica. Puede deberse a los niveles de hormonas que suben al comienzo del embarazo. Si los síntomas son graves, puede convertirse en justa seria amenaza para la mamá y para el feto. Por lo tanto, siga atentamente las sugerencias que se describen abajo.  Si los síntomas no logran controlarse con las medidas que usted avelino en martin casa, quizás necesiten hospitalizarla. Puede que le den líquidos y medicamentos por vía intravenosa (IV).  Cuidados en martin casa    Dieta  ? Lleve un registro de los alimentos que come y de cómo estos afectan avelina síntomas. Evite los alimentos que le provocan los síntomas.  ? Coma varias comidas más pequeñas a lo karen del día en lugar de lanie comidas grandes. [place] ayuda a evitar que el estómago quede vacío, lo que puede empeorar las náuseas.  ? Elija comidas altas en carbohidratos. También puede ayudarle comer alimentos altos en proteínas. Evite los alimentos grasosos o muy condimentados.  ? Antes de levantarse de la cama por las mañanas, intente comer unas galletas o pan dhiraj seco. Eso puede ayudarle a asentar el estómago.  ? Elinor líquidos transparentes, fríos. Elinor pequeñas cantidades de líquidos con electrolitos, tatiana las bebidas deportivas. Eso también puede ayudarle.    Medicamentos  ? De ser necesario, martin proveedor de atención médica puede indicarle ciertos medicamentos para ayudar a aliviar las náuseas y el vómito. También puede que le aconsejen consumir vitamina B6 y jengibre. No pruebe ningún medicamento de venta " mark ni remedio casero sin hablar damir con martin proveedor.  Visita de control  Programe justa visita de control con martin proveedor de atención médica o según le hayan indicado.  Cuándo debe buscar atención médica  Llame a martin proveedor de atención médica de inmediato si sucede cualquiera de las siguientes cosas:    Signos de deshidratación (boca seca, sed extrema, orina oscura o poca orina, mareo, debilidad o desmayo)    Vómito que no se detiene    Incapacidad de mantener los líquidos en el estómago    Diarrea frecuente    Pérdida de peso o no aumentar de peso en un período de dos semanas    Dolor intenso y carla en la parte inferior derecha del abdomen    Fiebre de 100.4  F (38  C) o más, o según le haya indicado martin proveedor  Date Last Reviewed: 8/20/2015 2000-2017 [employer]. [address] [place] [address]. Todos los derechos reservados. Esta información no pretende sustituir la atención médica profesional. Sólo martin médico puede diagnosticar y tratar un problema de chino.      Fill and take your new nausea medicine as directed.  Take ranitidine as directed for upper abdominal pain.    Please make an appointment to follow up with Your Primary Care Provider as planned.

## 2018-05-26 LAB
BACTERIA SPEC CULT: ABNORMAL
SPECIMEN SOURCE: ABNORMAL

## 2018-06-06 ENCOUNTER — HOSPITAL ENCOUNTER (EMERGENCY)
Facility: CLINIC | Age: 33
Discharge: HOME | End: 2018-06-07
Attending: EMERGENCY MEDICINE | Admitting: EMERGENCY MEDICINE
Payer: COMMERCIAL

## 2018-06-06 DIAGNOSIS — O20.9 HEMORRHAGE IN EARLY PREGNANCY: ICD-10-CM

## 2018-06-06 DIAGNOSIS — Z3A.12 12 WEEKS GESTATION OF PREGNANCY: ICD-10-CM

## 2018-06-06 DIAGNOSIS — O46.90 VAGINAL BLEEDING IN PREGNANCY: ICD-10-CM

## 2018-06-06 PROCEDURE — 99284 EMERGENCY DEPT VISIT MOD MDM: CPT | Mod: Z6 | Performed by: EMERGENCY MEDICINE

## 2018-06-06 PROCEDURE — 99284 EMERGENCY DEPT VISIT MOD MDM: CPT | Mod: 25

## 2018-06-06 NOTE — ED AVS SNAPSHOT
[facility], Emergency Department    [address] [address]    Phone:  [phone]    Fax:  [phone]                                       Dai Guadarrama   MRN: [id number]    Department:  [facility], Emergency Department   Date of Visit:  6/6/2018           Patient Information     Date Of Birth          1985        Your diagnoses for this visit were:     Vaginal bleeding in pregnancy        You were seen by Kristina Albert MD.        Discharge Instructions         Sangrado en la primera etapa del embarazo     La ecografía puede ayudar a comprobar la chino de martin feto.     Si ha sangrado en las primeras etapas de martin embarazo, sepa que no es la única. Muchas otras mujeres embarazadas barnes tenido también sangrado al comienzo del embarazo. En la mayoría de los casos, no significa un problema. Malik, incluso así, martin proveedor de atención médica tiene que saberlo, ya que quizás quiera hacerle pruebas para saber por qué ha sangrado. Llame a martin proveedor de atención médica si nota que ha sangrado mientras está embarazada.   Cuáles son las causas de un sangrado en las primeras etapas?  Con frecuencia, no se conocen las causas del sangrado en las primeras etapas. Malik, hay muchos factores en el comienzo del embarazo que pueden ocasionar sangrado o manchado, por ejemplo, lilibeth las relaciones sexuales (que pueden causar sangrado en cualquiera de los trimestres). Las siguientes son otras causas probables:    Implantación del embrión en la pared uterina    Hemorragia subcoriónica (sangrado entre el corion y el útero)    Aborto espontáneo    Embarazo ectópico (en justa de las trompas de [place])  Si nota un manchado  El manchado (causado por un sangrado muy leve) es el tipo de sangrado más común a principios del embarazo. Si lo nota, llame a martin proveedor de atención médica. Es muy probable que le diga que puede cuidarse en martin casa.  Si necesita hacerse algún examen  Según cuánto haya  sangrado, puede que martin otro proveedor de atención médica le pidan hacerse algunos exámenes. Por ejemplo, un examen pélvico, puede ayudar a comprobar qué tan avanzado está el embarazo. También puede que le trevor justa ecografía común o justa ecografía Doppler. Estas pruebas de diagnóstico por imágenes emplean ondas de estephanie para comprobar la chino de martin feto. Pueden hacerle la ecografía sobre el vientre o por dentro de martin vagina. También es posible que martin proveedor de atención médica le pida un análisis de ermelinda especial. Arti análisis compara avelina niveles hormonales en muestras de ermelinda tomadas con dos días de diferencia. Los resultados pueden ayudar a martin proveedor de atención médica a saber más sobre la implantación del embrión. También necesitarán comprobar martin tipo de ermelinda para evaluar si es necesario tratarla por sensibilización al factor RH.   Signos de advertencia  Si martin sangrado no se detiene o si nota cualquiera de los siguientes síntomas, busque ayuda médica de inmediato:    Empapa justa toallita sanitaria por hora.    Tiene sangrado tatiana si tuviera martin período.    Tiene cólicos o dolor de estómago muy intensos.    Se siente mareada o se desmaya.    Expulsa tejido a través de martin vagina.    Tiene un sangrado en cualquier momento después de martin primer trimestre.  Preguntas que quizás le trevor  Si elissa no es normal, es común algo de sangrado a principios del embarazo. Si notó algo de sangrado, seguramente se preocupará. Tenga en cuenta que el sangrado por sí solo no significa que haya algún problema. No obstante, llame a martin proveedor de atención médica de inmediato. Martin proveedor puede hacerle preguntas tatiana estas para intentar detectar la causa del sangrado:     Cuándo comenzó el sangrado?     Es un sangrado muy leve (manchado) o se parece a martin periodo?     Es la ermelinda de color zee brillante o es amarronada?     Castelan mantenido relaciones sexuales recientemente?     Ha tenido dolor o cólicos?     Se ha sentido  mareada o se ha desmayado?  Vigilancia de martin embarazo  Con frecuencia, el sangrado se detendrá igual de rápido tatiana comenzó. Martin embarazo puede, entonces, seguir martin transcurso normal. Es posible que necesite algunas visitas prenatales adicionales. Malik, usted y martin bebé muy probablemente estarán elissa.  Date Last Reviewed: 6/1/2016 2000-2017 [employer]. [address] [place] [address]. Todos los derechos reservados. Esta información no pretende sustituir la atención médica profesional. Sólo martin médico puede diagnosticar y tratar un problema de chino.      Please make an appointment to follow up with your OB clinic on Friday for a repeat pregnancy hormone level. Return with worsening or concerns.    Your next 10 appointments already scheduled     Jun 07, 2018  3:00 PM CDT   RETURN OB with Viridiana Lenz CNM   [facility] ([facility])    [facility] [address]  [address]  [facility] [address]   [phone]            Jul 16, 2018  1:30 PM CDT   Genetic Counseling with UR GEN COUNSELOR 2   [facility] - [facility])    [address]  [facility] [address]   [phone]            Jul 16, 2018  2:15 PM CDT   OSCAR US COMP with [id number]   [facility] Maternal Fetal Medicine Ultrasound - [facility])    [address]  [facility] [address] [phone]           Wear comfortable clothes and leave your valuables at home.            Jul 16, 2018  2:45 PM CDT   Radiology MD with DUNIA MOORE MD   [facility])    [address]  [facility] [address]   [phone]           Please arrive at the time given for your first appointment. This visit is used internally to schedule the physician's time during your  ultrasound.              24 Hour Appointment Hotline       To make an appointment at any [facility], call [phone] ([phone]). If you don't have a family doctor or clinic, we will help you find one. [place] clinics are conveniently located to serve the needs of you and your family.             Review of your medicines      Our records show that you are taking the medicines listed below. If these are incorrect, please call your family doctor or clinic.        Dose / Directions Last dose taken    metoclopramide 10 MG tablet   Commonly known as:  REGLAN   Dose:  10 mg   Quantity:  120 tablet        Take 1 tablet (10 mg) by mouth 4 times daily (before meals and nightly)   Refills:  1        ondansetron 4 MG ODT tab   Commonly known as:  ZOFRAN ODT   Dose:  4-8 mg   Quantity:  20 tablet        Take 1-2 tablets (4-8 mg) by mouth every 8 hours as needed for nausea   Refills:  3        prenatal multivitamin plus iron 27-0.8 MG Tabs per tablet   Dose:  1 tablet        Take 1 tablet by mouth daily   Refills:  0        ranitidine 150 MG tablet   Commonly known as:  ZANTAC   Dose:  150 mg   Quantity:  30 tablet        Take 1 tablet (150 mg) by mouth 2 times daily for 15 days   Refills:  0                Procedures and tests performed during your visit     HCG quantitative pregnancy (blood)    US OB < 14 Weeks Single      Orders Needing Specimen Collection     None      Pending Results     Date and Time Order Name Status Description    6/7/2018 0009 US OB < 14 Weeks Single Preliminary             Pending Culture Results     No orders found for last 3 day(s).            Pending Results Instructions     If you had any lab results that were not finalized at the time of your Discharge, you can call the ED Lab Result RN at [phone]. You will be contacted by this team for any positive Lab results or changes in treatment. The nurses are available 7 days a week from 10A to 6:30P.  You can leave a message 24 hours  per day and they will return your call.        Thank you for choosing [facility]       Thank you for choosing [facility] for your care. Our goal is always to provide you with excellent care. Hearing back from our patients is one way we can continue to improve our services. Please take a few minutes to complete the written survey that you may receive in the mail after you visit with us. Thank you!        [employer] Information     Portable Internet gives you secure access to your electronic health record. If you see a primary care provider, you can also send messages to your care team and make appointments. If you have questions, please call your primary care clinic.  If you do not have a primary care provider, please call [phone] and they will assist you.        Care EveryWhere ID     This is your Care EveryWhere ID. This could be used by other organizations to access your [facility] medical records  [id number]        Equal Access to Services     AISHA SONG : Mariano Andrew, cuco manuel, kodi valera. So [facility] [phone].    ATENCIÓN: Si habla español, tiene a martin disposición servicios gratuitos de asistencia lingüística. Monica al [phone].    We comply with applicable federal civil rights laws and [place] laws. We do not discriminate on the basis of race, color, national origin, age, disability, sex, sexual orientation, or gender identity.            After Visit Summary       This is your record. Keep this with you and show to your community pharmacist(s) and doctor(s) at your next visit.

## 2018-06-07 ENCOUNTER — APPOINTMENT (OUTPATIENT)
Dept: ULTRASOUND IMAGING | Facility: CLINIC | Age: 33
End: 2018-06-07
Attending: EMERGENCY MEDICINE
Payer: COMMERCIAL

## 2018-06-07 ENCOUNTER — OFFICE VISIT (OUTPATIENT)
Dept: OBGYN | Facility: CLINIC | Age: 33
End: 2018-06-07
Attending: ADVANCED PRACTICE MIDWIFE
Payer: COMMERCIAL

## 2018-06-07 VITALS
BODY MASS INDEX: 41.59 KG/M2 | SYSTOLIC BLOOD PRESSURE: 113 MMHG | HEART RATE: 89 BPM | RESPIRATION RATE: 16 BRPM | WEIGHT: 220.3 LBS | TEMPERATURE: 97.6 F | DIASTOLIC BLOOD PRESSURE: 83 MMHG | HEIGHT: 61 IN | OXYGEN SATURATION: 95 %

## 2018-06-07 VITALS
WEIGHT: 220 LBS | HEART RATE: 108 BPM | HEIGHT: 62 IN | BODY MASS INDEX: 40.48 KG/M2 | SYSTOLIC BLOOD PRESSURE: 89 MMHG | DIASTOLIC BLOOD PRESSURE: 66 MMHG

## 2018-06-07 DIAGNOSIS — O09.92 SUPERVISION OF HIGH RISK PREGNANCY IN SECOND TRIMESTER: Primary | ICD-10-CM

## 2018-06-07 DIAGNOSIS — R11.0 NAUSEA: ICD-10-CM

## 2018-06-07 DIAGNOSIS — K21.9 GASTROESOPHAGEAL REFLUX DISEASE WITHOUT ESOPHAGITIS: ICD-10-CM

## 2018-06-07 LAB — B-HCG SERPL-ACNC: ABNORMAL IU/L (ref 0–5)

## 2018-06-07 PROCEDURE — 76801 OB US < 14 WKS SINGLE FETUS: CPT

## 2018-06-07 PROCEDURE — 84702 CHORIONIC GONADOTROPIN TEST: CPT | Performed by: EMERGENCY MEDICINE

## 2018-06-07 PROCEDURE — G0463 HOSPITAL OUTPT CLINIC VISIT: HCPCS | Mod: ZF

## 2018-06-07 PROCEDURE — T1013 SIGN LANG/ORAL INTERPRETER: HCPCS | Mod: U3,ZF

## 2018-06-07 RX ORDER — LANSOPRAZOLE 30 MG/1
30 CAPSULE, DELAYED RELEASE ORAL DAILY
Qty: 30 CAPSULE | Refills: 1 | Status: SHIPPED | OUTPATIENT
Start: 2018-06-07 | End: 2018-07-28

## 2018-06-07 RX ORDER — PROMETHAZINE HYDROCHLORIDE 25 MG/1
25 SUPPOSITORY RECTAL EVERY 6 HOURS PRN
Qty: 40 SUPPOSITORY | Refills: 1 | Status: SHIPPED | OUTPATIENT
Start: 2018-06-07 | End: 2018-08-27

## 2018-06-07 ASSESSMENT — ENCOUNTER SYMPTOMS
FEVER: 0
DYSURIA: 0
COUGH: 0
SHORTNESS OF BREATH: 0

## 2018-06-07 NOTE — PROGRESS NOTES
"Subjective:      33 year old  at 12w6d presents for a routine prenatal appointment.         Denies cramping/contractions, vaginal bleeding or leakage of fluid.  Reports + fetal movement.       No HA, visual changes, RUQ or epigastric pain.     Patient concerns: Has continued to have nausea and vomiting. Did not set up home IVF infusion that was ordered at last visit. Open to this-- phone number given. Has not been able to take nausea medication consistently because throws up the pills. Agreeable to trying rectal phenergan. Would also like to switch from zantac- states that this did not work last pregnancy either. Remembers taking a different medication-- agreeable to lansoprazole.   Total wt loss 8 lbs- 1lb since last visit.    Was seen in ED yesterday for mucous discharge with small amount of blood. Normal u/s. Has had no further bleeding.     Declines early 1 hour d/t nausea.     Objective:  Vitals:    18 1503   BP: (!) 89/66   BP Location: Left arm   Patient Position: Chair   Pulse: 108   Weight: 99.8 kg (220 lb)   Height: 1.575 m (5' 2\")       See OB flowsheet    Assessment/Plan     Encounter Diagnoses   Name Primary?     Supervision of high risk pregnancy in second trimester Yes     Nausea      Gastroesophageal reflux disease without esophagitis      Orders Placed This Encounter   Medications     promethazine (PHENERGAN) 25 MG Suppository     Sig: Place 1 suppository (25 mg) rectally every 6 hours as needed for nausea     Dispense:  40 suppository     Refill:  1     LANsoprazole (PREVACID) 30 MG CR capsule     Sig: Take 1 capsule (30 mg) by mouth daily Take 30-60 minutes before a meal.     Dispense:  30 capsule     Refill:  1       - Reviewed total weight gain, encouraged continued healthy diet and exercise.      - Reviewed why/how to contact provider.    Patient education/orders or handouts today:  PTL signs/symptoms, Fetal movement and Level 2 u/s scheduled      Phone number given for home " infusion.   Plan switch from reglan and zantac to phenergan and lansoprazole.  Prescription sent for 25mg Phenergan rectal suppository (pt requesting non oral route d/t emesis).  Prescription sent for lansoprazole.  Level 2 ultrasound scheduled for 7/16/18.   Desires MD care.    Continue scheduled prenatal care, RTC in 4 weeks and prn if questions or concerns.     CHANELLE Arredondo,  KALLIEM

## 2018-06-07 NOTE — LETTER
"2018       RE: Dai Guadarrama  [address]  [facility] [address]     Dear Colleague,    Thank you for referring your patient, Dai Guadarrama, to the [facility] at [facility]. Please see a copy of my visit note below.    Subjective:      33 year old  at 12w6d presents for a routine prenatal appointment.         Denies cramping/contractions, vaginal bleeding or leakage of fluid.  Reports + fetal movement.       No HA, visual changes, RUQ or epigastric pain.     Patient concerns: Has continued to have nausea and vomiting. Did not set up home IVF infusion that was ordered at last visit. Open to this-- phone number given. Has not been able to take nausea medication consistently because throws up the pills. Agreeable to trying rectal phenergan. Would also like to switch from zantac- states that this did not work last pregnancy either. Remembers taking a different medication-- agreeable to lansoprazole.   Total wt loss 8 lbs- 1lb since last visit.    Was seen in ED yesterday for mucous discharge with small amount of blood. Normal u/s. Has had no further bleeding.     Declines early 1 hour d/t nausea.     Objective:  Vitals:    18 1503   BP: (!) 89/66   BP Location: Left arm   Patient Position: Chair   Pulse: 108   Weight: 99.8 kg (220 lb)   Height: 1.575 m (5' 2\")       See OB flowsheet    Assessment/Plan     Encounter Diagnoses   Name Primary?     Supervision of high risk pregnancy in second trimester Yes     Nausea      Gastroesophageal reflux disease without esophagitis      Orders Placed This Encounter   Medications     promethazine (PHENERGAN) 25 MG Suppository     Sig: Place 1 suppository (25 mg) rectally every 6 hours as needed for nausea     Dispense:  40 suppository     Refill:  1     LANsoprazole (PREVACID) 30 MG CR capsule     Sig: Take 1 capsule (30 mg) by mouth daily Take 30-60 minutes before a meal.    "  Dispense:  30 capsule     Refill:  1       - Reviewed total weight gain, encouraged continued healthy diet and exercise.      - Reviewed why/how to contact provider.    Patient education/orders or handouts today:  PTL signs/symptoms, Fetal movement and Level 2 u/s scheduled      Phone number given for home infusion.   Plan switch from reglan and zantac to phenergan and lansoprazole.  Prescription sent for 25mg Phenergan rectal suppository (pt requesting non oral route d/t emesis).  Prescription sent for lansoprazole.  Level 2 ultrasound scheduled for 7/16/18.   Desires MD care.    Continue scheduled prenatal care, RTC in 4 weeks and prn if questions or concerns.     Again, thank you for allowing me to participate in the care of your patient.      Sincerely,    Viridiana Lenz CNM

## 2018-06-07 NOTE — ED PROVIDER NOTES
History     Chief Complaint   Patient presents with     Vaginal Bleeding     went to the bathroom at 2230 and noticed blood in toilet; no bleeding since then; denies clots; 13 semanas     The history is limited by a language barrier. A  was used.     Dai Guadarrama is a 33-year-old female presents to the Emergency Department today with complaint of a small amount of vaginal spotting. She is 12 weeks 5 days pregnant by reported due date, based on previous OB notes. She is particularly concerned because she has had several pregnancy losses in the past. She states she has small amount of spotting a couple hours ago. This seems to have resolved. No associated cramping or pain. She has had some morning sickness throughout the pregnancy, and this has not changed. No fever, cough, shortness of breath. No urinary complaints. No other specific concerns. Per chart review she is Rh+.      This part of the medical record was transcribed by Margaux Brady Medical Scribe, from a dictation done by  Kristina Albert MD.     No current facility-administered medications for this encounter.      Current Outpatient Prescriptions   Medication     metoclopramide (REGLAN) 10 MG tablet     ondansetron (ZOFRAN ODT) 4 MG ODT tab     Prenatal Vit-Fe Fumarate-FA (PRENATAL MULTIVITAMIN PLUS IRON) 27-0.8 MG TABS per tablet     ranitidine (ZANTAC) 150 MG tablet     Past Medical History:   Diagnosis Date     Aneurysm (H)      Migraine headache without aura      Pregnancy related nausea and vomiting, antepartum 2016    two ED visits.     Recurrent pregnancy loss (CODE)      Retained placenta 2014     Stillbirth 2014    32 weeks     Tachycardia        Past Surgical History:   Procedure Laterality Date     C EACH ADD TOOTH EXTRACTION      bad reaction to anesthia for local      NO HISTORY OF SURGERY         Family History   Problem Relation Age of Onset     Anxiety Disorder No family hx of      MENTAL ILLNESS No family  "hx of      Substance Abuse No family hx of      Anesthesia Reaction No family hx of      Asthma No family hx of      OSTEOPOROSIS No family hx of      Genetic Disorder No family hx of      Thyroid Disease No family hx of      Hyperlipidemia No family hx of      CEREBROVASCULAR DISEASE No family hx of      Breast Cancer No family hx of      Colon Cancer No family hx of      Prostate Cancer No family hx of      Other Cancer No family hx of      Depression No family hx of      DIABETES No family hx of      Coronary Artery Disease No family hx of      Hypertension No family hx of        Social History   Substance Use Topics     Smoking status: Never Smoker     Smokeless tobacco: Never Used     Alcohol use 0.0 oz/week     0 Standard drinks or equivalent per week      Comment: rare alcohol use now nothing in pregnancy     No Known Allergies    I have reviewed the Medications, Allergies, Past Medical and Surgical History, and Social History in the Epic system.    Review of Systems   Constitutional: Negative for fever.   Respiratory: Negative for cough and shortness of breath.    Genitourinary: Positive for vaginal bleeding. Negative for dysuria and pelvic pain.   All other systems reviewed and are negative.      Physical Exam   BP: 108/73  Pulse: 94  Temp: 97.7  F (36.5  C)  Resp: 16  Height: 155 cm (5' 1.02\")  Weight: 99.9 kg (220 lb 4.8 oz)  SpO2: 96 %      Physical Exam   Constitutional: No distress.   HENT:   Head: Atraumatic.   Eyes: No scleral icterus.   Cardiovascular: Normal heart sounds and intact distal pulses.    Pulmonary/Chest: Breath sounds normal. No respiratory distress.   Abdominal: Soft. Bowel sounds are normal. There is no tenderness.   Musculoskeletal: She exhibits no edema or tenderness.   Skin: Skin is warm. No rash noted. She is not diaphoretic.       ED Course     ED Course     Procedures             Critical Care time:  none             Labs Ordered and Resulted from Time of ED Arrival Up to the " Time of Departure from the ED   HCG QUANTITATIVE PREGNANCY - Abnormal; Notable for the following:        Result Value    HCG Quantitative Serum 87628 (*)     All other components within normal limits            Assessments & Plan (with Medical Decision Making)   The patient is Rh+. Quantitative hCG is 65,343. I did do an ultrasound which showed an early live IUP of 12 weeks 6 days with a fetal heart rate of 156. No free fluid or other acute findings. Patient states the bleeding is now resolved. The cause for her spotting earlier is not clear. She is instructed to avoid putting anything in her vagina for the time being, including tampons and intercourse. She is instructed to follow-up with her OB clinic in 2 days for repeat hCG level. She states that she already has a scheduled appointment for tomorrow and is encouraged to keep this. She is encouraged to return to the ER with new or worsening symptoms. She has no pain or symptoms at this time. No evidence for ectopic or other serious cause for symptoms this time.     This part of the medical record was transcribed by Margaux Brady, Medical Scribe, from a dictation done by Kristina Albert MD.     I have reviewed the nursing notes.    I have reviewed the findings, diagnosis, plan and need for follow up with the patient.    Discharge Medication List as of 6/7/2018  1:27 AM          Final diagnoses:   Vaginal bleeding in pregnancy       6/6/2018   [facility], EMERGENCY DEPARTMENT     Kristina Albert MD  06/07/18 0328

## 2018-06-07 NOTE — DISCHARGE INSTRUCTIONS
Sangrado en la primera etapa del embarazo     La ecografía puede ayudar a comprobar la chino de martin feto.     Si ha sangrado en las primeras etapas de martin embarazo, sepa que no es la única. Muchas otras mujeres embarazadas barnes tenido también sangrado al comienzo del embarazo. En la mayoría de los casos, no significa un problema. Malik, incluso así, martin proveedor de atención médica tiene que saberlo, ya que quizás quiera hacerle pruebas para saber por qué ha sangrado. Llame a martin proveedor de atención médica si nota que ha sangrado mientras está embarazada.   Cuáles son las causas de un sangrado en las primeras etapas?  Con frecuencia, no se conocen las causas del sangrado en las primeras etapas. Malik, hay muchos factores en el comienzo del embarazo que pueden ocasionar sangrado o manchado, por ejemplo, lilibeth las relaciones sexuales (que pueden causar sangrado en cualquiera de los trimestres). Las siguientes son otras causas probables:    Implantación del embrión en la pared uterina    Hemorragia subcoriónica (sangrado entre el corion y el útero)    Aborto espontáneo    Embarazo ectópico (en justa de las trompas de Falopio)  Si nota un manchado  El manchado (causado por un sangrado muy leve) es el tipo de sangrado más común a principios del embarazo. Si lo nota, llame a martin proveedor de atención médica. Es muy probable que le diga que puede cuidarse en martin casa.  Si necesita hacerse algún examen  Según cuánto haya sangrado, puede que martin otro proveedor de atención médica le pidan hacerse algunos exámenes. Por ejemplo, un examen pélvico, puede ayudar a comprobar qué tan avanzado está el embarazo. También puede que le trevor justa ecografía común o justa ecografía Doppler. Estas pruebas de diagnóstico por imágenes emplean ondas de estephanie para comprobar la chino de martin feto. Pueden hacerle la ecografía sobre el vientre o por dentro de martin vagina. También es posible que martin proveedor de atención médica le pida un análisis de ermelinda  especial. Arti análisis compara avelina niveles hormonales en muestras de ermelinda tomadas con dos días de diferencia. Los resultados pueden ayudar a graham proveedor de atención médica a saber más sobre la implantación del embrión. También necesitarán comprobar graham tipo de ermelinda para evaluar si es necesario tratarla por sensibilización al factor RH.   Signos de advertencia  Si graham sangrado no se detiene o si nota cualquiera de los siguientes síntomas, busque ayuda médica de inmediato:    Empapa justa toallita sanitaria por hora.    Tiene sangrado tatiana si tuviera graham período.    Tiene cólicos o dolor de estómago muy intensos.    Se siente mareada o se desmaya.    Expulsa tejido a través de graham vagina.    Tiene un sangrado en cualquier momento después de graham primer trimestre.  Preguntas que quizás le trevor  Si elissa no es normal, es común algo de sangrado a principios del embarazo. Si notó algo de sangrado, seguramente se preocupará. Tenga en cuenta que el sangrado por sí solo no significa que haya algún problema. No obstante, llame a graham proveedor de atención médica de inmediato. Graham proveedor puede hacerle preguntas tatiana estas para intentar detectar la causa del sangrado:     Cuándo comenzó el sangrado?     Es un sangrado muy leve (manchado) o se parece a rgaham periodo?     Es la ermelinda de color zee brillante o es amarronada?     Mireles mantenido relaciones sexuales recientemente?     Ha tenido dolor o cólicos?     Se mireles sentido mareada o se ha desmayado?  Vigilancia de graham embarazo  Con frecuencia, el sangrado se detendrá igual de rápido tatiana comenzó. Graham embarazo puede, entonces, seguir graham transcurso normal. Es posible que necesite algunas visitas prenatales adicionales. Malik, usted y graham bebé muy probablemente estarán elissa.  Date Last Reviewed: 6/1/2016 2000-2017 [employer]. [address] [place] [address], [place] [address]. Todos los derechos reservados. Esta información no pretende sustituir la atención médica profesional. Sólo  martin médico puede diagnosticar y tratar un problema de chino.      Please make an appointment to follow up with your OB clinic on Friday for a repeat pregnancy hormone level. Return with worsening or concerns.

## 2018-06-07 NOTE — MR AVS SNAPSHOT
After Visit Summary   6/7/2018    Dai Guadarrama    MRN: [id number]           Patient Information     Date Of Birth          1985        Visit Information        Provider Department      6/7/2018 3:00 PM Joya Ashton; Viridiana Lenz, [facility]        Today's Diagnoses     Supervision of high risk pregnancy in second trimester    -  1    Nausea        Gastroesophageal reflux disease without esophagitis           Follow-ups after your visit        Follow-up notes from your care team     Return in about 4 weeks (around 7/5/2018) for CALEB WASHBURN.      Your next 10 appointments already scheduled     Jul 05, 2018  3:45 PM CDT   RETURN OB with Yessenia Barros MD   [facility] ([facility])    [facility]  [address]  [address]  [facility] [place] [address] [phone]            Jul 16, 2018  1:30 PM CDT   Genetic Counseling with DUNIA GEN COUNSELOR 2   [facility])    [address]  [facility] [address]   [phone]            Jul 16, 2018  2:15 PM CDT   MFM US COMP with [id number]   [facility])    [address]  [facility] [place] [id number] [phone]           Wear comfortable clothes and leave your valuables at home.            Jul 16, 2018  2:45 PM CDT   Radiology MD with DUNIA MOORE MD   [facility])    [address]  [facility] [address]   [phone]           Please arrive at the time given for your first appointment. This visit is used internally to schedule the physician's time during your ultrasound.              Who to contact     Please call your clinic at [phone] to:    Ask questions about your health    Make or  "cancel appointments    Discuss your medicines    Learn about your test results    Speak to your doctor            Additional Information About Your Visit        [employer] Information     [employer] gives you secure access to your electronic health record. If you see a primary care provider, you can also send messages to your care team and make appointments. If you have questions, please call your primary care clinic.  If you do not have a primary care provider, please call [phone] and they will assist you.      [employer] is an electronic gateway that provides easy, online access to your medical records. With [employer], you can request a clinic appointment, read your test results, renew a prescription or communicate with your care team.     To access your existing account, please contact your [facility] or call [phone] for assistance.        Care EveryWhere ID     This is your Care EveryWhere ID. This could be used by other organizations to access your [facility] medical records  [id number]        Your Vitals Were     Pulse Height Last Period BMI (Body Mass Index)          108 1.575 m (5' 2\") 02/28/2018 (Exact Date) 40.24 kg/m2         Blood Pressure from Last 3 Encounters:   06/07/18 (!) 89/66   06/07/18 113/83   05/24/18 100/63    Weight from Last 3 Encounters:   06/07/18 99.8 kg (220 lb)   06/06/18 99.9 kg (220 lb 4.8 oz)   05/24/18 100.4 kg (221 lb 6.4 oz)              Today, you had the following     No orders found for display         Today's Medication Changes          These changes are accurate as of 6/7/18 11:59 PM.  If you have any questions, ask your nurse or doctor.               Start taking these medicines.        Dose/Directions    LANsoprazole 30 MG CR capsule   Commonly known as:  PREVACID   Used for:  Gastroesophageal reflux disease without esophagitis   Started by:  Viridiana Lenz CNM        Dose:  30 mg   Take 1 capsule (30 mg) by mouth daily " Take 30-60 minutes before a meal.   Quantity:  30 capsule   Refills:  1       promethazine 25 MG Suppository   Commonly known as:  PHENERGAN   Used for:  Supervision of high risk pregnancy in second trimester   Started by:  Viridiana Lenz CNM        Dose:  25 mg   Place 1 suppository (25 mg) rectally every 6 hours as needed for nausea   Quantity:  40 suppository   Refills:  1            Where to get your medicines      These medications were sent to [email or web address] [id number] [facility] [address] AT [facility] OF [address]  [address] [facility] [address]     Phone:  [phone]     LANsoprazole 30 MG CR capsule    promethazine 25 MG Suppository                Primary Care Provider Fax #    Physician No Ref-Primary [phone]       No address on file        Equal Access to Services     AISHA SONG : Hadclarisse Andrew, waaxda mar, qaybta kaalmaluis stauffer, kodi moreno . [facility] [phone].    ATENCIÓN: Si habla español, tiene a martin disposición servicios gratuitos de asistencia lingüística. Llame al [phone].    We comply with applicable federal civil rights laws and [place] laws. We do not discriminate on the basis of race, color, national origin, age, disability, sex, sexual orientation, or gender identity.            Thank you!     Thank you for choosing [facility]  for your care. Our goal is always to provide you with excellent care. Hearing back from our patients is one way we can continue to improve our services. Please take a few minutes to complete the written survey that you may receive in the mail after your visit with us. Thank you!             Your Updated Medication List - Protect others around you: Learn how to safely use, store and throw away your medicines at www.disposemymeds.org.          This list is accurate as of 6/7/18 11:59 PM.  Always use your most recent med  list.                   Brand Name Dispense Instructions for use Diagnosis    LANsoprazole 30 MG CR capsule    PREVACID    30 capsule    Take 1 capsule (30 mg) by mouth daily Take 30-60 minutes before a meal.    Gastroesophageal reflux disease without esophagitis       metoclopramide 10 MG tablet    REGLAN    120 tablet    Take 1 tablet (10 mg) by mouth 4 times daily (before meals and nightly)    Supervision of high risk pregnancy in first trimester, Hyperemesis gravidarum, History of intrauterine fetal death, currently pregnant in first trimester, Morbid obesity due to excess calories (H)       ondansetron 4 MG ODT tab    ZOFRAN ODT    20 tablet    Take 1-2 tablets (4-8 mg) by mouth every 8 hours as needed for nausea    Hyperemesis gravidarum, Supervision of high risk pregnancy in first trimester, Aneurysm (H), History of intrauterine fetal death, currently pregnant in first trimester       prenatal multivitamin plus iron 27-0.8 MG Tabs per tablet      Take 1 tablet by mouth daily    Supervision of high risk pregnancy in first trimester, Hyperemesis gravidarum, Aneurysm (H), History of intrauterine fetal death, currently pregnant in first trimester       promethazine 25 MG Suppository    PHENERGAN    40 suppository    Place 1 suppository (25 mg) rectally every 6 hours as needed for nausea    Supervision of high risk pregnancy in second trimester       ranitidine 150 MG tablet    ZANTAC    30 tablet    Take 1 tablet (150 mg) by mouth 2 times daily for 15 days

## 2018-06-21 ENCOUNTER — HOME INFUSION (PRE-WILLOW HOME INFUSION) (OUTPATIENT)
Dept: PHARMACY | Facility: CLINIC | Age: 33
End: 2018-06-21

## 2018-06-21 ENCOUNTER — TELEPHONE (OUTPATIENT)
Dept: OBGYN | Facility: CLINIC | Age: 33
End: 2018-06-21

## 2018-06-21 NOTE — TELEPHONE ENCOUNTER
Received call from  home infusion for fluid orders for hyper emesis. Infusion order entered but not sent to home infusion. Called in verbal order.

## 2018-06-21 NOTE — PROGRESS NOTES
Therapy: HYDRATION  Insurance:[facility]  Ded: $0  Met: $0    Co-Insurance: 0  Max Out of Pocket: $0  Met: $0    Please contact Intake with any questions, [phone] or In Basket pool, [facility] ([id number]).    IN REFERENCE TO REFERRAL ON 6/21/18 TO CHECK FOR IV HYDRATION BENEFITS. PT HAS A SMALL COPAY PER DISPENSE FOR DRUG, USUALLY NO MORE THAN $3.

## 2018-06-22 ENCOUNTER — HOME INFUSION (PRE-WILLOW HOME INFUSION) (OUTPATIENT)
Dept: PHARMACY | Facility: CLINIC | Age: 33
End: 2018-06-22

## 2018-06-23 ENCOUNTER — HOME INFUSION (PRE-WILLOW HOME INFUSION) (OUTPATIENT)
Dept: PHARMACY | Facility: CLINIC | Age: 33
End: 2018-06-23

## 2018-06-23 NOTE — PROGRESS NOTES
This is a recent snapshot of the patient's [facility] medical record.  For current drug dose and complete information and questions, call [phone] or In [facility] pool, [facility] ([id number])  CSN Number:  [id number]

## 2018-06-24 ENCOUNTER — HOME INFUSION (PRE-WILLOW HOME INFUSION) (OUTPATIENT)
Dept: PHARMACY | Facility: CLINIC | Age: 33
End: 2018-06-24

## 2018-06-25 ENCOUNTER — HOME INFUSION (PRE-WILLOW HOME INFUSION) (OUTPATIENT)
Dept: PHARMACY | Facility: CLINIC | Age: 33
End: 2018-06-25

## 2018-06-25 NOTE — PROGRESS NOTES
This is a recent snapshot of the patient's [facility] medical record.  For current drug dose and complete information and questions, call [phone] or In Basket pool, [facility] ([id number])  CSN Number:  [id number]

## 2018-06-25 NOTE — PROGRESS NOTES
This is a recent snapshot of the patient's [facility] medical record.  For current drug dose and complete information and questions, call [phone] or In Basket pool, [facility] ([id number])  CSN Number:  [id number]

## 2018-06-25 NOTE — PROGRESS NOTES
This is a recent snapshot of the patient's [facility] medical record.  For current drug dose and complete information and questions, call [phone] or In Basket pool, [facility] ([id number])  CSN Number:  [id number]

## 2018-06-26 NOTE — PROGRESS NOTES
This is a recent snapshot of the patient's [facility] medical record.  For current drug dose and complete information and questions, call [phone] or In Basket pool, [facility] ([id number])  CSN Number:  [id number]

## 2018-06-27 ENCOUNTER — TELEPHONE (OUTPATIENT)
Dept: OBGYN | Facility: CLINIC | Age: 33
End: 2018-06-27

## 2018-06-27 ENCOUNTER — HOME INFUSION (PRE-WILLOW HOME INFUSION) (OUTPATIENT)
Dept: PHARMACY | Facility: CLINIC | Age: 33
End: 2018-06-27

## 2018-06-27 NOTE — TELEPHONE ENCOUNTER
----- Message from Cassandra Campos sent at 6/27/2018  1:31 PM CDT -----  Regarding: Medication ? call w/  Questions on how to take a medication; please call w/

## 2018-06-27 NOTE — TELEPHONE ENCOUNTER
Spoke with lb through . Patient is describing yeast infection symptoms. Thick white chunky discharge and itching. Nurse recommended 3 day monistat and to call back if no improvement. Patient agreeable to plan.

## 2018-06-28 ENCOUNTER — HOME INFUSION (PRE-WILLOW HOME INFUSION) (OUTPATIENT)
Dept: PHARMACY | Facility: CLINIC | Age: 33
End: 2018-06-28

## 2018-06-28 NOTE — PROGRESS NOTES
This is a recent snapshot of the patient's [facility] medical record.  For current drug dose and complete information and questions, call [phone] or In [facility] pool, [facility] ([id number])  CSN Number:  [id number]

## 2018-06-29 ENCOUNTER — HOME INFUSION (PRE-WILLOW HOME INFUSION) (OUTPATIENT)
Dept: PHARMACY | Facility: CLINIC | Age: 33
End: 2018-06-29

## 2018-06-29 NOTE — PROGRESS NOTES
This is a recent snapshot of the patient's [facility] medical record.  For current drug dose and complete information and questions, call [phone] or In Basket pool, [facility] ([id number])  CSN Number:  [id number]

## 2018-06-30 ENCOUNTER — HOME INFUSION (PRE-WILLOW HOME INFUSION) (OUTPATIENT)
Dept: PHARMACY | Facility: CLINIC | Age: 33
End: 2018-06-30

## 2018-07-02 ENCOUNTER — HOME INFUSION (PRE-WILLOW HOME INFUSION) (OUTPATIENT)
Dept: PHARMACY | Facility: CLINIC | Age: 33
End: 2018-07-02

## 2018-07-02 NOTE — PROGRESS NOTES
This is a recent snapshot of the patient's [facility] medical record.  For current drug dose and complete information and questions, call [phone] or In Basket pool, [facility] ([id number])  CSN Number:  [id number]

## 2018-07-02 NOTE — PROGRESS NOTES
This is a recent snapshot of the patient's [facility] medical record.  For current drug dose and complete information and questions, call [phone] or In Basket pool, [facility] ([id number])  CSN Number:  [id number]

## 2018-07-03 ENCOUNTER — HOME INFUSION (PRE-WILLOW HOME INFUSION) (OUTPATIENT)
Dept: PHARMACY | Facility: CLINIC | Age: 33
End: 2018-07-03

## 2018-07-03 NOTE — PROGRESS NOTES
This is a recent snapshot of the patient's [facility] medical record.  For current drug dose and complete information and questions, call [phone] or In Basket pool, [facility] ([id number])  CSN Number:  [id number]

## 2018-07-04 ENCOUNTER — HOME INFUSION (PRE-WILLOW HOME INFUSION) (OUTPATIENT)
Dept: PHARMACY | Facility: CLINIC | Age: 33
End: 2018-07-04

## 2018-07-05 ENCOUNTER — OFFICE VISIT (OUTPATIENT)
Dept: OBGYN | Facility: CLINIC | Age: 33
End: 2018-07-05
Payer: COMMERCIAL

## 2018-07-05 VITALS
SYSTOLIC BLOOD PRESSURE: 104 MMHG | WEIGHT: 224 LBS | BODY MASS INDEX: 40.97 KG/M2 | DIASTOLIC BLOOD PRESSURE: 64 MMHG | HEART RATE: 114 BPM

## 2018-07-05 DIAGNOSIS — O21.0 HYPEREMESIS GRAVIDARUM: Primary | ICD-10-CM

## 2018-07-05 DIAGNOSIS — O09.92 HIGH-RISK PREGNANCY SUPERVISION, SECOND TRIMESTER: ICD-10-CM

## 2018-07-05 PROCEDURE — T1013 SIGN LANG/ORAL INTERPRETER: HCPCS | Mod: U3,ZF | Performed by: STUDENT IN AN ORGANIZED HEALTH CARE EDUCATION/TRAINING PROGRAM

## 2018-07-05 PROCEDURE — G0463 HOSPITAL OUTPT CLINIC VISIT: HCPCS | Mod: ZF

## 2018-07-05 RX ORDER — ONDANSETRON 4 MG/1
4 TABLET, ORALLY DISINTEGRATING ORAL EVERY 6 HOURS PRN
Qty: 20 TABLET | Refills: 1 | Status: SHIPPED | OUTPATIENT
Start: 2018-07-05 | End: 2018-08-27

## 2018-07-05 ASSESSMENT — PAIN SCALES - GENERAL: PAINLEVEL: NO PAIN (0)

## 2018-07-05 NOTE — MR AVS SNAPSHOT
After Visit Summary   7/5/2018    Dai Guadarrama    MRN: [id number]           Patient Information     Date Of Birth          1985        Visit Information        Provider Department      7/5/2018 3:30 PM Yessenia Barros MD; [facility] LANGUAGE SERVICES [facility]        Today's Diagnoses     Hyperemesis gravidarum    -  1      Care Instructions    Go to lab for your Glucola when you are feeling better.   Follow up in clinic in 1 month.      your Zofran prescription from your pharmacy. If you are not tolerating the oral zofran you can switch back to IV Zofran.           Follow-ups after your visit        Follow-up notes from your care team     Return in about 4 weeks (around 8/2/2018).      Your next 10 appointments already scheduled     Jul 16, 2018  1:30 PM CDT   Genetic Counseling with UR GEN COUNSELOR 2   [facility])    [address]  [facility] [address]   [phone]            Jul 16, 2018  2:15 PM CDT   MFM US COMP with JAMISON   [facility] Ultrasound [facility])    [address]  [facility] [id number] [phone]           Wear comfortable clothes and leave your valuables at home.            Jul 16, 2018  2:45 PM CDT   Radiology MD with DUNIA MOORE MD   [facility])    [address]  [facility] [address]   [phone]           Please arrive at the time given for your first appointment. This visit is used internally to schedule the physician's time during your ultrasound.              Who to contact     Please call your clinic at [phone] to:    Ask questions about your health    Make or cancel appointments    Discuss your medicines    Learn about your test results    Speak to your doctor             Additional Information About Your Visit        Scryerharhaku Information     [employer] gives you secure access to your electronic health record. If you see a primary care provider, you can also send messages to your care team and make appointments. If you have questions, please call your primary care clinic.  If you do not have a primary care provider, please call [phone] and they will assist you.      [employer] is an electronic gateway that provides easy, online access to your medical records. With [employer], you can request a clinic appointment, read your test results, renew a prescription or communicate with your care team.     To access your existing account, please contact your [facility] or call [phone] for assistance.        Care EveryWhere ID     This is your Care EveryWhere ID. This could be used by other organizations to access your [facility] medical records  [id number]        Your Vitals Were     Pulse Last Period Breastfeeding? BMI (Body Mass Index)          114 02/28/2018 (Exact Date) No 40.97 kg/m2         Blood Pressure from Last 3 Encounters:   07/05/18 104/64   06/07/18 (!) 89/66   06/07/18 113/83    Weight from Last 3 Encounters:   07/05/18 101.6 kg (224 lb)   06/07/18 99.8 kg (220 lb)   06/06/18 99.9 kg (220 lb 4.8 oz)              Today, you had the following     No orders found for display         Today's Medication Changes          These changes are accurate as of 7/5/18  4:28 PM.  If you have any questions, ask your nurse or doctor.               These medicines have changed or have updated prescriptions.        Dose/Directions    * ondansetron 4 MG ODT tab   Commonly known as:  ZOFRAN ODT   This may have changed:  Another medication with the same name was added. Make sure you understand how and when to take each.   Used for:  Hyperemesis gravidarum, Supervision of high risk pregnancy in first trimester, Aneurysm (H), History of intrauterine fetal death, currently  pregnant in first trimester   Changed by:  Yessenia Barros MD        Dose:  4-8 mg   Take 1-2 tablets (4-8 mg) by mouth every 8 hours as needed for nausea   Quantity:  20 tablet   Refills:  3       * ondansetron 4 MG ODT tab   Commonly known as:  ZOFRAN ODT   This may have changed:  You were already taking a medication with the same name, and this prescription was added. Make sure you understand how and when to take each.   Used for:  Hyperemesis gravidarum   Changed by:  Yessenia Barros MD        Dose:  4 mg   Take 1 tablet (4 mg) by mouth every 6 hours as needed for nausea   Quantity:  20 tablet   Refills:  1       * Notice:  This list has 2 medication(s) that are the same as other medications prescribed for you. Read the directions carefully, and ask your doctor or other care provider to review them with you.         Where to get your medicines      These medications were sent to [facility] [id number] [place] [address] AT [facility] [address] [address]     Phone:  [phone]     ondansetron 4 MG ODT tab                Primary Care Provider Fax #    Physician No Ref-Primary [phone]       No address on file        Equal Access to Services     AISHA SONG : Mariano Andrew, washayda mar, qaybta kaalmada adeconoryada, kodi alcaraz. So [facility] [phone].    ATENCIÓN: Si habla español, tiene a martin disposición servicios gratuitos de asistencia lingüística. Llame al [phone].    We comply with applicable federal civil rights laws and [place] laws. We do not discriminate on the basis of race, color, national origin, age, disability, sex, sexual orientation, or gender identity.            Thank you!     Thank you for choosing [facility]  for your care. Our goal is always to provide you with excellent care. Hearing back from our patients is one way we can continue to improve our  services. Please take a few minutes to complete the written survey that you may receive in the mail after your visit with us. Thank you!             Your Updated Medication List - Protect others around you: Learn how to safely use, store and throw away your medicines at www.disposemymeds.org.          This list is accurate as of 7/5/18  4:29 PM.  Always use your most recent med list.                   Brand Name Dispense Instructions for use Diagnosis    LANsoprazole 30 MG CR capsule    PREVACID    30 capsule    Take 1 capsule (30 mg) by mouth daily Take 30-60 minutes before a meal.    Gastroesophageal reflux disease without esophagitis       metoclopramide 10 MG tablet    REGLAN    120 tablet    Take 1 tablet (10 mg) by mouth 4 times daily (before meals and nightly)    Supervision of high risk pregnancy in first trimester, Hyperemesis gravidarum, History of intrauterine fetal death, currently pregnant in first trimester, Morbid obesity due to excess calories (H)       * ondansetron 4 MG ODT tab    ZOFRAN ODT    20 tablet    Take 1-2 tablets (4-8 mg) by mouth every 8 hours as needed for nausea    Hyperemesis gravidarum, Supervision of high risk pregnancy in first trimester, Aneurysm (H), History of intrauterine fetal death, currently pregnant in first trimester       * ondansetron 4 MG ODT tab    ZOFRAN ODT    20 tablet    Take 1 tablet (4 mg) by mouth every 6 hours as needed for nausea    Hyperemesis gravidarum       prenatal multivitamin plus iron 27-0.8 MG Tabs per tablet      Take 1 tablet by mouth daily    Supervision of high risk pregnancy in first trimester, Hyperemesis gravidarum, Aneurysm (H), History of intrauterine fetal death, currently pregnant in first trimester       promethazine 25 MG Suppository    PHENERGAN    40 suppository    Place 1 suppository (25 mg) rectally every 6 hours as needed for nausea    Supervision of high risk pregnancy in second trimester       * Notice:  This list has 2  medication(s) that are the same as other medications prescribed for you. Read the directions carefully, and ask your doctor or other care provider to review them with you.

## 2018-07-05 NOTE — NURSING NOTE
Chief Complaint   Patient presents with     Prenatal Care     FREDY 16 weeks and 6 days   Alis Arora LPN

## 2018-07-05 NOTE — PROGRESS NOTES
[facility]  Return OB Visit    SUBJECTIVE  Interview conducted with a .    She has a diagnosis of hyperemesis gravidarum. She reports her nausea has overall improved with the IV Zofran and IVF therapy. She is giving herself 2mg IV Zofran every 6 hours and is receiving IV infusions from home care RN daily.She reports her symptoms are improved and she would like to switch to oral antiemetics and stop her IV fluid therapy. She is tolerating food and is able to stay orally hydrated.    She endorses headache when she gets her IV fluid therapy. She has 1-2 episodes of soft stools daily, which she attributes to taking Zofran.    Endorses overall fatigue with the pregnancy.  Denies vaginal bleeding or LOF.     Pregnancy complicated by:   1. Hyperemesis gravidarum   2. History of IUFD at 32w0d  3. Morbid obesity  4. AMA  5. GBS bacteruria   6. Family history of aneurysm- patient reports she had negative head imaging. However review of chart revealed an MRI w/small (3x2mm) periophthalmic aneurysm. She was seen by neurology at that time and due to its small size it did not warrant clips, coils, or surgery. She was cleared for valsalva during vaginal delivery.     OBJECTIVE  /64  Pulse 114  Wt 101.6 kg (224 lb)  LMP 2018 (Exact Date)  Breastfeeding? No  BMI 40.97 kg/m2    Gen: Well-appearing, NAD  FHR: 154 bpm      ASSESSMENT  Dai Guadarrama is a 33 year old  at 16w6d by 7w6d US, here for return OB visit. Hyperemesis gravidarum is improving and patient would like to transition to oral therapy. She reports good relief of nausea with IV zofran, so prescription for PO Zofran sent to pharmacy. Instructed to keep her IV in place until she has tried the oral zofran in case she does not experience relief with oral zofran and needs additional IV zofran and/or IV fluid infusions.     She is due for early GCT but is worried it will make her nauseous. Order placed for GCT, she was  instructed to go to lab when she is ready to take GCT. All questions answered, she is in agreement with plan.    PLAN  1. Hyperemesis gravidarum   1. PO Zofran prescription given  2. Instructed patient to start spacing out IV fluid infusions to 3 timer per week, then 2 times per week, etc until she is able to stoptopped IV fluid infusions all together.   3. Her weight is up 4 pounds from one month ago (however in total she has a TWG of -1 pounds from pre-pregnancy.      2. History of IUFD  1. Plan for Q4 week growth ultrasounds after 20 weeks and weekly BPPs at 32 weeks    3. Small periopthalmic aneurysm  1. MRI w/small (3x2mm) periophthalmic aneurysm.   2. Seen by neurology in 12/2016- due to its small size it did not warrant clips, coils, or surgery. She was cleared for bearing down during vaginal delivery.   3. Message sent to her consulting neurologist (Dr. Renee) regarding if he would recommend follow up imaging and any new recommendations.     4. AMA  1. Declined genetic screening in 1st trimester, not interested in genetic screening   2. Level 2 anatomy scheduled for 7/16    5. Morbid obesity  1. Goal TWG 1+1-20lbs  2. Current TWG -1 pounds    6. Prenatal Care  1. Rh positive, AB negative   2. RPR/HIV/Hep B NR, G/C negative   3. Rubella Immune   4. Order placed for GCT. A1c was 5.8 on 5/2018  5. GBS bacteruria     Return to clinic in 4 weeks.     Staffed with Dr. Marie Barros MD   Resident Physician, PGY3  Obstetrics, Gynecology, and Women's Health    The Patient was seen in Resident Continuity Clinic by    BRIGITTE BARROS  [facility] LANGUAGE SERVICES.  I reviewed the history & exam. Assessment and plan were jointly made.    Vicky Salazar MD

## 2018-07-05 NOTE — PATIENT INSTRUCTIONS
Go to lab for your Glucola when you are feeling better.   Follow up in clinic in 1 month.      your Zofran prescription from your pharmacy. If you are not tolerating the oral zofran you can switch back to IV Zofran.

## 2018-07-05 NOTE — PROGRESS NOTES
This is a recent snapshot of the patient's [facility] medical record.  For current drug dose and complete information and questions, call [phone] or In [facility] pool, fv home infusion ([id number])  CSN Number:  [id number]

## 2018-07-05 NOTE — PROGRESS NOTES
This is a recent snapshot of the patient's [facility] medical record.  For current drug dose and complete information and questions, call [phone] or In [facility] pool, fv home infusion ([id number])  CSN Number:  [id number]

## 2018-07-08 ENCOUNTER — HOME INFUSION (PRE-WILLOW HOME INFUSION) (OUTPATIENT)
Dept: PHARMACY | Facility: CLINIC | Age: 33
End: 2018-07-08

## 2018-07-09 ENCOUNTER — TELEPHONE (OUTPATIENT)
Dept: OBGYN | Facility: CLINIC | Age: 33
End: 2018-07-09

## 2018-07-09 ENCOUNTER — HOME INFUSION (PRE-WILLOW HOME INFUSION) (OUTPATIENT)
Dept: PHARMACY | Facility: CLINIC | Age: 33
End: 2018-07-09

## 2018-07-09 NOTE — TELEPHONE ENCOUNTER
Call from home health care nurse peripheral lines not lasting a day now.  Unable to get new lines started with even ultrasound.  Per Dr. Urrutia ok a midline for iv hydration for hyperemesis

## 2018-07-10 ENCOUNTER — HOME INFUSION (PRE-WILLOW HOME INFUSION) (OUTPATIENT)
Dept: PHARMACY | Facility: CLINIC | Age: 33
End: 2018-07-10

## 2018-07-10 NOTE — PROGRESS NOTES
This is a recent snapshot of the patient's [facility] medical record.  For current drug dose and complete information and questions, call [phone] or In Basket pool, fv home infusion ([id number])  CSN Number:  [id number]

## 2018-07-12 ENCOUNTER — PRE VISIT (OUTPATIENT)
Dept: MATERNAL FETAL MEDICINE | Facility: CLINIC | Age: 33
End: 2018-07-12

## 2018-07-12 NOTE — PROGRESS NOTES
This is a recent snapshot of the patient's [facility] medical record.  For current drug dose and complete information and questions, call [phone] or In [facility] pool, fv home infusion ([id number])  CSN Number:  [id number]

## 2018-07-16 ENCOUNTER — HOSPITAL ENCOUNTER (OUTPATIENT)
Dept: ULTRASOUND IMAGING | Facility: CLINIC | Age: 33
Discharge: HOME | End: 2018-07-16
Attending: MIDWIFE | Admitting: OBSTETRICS & GYNECOLOGY
Payer: COMMERCIAL

## 2018-07-16 ENCOUNTER — OFFICE VISIT (OUTPATIENT)
Dept: MATERNAL FETAL MEDICINE | Facility: CLINIC | Age: 33
End: 2018-07-16
Attending: MIDWIFE
Payer: COMMERCIAL

## 2018-07-16 DIAGNOSIS — O26.90 PREGNANCY RELATED CONDITION, ANTEPARTUM: ICD-10-CM

## 2018-07-16 DIAGNOSIS — O35.9XX0 SUSPECTED FETAL ANOMALY, ANTEPARTUM, NOT APPLICABLE OR UNSPECIFIED FETUS: Primary | ICD-10-CM

## 2018-07-16 DIAGNOSIS — O09.522 ELDERLY MULTIGRAVIDA IN SECOND TRIMESTER: ICD-10-CM

## 2018-07-16 DIAGNOSIS — O26.90 PREGNANCY RELATED CONDITION, ANTEPARTUM: Primary | ICD-10-CM

## 2018-07-16 PROCEDURE — 40000072 ZZH STATISTIC GENETIC COUNSELING, < 16 MIN: Mod: ZF | Performed by: GENETIC COUNSELOR, MS

## 2018-07-16 PROCEDURE — 76811 OB US DETAILED SNGL FETUS: CPT

## 2018-07-16 NOTE — MR AVS SNAPSHOT
After Visit Summary   7/16/2018    Dai Guadarrama    MRN: [id number]           Patient Information     Date Of Birth          1985        Visit Information        Provider Department      7/16/2018 2:45 PM Queta Lyles DO [facility]        Today's Diagnoses     Suspected fetal anomaly, antepartum, not applicable or unspecified fetus    -  1    Elderly multigravida in second trimester           Follow-ups after your visit        Your next 10 appointments already scheduled     Aug 09, 2018  2:15 PM CDT   MFM US COMPRE SINGLE F/U with [id number]   [facility] Ultrasound - [facility])    [address]  [facility] [id number] [phone]           Wear comfortable clothes and leave your valuables at home.            Aug 09, 2018  2:45 PM CDT   Radiology MD with UR OSCAR WASHBURN   [facility])    [address]  [facility] [address]   [phone]           Please arrive at the time given for your first appointment. This visit is used internally to schedule the physician's time during your ultrasound.              Future tests that were ordered for you today     Open Future Orders        Priority Expected Expires Ordered    MFM US Comprehensive Single F/U Routine 8/6/2018 7/16/2019 7/16/2018            Who to contact     If you have questions or need follow up information about today's clinic visit or your schedule please contact [facility]ERNAL FETAL MEDICINE [facility] directly at [phone].  Normal or non-critical lab and imaging results will be communicated to you by MyChart, letter or phone within 4 business days after the clinic has received the results. If you do not hear from us within 7 days, please contact the clinic through MyChart or phone. If you have a critical or abnormal lab  result, we will notify you by phone as soon as possible.  Submit refill requests through [employer] or call your pharmacy and they will forward the refill request to us. Please allow 3 business days for your refill to be completed.          Additional Information About Your Visit        slinksethart Information     [employer] gives you secure access to your electronic health record. If you see a primary care provider, you can also send messages to your care team and make appointments. If you have questions, please call your primary care clinic.  If you do not have a primary care provider, please call [phone] and they will assist you.        Care EveryWhere ID     This is your Care EveryWhere ID. This could be used by other organizations to access your [facility] medical records  [id number]        Your Vitals Were     Last Period                   02/28/2018 (Exact Date)            Blood Pressure from Last 3 Encounters:   07/05/18 104/64   06/07/18 (!) 89/66   06/07/18 113/83    Weight from Last 3 Encounters:   07/05/18 101.6 kg (224 lb)   06/07/18 99.8 kg (220 lb)   06/06/18 99.9 kg (220 lb 4.8 oz)               Primary Care Provider Fax #    Physician No Ref-Primary [phone]       No address on file        Equal Access to Services     AISHA SONG : Hadii isaiah lopezo Sojakob, waaxda luqadaha, qaybta kaalmada adeegyada, kodi alcaraz. So [facility] [phone].    ATENCIÓN: Si habla español, tiene a martin disposición servicios gratuitos de asistencia lingüística. Llame al [phone].    We comply with applicable federal civil rights laws and [place] laws. We do not discriminate on the basis of race, color, national origin, age, disability, sex, sexual orientation, or gender identity.            Thank you!     Thank you for choosing [facility]  for your care. Our goal is always to provide you with excellent care. Hearing back from our patients is one way we  can continue to improve our services. Please take a few minutes to complete the written survey that you may receive in the mail after your visit with us. Thank you!             Your Updated Medication List - Protect others around you: Learn how to safely use, store and throw away your medicines at www.disposemymeds.org.          This list is accurate as of 7/16/18  3:47 PM.  Always use your most recent med list.                   Brand Name Dispense Instructions for use Diagnosis    LANsoprazole 30 MG CR capsule    PREVACID    30 capsule    Take 1 capsule (30 mg) by mouth daily Take 30-60 minutes before a meal.    Gastroesophageal reflux disease without esophagitis       metoclopramide 10 MG tablet    REGLAN    120 tablet    Take 1 tablet (10 mg) by mouth 4 times daily (before meals and nightly)    Supervision of high risk pregnancy in first trimester, Hyperemesis gravidarum, History of intrauterine fetal death, currently pregnant in first trimester, Morbid obesity due to excess calories (H)       * ondansetron 4 MG ODT tab    ZOFRAN ODT    20 tablet    Take 1-2 tablets (4-8 mg) by mouth every 8 hours as needed for nausea    Hyperemesis gravidarum, Supervision of high risk pregnancy in first trimester, Aneurysm (H), History of intrauterine fetal death, currently pregnant in first trimester       * ondansetron 4 MG ODT tab    ZOFRAN ODT    20 tablet    Take 1 tablet (4 mg) by mouth every 6 hours as needed for nausea    Hyperemesis gravidarum       prenatal multivitamin plus iron 27-0.8 MG Tabs per tablet      Take 1 tablet by mouth daily    Supervision of high risk pregnancy in first trimester, Hyperemesis gravidarum, Aneurysm (H), History of intrauterine fetal death, currently pregnant in first trimester       promethazine 25 MG Suppository    PHENERGAN    40 suppository    Place 1 suppository (25 mg) rectally every 6 hours as needed for nausea    Supervision of high risk pregnancy in second trimester       *  Notice:  This list has 2 medication(s) that are the same as other medications prescribed for you. Read the directions carefully, and ask your doctor or other care provider to review them with you.

## 2018-07-16 NOTE — PROGRESS NOTES
"[facility]  Genetic Counseling Consult     Patient:  Dai Guadarrama YOB: 1985   Date of Service:  18      Dai Guadarrama was seen at the [facility] for genetic consultation as part of her appointment for comprehensive ultrasound.  The indication for genetic counseling is advanced maternal age. She was accompanied by her  and daughter. A , Rylie, was also present.        Impression/Plan:   1. Dai has not had serum screening in this pregnancy. The option of aneuploidy screening in this pregnancy was discussed and declined.     2. Dai had a comprehensive (level II) ultrasound today.  Please see the ultrasound report for further details.    3. The patient declines genetic amniocentesis and additional maternal serum screening today.    Pregnancy History:   /Parity:    Age at Delivery: 33 year old  HORTENCIA: 2018, by Ultrasound  Gestational Age: 18w3d    No significant complications or exposures were reported in the current pregnancy.    Dai has been experiencing hyperemesis gravidarum during this pregnancy. She has been receiving hydration via home infusion.     Moose pregnancy history is significant for:  o Two first trimester miscarriages   o IUFD at 35 weeks; normal karyotype (46, XX) and normal placental pathology and autopsy. The reasons for fetal demise remains unknown   o Dai had one healthy daughter born in 2017     Medical History:   Moose reported medical history is not expected to impact pregnancy management or risks to fetal development. Dai reports her migraines have been more frequent and with more pain during this pregnancy. Dai does have a small cerebral aneurysm and is followed by neurology. Dai stated, \"its small and there is nothing I can do for it until after this pregnancy\".       Family History:   A three-generation pedigree was " obtained, and is scanned under the  Media  tab.   The following significant findings were reported by Dai:    Dai's family history was updated from a previous visit in 2016, there are no significant changes    Dai's two sisters and her mother have had one miscarriage each. There is no significant family history of miscarriage in her , Perry's family.     Dai reports her sister had a cerebral aneurysm treated by clipping    Dai reports a history of cerebral aneurysm at 16. She did have an MRI in December 2016 that showed a small aneurysm. She was cleared for vaginal delivery before her daughter's birth which was a spontaneous vaginal delivery at 37w5d.     Perry reports a nephew that had heart surgery. Perry had no updates regarding this nephew other than he is doing well. Perry did not know further details regarding the defect or surgery.     Otherwise, the reported family history is negative for multiple miscarriages, stillbirths, birth defects, mental retardation, known genetic conditions, and consanguinity.       Carrier Screening:   The patient reports that she and the father of the pregnancy have  ancestry:       Expanded carrier screening for mutations in a large panel of genes associated with autosomal recessive conditions including cystic fibrosis, spinal muscular atrophy, and others, is now available.      Carrier screening was not discussed today.       Risk Assessment for Chromosome Conditions:   We explained that the risk for fetal chromosome abnormalities increases with maternal age. We discussed specific features of common chromosome abnormalities, including Down syndrome, trisomy 13, trisomy 18, and sex chromosome trisomies.      At age 33 at midtrimester, the risk to have a baby with Down syndrome is 1 in 274.     At age 33 at midtrimester, the risk to have a baby with any chromosome abnormality is 1 in 135.       Dai did not have maternal serum screening earlier in  pregnancy.       Testing Options:   We discussed the following options:   Comprehensive (Level II) ultrasound: Detailed ultrasound performed between 18-22 weeks gestation to  screen for major birth defects and markers for aneuploidy.      We reviewed the benefits and limitations of this testing.  Screening tests provide a risk assessment specific to the pregnancy for certain fetal chromosome abnormalities, but cannot definitively diagnose or exclude a fetal chromosome abnormality.  Follow-up genetic counseling and consideration of diagnostic testing is recommended with any abnormal screening result.     Diagnostic tests carry inherent risks- including risk of miscarriage- that require careful consideration.  These tests can detect fetal chromosome abnormalities with greater than 99% certainty.  Results can be compromised by maternal cell contamination or mosaicism, and are limited by the resolution of cytogenetic G-banding technology.  There is no screening nor diagnostic test that can detect all forms of birth defects or mental disability.    It was a pleasure to be involved with [facility] care. Face-to-face time of the meeting was 30 minutes.    Cassandra Correa MS  Genetic Counseling Intern  Phone: [phone]   Pager: [phone]    Attestation:  Patient seen, evaluated and discussed with the Genetic Counseling Intern. I have verified the content of the note, which accurately reflects my assessment of the patient and the plan of care.  Supervising Genetic Counselor  Naima Gibson MS, [facility]  Maternal Fetal Medicine  [facility]  Phone:[phone]  Email: [email or web address]

## 2018-07-16 NOTE — PROGRESS NOTES
"Please see \"Imaging\" tab under \"Chart Review\" for details of today's US.    Queta Lyles, DO    "

## 2018-07-16 NOTE — MR AVS SNAPSHOT
After Visit Summary   7/16/2018    Dai Guadarrama    MRN: [id number]           Patient Information     Date Of Birth          1985        Visit Information        Provider Department      7/16/2018 1:30 PM UR GEN COUNSELOR 2 [facility]        Today's Diagnoses     Pregnancy related condition, antepartum    -  1       Follow-ups after your visit        Who to contact     If you have questions or need follow up information about today's clinic visit or your schedule please contact [facility] directly at [phone].  Normal or non-critical lab and imaging results will be communicated to you by Office Depothart, letter or phone within 4 business days after the clinic has received the results. If you do not hear from us within 7 days, please contact the clinic through [employer] or phone. If you have a critical or abnormal lab result, we will notify you by phone as soon as possible.  Submit refill requests through FTBpro or call your pharmacy and they will forward the refill request to us. Please allow 3 business days for your refill to be completed.          Additional Information About Your Visit        MyChart Information     [employer] gives you secure access to your electronic health record. If you see a primary care provider, you can also send messages to your care team and make appointments. If you have questions, please call your primary care clinic.  If you do not have a primary care provider, please call [phone] and they will assist you.        Care EveryWhere ID     This is your Care EveryWhere ID. This could be used by other organizations to access your [place] medical records  [id number]        Your Vitals Were     Last Period                   02/28/2018 (Exact Date)            Blood Pressure from Last 3 Encounters:   07/05/18 104/64   06/07/18 (!) 89/66   06/07/18 113/83    Weight from Last 3 Encounters:   07/05/18  101.6 kg (224 lb)   06/07/18 99.8 kg (220 lb)   06/06/18 99.9 kg (220 lb 4.8 oz)              We Performed the Following     [facility] Genetic Counseling        Primary Care Provider Fax #    Physician No Ref-Primary [phone]       No address on file        Equal Access to Services     AISHA CÁRDENASHAJA : Hadii isaiah ku hadmollyo Soulisesali, waaxda luqadaha, qaybta kaalmada estuardojimiluis, kodi espinal dilanpilar gutierrezcecilyasiya alcaraz. So [facility] [phone].    ATENCIÓN: Si habla español, tiene a martin disposición servicios gratuitos de asistencia lingüística. Azraame al [phone].    We comply with applicable federal civil rights laws and [place] laws. We do not discriminate on the basis of race, color, national origin, age, disability, sex, sexual orientation, or gender identity.            Thank you!     Thank you for choosing [facility]  for your care. Our goal is always to provide you with excellent care. Hearing back from our patients is one way we can continue to improve our services. Please take a few minutes to complete the written survey that you may receive in the mail after your visit with us. Thank you!             Your Updated Medication List - Protect others around you: Learn how to safely use, store and throw away your medicines at www.disposemymeds.org.          This list is accurate as of 7/16/18  3:06 PM.  Always use your most recent med list.                   Brand Name Dispense Instructions for use Diagnosis    LANsoprazole 30 MG CR capsule    PREVACID    30 capsule    Take 1 capsule (30 mg) by mouth daily Take 30-60 minutes before a meal.    Gastroesophageal reflux disease without esophagitis       metoclopramide 10 MG tablet    REGLAN    120 tablet    Take 1 tablet (10 mg) by mouth 4 times daily (before meals and nightly)    Supervision of high risk pregnancy in first trimester, Hyperemesis gravidarum, History of intrauterine fetal death, currently pregnant in first trimester,  Morbid obesity due to excess calories (H)       * ondansetron 4 MG ODT tab    ZOFRAN ODT    20 tablet    Take 1-2 tablets (4-8 mg) by mouth every 8 hours as needed for nausea    Hyperemesis gravidarum, Supervision of high risk pregnancy in first trimester, Aneurysm (H), History of intrauterine fetal death, currently pregnant in first trimester       * ondansetron 4 MG ODT tab    ZOFRAN ODT    20 tablet    Take 1 tablet (4 mg) by mouth every 6 hours as needed for nausea    Hyperemesis gravidarum       prenatal multivitamin plus iron 27-0.8 MG Tabs per tablet      Take 1 tablet by mouth daily    Supervision of high risk pregnancy in first trimester, Hyperemesis gravidarum, Aneurysm (H), History of intrauterine fetal death, currently pregnant in first trimester       promethazine 25 MG Suppository    PHENERGAN    40 suppository    Place 1 suppository (25 mg) rectally every 6 hours as needed for nausea    Supervision of high risk pregnancy in second trimester       * Notice:  This list has 2 medication(s) that are the same as other medications prescribed for you. Read the directions carefully, and ask your doctor or other care provider to review them with you.

## 2018-07-17 ENCOUNTER — HOSPITAL ENCOUNTER (EMERGENCY)
Facility: CLINIC | Age: 33
Discharge: HOME | End: 2018-07-18
Attending: EMERGENCY MEDICINE | Admitting: EMERGENCY MEDICINE
Payer: COMMERCIAL

## 2018-07-17 ENCOUNTER — APPOINTMENT (OUTPATIENT)
Dept: ULTRASOUND IMAGING | Facility: CLINIC | Age: 33
End: 2018-07-17
Attending: EMERGENCY MEDICINE
Payer: COMMERCIAL

## 2018-07-17 DIAGNOSIS — I82.621 ARM DVT (DEEP VENOUS THROMBOEMBOLISM), ACUTE, RIGHT (H): ICD-10-CM

## 2018-07-17 LAB
ALBUMIN SERPL-MCNC: 3 G/DL (ref 3.4–5)
ALP SERPL-CCNC: 96 U/L (ref 40–150)
ALT SERPL W P-5'-P-CCNC: 14 U/L (ref 0–50)
ANION GAP SERPL CALCULATED.3IONS-SCNC: 9 MMOL/L (ref 3–14)
AST SERPL W P-5'-P-CCNC: 9 U/L (ref 0–45)
BASOPHILS # BLD AUTO: 0 10E9/L (ref 0–0.2)
BASOPHILS NFR BLD AUTO: 0.2 %
BILIRUB SERPL-MCNC: 0.2 MG/DL (ref 0.2–1.3)
BUN SERPL-MCNC: 7 MG/DL (ref 7–30)
CALCIUM SERPL-MCNC: 9 MG/DL (ref 8.5–10.1)
CHLORIDE SERPL-SCNC: 107 MMOL/L (ref 94–109)
CO2 SERPL-SCNC: 25 MMOL/L (ref 20–32)
CREAT SERPL-MCNC: 0.48 MG/DL (ref 0.52–1.04)
DIFFERENTIAL METHOD BLD: ABNORMAL
EOSINOPHIL # BLD AUTO: 0.2 10E9/L (ref 0–0.7)
EOSINOPHIL NFR BLD AUTO: 1.6 %
ERYTHROCYTE [DISTWIDTH] IN BLOOD BY AUTOMATED COUNT: 14.3 % (ref 10–15)
GFR SERPL CREATININE-BSD FRML MDRD: >90 ML/MIN/1.7M2
GLUCOSE SERPL-MCNC: 90 MG/DL (ref 70–99)
HCT VFR BLD AUTO: 36.2 % (ref 35–47)
HGB BLD-MCNC: 11.9 G/DL (ref 11.7–15.7)
IMM GRANULOCYTES # BLD: 0.1 10E9/L (ref 0–0.4)
IMM GRANULOCYTES NFR BLD: 0.9 %
LIPASE SERPL-CCNC: 179 U/L (ref 73–393)
LYMPHOCYTES # BLD AUTO: 2.5 10E9/L (ref 0.8–5.3)
LYMPHOCYTES NFR BLD AUTO: 21.4 %
MCH RBC QN AUTO: 28.7 PG (ref 26.5–33)
MCHC RBC AUTO-ENTMCNC: 32.9 G/DL (ref 31.5–36.5)
MCV RBC AUTO: 87 FL (ref 78–100)
MONOCYTES # BLD AUTO: 0.8 10E9/L (ref 0–1.3)
MONOCYTES NFR BLD AUTO: 6.4 %
NEUTROPHILS # BLD AUTO: 8.2 10E9/L (ref 1.6–8.3)
NEUTROPHILS NFR BLD AUTO: 69.5 %
NRBC # BLD AUTO: 0 10*3/UL
NRBC BLD AUTO-RTO: 0 /100
PLATELET # BLD AUTO: 343 10E9/L (ref 150–450)
POTASSIUM SERPL-SCNC: 3.4 MMOL/L (ref 3.4–5.3)
PROT SERPL-MCNC: 7.4 G/DL (ref 6.8–8.8)
RBC # BLD AUTO: 4.14 10E12/L (ref 3.8–5.2)
SODIUM SERPL-SCNC: 141 MMOL/L (ref 133–144)
WBC # BLD AUTO: 11.7 10E9/L (ref 4–11)

## 2018-07-17 PROCEDURE — 80053 COMPREHEN METABOLIC PANEL: CPT | Performed by: EMERGENCY MEDICINE

## 2018-07-17 PROCEDURE — 87070 CULTURE OTHR SPECIMN AEROBIC: CPT | Performed by: EMERGENCY MEDICINE

## 2018-07-17 PROCEDURE — 96372 THER/PROPH/DIAG INJ SC/IM: CPT | Performed by: EMERGENCY MEDICINE

## 2018-07-17 PROCEDURE — 87040 BLOOD CULTURE FOR BACTERIA: CPT | Performed by: EMERGENCY MEDICINE

## 2018-07-17 PROCEDURE — 85025 COMPLETE CBC W/AUTO DIFF WBC: CPT | Performed by: EMERGENCY MEDICINE

## 2018-07-17 PROCEDURE — 99284 EMERGENCY DEPT VISIT MOD MDM: CPT | Mod: Z6 | Performed by: EMERGENCY MEDICINE

## 2018-07-17 PROCEDURE — 83690 ASSAY OF LIPASE: CPT | Performed by: EMERGENCY MEDICINE

## 2018-07-17 PROCEDURE — 96374 THER/PROPH/DIAG INJ IV PUSH: CPT | Performed by: EMERGENCY MEDICINE

## 2018-07-17 PROCEDURE — 99284 EMERGENCY DEPT VISIT MOD MDM: CPT | Mod: 25 | Performed by: EMERGENCY MEDICINE

## 2018-07-17 PROCEDURE — 25000128 H RX IP 250 OP 636: Performed by: EMERGENCY MEDICINE

## 2018-07-17 PROCEDURE — 93971 EXTREMITY STUDY: CPT | Mod: RT

## 2018-07-17 PROCEDURE — 96361 HYDRATE IV INFUSION ADD-ON: CPT | Performed by: EMERGENCY MEDICINE

## 2018-07-17 RX ORDER — ONDANSETRON 2 MG/ML
4 INJECTION INTRAMUSCULAR; INTRAVENOUS ONCE
Status: COMPLETED | OUTPATIENT
Start: 2018-07-17 | End: 2018-07-17

## 2018-07-17 RX ORDER — SODIUM CHLORIDE 9 MG/ML
INJECTION, SOLUTION INTRAVENOUS CONTINUOUS
Status: DISCONTINUED | OUTPATIENT
Start: 2018-07-17 | End: 2018-07-18 | Stop reason: HOSPADM

## 2018-07-17 RX ADMIN — ONDANSETRON 4 MG: 2 INJECTION INTRAMUSCULAR; INTRAVENOUS at 18:17

## 2018-07-17 RX ADMIN — SODIUM CHLORIDE 1000 ML: 9 INJECTION, SOLUTION INTRAVENOUS at 20:17

## 2018-07-17 RX ADMIN — SODIUM CHLORIDE 1000 ML: 9 INJECTION, SOLUTION INTRAVENOUS at 18:17

## 2018-07-17 RX ADMIN — SODIUM CHLORIDE: 9 INJECTION, SOLUTION INTRAVENOUS at 21:18

## 2018-07-17 ASSESSMENT — ENCOUNTER SYMPTOMS
COLOR CHANGE: 1
DIARRHEA: 0
DIZZINESS: 0
FREQUENCY: 0
NAUSEA: 1
DIFFICULTY URINATING: 0
LIGHT-HEADEDNESS: 0
BACK PAIN: 1
SHORTNESS OF BREATH: 0
ABDOMINAL PAIN: 1
HEMATURIA: 0
VOMITING: 1
DYSURIA: 0
FEVER: 0

## 2018-07-17 NOTE — ED AVS SNAPSHOT
[facility], Emergency Department    [address]    [place] [address]    Phone:  [phone]    Fax:  [phone]                                       Dai Guadarrama   MRN: [id number]    Department:  [facility], Emergency Department   Date of Visit:  7/17/2018           Patient Information     Date Of Birth          1985        Your diagnoses for this visit were:     Arm DVT (deep venous thromboembolism), acute, right (H)        You were seen by Madelyn Green MD.        Discharge Instructions       Please make an appointment to follow up with your OBGYN this week to follow-up on blood clot.      Take lovenox as prescribed (one injection in the morning and one in the evening, 12 hours apart).    Use IV tomorrow if needed for home fluids.  OB clinic will call you tomorrow to schedule a new PICC line placement.      If you have worsening arm pain, chest pain, shortness of breath, vaginal bleeding or other concerns, return to the emergency department for re-evaluation.          En qué consiste la trombosis venosa profunda     Cuando el músculo se contrae, la válvula se abre y la ermelinda es empujada hacia arriba en dirección al corazón.            Cuando la ermelinda se mueve demasiado lentamente por justa vena, puede formarse un coágulo. Justa parte del coágulo puede separarse y desplazarse por el torrente sanguíneo.      La trombosis venosa profunda (TVP) (o DVT por avelina iniciales en inglés) es justa afección en la que se forma un coágulo en justa vena profunda. Un coágulo de ermelinda es más común en justa pierna, adriano también puede desarrollarse en justa vena jose cruz profunda en justa pierna, un brazo u otra parte del cuerpo. Justa parte del coágulo, llamada émbolo, puede separarse de la vena y desplazarse a los pulmones y formar justa embolia pulmonar. [place] puede cortar el flujo de ermelinda a justa parte o a la totalidad del pulmón, lo que constituye  justa emergencia médica que puede provocar la muerte. Los  proveedores de atención médica utilizan el término tromboembolismo pulmonar para describir estas dos afecciones que están íntimamente relacionadas enre sí, tatiana lo están martin prevención y tratamiento.     Con el tiempo, el coágulo puede dañar de forma permanente las venas de las piernas. Para proteger martin chino, la TVP debe tratarse de inmediato.  Cómo se desarrolla la TVP  Los músculos de las piernas tienen venas profundas a través de las cuales regresa la ermelinda de las piernas hacia el corazón. Cuando los músculos de las piernas se contraen y se relajan, van desplazando la ermelinda hacia arriba por las venas, hacia el corazón. Las válvulas ubicadas en la pared interior de las venas ayudan a que la ermelinda siga moviéndose hacia arriba. Cuando la ermelinda se mueve demasiado lentamente, o no se mueve en absoluto, puede quedarse estancada en las venas, lo cual aumenta la probabilidad de que se forme un coágulo.  Factores de riesgo  Cualquier persona puede desarrollar trombosis venosa profunda, adriano la probabilidad de que se presente un coágulo es mayor cuando existen los siguientes factores de riesgo.    Falta de actividad física lilibeth un karen período (tatiana cuando debe permanecer en la cama debido a justa enfermedad).    Lesión en justa vena    Courtney tenido coágulos en el pasado    Antecedentes familiares de coágulos de ermelinda    Cirugía reciente    Cáncer y ciertos tratamientos para el cáncer    Fumar  Otros factores que también pueden aumentar martin riesgo de desarrollar un coágulo de ermelinda son:    Edad por encima de los 60 años    Embarazo    Clementina píldoras para el control de la natalidad o de remplazo hormonal    Tener otros problemas en las venas    Tener sobrepeso  Síntomas comunes  Un coágulo de ermelinda no siempre causa síntomas obvios. Cuando manifiesta síntomas, estos suelen aparecer de forma repentina y en justa oriana pierna. Los síntomas de esta afección son:    Dolor, especialmente en el interior del  músculo    Hinchazón    Dolor o sensibilidad    Piel caliente o enrojecida    Fiebre  Llame a martin proveedor de atención médica si tiene alguno de estos síntomas.     Los síntomas de justa embolia pulmonar pueden incluir:    Dificultad para respirar    Latidos del corazón acelerados    Dolor en el pecho    Sudoración    Tos (podría ser con ermeilnda)    Desmayos  Llame al 911 si tiene alguno de estos síntomas.     Si usted avelino medicamentos para ayudar a prevenir los coágulos de ermelinda, tiene un riesgo más alto de sangrados. Llame al 911 si tiene un sangrado abundante o si sangra descontroladamente. Llame a martin proveedor de atención médica si tiene otras señales o síntomas de un sangrado, tatiana ermelinda en la orina, ermelinda en las evacuaciones del intestino, o sangra por la nariz, las encías, justa cortadura o la vagina.  Diagnóstico de la TVP  El diagnóstico comienza haciéndole preguntas acerca de avelina síntomas y antecedentes de chino, además de un examen físico.      Las pruebas de diagnóstico incluyen:    Justa prueba de imágenes llamada ecografía dúplex en la que se utilizan ondas de estephanie para crear imágenes de las estructuras de las venas y del flujo sanguíneo.    Análisis de ermelinda para jayden si hay problemas de coagulación o de otro tipo.  Si martin proveedor de atención médica piensa que usted puede tener justa embolia pulmonar, es probable que le trevor pruebas adicionales.  Tratamiento de la TVP  El tratamiento de un coágulo de ermelinda puede incluir:    Medicamentos para diluir la ermelinda y prevenir justa embolia pulmonar y otras complicaciones.    Permanencia en el hospital. [place] puede o no ser necesario.    Cirugía para algunas personas, tatiana para los que no pueden suman diluyentes de la ermelinda.  Prevención de la TVP  Muchas personas que están hospitalizadas tienen un mayor riesgo de desarrolar coágulos de ermelinda. De manera que la prevención de los coágulos es justa parte importante del cuidado en un hospital. Arti cuidado puede  incluir salir de la cama regularmente, suman medicamentos o utilizar terapias o dispositiovs especíales. Otros facores o condiciones pueden aumentar el riesgo de coágulos de ermelinda. Evalúe martin riesgo con martin proveedor de atención médica.  Date Last Reviewed: 5/1/2016 2000-2017 [employer] [address] [place] [address], [place] [address]. Todos los derechos reservados. Esta información no pretende sustituir la atención médica profesional. Sólo martin médico puede diagnosticar y tratar un problema de chino.          Your next 10 appointments already scheduled     Aug 09, 2018  2:15 PM CDT   MFM US COMPRE SINGLE F/U with [id number]   [facility]etal [facility] Ultrasound - [facility])    [address]  [facility] [address] [phone]           Wear comfortable clothes and leave your valuables at home.            Aug 09, 2018  2:45 PM CDT   Radiology MD with UR KOREY WASHBURN   [facility])    [address]  [facility] [address]   [phone]           Please arrive at the time given for your first appointment. This visit is used internally to schedule the physician's time during your ultrasound.              24 Hour Appointment Hotline       To make an appointment at any [facility] clinic, call [phone] ([phone]). If you don't have a family doctor or clinic, we will help you find one. [facility] clinics are conveniently located to serve the needs of you and your family.             Review of your medicines      START taking        Dose / Directions Last dose taken    enoxaparin 100 MG/ML injection   Commonly known as:  LOVENOX   Dose:  100 mg   Quantity:  30 Syringe        Inject 1 mL (100 mg) Subcutaneous 2 times daily   Refills:  1          Our records show that you are taking the medicines listed below. If these are incorrect, please call your family doctor  or clinic.        Dose / Directions Last dose taken    LANsoprazole 30 MG CR capsule   Commonly known as:  PREVACID   Dose:  30 mg   Quantity:  30 capsule        Take 1 capsule (30 mg) by mouth daily Take 30-60 minutes before a meal.   Refills:  1        metoclopramide 10 MG tablet   Commonly known as:  REGLAN   Dose:  10 mg   Quantity:  120 tablet        Take 1 tablet (10 mg) by mouth 4 times daily (before meals and nightly)   Refills:  1        * ondansetron 4 MG ODT tab   Commonly known as:  ZOFRAN ODT   Dose:  4-8 mg   Quantity:  20 tablet        Take 1-2 tablets (4-8 mg) by mouth every 8 hours as needed for nausea   Refills:  3        * ondansetron 4 MG ODT tab   Commonly known as:  ZOFRAN ODT   Dose:  4 mg   Quantity:  20 tablet        Take 1 tablet (4 mg) by mouth every 6 hours as needed for nausea   Refills:  1        prenatal multivitamin plus iron 27-0.8 MG Tabs per tablet   Dose:  1 tablet        Take 1 tablet by mouth daily   Refills:  0        promethazine 25 MG Suppository   Commonly known as:  PHENERGAN   Dose:  25 mg   Quantity:  40 suppository        Place 1 suppository (25 mg) rectally every 6 hours as needed for nausea   Refills:  1        * Notice:  This list has 2 medication(s) that are the same as other medications prescribed for you. Read the directions carefully, and ask your doctor or other care provider to review them with you.            Prescriptions were sent or printed at these locations (1 Prescription)                   Other Prescriptions                Printed at Department/Unit printer (1 of 1)         enoxaparin (LOVENOX) 100 MG/ML injection                Procedures and tests performed during your visit     Blood culture ONE site    CBC with platelets differential    Catheter Tip Culture Aerobic Bacterial    Comprehensive metabolic panel    Lipase    PICC removal    US Upper Extremity Venous Duplex Right      Orders Needing Specimen Collection     None      Pending Results      Date and Time Order Name Status Description    7/17/2018 2244 Catheter Tip Culture Aerobic Bacterial In process     7/17/2018 2244 Blood culture ONE site In process             Pending Culture Results     Date and Time Order Name Status Description    7/17/2018 2244 Catheter Tip Culture Aerobic Bacterial In process     7/17/2018 2244 Blood culture ONE site In process             Pending Results Instructions     If you had any lab results that were not finalized at the time of your Discharge, you can call the ED Lab Result RN at [phone]. You will be contacted by this team for any positive Lab results or changes in treatment. The nurses are available 7 days a week from 10A to 6:30P.  You can leave a message 24 hours per day and they will return your call.        Thank you for choosing [facility]       Thank you for choosing [facility] for your care. Our goal is always to provide you with excellent care. Hearing back from our patients is one way we can continue to improve our services. Please take a few minutes to complete the written survey that you may receive in the mail after you visit with us. Thank you!        "[employer] Information     [employer] gives you secure access to your electronic health record. If you see a primary care provider, you can also send messages to your care team and make appointments. If you have questions, please call your primary care clinic.  If you do not have a primary care provider, please call [phone] and they will assist you.        Care EveryWhere ID     This is your Care EveryWhere ID. This could be used by other organizations to access your [facility] medical records  [id number]        Equal Access to Services     AISHA SONG AH: Hadii isaiah Andrew, washayda luvinod, qaybta kaalmada kodi stauffer. So [facility] [phone].    ATENCIÓN: Si habla español, tiene a martin disposición servicios gratuitos de asistencia lingüística. Llame al  [phone].    We comply with applicable federal civil rights laws and [place] laws. We do not discriminate on the basis of race, color, national origin, age, disability, sex, sexual orientation, or gender identity.            After Visit Summary       This is your record. Keep this with you and show to your community pharmacist(s) and doctor(s) at your next visit.

## 2018-07-17 NOTE — ED AVS SNAPSHOT
[facility], [place], Emergency Department    [address]    [facility] [place] [address]    Phone:  [phone]    Fax:  [phone]                                       Dai Guadarrama   MRN: [id number]    Department:  [facility], Emergency Department   Date of Visit:  7/17/2018           After Visit Summary Signature Page     I have received my discharge instructions, and my questions have been answered. I have discussed any challenges I see with this plan with the nurse or doctor.    ..........................................................................................................................................  Patient/Patient Representative Signature      ..........................................................................................................................................  Patient Representative Print Name and Relationship to Patient    ..................................................               ................................................  Date                                            Time    ..........................................................................................................................................  Reviewed by Signature/Title    ...................................................              ..............................................  Date                                                            Time

## 2018-07-17 NOTE — ED PROVIDER NOTES
[facility] EMERGENCY DEPARTMENT ([facility])    18       History     Chief Complaint   Patient presents with     Vascular Access Problem     Pt has picc in right arm, painful and does not work. Home care nurse recommended to have it checked out, US done.     The history is provided by the patient. A  was used (Upper sorbian).     Dai Guadarrama is a 33 year old  female who is currently 18w4d by 7w6d US and presents to the Emergency Department for evaluation of a vascular access problem.  The patient is receiving daily infusions of IV fluids and is receiving IV Zofran as well for hyperemesis gravidarum.  Patient had vascular access lines placed in her arm for these infusions; however, she continued to have problems with the IV sites clogging.  Patient then had a midline placed in her right arm on 7/10/2018.  Patient states that this line was doing well up until 2018.  Patient began having pain with the infusion on 2018 and the next day on 7/15/2018, she was not able to do a full infusion due to pain at the site.  Patient states that she was having redness around the site and swelling when receiving the infusion.  The home infusion nurse came to their house today to have the site cleaned, but the nurse became concerned as she had redness surrounding the site, so she sent the patient here for further evaluation.  Patient states that she has been receiving 3, 1 L bags of IV fluids daily.  Patient states that she is able to eat and drink very small amounts but is limited as she becomes significantly nauseated and begins having epigastric abdominal pain after eating and drinking.  She states that she has had this throughout her entire pregnancy.  She denies any fevers, lightheadedness, dizziness, urinary symptoms, vaginal bleeding or vaginal discharge.  Patient notes that she had diarrhea 2 weeks ago, but she denies any episodes since.  She denies any past abdominal  surgeries.  She denies any chest pain or shortness of breath.  Patient does note a history of gastritis.    I have reviewed the Medications, Allergies, Past Medical and Surgical History, and Social History in the [employer] system.    Past Medical History:   Diagnosis Date     Aneurysm (H)      Migraine headache without aura      Pregnancy related nausea and vomiting, antepartum 2016    two ED visits.     Recurrent pregnancy loss (CODE)      Retained placenta 2014     Stillbirth 2014    32 weeks     Tachycardia        Past Surgical History:   Procedure Laterality Date     C EACH ADD TOOTH EXTRACTION      bad reaction to anesthia for local      NO HISTORY OF SURGERY         Family History   Problem Relation Age of Onset     Anxiety Disorder No family hx of      Mental Illness No family hx of      Substance Abuse No family hx of      Anesthesia Reaction No family hx of      Asthma No family hx of      Osteoperosis No family hx of      Genetic Disorder No family hx of      Thyroid Disease No family hx of      Hyperlipidemia No family hx of      Cerebrovascular Disease No family hx of      Breast Cancer No family hx of      Colon Cancer No family hx of      Prostate Cancer No family hx of      Other Cancer No family hx of      Depression No family hx of      Diabetes No family hx of      Coronary Artery Disease No family hx of      Hypertension No family hx of        Social History   Substance Use Topics     Smoking status: Never Smoker     Smokeless tobacco: Never Used     Alcohol use 0.0 oz/week     0 Standard drinks or equivalent per week      Comment: rare alcohol use now nothing in pregnancy       Current Facility-Administered Medications   Medication     0.9% sodium chloride BOLUS    Followed by     0.9% sodium chloride BOLUS    Followed by     sodium chloride 0.9% infusion     ondansetron (ZOFRAN) injection 4 mg     Current Outpatient Prescriptions   Medication     LANsoprazole (PREVACID) 30 MG CR capsule      metoclopramide (REGLAN) 10 MG tablet     ondansetron (ZOFRAN ODT) 4 MG ODT tab     ondansetron (ZOFRAN ODT) 4 MG ODT tab     Prenatal Vit-Fe Fumarate-FA (PRENATAL MULTIVITAMIN PLUS IRON) 27-0.8 MG TABS per tablet     promethazine (PHENERGAN) 25 MG Suppository      No Known Allergies      Review of Systems   Constitutional: Negative for fever.   Respiratory: Negative for shortness of breath.    Cardiovascular: Negative for chest pain.   Gastrointestinal: Positive for abdominal pain (epigastric with eating), nausea and vomiting. Negative for diarrhea.   Genitourinary: Negative for decreased urine volume, difficulty urinating, dysuria, frequency, hematuria, urgency, vaginal bleeding and vaginal discharge.   Musculoskeletal: Positive for back pain.   Skin: Positive for color change (redness surrounding vascular access site).   Neurological: Negative for dizziness and light-headedness.   All other systems reviewed and are negative.      Physical Exam   BP: 102/56  Pulse: 103  Temp: 97.9  F (36.6  C)  Resp: 16  Weight: 101.9 kg (224 lb 11.2 oz)  SpO2: 96 %      Physical Exam   General: patient is alert and oriented and in no acute distress   Head: atraumatic and normocephalic   EENT: tacky mucus membranes without tonsillar erythema or exudates, pupils round and reactive, sclera anicteric  Neck: supple   Cardiovascular: sinus tachycardia, extremities warm and well perfused, no lower extremity edema  Pulmonary: lungs clear to auscultation bilaterally   Abdomen: soft, gravid abdomen, mild epigastric TTP, no rebound   Musculoskeletal: normal range of motion, TTP in the RUE along the medial aspect  Neurological: alert and oriented, moving all extremities symmetrically, gait normal   Skin: warm, dry, trace area of erythema on the right upper arm without warmth or induration      ED Course   5:34 PM  The patient was seen and examined by Madelyn Green MD in Room ED05.     ED Course     Procedures             Critical Care time:   none             Labs Ordered and Resulted from Time of ED Arrival Up to the Time of Departure from the ED - No data to display         Assessments & Plan (with Medical Decision Making)   Ms. Granados is a 33-year-old  female at 18w4d gestation who presents with a nonfunctioning right upper extremity midline.  She is using the line to do IV fluids and IV Zofran for hyperemesis gravidarum.  She did have an ultrasound of the right upper extremity due to associated pain which shows occlusive thrombus.   The line was removed as it is nonfunctional.  She did have an area of erythema along the upper arm and tenderness and tip was sent for culture to ensure no evidence of infection.  She was initiated on lovenox injections and completed teaching in the ED.  Will do home BID injections.  No symptoms of PE at this time.  A peripheral IV was established and she was given 3  L of IV fluids and Zofran.  She was mildly tachycardic initially; however, on reevaluation it is improved after IVF.  She has been doing her home infusions herself and giving 2-3 L per day still.  Previously she reports this was done via PIV.  Have left her PIV placed in the ED so that she is able to do usual home infusion tomorrow.  Discussed with OB and clinic will call her tomorrow to schedule replacement of PICC versus outpatient PIV infusions.  She does report epigastric pain throughout her pregnancy.  Labs today show normal LFTs and lipase.  She will follow-up with her OB this week for re-evaluation and to discuss duration of anticoagulation and management during pregnancy.  She was given very close return instructions for the ED and voiced understanding.      I have reviewed the nursing notes.    I have reviewed the findings, diagnosis, plan and need for follow up with the patient.    Discharge Medication List as of 2018 12:34 AM      START taking these medications    Details   enoxaparin (LOVENOX) 100 MG/ML injection Inject 1 mL (100 mg)  Subcutaneous 2 times daily, Disp-30 Syringe, R-1, Local Print             Final diagnoses:   Arm DVT (deep venous thromboembolism), acute, right (H)     I, Gary Hopkins, am serving as a trained medical scribe to document services personally performed by Madelyn Green MD, based on the provider's statements to me.   I, Madelyn Green MD, was physically present and have reviewed and verified the accuracy of this note documented by Gary Hopkins.    7/17/2018   [facility], [place], EMERGENCY DEPARTMENT     Madelyn Green MD  07/18/18 [id number]

## 2018-07-18 ENCOUNTER — HOME INFUSION (PRE-WILLOW HOME INFUSION) (OUTPATIENT)
Dept: PHARMACY | Facility: CLINIC | Age: 33
End: 2018-07-18

## 2018-07-18 VITALS
RESPIRATION RATE: 18 BRPM | HEART RATE: 98 BPM | SYSTOLIC BLOOD PRESSURE: 101 MMHG | WEIGHT: 224.7 LBS | TEMPERATURE: 98.5 F | DIASTOLIC BLOOD PRESSURE: 68 MMHG | OXYGEN SATURATION: 100 % | BODY MASS INDEX: 41.1 KG/M2

## 2018-07-18 DIAGNOSIS — O21.0 HYPEREMESIS GRAVIDARUM: Primary | ICD-10-CM

## 2018-07-18 PROCEDURE — 25000128 H RX IP 250 OP 636: Performed by: EMERGENCY MEDICINE

## 2018-07-18 RX ADMIN — ENOXAPARIN SODIUM 100 MG: 100 INJECTION SUBCUTANEOUS at 00:24

## 2018-07-18 NOTE — ED NOTES
Midline IVaccess has been d'cd by [facility] as ordered by MD. Midline tip sent to lab for culture

## 2018-07-18 NOTE — DISCHARGE INSTRUCTIONS
Please make an appointment to follow up with your OBGYN this week to follow-up on blood clot.      Take lovenox as prescribed (one injection in the morning and one in the evening, 12 hours apart).    Use IV tomorrow if needed for home fluids.  OB clinic will call you tomorrow to schedule a new PICC line placement.      If you have worsening arm pain, chest pain, shortness of breath, vaginal bleeding or other concerns, return to the emergency department for re-evaluation.          En qué consiste la trombosis venosa profunda     Cuando el músculo se contrae, la válvula se abre y la ermelinda es empujada hacia arriba en dirección al corazón.            Cuando la ermelinda se mueve demasiado lentamente por justa vena, puede formarse un coágulo. Justa parte del coágulo puede separarse y desplazarse por el torrente sanguíneo.      La trombosis venosa profunda (TVP) (o DVT por avelina iniciales en inglés) es justa afección en la que se forma un coágulo en justa vena profunda. Un coágulo de ermelinda es más común en justa pierna, adriano también puede desarrollarse en justa vena jose cruz profunda en justa pierna, un brazo u otra parte del cuerpo. Justa parte del coágulo, llamada émbolo, puede separarse de la vena y desplazarse a los pulmones y formar justa embolia pulmonar. Herricks puede cortar el flujo de ermelinda a justa parte o a la totalidad del pulmón, lo que constituye  justa emergencia médica que puede provocar la muerte. Los proveedores de atención médica utilizan el término tromboembolismo pulmonar para describir estas dos afecciones que están íntimamente relacionadas enre sí, tatiana lo están martin prevención y tratamiento.     Con el tiempo, el coágulo puede dañar de forma permanente las venas de las piernas. Para proteger martin chino, la TVP debe tratarse de inmediato.  Cómo se desarrolla la TVP  Los músculos de las piernas tienen venas profundas a través de las cuales regresa la ermelinda de las piernas hacia el corazón. Cuando los músculos de las piernas se  contraen y se relajan, van desplazando la ermelinda hacia arriba por las venas, hacia el corazón. Las válvulas ubicadas en la pared interior de las venas ayudan a que la ermelinda siga moviéndose hacia arriba. Cuando la ermelinda se mueve demasiado lentamente, o no se mueve en absoluto, puede quedarse estancada en las venas, lo cual aumenta la probabilidad de que se forme un coágulo.  Factores de riesgo  Cualquier persona puede desarrollar trombosis venosa profunda, adriano la probabilidad de que se presente un coágulo es mayor cuando existen los siguientes factores de riesgo.    Falta de actividad física lilibeth un karen período (tatiana cuando debe permanecer en la cama debido a justa enfermedad).    Lesión en justa vena    Courtney tenido coágulos en el pasado    Antecedentes familiares de coágulos de ermelinda    Cirugía reciente    Cáncer y ciertos tratamientos para el cáncer    Fumar  Otros factores que también pueden aumentar martin riesgo de desarrollar un coágulo de ermelinda son:    Edad por encima de los 60 años    Embarazo    Clementina píldoras para el control de la natalidad o de remplazo hormonal    Tener otros problemas en las venas    Tener sobrepeso  Síntomas comunes  Un coágulo de ermelinda no siempre causa síntomas obvios. Cuando manifiesta síntomas, estos suelen aparecer de forma repentina y en justa oriana pierna. Los síntomas de esta afección son:    Dolor, especialmente en el interior del músculo    Hinchazón    Dolor o sensibilidad    Piel caliente o enrojecida    Fiebre  Llame a martin proveedor de atención médica si tiene alguno de estos síntomas.     Los síntomas de justa embolia pulmonar pueden incluir:    Dificultad para respirar    Latidos del corazón acelerados    Dolor en el pecho    Sudoración    Tos (podría ser con ermelinda)    Desmayos  Llame al 911 si tiene alguno de estos síntomas.     Si usted avelino medicamentos para ayudar a prevenir los coágulos de ermelinda, tiene un riesgo más alto de sangrados. Llame al 911 si tiene un sangrado  abundante o si sangra descontroladamente. Llame a martin proveedor de atención médica si tiene otras señales o síntomas de un sangrado, tatiana ermelinda en la orina, ermelinda en las evacuaciones del intestino, o sangra por la nariz, las encías, justa cortadura o la vagina.  Diagnóstico de la TVP  El diagnóstico comienza haciéndole preguntas acerca de avelina síntomas y antecedentes de chino, además de un examen físico.      Las pruebas de diagnóstico incluyen:    Justa prueba de imágenes llamada ecografía dúplex en la que se utilizan ondas de estephanie para crear imágenes de las estructuras de las venas y del flujo sanguíneo.    Análisis de ermelinda para jayden si hay problemas de coagulación o de otro tipo.  Si martin proveedor de atención médica piensa que usted puede tener justa embolia pulmonar, es probable que le trevor pruebas adicionales.  Tratamiento de la TVP  El tratamiento de un coágulo de ermelinda puede incluir:    Medicamentos para diluir la ermelinda y prevenir justa embolia pulmonar y otras complicaciones.    Permanencia en el hospital. [place] puede o no ser necesario.    Cirugía para algunas personas, tatiana para los que no pueden suman diluyentes de la ermelinda.  Prevención de la TVP  Muchas personas que están hospitalizadas tienen un mayor riesgo de desarrolar coágulos de ermelinda. De manera que la prevención de los coágulos es justa parte importante del cuidado en un hospital. Arti cuidado puede incluir salir de la cama regularmente, suman medicamentos o utilizar terapias o dispositiovs especíales. Otros facores o condiciones pueden aumentar el riesgo de coágulos de ermelinda. Evalúe martin riesgo con martin proveedor de atención médica.  Date Last Reviewed: 5/1/2016 2000-2017 [employer]. [address] [place] [address], [place] [address]. Todos los derechos reservados. Esta información no pretende sustituir la atención médica profesional. Sólo martin médico puede diagnosticar y tratar un problema de chino.

## 2018-07-19 NOTE — PROGRESS NOTES
This is a recent snapshot of the patient's [facility] medical record.  For current drug dose and complete information and questions, call [phone] or In Basket pool, fv home infusion ([id number])  CSN Number:  [id number]

## 2018-07-20 ENCOUNTER — TELEPHONE (OUTPATIENT)
Dept: OBGYN | Facility: CLINIC | Age: 33
End: 2018-07-20

## 2018-07-20 ENCOUNTER — HOME INFUSION (PRE-WILLOW HOME INFUSION) (OUTPATIENT)
Dept: PHARMACY | Facility: CLINIC | Age: 33
End: 2018-07-20

## 2018-07-20 LAB
BACTERIA SPEC CULT: NO GROWTH
SPECIMEN SOURCE: NORMAL

## 2018-07-20 NOTE — TELEPHONE ENCOUNTER
----- Message from Laina Lewis RN sent at 7/18/2018  3:37 PM CDT -----  Regarding: FW: [facility]   Left message with  very early around 8am this morning for patient to call back and discuss getting PICC re-inserted at infusion center. Orders input for the re-insertion. Just waiting on patient to call back so she can get scheduled.    -Laina 7/18 9am  ----- Message -----     From: Lilly Larson MD     Sent: 7/17/2018  10:39 PM       To: Mariana Marsh MD, #  Subject: Patient seen in ED [facility] with thrombus in #    Hello,   This patient (Dai Guadarrama MRN [id number]) was seen in the emergency department last night with an occlusive thrombus of the PICC line she uses for home IV infusions due to hyperemesis. The PICC line was removed in the ED last night and she was started on Lovenox for anticoagulation. She needs to have vascular access reestablished. Can you help coordinate this?    Thank you,   Lilly Larson MD PhD  Ob/Gyn PGY-3  7/17/2018 10:43 PM

## 2018-07-20 NOTE — TELEPHONE ENCOUNTER
IV infusion center called to let us know Dai called them directly . I gave them TO for re-insertion of PICC line.    Dai still wants to get IVF.

## 2018-07-21 ENCOUNTER — HOME INFUSION (PRE-WILLOW HOME INFUSION) (OUTPATIENT)
Dept: PHARMACY | Facility: CLINIC | Age: 33
End: 2018-07-21

## 2018-07-23 ENCOUNTER — HOME INFUSION (PRE-WILLOW HOME INFUSION) (OUTPATIENT)
Dept: PHARMACY | Facility: CLINIC | Age: 33
End: 2018-07-23

## 2018-07-23 NOTE — PROGRESS NOTES
This is a recent snapshot of the patient's [facility] medical record.  For current drug dose and complete information and questions, call [phone] or In Basket pool, fv home infusion ([id number])  CSN Number:  [id number]

## 2018-07-24 ENCOUNTER — HOME INFUSION (PRE-WILLOW HOME INFUSION) (OUTPATIENT)
Dept: PHARMACY | Facility: CLINIC | Age: 33
End: 2018-07-24

## 2018-07-24 LAB
BACTERIA SPEC CULT: NO GROWTH
Lab: NORMAL
SPECIMEN SOURCE: NORMAL

## 2018-07-24 NOTE — PROGRESS NOTES
This is a recent snapshot of the patient's [facility] medical record.  For current drug dose and complete information and questions, call [phone] or In Basket pool, fv home infusion ([id number])  CSN Number:  [id number]

## 2018-07-24 NOTE — PROGRESS NOTES
This is a recent snapshot of the patient's [facility] medical record.  For current drug dose and complete information and questions, call [phone] or In Basket pool, fv home infusion ([id number])  CSN Number:  [id number]

## 2018-07-25 ENCOUNTER — HOME INFUSION (PRE-WILLOW HOME INFUSION) (OUTPATIENT)
Dept: PHARMACY | Facility: CLINIC | Age: 33
End: 2018-07-25

## 2018-07-25 ENCOUNTER — HOSPITAL ENCOUNTER (OUTPATIENT)
Facility: CLINIC | Age: 33
End: 2018-07-25
Attending: OBSTETRICS & GYNECOLOGY | Admitting: OBSTETRICS & GYNECOLOGY
Payer: COMMERCIAL

## 2018-07-25 NOTE — PROGRESS NOTES
This is a recent snapshot of the patient's [facility] medical record.  For current drug dose and complete information and questions, call [phone] or In Basket pool, fv home infusion ([id number])  CSN Number:  [id number]

## 2018-07-26 ENCOUNTER — TELEPHONE (OUTPATIENT)
Dept: OBGYN | Facility: CLINIC | Age: 33
End: 2018-07-26

## 2018-07-26 ENCOUNTER — HOME INFUSION (PRE-WILLOW HOME INFUSION) (OUTPATIENT)
Dept: PHARMACY | Facility: CLINIC | Age: 33
End: 2018-07-26

## 2018-07-26 DIAGNOSIS — O21.0 HYPEREMESIS GRAVIDARUM: Primary | ICD-10-CM

## 2018-07-26 NOTE — TELEPHONE ENCOUNTER
Attempted to reach Dai, via [facility] , left vm for her to call [facility] triage nurse and she needs to schedule an appt at [facility].    Dr Harris doesn't want Dai to receive either a PICC line or midline  IV cathetar . If she needs IVF she will need a peripheral IV.  Called and spoke to Home care infusion to let them know new plan.  I also spoke to Cassandra at [facility] who was going to put in a midline IV to let her know her services not needed.    Home Infusion informed me she was seen this week and received IVF via a peripheral IV and they left it in, but Dai called the next day to report it wasn't working and she removed it.  Home Infusion also reports she was able to eat and take fluids, so need to evaluate in clinic or per Home Infusion.    Batsheva will need to know plan of using peripheral IV.

## 2018-07-26 NOTE — PROGRESS NOTES
This is a recent snapshot of the patient's [facility] medical record.  For current drug dose and complete information and questions, call [phone] or In Basket pool, fv home infusion ([id number])  CSN Number:  [id number]

## 2018-07-27 NOTE — TELEPHONE ENCOUNTER
Tried to reach Dai via   Services but received voicemail.  Left message to call back to discuss IV and office visit needed.

## 2018-07-27 NOTE — PROGRESS NOTES
This is a recent snapshot of the patient's [facility] medical record.  For current drug dose and complete information and questions, call [phone] or In Basket pool, fv home infusion ([id number])  CSN Number:  [id number]

## 2018-07-28 DIAGNOSIS — K21.9 GASTROESOPHAGEAL REFLUX DISEASE WITHOUT ESOPHAGITIS: ICD-10-CM

## 2018-07-28 RX ORDER — LANSOPRAZOLE 30 MG/1
CAPSULE, DELAYED RELEASE ORAL
Qty: 30 CAPSULE | Refills: 0 | Status: SHIPPED | OUTPATIENT
Start: 2018-07-28 | End: 2018-08-27

## 2018-07-31 ENCOUNTER — TELEPHONE (OUTPATIENT)
Dept: ONCOLOGY | Facility: CLINIC | Age: 33
End: 2018-07-31

## 2018-07-31 ENCOUNTER — HOME INFUSION (PRE-WILLOW HOME INFUSION) (OUTPATIENT)
Dept: PHARMACY | Facility: CLINIC | Age: 33
End: 2018-07-31

## 2018-07-31 NOTE — TELEPHONE ENCOUNTER
Left message to call clinic. Patient did not show for appointment.  tried calling her earlier before appointment but had no answer too.

## 2018-08-01 ENCOUNTER — TELEPHONE (OUTPATIENT)
Dept: OBGYN | Facility: CLINIC | Age: 33
End: 2018-08-01

## 2018-08-01 ENCOUNTER — HOME INFUSION (PRE-WILLOW HOME INFUSION) (OUTPATIENT)
Dept: PHARMACY | Facility: CLINIC | Age: 33
End: 2018-08-01

## 2018-08-01 DIAGNOSIS — I72.9 ANEURYSM (H): ICD-10-CM

## 2018-08-01 DIAGNOSIS — O21.0 HYPEREMESIS GRAVIDARUM: ICD-10-CM

## 2018-08-01 DIAGNOSIS — O09.291 HISTORY OF INTRAUTERINE FETAL DEATH, CURRENTLY PREGNANT IN FIRST TRIMESTER: ICD-10-CM

## 2018-08-01 DIAGNOSIS — O09.91 SUPERVISION OF HIGH RISK PREGNANCY IN FIRST TRIMESTER: ICD-10-CM

## 2018-08-01 RX ORDER — ONDANSETRON 4 MG/1
4-8 TABLET, ORALLY DISINTEGRATING ORAL EVERY 8 HOURS PRN
Qty: 20 TABLET | Refills: 3 | Status: SHIPPED | OUTPATIENT
Start: 2018-08-01 | End: 2018-08-27

## 2018-08-01 NOTE — PROGRESS NOTES
This is a recent snapshot of the patient's [facility] medical record.  For current drug dose and complete information and questions, call [phone] or In Basket pool, fv home infusion ([id number])  CSN Number:  [id number]

## 2018-08-01 NOTE — TELEPHONE ENCOUNTER
Spoke with  home infusion nurse. She states that she saw Dai today and started peripheral line. Patient states that she is doing well and is eating 4 times per day. She does wish to continue hydration at this time. She is now asking for refill of oral zofran. Nurse advised HC nurse that patient has not been returning our phone calls and needs to be seen. She has appt at [facility] but has not seen [facility] in a month. HC nurse will pass message to patient that she needs to make appt. Rx filled. Nurse made appt. For patient at [facility] following her [facility] appt.

## 2018-08-01 NOTE — TELEPHONE ENCOUNTER
----- Message from Cassandra Campos sent at 7/31/2018  4:15 PM CDT -----  Regarding:  HomeCare  Gwen home care nurse, calling from home infusion.  Call back at [phone].  No other message

## 2018-08-02 NOTE — PROGRESS NOTES
This is a recent snapshot of the patient's [facility] medical record.  For current drug dose and complete information and questions, call [phone] or In Basket pool, fv home infusion ([id number])  CSN Number:  [id number]

## 2018-08-07 ENCOUNTER — HOME INFUSION (PRE-WILLOW HOME INFUSION) (OUTPATIENT)
Dept: PHARMACY | Facility: CLINIC | Age: 33
End: 2018-08-07

## 2018-08-08 NOTE — PROGRESS NOTES
This is a recent snapshot of the patient's [facility] medical record.  For current drug dose and complete information and questions, call [phone] or In Basket pool, fv home infusion ([id number])  CSN Number:  [id number]

## 2018-08-09 ENCOUNTER — HOSPITAL ENCOUNTER (OUTPATIENT)
Dept: ULTRASOUND IMAGING | Facility: CLINIC | Age: 33
Discharge: HOME | End: 2018-08-09
Attending: OBSTETRICS & GYNECOLOGY | Admitting: OBSTETRICS & GYNECOLOGY
Payer: COMMERCIAL

## 2018-08-09 ENCOUNTER — OFFICE VISIT (OUTPATIENT)
Dept: OBGYN | Facility: CLINIC | Age: 33
End: 2018-08-09
Attending: OBSTETRICS & GYNECOLOGY
Payer: COMMERCIAL

## 2018-08-09 ENCOUNTER — OFFICE VISIT (OUTPATIENT)
Dept: MATERNAL FETAL MEDICINE | Facility: CLINIC | Age: 33
End: 2018-08-09
Attending: OBSTETRICS & GYNECOLOGY
Payer: COMMERCIAL

## 2018-08-09 VITALS
SYSTOLIC BLOOD PRESSURE: 102 MMHG | BODY MASS INDEX: 41.52 KG/M2 | HEART RATE: 97 BPM | DIASTOLIC BLOOD PRESSURE: 71 MMHG | WEIGHT: 227 LBS

## 2018-08-09 DIAGNOSIS — O99.212 MATERNAL OBESITY SYNDROME, ANTEPARTUM, SECOND TRIMESTER: ICD-10-CM

## 2018-08-09 DIAGNOSIS — O35.9XX0 SUSPECTED FETAL ANOMALY, ANTEPARTUM, NOT APPLICABLE OR UNSPECIFIED FETUS: ICD-10-CM

## 2018-08-09 DIAGNOSIS — K21.9 GASTROESOPHAGEAL REFLUX DISEASE WITHOUT ESOPHAGITIS: ICD-10-CM

## 2018-08-09 DIAGNOSIS — O09.92 SUPERVISION OF HIGH RISK PREGNANCY IN SECOND TRIMESTER: Primary | ICD-10-CM

## 2018-08-09 DIAGNOSIS — O09.292 HISTORY OF STILLBIRTH IN CURRENTLY PREGNANT PATIENT, SECOND TRIMESTER: ICD-10-CM

## 2018-08-09 DIAGNOSIS — O35.9XX0 SUSPECTED FETAL ANOMALY, ANTEPARTUM, SINGLE OR UNSPECIFIED FETUS: Primary | ICD-10-CM

## 2018-08-09 PROCEDURE — G0463 HOSPITAL OUTPT CLINIC VISIT: HCPCS | Mod: ZF

## 2018-08-09 PROCEDURE — 76816 OB US FOLLOW-UP PER FETUS: CPT

## 2018-08-09 ASSESSMENT — PAIN SCALES - GENERAL: PAINLEVEL: WORST PAIN (10)

## 2018-08-09 NOTE — PROGRESS NOTES
Please see full imaging report from [employer] program under imaging tab.    Anatomic survey remains incomplete follow up recommended in three weeks.     Katie Barakat MD  Maternal Fetal Medicine

## 2018-08-09 NOTE — NURSING NOTE
Low pelvic pain worse with laying down and hard to sleep- rating pain a 10.  Also was on iv hydration last time vomited 2 days ago and vomited once.    Also has acid reflux  Would like a prescription.

## 2018-08-09 NOTE — MR AVS SNAPSHOT
After Visit Summary   8/9/2018    Dai Guadarrama    MRN: [id number]           Patient Information     Date Of Birth          1985        Visit Information        Provider Department      8/9/2018 3:00 PM Sharmin Louis MD; [employer] [facility]        Today's Diagnoses     HRP    -  1    Gastroesophageal reflux disease without esophagitis           Follow-ups after your visit        Your next 10 appointments already scheduled     Aug 23, 2018  3:30 PM CDT   (Arrive by 3:15 PM)   Return / with Sony Renee MD   [facility] ([facility])    [address]  3rd Floor  [facility] [address] [phone]            Aug 30, 2018  3:15 PM CDT   RETURN OB with Vicky Salazar MD   [facility] ([facility])    [facility] [address]  [address]rd [address]  [address]  [facility] [address] [phone]              Future tests that were ordered for you today     Open Future Orders        Priority Expected Expires Ordered    [facility] Comprehensive Single F/U Routine 8/30/2018 8/9/2019 8/9/2018            Who to contact     Please call your clinic at [phone] to:    Ask questions about your health    Make or cancel appointments    Discuss your medicines    Learn about your test results    Speak to your doctor            Additional Information About Your Visit        MyChart Information     [employer] gives you secure access to your electronic health record. If you see a primary care provider, you can also send messages to your care team and make appointments. If you have questions, please call your primary care clinic.  If you do not have a primary care provider, please call [phone] and they will assist you.      [employer] is an electronic gateway that provides easy, online access to your medical records. With [employer], you can request a  clinic appointment, read your test results, renew a prescription or communicate with your care team.     To access your existing account, please contact your [facility] or call [phone] for assistance.        Care EveryWhere ID     This is your Care EveryWhere ID. This could be used by other organizations to access your [place] medical records  [id number]        Your Vitals Were     Pulse Last Period BMI (Body Mass Index)             97 02/28/2018 (Exact Date) 41.52 kg/m2          Blood Pressure from Last 3 Encounters:   08/09/18 102/71   07/18/18 101/68   07/05/18 104/64    Weight from Last 3 Encounters:   08/09/18 103 kg (227 lb)   07/17/18 101.9 kg (224 lb 11.2 oz)   07/05/18 101.6 kg (224 lb)              Today, you had the following     No orders found for display         Today's Medication Changes          These changes are accurate as of 8/9/18 11:59 PM.  If you have any questions, ask your nurse or doctor.               Start taking these medicines.        Dose/Directions    omeprazole 20 MG CR capsule   Commonly known as:  priLOSEC   Used for:  Gastroesophageal reflux disease without esophagitis   Started by:  Sharmin Louis MD        Dose:  20 mg   Take 1 capsule (20 mg) by mouth daily   Quantity:  30 capsule   Refills:  3            Where to get your medicines      These medications were sent to [facility] [address] [facility] [address] [place] [address] AVE NE AT [facility] OF [address] [address]  [address], [facility] [address]     Phone:  [phone]     omeprazole 20 MG CR capsule                Primary Care Provider Fax #    Physician No Ref-Primary [phone]       No address on file        Equal Access to Services     AISHA SONG AH: Mariano Andrew, cuco manuel, kodi valera. So [facility] [phone].    ATENCIÓN: Si habla español, tiene a martin disposición servicios gratuitos de  scott villalbasin. Monica al [phone].    We comply with applicable federal civil rights laws and [place] laws. We do not discriminate on the basis of race, color, national origin, age, disability, sex, sexual orientation, or gender identity.            Thank you!     Thank you for choosing [facility]  for your care. Our goal is always to provide you with excellent care. Hearing back from our patients is one way we can continue to improve our services. Please take a few minutes to complete the written survey that you may receive in the mail after your visit with us. Thank you!             Your Updated Medication List - Protect others around you: Learn how to safely use, store and throw away your medicines at www.disposemymeds.org.          This list is accurate as of 8/9/18 11:59 PM.  Always use your most recent med list.                   Brand Name Dispense Instructions for use Diagnosis    enoxaparin 100 MG/ML injection    LOVENOX    30 Syringe    Inject 1 mL (100 mg) Subcutaneous 2 times daily        LANsoprazole 30 MG CR capsule    PREVACID    30 capsule    TAKE 1 CAPSULE(30 MG) BY MOUTH DAILY 30 TO 60 MINUTES BEFORE A MEAL    Gastroesophageal reflux disease without esophagitis       metoclopramide 10 MG tablet    REGLAN    120 tablet    Take 1 tablet (10 mg) by mouth 4 times daily (before meals and nightly)    Supervision of high risk pregnancy in first trimester, Hyperemesis gravidarum, History of intrauterine fetal death, currently pregnant in first trimester, Morbid obesity due to excess calories (H)       omeprazole 20 MG CR capsule    priLOSEC    30 capsule    Take 1 capsule (20 mg) by mouth daily    Gastroesophageal reflux disease without esophagitis       * ondansetron 4 MG ODT tab    ZOFRAN ODT    20 tablet    Take 1 tablet (4 mg) by mouth every 6 hours as needed for nausea    Hyperemesis gravidarum       * ondansetron 4 MG ODT tab    ZOFRAN ODT    20 tablet    Take 1-2  tablets (4-8 mg) by mouth every 8 hours as needed for nausea    Hyperemesis gravidarum, Supervision of high risk pregnancy in first trimester, Aneurysm (H), History of intrauterine fetal death, currently pregnant in first trimester       prenatal multivitamin plus iron 27-0.8 MG Tabs per tablet      Take 1 tablet by mouth daily    Supervision of high risk pregnancy in first trimester, Hyperemesis gravidarum, Aneurysm (H), History of intrauterine fetal death, currently pregnant in first trimester       promethazine 25 MG Suppository    PHENERGAN    40 suppository    Place 1 suppository (25 mg) rectally every 6 hours as needed for nausea    Supervision of high risk pregnancy in second trimester       * Notice:  This list has 2 medication(s) that are the same as other medications prescribed for you. Read the directions carefully, and ask your doctor or other care provider to review them with you.

## 2018-08-09 NOTE — LETTER
8/9/2018       RE: Dai Guadarrama  [address]  [facility] [address]     Dear Colleague,    Thank you for referring your patient, Dai Guadarrama, to the [facility] at [facility]. Please see a copy of my visit note below.    [facility]  Return OB Visit  Denies vaginal bleeding, vaginal discharge, LOF, contractions.  Reports good fetal movement. States she feels hip pain that localizes to the left and radiates down the front and back of thigh. Also complains of heart burn. As per patient, she has a history of low blood sugars since she was young, and had fainting spells before. With her last pregnancy, the GCT had to be repeated thrice because of lower than expected levels.    Weight gain: +2.8 lbs from last visit, -1lb from pregravid wt    Pregnancy complicated by:    1. Heart burn                      1. Start on Omeprazole  20mg every day                    2.  Hyperemesis gravidarum- resolved  1. Continue PO Zofran   2. D/C'd IV fluids, continue taking fluids PO.   3. Her weight is up 2.8 lb from previous visit and 1 lb below her pregravid wt.     3. History of IUFD  1. Current fetus AGA according to today's US  2. Plan for Q4 week growth ultrasounds after 20 weeks and weekly BPPs at 32 weeks     4. AMA  1. Declined genetic screening in 1st trimester, not interested in genetic screening     5. Morbid obesity  1. Goal TWG 1-20lbs  2. Current TWG -1 lb      6. Patient's account of low blood sugars     1. Advised to eat complex carbs vs simple carbs in order to        maintain blood sugars  6. Prenatal Care  1. Rh positive, AB negative   2. RPR/HIV/Hep B NR, G/C negative   3. Rubella Immune   4. Order placed for GCT. A1c was 5.8 on 5/2018  5. GBS bacteruria     Return to clinic in 3-4 weeks.     Vera Lombardi MS3  8/9/2018, 2:13 PM    The student acted as my scribe. I have seen, examined and counseled the patient. I have  reviewed and edited the note.           Again, thank you for allowing me to participate in the care of your patient.      Sincerely,    Sharmin Louis MD

## 2018-08-09 NOTE — PROGRESS NOTES
.[facility]  Return OB Visit  Denies vaginal bleeding, vaginal discharge, LOF, contractions.  Reports good fetal movement. States she feels hip pain that localizes to the left and radiates down the front and back of thigh. Also complains of heart burn. As per patient, she has a history of low blood sugars since she was young, and had fainting spells before. With her last pregnancy, the GCT had to be repeated thrice because of lower than expected levels.    Weight gain: +2.8 lbs from last visit, -1lb from pregravid wt    Pregnancy complicated by:    1. Heart burn                      1. Start on Omeprazole  20mg every day                    2.  Hyperemesis gravidarum- resolved  1. Continue PO Zofran   2. D/C'd IV fluids, continue taking fluids PO.   3. Her weight is up 2.8 lb from previous visit and 1 lb below her pregravid wt.     3. History of IUFD  1. Current fetus AGA according to today's US  2. Plan for Q4 week growth ultrasounds after 20 weeks and weekly BPPs at 32 weeks     4. AMA  1. Declined genetic screening in 1st trimester, not interested in genetic screening     5. Morbid obesity  1. Goal TWG 1-20lbs  2. Current TWG -1 lb      6. Patient's account of low blood sugars     1. Advised to eat complex carbs vs simple carbs in order to        maintain blood sugars  6. Prenatal Care  1. Rh positive, AB negative   2. RPR/HIV/Hep B NR, G/C negative   3. Rubella Immune   4. Order placed for GCT. A1c was 5.8 on 5/2018  5. GBS bacteruria     Return to clinic in 3-4 weeks.     Vera Lombardi MS3  8/9/2018, 2:13 PM    The student acted as my scribe. I have seen, examined and counseled the patient. I have reviewed and edited the note.

## 2018-08-09 NOTE — MR AVS SNAPSHOT
After Visit Summary   8/9/2018    Dai Guadarrama    MRN: [id number]           Patient Information     Date Of Birth          1985        Visit Information        Provider Department      8/9/2018 2:45 PM Katie Barakat MD [facility] Maternal Fetal Medicine [facility]        Today's Diagnoses     Suspected fetal anomaly, antepartum, single or unspecified fetus    -  1    Maternal obesity syndrome, antepartum, second trimester        History of stillbirth in currently pregnant patient, second trimester           Follow-ups after your visit        Your next 10 appointments already scheduled     Aug 23, 2018  3:30 PM CDT   (Arrive by 3:15 PM)   Return / with Sony Renee MD   [facility] ([facility])    [address] [place] [address]  [address] [facility] [address] [phone]              Future tests that were ordered for you today     Open Future Orders        Priority Expected Expires Ordered    [facility] Comprehensive Single F/U Routine 8/30/2018 8/9/2019 8/9/2018            Who to contact     If you have questions or need follow up information about today's clinic visit or your schedule please contact [facility] [place] directly at [phone].  Normal or non-critical lab and imaging results will be communicated to you by MyChart, letter or phone within 4 business days after the clinic has received the results. If you do not hear from us within 7 days, please contact the clinic through MyChart or phone. If you have a critical or abnormal lab result, we will notify you by phone as soon as possible.  Submit refill requests through [employer] or call your pharmacy and they will forward the refill request to us. Please allow 3 business days for your refill to be completed.          Additional Information About Your Visit        [employer] Information     [employer] gives you secure access to your electronic health  record. If you see a primary care provider, you can also send messages to your care team and make appointments. If you have questions, please call your primary care clinic.  If you do not have a primary care provider, please call [phone] and they will assist you.        Care EveryWhere ID     This is your Care EveryWhere ID. This could be used by other organizations to access your [place] medical records  [id number]        Your Vitals Were     Last Period                   02/28/2018 (Exact Date)            Blood Pressure from Last 3 Encounters:   08/09/18 102/71   07/18/18 101/68   07/05/18 104/64    Weight from Last 3 Encounters:   08/09/18 103 kg (227 lb)   07/17/18 101.9 kg (224 lb 11.2 oz)   07/05/18 101.6 kg (224 lb)                 Today's Medication Changes          These changes are accurate as of 8/9/18  4:24 PM.  If you have any questions, ask your nurse or doctor.               Start taking these medicines.        Dose/Directions    omeprazole 20 MG CR capsule   Commonly known as:  priLOSEC   Used for:  Gastroesophageal reflux disease without esophagitis   Started by:  Sharmin Louis MD        Dose:  20 mg   Take 1 capsule (20 mg) by mouth daily   Quantity:  30 capsule   Refills:  3            Where to get your medicines      These medications were sent to [facility] [address] [facility] [address] [place], [place] [address]E NE AT [facility] OF 26[facility]  [address] [facility] [address]     Phone:  [phone]     omeprazole 20 MG CR capsule                Primary Care Provider Fax #    Physician No Ref-Primary [phone]       No address on file        Equal Access to Services     [facility]: Hadii isaiah bailey hadasho Soomaali, waaxda luqadaha, qaybta kaalmada xiomy, kodi alcaraz. So [facility] [phone].    ATENCIÓN: Si habla español, tiene a martin disposición servicios gratuitos de asistencia lingüística. Llame al [phone].    We comply with  applicable federal civil rights laws and [place] laws. We do not discriminate on the basis of race, color, national origin, age, disability, sex, sexual orientation, or gender identity.            Thank you!     Thank you for choosing [facility]  for your care. Our goal is always to provide you with excellent care. Hearing back from our patients is one way we can continue to improve our services. Please take a few minutes to complete the written survey that you may receive in the mail after your visit with us. Thank you!             Your Updated Medication List - Protect others around you: Learn how to safely use, store and throw away your medicines at www.disposemymeds.org.          This list is accurate as of 8/9/18  4:24 PM.  Always use your most recent med list.                   Brand Name Dispense Instructions for use Diagnosis    enoxaparin 100 MG/ML injection    LOVENOX    30 Syringe    Inject 1 mL (100 mg) Subcutaneous 2 times daily        LANsoprazole 30 MG CR capsule    PREVACID    30 capsule    TAKE 1 CAPSULE(30 MG) BY MOUTH DAILY 30 TO 60 MINUTES BEFORE A MEAL    Gastroesophageal reflux disease without esophagitis       metoclopramide 10 MG tablet    REGLAN    120 tablet    Take 1 tablet (10 mg) by mouth 4 times daily (before meals and nightly)    Supervision of high risk pregnancy in first trimester, Hyperemesis gravidarum, History of intrauterine fetal death, currently pregnant in first trimester, Morbid obesity due to excess calories (H)       omeprazole 20 MG CR capsule    priLOSEC    30 capsule    Take 1 capsule (20 mg) by mouth daily    Gastroesophageal reflux disease without esophagitis       * ondansetron 4 MG ODT tab    ZOFRAN ODT    20 tablet    Take 1 tablet (4 mg) by mouth every 6 hours as needed for nausea    Hyperemesis gravidarum       * ondansetron 4 MG ODT tab    ZOFRAN ODT    20 tablet    Take 1-2 tablets (4-8 mg) by mouth every 8 hours as needed for  nausea    Hyperemesis gravidarum, Supervision of high risk pregnancy in first trimester, Aneurysm (H), History of intrauterine fetal death, currently pregnant in first trimester       prenatal multivitamin plus iron 27-0.8 MG Tabs per tablet      Take 1 tablet by mouth daily    Supervision of high risk pregnancy in first trimester, Hyperemesis gravidarum, Aneurysm (H), History of intrauterine fetal death, currently pregnant in first trimester       promethazine 25 MG Suppository    PHENERGAN    40 suppository    Place 1 suppository (25 mg) rectally every 6 hours as needed for nausea    Supervision of high risk pregnancy in second trimester       * Notice:  This list has 2 medication(s) that are the same as other medications prescribed for you. Read the directions carefully, and ask your doctor or other care provider to review them with you.

## 2018-08-10 ENCOUNTER — TELEPHONE (OUTPATIENT)
Dept: OBGYN | Facility: CLINIC | Age: 33
End: 2018-08-10

## 2018-08-10 ENCOUNTER — HOME INFUSION (PRE-WILLOW HOME INFUSION) (OUTPATIENT)
Dept: PHARMACY | Facility: CLINIC | Age: 33
End: 2018-08-10

## 2018-08-10 PROBLEM — O21.0 HYPEREMESIS GRAVIDARUM: Status: RESOLVED | Noted: 2018-05-24 | Resolved: 2018-08-10

## 2018-08-10 NOTE — TELEPHONE ENCOUNTER
----- Message from Sharmin Louis MD sent at 8/10/2018  7:11 AM CDT -----  Regarding: d/c infusion orders   Hi, how do I discontinue her infusion??

## 2018-08-10 NOTE — TELEPHONE ENCOUNTER
Spoke to  Home Infusion Pharmacist who took VO to stop infusions. She indicated understanding and agreed with plan.

## 2018-08-13 NOTE — PROGRESS NOTES
This is a recent snapshot of the patient's [facility] medical record.  For current drug dose and complete information and questions, call [phone] or In Basket pool, fv home infusion ([id number])  CSN Number:  [id number]

## 2018-08-27 ENCOUNTER — HOSPITAL ENCOUNTER (EMERGENCY)
Facility: CLINIC | Age: 33
Discharge: HOME | End: 2018-08-27
Attending: EMERGENCY MEDICINE | Admitting: EMERGENCY MEDICINE
Payer: COMMERCIAL

## 2018-08-27 ENCOUNTER — TELEPHONE (OUTPATIENT)
Dept: OBGYN | Facility: CLINIC | Age: 33
End: 2018-08-27

## 2018-08-27 ENCOUNTER — APPOINTMENT (OUTPATIENT)
Dept: GENERAL RADIOLOGY | Facility: CLINIC | Age: 33
End: 2018-08-27
Attending: EMERGENCY MEDICINE
Payer: COMMERCIAL

## 2018-08-27 VITALS
BODY MASS INDEX: 41.52 KG/M2 | OXYGEN SATURATION: 96 % | WEIGHT: 227 LBS | RESPIRATION RATE: 18 BRPM | TEMPERATURE: 98.7 F | SYSTOLIC BLOOD PRESSURE: 109 MMHG | HEART RATE: 136 BPM | DIASTOLIC BLOOD PRESSURE: 63 MMHG

## 2018-08-27 DIAGNOSIS — J06.9 UPPER RESPIRATORY TRACT INFECTION, UNSPECIFIED TYPE: ICD-10-CM

## 2018-08-27 LAB
ANION GAP SERPL CALCULATED.3IONS-SCNC: 13 MMOL/L (ref 3–14)
BASOPHILS # BLD AUTO: 0 10E9/L (ref 0–0.2)
BASOPHILS NFR BLD AUTO: 0.2 %
BUN SERPL-MCNC: 4 MG/DL (ref 7–30)
CALCIUM SERPL-MCNC: 8.5 MG/DL (ref 8.5–10.1)
CHLORIDE SERPL-SCNC: 109 MMOL/L (ref 94–109)
CO2 BLDCOV-SCNC: 20 MMOL/L (ref 21–28)
CO2 SERPL-SCNC: 22 MMOL/L (ref 20–32)
CREAT SERPL-MCNC: 0.42 MG/DL (ref 0.52–1.04)
DEPRECATED S PYO AG THROAT QL EIA: NORMAL
DIFFERENTIAL METHOD BLD: ABNORMAL
EOSINOPHIL # BLD AUTO: 0.1 10E9/L (ref 0–0.7)
EOSINOPHIL NFR BLD AUTO: 0.9 %
ERYTHROCYTE [DISTWIDTH] IN BLOOD BY AUTOMATED COUNT: 14.7 % (ref 10–15)
GFR SERPL CREATININE-BSD FRML MDRD: >90 ML/MIN/1.7M2
GLUCOSE SERPL-MCNC: 106 MG/DL (ref 70–99)
HCT VFR BLD AUTO: 30 % (ref 35–47)
HGB BLD-MCNC: 9.5 G/DL (ref 11.7–15.7)
IMM GRANULOCYTES # BLD: 0.1 10E9/L (ref 0–0.4)
IMM GRANULOCYTES NFR BLD: 1.6 %
INTERPRETATION ECG - MUSE: NORMAL
LACTATE BLD-SCNC: 1 MMOL/L (ref 0.7–2.1)
LYMPHOCYTES # BLD AUTO: 1.2 10E9/L (ref 0.8–5.3)
LYMPHOCYTES NFR BLD AUTO: 13.6 %
MCH RBC QN AUTO: 27.5 PG (ref 26.5–33)
MCHC RBC AUTO-ENTMCNC: 31.7 G/DL (ref 31.5–36.5)
MCV RBC AUTO: 87 FL (ref 78–100)
MONOCYTES # BLD AUTO: 0.6 10E9/L (ref 0–1.3)
MONOCYTES NFR BLD AUTO: 6.8 %
NEUTROPHILS # BLD AUTO: 6.5 10E9/L (ref 1.6–8.3)
NEUTROPHILS NFR BLD AUTO: 76.9 %
NRBC # BLD AUTO: 0 10*3/UL
NRBC BLD AUTO-RTO: 0 /100
PCO2 BLDV: 30 MM HG (ref 40–50)
PH BLDV: 7.43 PH (ref 7.32–7.43)
PLATELET # BLD AUTO: 314 10E9/L (ref 150–450)
PO2 BLDV: 69 MM HG (ref 25–47)
POTASSIUM SERPL-SCNC: 3 MMOL/L (ref 3.4–5.3)
RBC # BLD AUTO: 3.46 10E12/L (ref 3.8–5.2)
SAO2 % BLDV FROM PO2: 94 %
SODIUM SERPL-SCNC: 144 MMOL/L (ref 133–144)
SPECIMEN SOURCE: NORMAL
WBC # BLD AUTO: 8.5 10E9/L (ref 4–11)

## 2018-08-27 PROCEDURE — 87081 CULTURE SCREEN ONLY: CPT | Performed by: EMERGENCY MEDICINE

## 2018-08-27 PROCEDURE — 93005 ELECTROCARDIOGRAM TRACING: CPT | Performed by: EMERGENCY MEDICINE

## 2018-08-27 PROCEDURE — 93010 ELECTROCARDIOGRAM REPORT: CPT | Mod: Z6 | Performed by: EMERGENCY MEDICINE

## 2018-08-27 PROCEDURE — 25000132 ZZH RX MED GY IP 250 OP 250 PS 637: Performed by: EMERGENCY MEDICINE

## 2018-08-27 PROCEDURE — 25000128 H RX IP 250 OP 636: Performed by: EMERGENCY MEDICINE

## 2018-08-27 PROCEDURE — 83605 ASSAY OF LACTIC ACID: CPT

## 2018-08-27 PROCEDURE — 96360 HYDRATION IV INFUSION INIT: CPT | Performed by: EMERGENCY MEDICINE

## 2018-08-27 PROCEDURE — 80048 BASIC METABOLIC PNL TOTAL CA: CPT | Performed by: EMERGENCY MEDICINE

## 2018-08-27 PROCEDURE — 71046 X-RAY EXAM CHEST 2 VIEWS: CPT

## 2018-08-27 PROCEDURE — 96361 HYDRATE IV INFUSION ADD-ON: CPT | Performed by: EMERGENCY MEDICINE

## 2018-08-27 PROCEDURE — 85025 COMPLETE CBC W/AUTO DIFF WBC: CPT | Performed by: EMERGENCY MEDICINE

## 2018-08-27 PROCEDURE — 99285 EMERGENCY DEPT VISIT HI MDM: CPT | Mod: 25 | Performed by: EMERGENCY MEDICINE

## 2018-08-27 PROCEDURE — 82803 BLOOD GASES ANY COMBINATION: CPT

## 2018-08-27 PROCEDURE — 87880 STREP A ASSAY W/OPTIC: CPT | Performed by: EMERGENCY MEDICINE

## 2018-08-27 RX ORDER — ACETAMINOPHEN 325 MG/1
650 TABLET ORAL EVERY 4 HOURS PRN
Status: DISCONTINUED | OUTPATIENT
Start: 2018-08-27 | End: 2018-08-27 | Stop reason: HOSPADM

## 2018-08-27 RX ADMIN — SODIUM CHLORIDE 1000 ML: 9 INJECTION, SOLUTION INTRAVENOUS at 18:11

## 2018-08-27 RX ADMIN — ACETAMINOPHEN 650 MG: 325 TABLET, FILM COATED ORAL at 18:07

## 2018-08-27 RX ADMIN — BENZOCAINE, MENTHOL 1 LOZENGE: 15; 3.6 LOZENGE ORAL at 18:08

## 2018-08-27 RX ADMIN — SODIUM CHLORIDE 1000 ML: 9 INJECTION, SOLUTION INTRAVENOUS at 16:31

## 2018-08-27 ASSESSMENT — ENCOUNTER SYMPTOMS
FEVER: 1
NAUSEA: 1
COUGH: 1
SORE THROAT: 1
SHORTNESS OF BREATH: 1
VOMITING: 1

## 2018-08-27 NOTE — ED AVS SNAPSHOT
[facility] [place], Emergency Department    [address]    [place] [place] [place] [address]    Phone:  [phone]    Fax:  [phone]                                       Dai Guadarrama   MRN: [id number]    Department:  [facility], Emergency Department   Date of Visit:  8/27/2018           After Visit Summary Signature Page     I have received my discharge instructions, and my questions have been answered. I have discussed any challenges I see with this plan with the nurse or doctor.    ..........................................................................................................................................  Patient/Patient Representative Signature      ..........................................................................................................................................  Patient Representative Print Name and Relationship to Patient    ..................................................               ................................................  Date                                            Time    ..........................................................................................................................................  Reviewed by Signature/Title    ...................................................              ..............................................  Date                                                            Time          22EPIC Rev 08/18

## 2018-08-27 NOTE — ED PROVIDER NOTES
[facility] EMERGENCY DEPARTMENT ([facility])    8/27/18       History     Chief Complaint   Patient presents with     Chest Pain     mid upper chest pain and back pain when she cough, SOB. Also abdominal pain with coughing. Non productive cough for last three days. 26 weeks pregnant.     HPI  Dai Guadarrama is a 33 year old female who presents to the Emergency Department complaining of cough, shortness of breath, sore throat and chest pain.  She is 26 weeks pregnant and states that approximately 3-4 days ago she began to get a sore throat and then developed a nonproductive cough.  She had fevers the first day, but has not had any since.  She reports that she has been taking Robitussin and Tylenol as this is the only thing she can take during her pregnancy.  She has not been able to eat or drink much secondary to sore throat and states that she occasionally has emesis.  Her other child did come down with an illness and developed an ear infection prior to this, but she has no other known ill exposures.    This part of the medical record was transcribed by Gary Hopkins, Medical Scribe, from a dictation done by Alin Nieves MD.       I have reviewed the Medications, Allergies, Past Medical and Surgical History, and Social History in the [employer] system.    Past Medical History:   Diagnosis Date     Aneurysm (H)      Migraine headache without aura      Pregnancy related nausea and vomiting, antepartum 2016    two ED visits.     Recurrent pregnancy loss (CODE)      Retained placenta 2014     Stillbirth 2014    32 weeks     Tachycardia        Past Surgical History:   Procedure Laterality Date     C EACH ADD TOOTH EXTRACTION      bad reaction to anesthia for local      NO HISTORY OF SURGERY         Family History   Problem Relation Age of Onset     Anxiety Disorder No family hx of      Mental Illness No family hx of      Substance Abuse No family hx of      Anesthesia Reaction No family hx of      Asthma  No family hx of      Osteoporosis No family hx of      Genetic Disorder No family hx of      Thyroid Disease No family hx of      Hyperlipidemia No family hx of      Cerebrovascular Disease No family hx of      Breast Cancer No family hx of      Colon Cancer No family hx of      Prostate Cancer No family hx of      Other Cancer No family hx of      Depression No family hx of      Diabetes No family hx of      Coronary Artery Disease No family hx of      Hypertension No family hx of        Social History   Substance Use Topics     Smoking status: Never Smoker     Smokeless tobacco: Never Used     Alcohol use 0.0 oz/week     0 Standard drinks or equivalent per week      Comment: rare alcohol use now nothing in pregnancy       No current facility-administered medications for this encounter.      Current Outpatient Prescriptions   Medication     benzocaine-menthol (CEPACOL) 15-3.6 MG lozenge     Prenatal Vit-Fe Fumarate-FA (PRENATAL MULTIVITAMIN PLUS IRON) 27-0.8 MG TABS per tablet      No Known Allergies      Review of Systems   Constitutional: Positive for fever.   HENT: Positive for sore throat.    Respiratory: Positive for cough and shortness of breath.    Cardiovascular: Positive for chest pain.   Gastrointestinal: Positive for nausea and vomiting.   All other systems reviewed and are negative.      Physical Exam   BP: 119/52  Pulse: 136  Temp: 98.7  F (37.1  C)  Resp: 18  Weight: 103 kg (227 lb)  SpO2: 97 %      Physical Exam   Constitutional: She is oriented to person, place, and time. Vital signs are normal. She appears well-developed and well-nourished.  Non-toxic appearance. She does not appear ill. No distress.   HENT:   Head: Normocephalic and atraumatic.   Mouth/Throat: Oropharynx is clear and moist. No oropharyngeal exudate.   Eyes: Conjunctivae and EOM are normal. Pupils are equal, round, and reactive to light. No scleral icterus.   Neck: Normal range of motion. Neck supple. No JVD present. No tracheal  deviation present. No thyromegaly present.   Cardiovascular: Regular rhythm, normal heart sounds and intact distal pulses.  Tachycardia present.  Exam reveals no gallop and no friction rub.    No murmur heard.  Pulmonary/Chest: Effort normal and breath sounds normal. No respiratory distress.   Abdominal: Soft. Bowel sounds are normal. She exhibits no distension and no mass. There is no tenderness.   Musculoskeletal: Normal range of motion. She exhibits no edema or tenderness.   Lymphadenopathy:     She has no cervical adenopathy.   Neurological: She is alert and oriented to person, place, and time. She has normal strength. No cranial nerve deficit or sensory deficit.   Skin: Skin is warm and dry. No rash noted. No erythema. No pallor.   Psychiatric: She has a normal mood and affect. Her behavior is normal.   Nursing note and vitals reviewed.      ED Course     ED Course     Procedures             EKG Interpretation:      Interpreted by Alin Nieves    Symptoms at time of EKG: cough and chest pain   Rhythm: sinus tachycardia  Rate: 111  Ectopy: none  Conduction: normal  ST Segments/ T Waves: No ST-T wave changes  Q Waves: none  Comparison to prior: No old EKG available    Clinical Impression: sinus tachycardia                Results for orders placed or performed during the hospital encounter of 08/27/18   Chest XR,  PA & LAT    Narrative    XR CHEST TWO VIEWS   8/27/2018 5:16 PM     HISTORY: Chest pain, cough, fever, evaluate for pneumonia.      COMPARISON: None.    FINDINGS: The heart size is normal. The lungs are clear. No  pneumothorax or pleural effusion.      Impression    IMPRESSION: No acute abnormality.    AMI RAMIREZ MD   CBC with platelets differential   Result Value Ref Range    WBC 8.5 4.0 - 11.0 10e9/L    RBC Count 3.46 (L) 3.8 - 5.2 10e12/L    Hemoglobin 9.5 (L) 11.7 - 15.7 g/dL    Hematocrit 30.0 (L) 35.0 - 47.0 %    MCV 87 78 - 100 fl    MCH 27.5 26.5 - 33.0 pg    MCHC 31.7 31.5 - 36.5  g/dL    RDW 14.7 10.0 - 15.0 %    Platelet Count 314 150 - 450 10e9/L    Diff Method Automated Method     % Neutrophils 76.9 %    % Lymphocytes 13.6 %    % Monocytes 6.8 %    % Eosinophils 0.9 %    % Basophils 0.2 %    % Immature Granulocytes 1.6 %    Nucleated RBCs 0 0 /100    Absolute Neutrophil 6.5 1.6 - 8.3 10e9/L    Absolute Lymphocytes 1.2 0.8 - 5.3 10e9/L    Absolute Monocytes 0.6 0.0 - 1.3 10e9/L    Absolute Eosinophils 0.1 0.0 - 0.7 10e9/L    Absolute Basophils 0.0 0.0 - 0.2 10e9/L    Abs Immature Granulocytes 0.1 0 - 0.4 10e9/L    Absolute Nucleated RBC 0.0    Basic metabolic panel   Result Value Ref Range    Sodium 144 133 - 144 mmol/L    Potassium 3.0 (L) 3.4 - 5.3 mmol/L    Chloride 109 94 - 109 mmol/L    Carbon Dioxide 22 20 - 32 mmol/L    Anion Gap 13 3 - 14 mmol/L    Glucose 106 (H) 70 - 99 mg/dL    Urea Nitrogen 4 (L) 7 - 30 mg/dL    Creatinine 0.42 (L) 0.52 - 1.04 mg/dL    GFR Estimate >90 >60 mL/min/1.7m2    GFR Estimate If Black >90 >60 mL/min/1.7m2    Calcium 8.5 8.5 - 10.1 mg/dL   EKG 12-lead   Result Value Ref Range    Interpretation ECG Click View Image link to view waveform and result    ISTAT gases lactate arlette POCT   Result Value Ref Range    Ph Venous 7.43 7.32 - 7.43 pH    PCO2 Venous 30 (L) 40 - 50 mm Hg    PO2 Venous 69 (H) 25 - 47 mm Hg    Bicarbonate Venous 20 (L) 21 - 28 mmol/L    O2 Sat Venous 94 %    Lactic Acid 1.0 0.7 - 2.1 mmol/L   Rapid strep screen   Result Value Ref Range    Specimen Description Throat     Rapid Strep A Screen       NEGATIVE: No Group A streptococcal antigen detected by immunoassay, await culture report.   Beta strep group A culture   Result Value Ref Range    Specimen Description Throat     Culture Micro No Beta Streptococcus isolated             Assessments & Plan (with Medical Decision Making)   This patient presented to the Emergency Department complaining of a sore throat and cough. She did complain of chest pain which is bilateral and more lateral in  nature. Seems more concentrated on the rib cage and is secondary to her coughing. She is currently pregnant and did have a small area of thrombosis around a PICC line that had been in place approximately a month ago but at this time I think symptoms are most likely secondary to a viral upper respiratory tract infection. Chest x-ray demonstrated no evidence of pneumonia and rapid strep was negative. She was tachycardic at presentation but I feel that this is most likely secondary to poor oral intake as well as her taking over-the-counter cold medication. She is not hypoxic and at this point suspicion for pulmonary embolism is low and I did not feel that potential fetal exposure to radiation was warranted. She did feel improved with IV fluids, as well as a Cepacol lozenge and was prescribed Cepacol lozenges and told the Mucinex DM is okay to use during pregnancy. She was asked to follow-up as soon as possible with her OB clinic and to return for any problems.    This part of the medical record was transcribed by Margaux Brady, Medical Scribe, from a dictation done by Alin Nieves MD.     I have reviewed the nursing notes.    I have reviewed the findings, diagnosis, plan and need for follow up with the patient.    Discharge Medication List as of 8/27/2018  7:19 PM      START taking these medications    Details   benzocaine-menthol (CEPACOL) 15-3.6 MG lozenge Place 1 lozenge inside cheek every 2 hours as needed for moderate pain or sore throat, Disp-30 lozenge, R-0, Local Print             Final diagnoses:   Upper respiratory tract infection, unspecified type       8/27/2018   [facility], [place], EMERGENCY DEPARTMENT     Alin Nieves MD  09/02/18 [id number]

## 2018-08-27 NOTE — ED AVS SNAPSHOT
[facility], Emergency Department    [address]    [place] [address]    Phone:  [phone]    Fax:  [phone]                                       Dai Guadarrama   MRN: [id number]    Department:  [facility], Emergency Department   Date of Visit:  8/27/2018           Patient Information     Date Of Birth          1985        Your diagnoses for this visit were:     Upper respiratory tract infection, unspecified type        You were seen by Alin Nieves MD.        Discharge Instructions       Please make an appointment to follow up with your obstetrician as soon as possible.    Stay well hydrated by drinking plenty of fluids.    Tylenol for pain or fever.    Cepacol lozenges as directed for sore throat.    Mucinex DM can be used for cough while you are pregnant.    Return to the emergency department for any problems.      Your next 10 appointments already scheduled     Aug 30, 2018  3:00 PM CDT   RETURN OB with Vicky Salazar MD   [facility] ([facility])    [place] [facility] [address] [facility] [address] [phone]              24 Hour Appointment Hotline       To make an appointment at any [facility], call [phone] ([phone]). If you don't have a family doctor or clinic, we will help you find one. [facility] are conveniently located to serve the needs of you and your family.             Review of your medicines      START taking        Dose / Directions Last dose taken    benzocaine-menthol 15-3.6 MG lozenge   Commonly known as:  CEPACOL   Dose:  1 lozenge   Quantity:  30 lozenge        Place 1 lozenge inside cheek every 2 hours as needed for moderate pain or sore throat   Refills:  0          Our records show that you are taking the medicines listed below. If these are incorrect, please call your family doctor or clinic.        Dose / Directions Last dose taken     prenatal multivitamin plus iron 27-0.8 MG Tabs per tablet   Dose:  1 tablet        Take 1 tablet by mouth daily   Refills:  0                Prescriptions were sent or printed at these locations (1 Prescription)                   Other Prescriptions                Printed at Department/Unit printer (1 of 1)         benzocaine-menthol (CEPACOL) 15-3.6 MG lozenge                Procedures and tests performed during your visit     Basic metabolic panel    Beta strep group A culture    CBC with platelets differential    Chest XR,  PA & LAT    EKG 12-lead    ISTAT CG4 gases lactate arlette nursing POCT    ISTAT gases lactate arlette POCT    Rapid strep screen      Orders Needing Specimen Collection     None      Pending Results     Date and Time Order Name Status Description    8/27/2018 1625 Beta strep group A culture In process             Pending Culture Results     Date and Time Order Name Status Description    8/27/2018 1625 Beta strep group A culture In process             Pending Results Instructions     If you had any lab results that were not finalized at the time of your Discharge, you can call the ED Lab Result RN at [phone]. You will be contacted by this team for any positive Lab results or changes in treatment. The nurses are available 7 days a week from 10A to 6:30P.  You can leave a message 24 hours per day and they will return your call.        Thank you for choosing [facility]       Thank you for choosing [facility] for your care. Our goal is always to provide you with excellent care. Hearing back from our patients is one way we can continue to improve our services. Please take a few minutes to complete the written survey that you may receive in the mail after you visit with us. Thank you!        [employer] Information     [employer] gives you secure access to your electronic health record. If you see a primary care provider, you can also send messages to your care team and make appointments. If you have  questions, please call your primary care clinic.  If you do not have a primary care provider, please call [phone] and they will assist you.        Care EveryWhere ID     This is your Care EveryWhere ID. This could be used by other organizations to access your [place] medical records  [id number]        Equal Access to Services     AISHA SONG : Mariano Andrew, cuco manuel, kodi valera. So [facility] [phone].    ATENCIÓN: Si habla español, tiene a martin disposición servicios gratuitos de asistencia lingüística. Llame al [phone].    We comply with applicable federal civil rights laws and [place] laws. We do not discriminate on the basis of race, color, national origin, age, disability, sex, sexual orientation, or gender identity.            After Visit Summary       This is your record. Keep this with you and show to your community pharmacist(s) and doctor(s) at your next visit.

## 2018-08-27 NOTE — TELEPHONE ENCOUNTER
Spoke with Dai who is 24 weeks pregnant and complaining of sore throat and being unable to eat for 4 days. She states that she has been coughing and has had slight fever as well. Her ribs hurt from coughing so much. Nurse advised going to urgent care or ED for immediate evaluation of strep throat or sinus infection to get treatment. Patient agreeable and will go in today.,

## 2018-08-28 NOTE — DISCHARGE INSTRUCTIONS
Please make an appointment to follow up with your obstetrician as soon as possible.    Stay well hydrated by drinking plenty of fluids.    Tylenol for pain or fever.    Cepacol lozenges as directed for sore throat.    Mucinex DM can be used for cough while you are pregnant.    Return to the emergency department for any problems.

## 2018-08-29 LAB
BACTERIA SPEC CULT: NORMAL
SPECIMEN SOURCE: NORMAL

## 2018-08-30 ENCOUNTER — OFFICE VISIT (OUTPATIENT)
Dept: OBGYN | Facility: CLINIC | Age: 33
End: 2018-08-30
Attending: OBSTETRICS & GYNECOLOGY
Payer: COMMERCIAL

## 2018-08-30 VITALS
HEIGHT: 62 IN | SYSTOLIC BLOOD PRESSURE: 111 MMHG | WEIGHT: 227.6 LBS | BODY MASS INDEX: 41.88 KG/M2 | HEART RATE: 118 BPM | DIASTOLIC BLOOD PRESSURE: 74 MMHG

## 2018-08-30 DIAGNOSIS — O09.92 HIGH-RISK PREGNANCY IN SECOND TRIMESTER: Primary | ICD-10-CM

## 2018-08-30 NOTE — MR AVS SNAPSHOT
After Visit Summary   8/30/2018    Dai Guadarrama    MRN: [id number]           Patient Information     Date Of Birth          1985        Visit Information        Provider Department      8/30/2018 3:00 PM Vicky Salazar MD; [employer] [facility]        Today's Diagnoses     High-risk pregnancy in second trimester    -  1       Follow-ups after your visit        Follow-up notes from your care team     Return in about 4 weeks (around 9/27/2018).      Your next 10 appointments already scheduled     Sep 13, 2018  3:00 PM CDT   MFM US COMPRE SINGLE F/U with [id number]   [facility])    [address]  [facility] [address] [phone]           Wear comfortable clothes and leave your valuables at home.            Sep 13, 2018  3:30 PM CDT   Radiology MD with UR OSCAR WASHBURN   [facility])    [address]  [facility] [id number] [phone]           Please arrive at the time given for your first appointment. This visit is used internally to schedule the physician's time during your ultrasound.            Sep 27, 2018  2:15 PM CDT   RETURN OB with Vicky Salazar MD   [facility] ([facility])    [facility] [facility] [address]  [address]  [facility] [address] [phone]              Who to contact     Please call your clinic at [phone] to:    Ask questions about your health    Make or cancel appointments    Discuss your medicines    Learn about your test results    Speak to your doctor            Additional Information About Your Visit        MyChart Information     [employer] gives you secure access to your electronic health record. If you see a primary care provider, you can also send messages  "to your care team and make appointments. If you have questions, please call your primary care clinic.  If you do not have a primary care provider, please call [phone] and they will assist you.      [employer] is an electronic gateway that provides easy, online access to your medical records. With [employer], you can request a clinic appointment, read your test results, renew a prescription or communicate with your care team.     To access your existing account, please contact your [facility] or call [phone] for assistance.        Care EveryWhere ID     This is your Care EveryWhere ID. This could be used by other organizations to access your [place] medical records  [id number]        Your Vitals Were     Pulse Height Last Period BMI (Body Mass Index)          118 1.575 m (5' 2\") 02/28/2018 (Exact Date) 41.63 kg/m2         Blood Pressure from Last 3 Encounters:   08/30/18 111/74   08/27/18 109/63   08/09/18 102/71    Weight from Last 3 Encounters:   08/30/18 103.2 kg (227 lb 9.6 oz)   08/27/18 103 kg (227 lb)   08/09/18 103 kg (227 lb)              Today, you had the following     No orders found for display       Primary Care Provider    [facility]       No address on file        Equal Access to Services     AISHA SONG : Hadii aad ku hadasho Soomaali, waaxda luqadaha, qaybta kaalmada adeegyada, kodi moreno . So [facility] [phone].    ATENCIÓN: Si habla español, tiene a martin disposición servicios gratuitos de asistencia lingüística. Llame al [phone].    We comply with applicable federal civil rights laws and [place] laws. We do not discriminate on the basis of race, color, national origin, age, disability, sex, sexual orientation, or gender identity.            Thank you!     Thank you for choosing [facility]  for your care. Our goal is always to provide you with excellent care. Hearing back from our patients " is one way we can continue to improve our services. Please take a few minutes to complete the written survey that you may receive in the mail after your visit with us. Thank you!             Your Updated Medication List - Protect others around you: Learn how to safely use, store and throw away your medicines at www.disposemymeds.org.          This list is accurate as of 8/30/18  9:51 PM.  Always use your most recent med list.                   Brand Name Dispense Instructions for use Diagnosis    benzocaine-menthol 15-3.6 MG lozenge    CEPACOL    30 lozenge    Place 1 lozenge inside cheek every 2 hours as needed for moderate pain or sore throat        prenatal multivitamin plus iron 27-0.8 MG Tabs per tablet      Take 1 tablet by mouth daily    Supervision of high risk pregnancy in first trimester, Hyperemesis gravidarum, Aneurysm (H), History of intrauterine fetal death, currently pregnant in first trimester

## 2018-08-30 NOTE — LETTER
"2018       RE: Dai Guadarrama  [address]  [facility] [address]     Dear Colleague,    Thank you for referring your patient, Dai Guadarrama, to the [facility] at [facility]. Please see a copy of my visit note below.    S: Doing well. Feeling movement. No cramping, vb, lof. Has current URI, was seen in ED earlier in week. Is taking claritin and robitussin. Denies fever.     O: /74  Pulse 118  Ht 1.575 m (5' 2\")  Wt 103.2 kg (227 lb 9.6 oz)  LMP 2018 (Exact Date)  BMI 41.63 kg/m2  A/P: 33 year old  @24w6d   Level 2 with incomplete imaging, is due for repeat but not scheduled- will have  assist.   H/o IUFD, plan for Q 4week US  Supportive cares for URI  GBS bacturia, abx in labor  EOB next      Again, thank you for allowing me to participate in the care of your patient.      Sincerely,    Vicky Salazar MD      "

## 2018-08-31 NOTE — PROGRESS NOTES
"S: Doing well. Feeling movement. No cramping, vb, lof. Has current URI, was seen in ED earlier in week. Is taking claritin and robitussin. Denies fever.     O: /74  Pulse 118  Ht 1.575 m (5' 2\")  Wt 103.2 kg (227 lb 9.6 oz)  LMP 2018 (Exact Date)  BMI 41.63 kg/m2  A/P: 33 year old  @24w6d   Level 2 with incomplete imaging, is due for repeat but not scheduled- will have  assist.   H/o IUFD, plan for Q 4week US  Supportive cares for URI  GBS bacturia, abx in labor  EOB next    Vicky Salazar MD    "

## 2018-09-13 ENCOUNTER — HOSPITAL ENCOUNTER (OUTPATIENT)
Dept: ULTRASOUND IMAGING | Facility: CLINIC | Age: 33
Discharge: HOME | End: 2018-09-13
Attending: OBSTETRICS & GYNECOLOGY | Admitting: OBSTETRICS & GYNECOLOGY
Payer: COMMERCIAL

## 2018-09-13 ENCOUNTER — OFFICE VISIT (OUTPATIENT)
Dept: INTERPRETER SERVICES | Facility: CLINIC | Age: 33
End: 2018-09-13
Payer: COMMERCIAL

## 2018-09-13 ENCOUNTER — OFFICE VISIT (OUTPATIENT)
Dept: MATERNAL FETAL MEDICINE | Facility: CLINIC | Age: 33
End: 2018-09-13
Attending: OBSTETRICS & GYNECOLOGY
Payer: COMMERCIAL

## 2018-09-13 DIAGNOSIS — O09.292 HISTORY OF STILLBIRTH IN CURRENTLY PREGNANT PATIENT, SECOND TRIMESTER: Primary | ICD-10-CM

## 2018-09-13 DIAGNOSIS — O35.9XX0 SUSPECTED FETAL ANOMALY, ANTEPARTUM, SINGLE OR UNSPECIFIED FETUS: ICD-10-CM

## 2018-09-13 PROCEDURE — 76816 OB US FOLLOW-UP PER FETUS: CPT

## 2018-09-13 PROCEDURE — T1013 SIGN LANG/ORAL INTERPRETER: HCPCS | Mod: U3

## 2018-09-13 NOTE — PROGRESS NOTES
"Please see \"Imaging\" tab under Chart Review for full details.    Sophia Moe MD  Maternal Fetal Medicine    "

## 2018-09-27 ENCOUNTER — OFFICE VISIT (OUTPATIENT)
Dept: OBGYN | Facility: CLINIC | Age: 33
End: 2018-09-27
Attending: OBSTETRICS & GYNECOLOGY
Payer: COMMERCIAL

## 2018-09-27 VITALS
BODY MASS INDEX: 41.88 KG/M2 | HEART RATE: 117 BPM | WEIGHT: 229 LBS | DIASTOLIC BLOOD PRESSURE: 64 MMHG | SYSTOLIC BLOOD PRESSURE: 106 MMHG

## 2018-09-27 DIAGNOSIS — K21.9 GASTROESOPHAGEAL REFLUX DISEASE WITHOUT ESOPHAGITIS: ICD-10-CM

## 2018-09-27 DIAGNOSIS — Z34.93 NORMAL PREGNANCY IN THIRD TRIMESTER: Primary | ICD-10-CM

## 2018-09-27 DIAGNOSIS — Z87.59 HISTORY OF FETAL LOSS: ICD-10-CM

## 2018-09-27 DIAGNOSIS — Z23 NEED FOR VACCINATION: ICD-10-CM

## 2018-09-27 LAB
ERYTHROCYTE [DISTWIDTH] IN BLOOD BY AUTOMATED COUNT: 14.8 % (ref 10–15)
GLUCOSE 1H P 50 G GLC PO SERPL-MCNC: 134 MG/DL (ref 60–129)
HCT VFR BLD AUTO: 31.2 % (ref 35–47)
HGB BLD-MCNC: 9.7 G/DL (ref 11.7–15.7)
MCH RBC QN AUTO: 26.5 PG (ref 26.5–33)
MCHC RBC AUTO-ENTMCNC: 31.1 G/DL (ref 31.5–36.5)
MCV RBC AUTO: 85 FL (ref 78–100)
PLATELET # BLD AUTO: 383 10E9/L (ref 150–450)
RBC # BLD AUTO: 3.66 10E12/L (ref 3.8–5.2)
WBC # BLD AUTO: 12.4 10E9/L (ref 4–11)

## 2018-09-27 PROCEDURE — 86780 TREPONEMA PALLIDUM: CPT | Performed by: OBSTETRICS & GYNECOLOGY

## 2018-09-27 PROCEDURE — T1013 SIGN LANG/ORAL INTERPRETER: HCPCS | Mod: U3,ZF

## 2018-09-27 PROCEDURE — 25000128 H RX IP 250 OP 636: Mod: ZF

## 2018-09-27 PROCEDURE — 82950 GLUCOSE TEST: CPT | Performed by: OBSTETRICS & GYNECOLOGY

## 2018-09-27 PROCEDURE — 90472 IMMUNIZATION ADMIN EACH ADD: CPT | Mod: ZF

## 2018-09-27 PROCEDURE — G0463 HOSPITAL OUTPT CLINIC VISIT: HCPCS | Mod: 25,ZF

## 2018-09-27 PROCEDURE — 90715 TDAP VACCINE 7 YRS/> IM: CPT | Mod: ZF

## 2018-09-27 PROCEDURE — G0008 ADMIN INFLUENZA VIRUS VAC: HCPCS

## 2018-09-27 PROCEDURE — 36415 COLL VENOUS BLD VENIPUNCTURE: CPT | Performed by: OBSTETRICS & GYNECOLOGY

## 2018-09-27 PROCEDURE — 90471 IMMUNIZATION ADMIN: CPT | Mod: ZF

## 2018-09-27 PROCEDURE — 90686 IIV4 VACC NO PRSV 0.5 ML IM: CPT | Mod: ZF

## 2018-09-27 PROCEDURE — 85027 COMPLETE CBC AUTOMATED: CPT | Performed by: OBSTETRICS & GYNECOLOGY

## 2018-09-27 RX ORDER — OMEPRAZOLE 10 MG/1
20 CAPSULE, DELAYED RELEASE ORAL DAILY
Qty: 60 CAPSULE | Refills: 3 | Status: SHIPPED | OUTPATIENT
Start: 2018-09-27 | End: 2018-12-05

## 2018-09-27 ASSESSMENT — PAIN SCALES - GENERAL: PAINLEVEL: NO PAIN (0)

## 2018-09-27 NOTE — LETTER
2018       RE: Dai Guadarrama  [address]  [facility] [address]     Dear Colleague,    Thank you for referring your patient, Dai Guadarrama, to the [facility] at [facility]. Please see a copy of my visit note below.    [facility]  [facility] Visit    S: States she's feeling bad today. Cramping in pelvis (~six times daily), lasts 1 or 2 minutes for the last two weeks. No LOF, vaginal bleeding. Good fetal movement. Reports that her pelvis and hips hurt a lot. She can't sit or lay down because of the pain. Taking tylenol but is not helping.  She used pregnancy belt in prior pregnancy and didn't like it. Would like something to help her sleep. Endorses intermittent headaches. Takes Zofran sometimes for nausea. Planning to formula feed- states that she didn't produce milk with her first baby.    Her pregnancy is notable for:  AMA  History of IUFD  Obesity    O: /64  Pulse 117  Wt 103.9 kg (229 lb)  LMP 2018 (Exact Date)  Breastfeeding? No  BMI 41.88 kg/m2  Gen: Well-appearing, NAD  Fundal Height:  31  FHR: 125    A/P:  Dai Guadarrama is a 33 year old  at 28w6d by 7w6d ultrasound, here for EOB visit.    AMA:  - declined first trimester screening    PNC/FWB:   - History of IUFD, q4w US EFW 57% at 26w6d- Re[peat growth Q4 weeks, weekly BPP at 32 weeks  - GBS bacteruria, PCN in labor  - Prenatal labs HBsAg NR, HIV NR, Varicella immune, Rh pos, Rubella immune, GCT, CBC, RPR collected today  - Immunizations: Tdap, flu shot today  - Placenta: anterior  - Labor analgesia: Epidural  - Feeding: Formula, dicussed lactation consultation in hospital  - Contraception: options discussed, patient undecided    General discomfort:   -encouraged patient to try pregnancy belt again  -offered PT but patient declines due to childcare for her daughter  -Recommended warm baths, stretching exercises  -Unisom at  bedtime to help with sleep    RTC in 2 weeks    Merry Danielle MD  Ob/Gyn, PGY-1    Appreciate note by Dr. Danielle. Patient has been seen and examined by me with the resident, agree with above note.     Vicky Salazar MD

## 2018-09-27 NOTE — NURSING NOTE
"Injectable Influenza Immunization Documentation    1.  Has the patient received the information for the injectable influenza vaccine? YES     2. Is the patient 6 months of age or older? YES     3. Does the patient have any of the following contraindications?         Severe allergy to eggs?  No     Severe allergic reaction to previous influenza vaccines?  No   Severe allergy to latex?  No       History of Guillain-Leawood syndrome?  No     Currently have a temperature greater than 100.4F?  No        4.  Severely egg allergic patients should have flu vaccine eligibility assessed by an MD, RN, or pharmacist, and those who received flu vaccine should be observed for 15 min by an MD, RN, Pharmacist, Medical Technician, or member of clinic staff.\": YES    5. Latex-allergic patients should be given latex-free influenza vaccine Yes. Please reference the Vaccine latex table to determine if your clinic s product is latex-containing.       Vaccination given by Liza Schwarz          "

## 2018-09-27 NOTE — PROGRESS NOTES
[facility]  Return OB Visit    S: States she's feeling bad today. Cramping in pelvis (~six times daily), lasts 1 or 2 minutes for the last two weeks. No LOF, vaginal bleeding. Good fetal movement. Reports that her pelvis and hips hurt a lot. She can't sit or lay down because of the pain. Taking tylenol but is not helping.  She used pregnancy belt in prior pregnancy and didn't like it. Would like something to help her sleep. Endorses intermittent headaches. Takes Zofran sometimes for nausea. Planning to formula feed- states that she didn't produce milk with her first baby.    Her pregnancy is notable for:  AMA  History of IUFD  Obesity    O: /64  Pulse 117  Wt 103.9 kg (229 lb)  LMP 2018 (Exact Date)  Breastfeeding? No  BMI 41.88 kg/m2  Gen: Well-appearing, NAD  Fundal Height:  31  FHR: 125    A/P:  Dai Guadarrama is a 33 year old  at 28w6d by 7w6d ultrasound, here for EOB visit.    AMA:  - declined first trimester screening    PNC/FWB:   - History of IUFD, q4w US EFW 57% at 26w6d- Re[peat growth Q4 weeks, weekly BPP at 32 weeks  - GBS bacteruria, PCN in labor  - Prenatal labs HBsAg NR, HIV NR, Varicella immune, Rh pos, Rubella immune, GCT, CBC, RPR collected today  - Immunizations: Tdap, flu shot today  - Placenta: anterior  - Labor analgesia: Epidural  - Feeding: Formula, dicussed lactation consultation in hospital  - Contraception: options discussed, patient undecided    General discomfort:   -encouraged patient to try pregnancy belt again  -offered PT but patient declines due to childcare for her daughter  -Recommended warm baths, stretching exercises  -Unisom at bedtime to help with sleep    RTC in 2 weeks    Merry Danielle MD  Ob/Gyn, PGY-1    Appreciate note by Dr. Danielle. Patient has been seen and examined by me with the resident, agree with above note.     Vicky Salazar MD

## 2018-09-27 NOTE — NURSING NOTE
Chief Complaint   Patient presents with     Prenatal Care     FREDY 28 weeks and 6 days   Alis Arora LPN

## 2018-09-27 NOTE — MR AVS SNAPSHOT
After Visit Summary   9/27/2018    Dai Guadarrama    MRN: [id number]           Patient Information     Date Of Birth          1985        Visit Information        Provider Department      9/27/2018 2:00 PM Leeann Dickson; Vicky Salazar MD [facility]        Today's Diagnoses     Normal pregnancy in third trimester    -  1    Gastroesophageal reflux disease without esophagitis        History of fetal loss        Need for vaccination           Follow-ups after your visit        Follow-up notes from your care team     Return in about 2 weeks (around 10/11/2018).      Your next 10 appointments already scheduled     Oct 11, 2018  3:00 PM CDT   (Arrive by 2:45 PM)   [facility]   [facility] ([facility])    [facility]  3rd Floor, [address]  [address] [place] [id number] [phone]           How do I prepare for my exam? (Food and drink instructions) Drink four 8-ounce glasses of fluid an hour before your exam. If you need to empty your bladder before your exam, try to release only a little urine. Then, drink another glass of fluid.  How do I prepare for my exam? (Other instructions) You may have up to two family members in the exam room. If you bring a small child, an adult must be there to care for him or her. No video or camera photography during the procedure.  What should I wear: Wear comfortable clothes.  How long does the exam take: Most ultrasounds take 30 to 60 minutes.  What should I bring: Bring a list of your medicines, including vitamins, minerals and over-the-counter drugs. It is safest to leave personal items at home.  Do I need a :  No  is needed.  What do I need to tell my doctor: Tell your doctor about any allergies you may have.  What should I do after the exam: No restrictions, You may resume normal activities.  What is this  test: An ultrasound uses sound waves to make pictures of the body. Sound waves do not cause pain. The only discomfort may be the pressure of the wand against your skin or full bladder.  Who should I call with questions: If you have any questions, please call the Imaging Department where you will have your exam. Directions, parking instructions, and other information is available on our website, [email or web address].            Oct 11, 2018  3:30 PM CDT   RETURN OB with Val Blanco MD   [facility] ([facility])    [facility]  [address]  [address]  [facility] [address] [phone]              Who to contact     Please call your clinic at [phone] to:    Ask questions about your health    Make or cancel appointments    Discuss your medicines    Learn about your test results    Speak to your doctor            Additional Information About Your Visit        [employer] Information     [employer] gives you secure access to your electronic health record. If you see a primary care provider, you can also send messages to your care team and make appointments. If you have questions, please call your primary care clinic.  If you do not have a primary care provider, please call [phone] and they will assist you.      [employer] is an electronic gateway that provides easy, online access to your medical records. With [employer], you can request a clinic appointment, read your test results, renew a prescription or communicate with your care team.     To access your existing account, please contact your [facility] or call [phone] for assistance.        Care EveryWhere ID     This is your Care EveryWhere ID. This could be used by other organizations to access your [facility] medical records  [id number]        Your Vitals Were     Pulse Last Period Breastfeeding? BMI (Body Mass Index)          117 02/28/2018 (Exact Date) No  41.88 kg/m2         Blood Pressure from Last 3 Encounters:   09/27/18 106/64   08/30/18 111/74   08/27/18 109/63    Weight from Last 3 Encounters:   09/27/18 103.9 kg (229 lb)   08/30/18 103.2 kg (227 lb 9.6 oz)   08/27/18 103 kg (227 lb)              We Performed the Following     CBC with Platelets     Glucose 1 Hour     HC FLU VAC PRESRV FREE QUAD SPLIT VIR 3+YRS IM     TDAP VACCINE (BOOSTRIX)     Treponema Abs w Reflex to RPR and Titer          Today's Medication Changes          These changes are accurate as of 9/27/18  4:14 PM.  If you have any questions, ask your nurse or doctor.               Start taking these medicines.        Dose/Directions    doxylamine 25 MG Tabs tablet   Commonly known as:  UNISOM   Used for:  Normal pregnancy in third trimester   Started by:  Vicky Salazar MD        Dose:  12.5-25 mg   Take 0.5-1 tablets (12.5-25 mg) by mouth nightly as needed for sleep   Quantity:  30 each   Refills:  3         These medicines have changed or have updated prescriptions.        Dose/Directions    omeprazole 10 MG CR capsule   Commonly known as:  priLOSEC   This may have changed:  when to take this   Used for:  Gastroesophageal reflux disease without esophagitis   Changed by:  Vicky Salazar MD        Dose:  20 mg   Take 2 capsules (20 mg) by mouth daily   Quantity:  60 capsule   Refills:  3         Stop taking these medicines if you haven't already. Please contact your care team if you have questions.     benzocaine-menthol 15-3.6 MG lozenge   Commonly known as:  CEPACOL   Stopped by:  Vicky Salazar MD                Where to get your medicines      These medications were sent to [id number] - [place], [place] - [address] AT [facility] [address]  [address], [facility] [address]     Phone:  [phone]     doxylamine 25 MG Tabs tablet    omeprazole 10 MG CR capsule                Primary Care Provider    [facility]        No address on file        Equal Access to Services     [facility] [place] NOHEMI : Hadii isaiah bailey chloe Andrew, washayda luvinod, qabrittany katonyaluis stauffer, waxchuck kylie gutierrezcecilyasiya alcaraz. So [facility] [phone].    ATENCIÓN: Si habla español, tiene a martin disposición servicios gratuitos de asistencia lingüística. Azraame al [phone].    We comply with applicable federal civil rights laws and [place] laws. We do not discriminate on the basis of race, color, national origin, age, disability, sex, sexual orientation, or gender identity.            Thank you!     Thank you for choosing [facility]  for your care. Our goal is always to provide you with excellent care. Hearing back from our patients is one way we can continue to improve our services. Please take a few minutes to complete the written survey that you may receive in the mail after your visit with us. Thank you!             Your Updated Medication List - Protect others around you: Learn how to safely use, store and throw away your medicines at www.disposemymeds.org.          This list is accurate as of 9/27/18  4:14 PM.  Always use your most recent med list.                   Brand Name Dispense Instructions for use Diagnosis    doxylamine 25 MG Tabs tablet    UNISOM    30 each    Take 0.5-1 tablets (12.5-25 mg) by mouth nightly as needed for sleep    Normal pregnancy in third trimester       omeprazole 10 MG CR capsule    priLOSEC    60 capsule    Take 2 capsules (20 mg) by mouth daily    Gastroesophageal reflux disease without esophagitis       prenatal multivitamin plus iron 27-0.8 MG Tabs per tablet      Take 1 tablet by mouth daily    Supervision of high risk pregnancy in first trimester, Hyperemesis gravidarum, Aneurysm (H), History of intrauterine fetal death, currently pregnant in first trimester

## 2018-09-28 LAB — T PALLIDUM AB SER QL: NONREACTIVE

## 2018-10-11 ENCOUNTER — OFFICE VISIT (OUTPATIENT)
Dept: OBGYN | Facility: CLINIC | Age: 33
End: 2018-10-11
Payer: COMMERCIAL

## 2018-10-11 ENCOUNTER — RADIANT APPOINTMENT (OUTPATIENT)
Dept: ULTRASOUND IMAGING | Facility: CLINIC | Age: 33
End: 2018-10-11
Attending: OBSTETRICS & GYNECOLOGY
Payer: COMMERCIAL

## 2018-10-11 VITALS
BODY MASS INDEX: 42.14 KG/M2 | WEIGHT: 230.4 LBS | HEART RATE: 112 BPM | SYSTOLIC BLOOD PRESSURE: 97 MMHG | DIASTOLIC BLOOD PRESSURE: 69 MMHG

## 2018-10-11 DIAGNOSIS — Z87.59 HISTORY OF FETAL LOSS: ICD-10-CM

## 2018-10-11 DIAGNOSIS — Z87.59 HISTORY OF IUFD: ICD-10-CM

## 2018-10-11 DIAGNOSIS — D50.9 IRON DEFICIENCY ANEMIA, UNSPECIFIED IRON DEFICIENCY ANEMIA TYPE: ICD-10-CM

## 2018-10-11 DIAGNOSIS — O09.893 SUPERVISION OF OTHER HIGH RISK PREGNANCIES, THIRD TRIMESTER: Primary | ICD-10-CM

## 2018-10-11 PROCEDURE — T1013 SIGN LANG/ORAL INTERPRETER: HCPCS | Mod: U3,ZF

## 2018-10-11 PROCEDURE — G0463 HOSPITAL OUTPT CLINIC VISIT: HCPCS | Mod: 25,ZF

## 2018-10-11 PROCEDURE — 76816 OB US FOLLOW-UP PER FETUS: CPT

## 2018-10-11 RX ORDER — FERROUS SULFATE 325(65) MG
325 TABLET ORAL
Qty: 30 TABLET | Refills: 2 | Status: ON HOLD | OUTPATIENT
Start: 2018-10-11 | End: 2018-11-26

## 2018-10-11 ASSESSMENT — PAIN SCALES - GENERAL: PAINLEVEL: MODERATE PAIN (5)

## 2018-10-11 NOTE — MR AVS SNAPSHOT
After Visit Summary   10/11/2018    Dai Guadarrama    MRN: [id number]           Patient Information     Date Of Birth          1985        Visit Information        Provider Department      10/11/2018 3:00 PM Liz Mcadams; Val Blanco MD [facility]        Today's Diagnoses     History of IUFD    -  1      Care Instructions    Follow up in 1 week for ultrasound, 2 weeks for appointment          Follow-ups after your visit        Your next 10 appointments already scheduled     Oct 18, 2018  7:30 AM CDT   LAB with OP LAB UR   [facility] [place], Laboratory Services ([facility])    [address] [facility].  [facility] [id number] [phone]           Please do not eat 10-12 hours before your appointment if you are coming in fasting for labs on lipids, cholesterol, or glucose (sugar). This does not apply to pregnant women. Water, hot tea and black coffee (with nothing added) are okay. Do not drink other fluids, diet soda or chew gum.              Future tests that were ordered for you today     Open Future Orders        Priority Expected Expires Ordered    US OB Biophys Single Gestation Measure Routine  10/11/2019 10/11/2018            Who to contact     Please call your clinic at [phone] to:    Ask questions about your health    Make or cancel appointments    Discuss your medicines    Learn about your test results    Speak to your doctor            Additional Information About Your Visit        MyChart Information     [employer] gives you secure access to your electronic health record. If you see a primary care provider, you can also send messages to your care team and make appointments. If you have questions, please call your primary care clinic.  If you do not have a primary care provider, please call [phone] and they will assist you.      [employer] is an electronic gateway that provides easy, online access  to your medical records. With [facility], you can request a clinic appointment, read your test results, renew a prescription or communicate with your care team.     To access your existing account, please contact your [facility] or call [phone] for assistance.        Care EveryWhere ID     This is your Care EveryWhere ID. This could be used by other organizations to access your [facility] [place] medical records  [id number]        Your Vitals Were     Pulse Last Period BMI (Body Mass Index)             112 02/28/2018 (Exact Date) 42.14 kg/m2          Blood Pressure from Last 3 Encounters:   10/11/18 97/69   09/27/18 106/64   08/30/18 111/74    Weight from Last 3 Encounters:   10/11/18 104.5 kg (230 lb 6.4 oz)   09/27/18 103.9 kg (229 lb)   08/30/18 103.2 kg (227 lb 9.6 oz)               Primary Care Provider    [facility] [place] [facility]       No address on file        Equal Access to Services     AISHA SONG AH: Hadii aad ku hadasho Soomaali, waaxda luqadaha, qaybta kaalmada adeegyada, kodi alcaraz. So [facility] [phone].    ATENCIÓN: Si habla español, tiene a martin disposición servicios gratuitos de asistencia lingüística. Llame al [phone].    We comply with applicable federal civil rights laws and [place] laws. We do not discriminate on the basis of race, color, national origin, age, disability, sex, sexual orientation, or gender identity.            Thank you!     Thank you for choosing [facility]  for your care. Our goal is always to provide you with excellent care. Hearing back from our patients is one way we can continue to improve our services. Please take a few minutes to complete the written survey that you may receive in the mail after your visit with us. Thank you!             Your Updated Medication List - Protect others around you: Learn how to safely use, store and throw away your medicines at www.disposemymeds.org.           This list is accurate as of 10/11/18  3:55 PM.  Always use your most recent med list.                   Brand Name Dispense Instructions for use Diagnosis    doxylamine 25 MG Tabs tablet    UNISOM    30 each    Take 0.5-1 tablets (12.5-25 mg) by mouth nightly as needed for sleep    Normal pregnancy in third trimester       omeprazole 10 MG CR capsule    priLOSEC    60 capsule    Take 2 capsules (20 mg) by mouth daily    Gastroesophageal reflux disease without esophagitis       prenatal multivitamin plus iron 27-0.8 MG Tabs per tablet      Take 1 tablet by mouth daily    Supervision of high risk pregnancy in first trimester, Hyperemesis gravidarum, Aneurysm (H), History of intrauterine fetal death, currently pregnant in first trimester

## 2018-10-11 NOTE — PROGRESS NOTES
[facility]  Return OB Visit    S: Dai is doing OK today. She reports some abdominal discomfort. Is having very intermittent cramping, 5-10 times per day. She denies vaginal bleeding, vaginal discharge, LOF, contractions.  Reports good fetal movement.     Her pregnancy is complicated by:  - Advanced maternal age  - History of IUFD  - Obesity with BMI 42  - Failed GCT    O: BP 97/69  Pulse 112  Wt 104.5 kg (230 lb 6.4 oz)  LMP 2018 (Exact Date)  BMI 42.14 kg/m2  Weight gain:     Gen: Well-appearing, NAD  CV: Regular rate, well perfused  Pulm:  Normal respiratory effort  Abd:  Gravid, soft, nontender    Fundal Height:  32  FHR: 155    A/P:  Dai Guadarrama is a 33 year old  at 30w6d by 7w6d US, here for return OB visit. Pregnancy complicated by AMA, h/o IUFD, obesity, failed GCT.     #) Prenatal care  - Rh positive, antibody screen negative, Rubella immune, Hepatitis B non, GBS bacteriuria, plan for PCN prophylaxis in labor  - GCT failed at 134, scheduled for 3 hr GTT  - Hgb 9.7 at 28 week visit, Rx given for oral Fe  - Immunizations: S/p TDAP, influenza  - Feeding: Formula  - Contraception: previously discussed, undecided  - Labor analgesia: epidural    #) History of IUFD  - Q4 week growth US, last today EFW 87%ile 4#6oz, BPP 8/8  - Weekly BPPs beginning at 32 weeks, ordered today  - Plan induction of labor at 39 weeks    #) History of periophthalmic aneurysm  - S/p consultation with neurology  - Cleared for vaginal delivery in prior pregnancy    Return to clinic in 1 week for BPP, 2 weeks for prenatal appointment     Staffed with Dr. Maria Eugenia Blanco MD  OBGYN PGY-4  [phone]  10/11/2018, 4:28 PM    The patient was seen in resident continuity clinic by Dr. Barron.  I have reviewed the history and exam, the assessment and plan were jointly made.     Maria Eugenia Urrutia MD, FACOG

## 2018-10-18 ENCOUNTER — TELEPHONE (OUTPATIENT)
Dept: OBGYN | Facility: CLINIC | Age: 33
End: 2018-10-18

## 2018-10-18 ENCOUNTER — RADIANT APPOINTMENT (OUTPATIENT)
Dept: ULTRASOUND IMAGING | Facility: CLINIC | Age: 33
End: 2018-10-18
Payer: COMMERCIAL

## 2018-10-18 ENCOUNTER — HOSPITAL ENCOUNTER (OUTPATIENT)
Facility: CLINIC | Age: 33
Discharge: HOME | End: 2018-10-18
Attending: OBSTETRICS & GYNECOLOGY | Admitting: OBSTETRICS & GYNECOLOGY
Payer: COMMERCIAL

## 2018-10-18 VITALS
TEMPERATURE: 98 F | RESPIRATION RATE: 20 BRPM | SYSTOLIC BLOOD PRESSURE: 111 MMHG | HEIGHT: 61 IN | BODY MASS INDEX: 43.43 KG/M2 | DIASTOLIC BLOOD PRESSURE: 55 MMHG | WEIGHT: 230 LBS

## 2018-10-18 DIAGNOSIS — O09.93 ENCOUNTER FOR SUPERVISION OF HIGH RISK PREGNANCY IN THIRD TRIMESTER, ANTEPARTUM: ICD-10-CM

## 2018-10-18 DIAGNOSIS — O99.810 ABNORMAL MATERNAL GLUCOSE TOLERANCE, ANTEPARTUM: ICD-10-CM

## 2018-10-18 DIAGNOSIS — O99.810 ABNORMAL MATERNAL GLUCOSE TOLERANCE, ANTEPARTUM: Primary | ICD-10-CM

## 2018-10-18 PROBLEM — Z36.89 ENCOUNTER FOR TRIAGE IN PREGNANT PATIENT: Status: ACTIVE | Noted: 2018-10-18

## 2018-10-18 LAB
ALBUMIN UR-MCNC: 30 MG/DL
APPEARANCE UR: ABNORMAL
BACTERIA #/AREA URNS HPF: ABNORMAL /HPF
BILIRUB UR QL STRIP: NEGATIVE
COLOR UR AUTO: YELLOW
GLUCOSE 1H P 100 G GLC PO SERPL-MCNC: 136 MG/DL (ref 60–179)
GLUCOSE 2H P 100 G GLC PO SERPL-MCNC: 100 MG/DL (ref 60–154)
GLUCOSE 3H P 100 G GLC PO SERPL-MCNC: 129 MG/DL (ref 60–139)
GLUCOSE P FAST SERPL-MCNC: 99 MG/DL (ref 60–94)
GLUCOSE UR STRIP-MCNC: NEGATIVE MG/DL
HGB UR QL STRIP: NEGATIVE
KETONES UR STRIP-MCNC: NEGATIVE MG/DL
LEUKOCYTE ESTERASE UR QL STRIP: ABNORMAL
MUCOUS THREADS #/AREA URNS LPF: PRESENT /LPF
NITRATE UR QL: NEGATIVE
PH UR STRIP: 6.5 PH (ref 5–7)
RBC #/AREA URNS AUTO: 2 /HPF (ref 0–2)
SOURCE: ABNORMAL
SP GR UR STRIP: 1.02 (ref 1–1.03)
SPECIMEN SOURCE: NORMAL
SQUAMOUS #/AREA URNS AUTO: 19 /HPF (ref 0–1)
UROBILINOGEN UR STRIP-MCNC: NORMAL MG/DL (ref 0–2)
WBC #/AREA URNS AUTO: 9 /HPF (ref 0–5)
WET PREP SPEC: NORMAL

## 2018-10-18 PROCEDURE — 36415 COLL VENOUS BLD VENIPUNCTURE: CPT | Performed by: OBSTETRICS & GYNECOLOGY

## 2018-10-18 PROCEDURE — 87491 CHLMYD TRACH DNA AMP PROBE: CPT | Performed by: STUDENT IN AN ORGANIZED HEALTH CARE EDUCATION/TRAINING PROGRAM

## 2018-10-18 PROCEDURE — 87591 N.GONORRHOEAE DNA AMP PROB: CPT | Performed by: STUDENT IN AN ORGANIZED HEALTH CARE EDUCATION/TRAINING PROGRAM

## 2018-10-18 PROCEDURE — G0463 HOSPITAL OUTPT CLINIC VISIT: HCPCS

## 2018-10-18 PROCEDURE — 82952 GTT-ADDED SAMPLES: CPT | Performed by: OBSTETRICS & GYNECOLOGY

## 2018-10-18 PROCEDURE — 76819 FETAL BIOPHYS PROFIL W/O NST: CPT

## 2018-10-18 PROCEDURE — 87086 URINE CULTURE/COLONY COUNT: CPT | Performed by: OBSTETRICS & GYNECOLOGY

## 2018-10-18 PROCEDURE — 59025 FETAL NON-STRESS TEST: CPT

## 2018-10-18 PROCEDURE — 81001 URINALYSIS AUTO W/SCOPE: CPT | Performed by: STUDENT IN AN ORGANIZED HEALTH CARE EDUCATION/TRAINING PROGRAM

## 2018-10-18 PROCEDURE — 82951 GLUCOSE TOLERANCE TEST (GTT): CPT | Performed by: OBSTETRICS & GYNECOLOGY

## 2018-10-18 PROCEDURE — 87210 SMEAR WET MOUNT SALINE/INK: CPT | Performed by: STUDENT IN AN ORGANIZED HEALTH CARE EDUCATION/TRAINING PROGRAM

## 2018-10-18 ASSESSMENT — ACTIVITIES OF DAILY LIVING (ADL)
RETIRED_EATING: 0-->INDEPENDENT
TRANSFERRING: 0-->INDEPENDENT
AMBULATION: 0-->INDEPENDENT
SWALLOWING: 0-->SWALLOWS FOODS/LIQUIDS WITHOUT DIFFICULTY
BATHING: 0-->INDEPENDENT
TOILETING: 0-->INDEPENDENT
FALL_HISTORY_WITHIN_LAST_SIX_MONTHS: NO
RETIRED_COMMUNICATION: 0-->UNDERSTANDS/COMMUNICATES WITHOUT DIFFICULTY
DRESS: 0-->INDEPENDENT
COGNITION: 0 - NO COGNITION ISSUES REPORTED

## 2018-10-18 NOTE — IP AVS SNAPSHOT
MRN:[id number]                      After Visit Summary   10/18/2018    Dai Guadarrama    MRN: [id number]           Thank you!     Thank you for choosing [facility] for your care. Our goal is always to provide you with excellent care. Hearing back from our patients is one way we can continue to improve our services. Please take a few minutes to complete the written survey that you may receive in the mail after you visit with us. Thank you!        Patient Information     Date Of Birth          1985        Designated Caregiver       Most Recent Value    Caregiver    Will someone help with your care after discharge? no      About your hospital stay     You were admitted on:  October 18, 2018 You last received care in the:  [facility]    You were discharged on:  October 18, 2018       Who to Call     For medical emergencies, please call 911.  For non-urgent questions about your medical care, please call your primary care provider or clinic, None          Attending Provider     Provider Specialty    Vicky Salazar MD OB/Gyn       Primary Care Provider    [facility]      Your next 10 appointments already scheduled     Oct 25, 2018  1:00 PM CDT   (Arrive by 12:45 PM)   US FETAL BIOPHYS PROFILE W/O NON STRESS TEST with [id number]   [facility] ([facility])    [facility]  3rd Floor, [address]  [address] [place] [address] [phone]           How do I prepare for my exam? (Food and drink instructions) Drink four 8-ounce glasses of fluid an hour before your exam. If you need to empty your bladder before your exam, try to release only a little urine. Then, drink another glass of fluid.  How do I prepare for my exam? (Other instructions) You may have up to two family members in the exam room. If you bring a small child, an adult must be there to care for him or her. No video or camera photography during  the procedure.  What should I wear: Wear comfortable clothes.  How long does the exam take: Most ultrasounds take 30 to 60 minutes.  What should I bring: Bring a list of your medicines, including vitamins, minerals and over-the-counter drugs. It is safest to leave personal items at home.  Do I need a :  No  is needed.  What do I need to tell my doctor: Tell your doctor about any allergies you may have.  What should I do after the exam: No restrictions, You may resume normal activities.  What is this test: An ultrasound uses sound waves to make pictures of the body. Sound waves do not cause pain. The only discomfort may be the pressure of the wand against your skin or full bladder.  Who should I call with questions: If you have any questions, please call the Imaging Department where you will have your exam. Directions, parking instructions, and other information is available on our website, [email or web address].            Oct 25, 2018  1:45 PM CDT   RETURN OB with Maria Eugenia Urrutia MD   [facility] ([facility])    [facility]  [address]rd Fl[address]  [address]  [facility] [address] [phone]              Further instructions from your care team       Discharge Instruction for Undelivered Patients      You were seen for: Fetal Assessment  We Consulted: DR Salazar  You had (Test or Medicine): NST and lab tests    Call your provider if you notice:  Swelling in your face or increased swelling in your hands or legs.  Headaches that are not relieved by Tylenol (acetaminophen).  Changes in your vision (blurring: seeing spots or stars.)  Nausea (sick to your stomach) and vomiting (throwing up).   Weight gain of 5 pounds or more per week.  Heartburn that doesn't go away.  Signs of bladder infection: pain when you urinate (use the toilet), need to go more often and more urgently.  The bag of parada (rupture of membranes) breaks, or you notice  "leaking in your underwear.  Bright red blood in your underwear.  Abdominal (lower belly) or stomach pain.  For first baby: Contractions (tightening) less than 5 minutes apart for one hour or more.  Second (plus) baby: Contractions (tightening) less than 10 minutes apart and getting stronger.  *If less than 34 weeks: Contractions (tightenings) more than 6 times in one hour.  Increase or change in vaginal discharge (note the color and amount)      Follow-up:  The clinic will contact you tomorrow morning for an US  to be done tomorrow. La clinica se contactara con usted para hacer justa reza para manana para ecografia para martin brad.           Pending Results     Date and Time Order Name Status Description    10/18/2018 1715 Urine Culture Aerobic Bacterial In process     10/18/2018 1705 Chlamydia trachomatis PCR In process     10/18/2018 1705 Neisseria gonorrhoea PCR In process     10/18/2018 1456 US OB FETAL BIOPHY PROFILE W/O NON STRESS SINGLE In process             Statement of Approval     Ordered          10/18/18 2016  I have reviewed and agree with all the recommendations and orders detailed in this document.  EFFECTIVE NOW     Approved and electronically signed by:  Shanice Dale MD             Admission Information     Date & Time Provider Department Dept. Phone    10/18/2018 Vicky Salazar MD [facility] [phone]      Your Vitals Were     Blood Pressure Temperature Respirations Height Weight Last Period    111/55 98  F (36.7  C) (Oral) 20 1.55 m (5' 1.02\") 104.3 kg (230 lb) 02/28/2018 (Exact Date)    BMI (Body Mass Index)                   43.42 kg/m2           MyChart Information     [facility] gives you secure access to your electronic health record. If you see a primary care provider, you can also send messages to your care team and make appointments. If you have questions, please call your primary care clinic.  If you do not have a primary care provider, please call [phone] and they will assist " you.        Care EveryWhere ID     This is your Care EveryWhere ID. This could be used by other organizations to access your [facility] medical records  [id number]        Equal Access to Services     AISHA SONG : Mariano Andrew, cuco crossleonardha, kwadwota kasree stauffer, kodi shardain hayaapilar marteconor cobb tiffanie alcarazXimena So [facility] [phone].    ATENCIÓN: Si habla español, tiene a amrtin disposición servicios gratuitos de asistencia lingüística. Llame al [phone].    We comply with applicable federal civil rights laws and [place] laws. We do not discriminate on the basis of race, color, national origin, age, disability, sex, sexual orientation, or gender identity.               Review of your medicines      UNREVIEWED medicines. Ask your doctor about these medicines        Dose / Directions    doxylamine 25 MG Tabs tablet   Commonly known as:  UNISOM   Used for:  Normal pregnancy in third trimester        Dose:  12.5-25 mg   Take 0.5-1 tablets (12.5-25 mg) by mouth nightly as needed for sleep   Quantity:  30 each   Refills:  3       ferrous sulfate 325 (65 Fe) MG tablet   Commonly known as:  IRON   Used for:  Iron deficiency anemia, unspecified iron deficiency anemia type        Dose:  325 mg   Take 1 tablet (325 mg) by mouth daily (with breakfast)   Quantity:  30 tablet   Refills:  2       omeprazole 10 MG CR capsule   Commonly known as:  priLOSEC   Used for:  Gastroesophageal reflux disease without esophagitis        Dose:  20 mg   Take 2 capsules (20 mg) by mouth daily   Quantity:  60 capsule   Refills:  3       prenatal multivitamin plus iron 27-0.8 MG Tabs per tablet   Used for:  Supervision of high risk pregnancy in first trimester, Hyperemesis gravidarum, Aneurysm (H), History of intrauterine fetal death, currently pregnant in first trimester        Dose:  1 tablet   Take 1 tablet by mouth daily   Refills:  0                Protect others around you: Learn how to safely use, store and throw away  your medicines at www.disposemymeds.org.             Medication List: This is a list of all your medications and when to take them. Check marks below indicate your daily home schedule. Keep this list as a reference.      Medications           Morning Afternoon Evening Bedtime As Needed    doxylamine 25 MG Tabs tablet   Commonly known as:  UNISOM   Take 0.5-1 tablets (12.5-25 mg) by mouth nightly as needed for sleep                                ferrous sulfate 325 (65 Fe) MG tablet   Commonly known as:  IRON   Take 1 tablet (325 mg) by mouth daily (with breakfast)                                omeprazole 10 MG CR capsule   Commonly known as:  priLOSEC   Take 2 capsules (20 mg) by mouth daily                                prenatal multivitamin plus iron 27-0.8 MG Tabs per tablet   Take 1 tablet by mouth daily                                          More Information        Recuento de patadas    Es normal que le preocupe la chino de martin bebé. Para saber si el bebé está elissa, justa de las cosas que puede hacer es anotar los movimientos del bebé justa vez al día. [place] es lo que se llama recuento de patadas. Recuerde llevar avelina anotaciones con el recuento de patadas del bebé a todas avelina citas con martin proveedor de atención médica.  Cómo contar las patadas de martin bebé  Aqu[place] tiene algunos consejos para contar las patadas de martin bebé:    Escoja justa hora cuando el bebé esté activo, tatiana por ejemplo después de justa comida.    Siéntese cómodamente o acuéstese de lado.    La primera vez que el bebé se mueva, anote la hora.    Cuente cada movimiento hasta que el bebé se haya movido junior veces. ([place] puede llevar entre veinte minutos y dos horas).    Trate de hacer esto a la misma hora todos los días.  Cuándo llamar a martin proveedor de atención médica  Llame a martin proveedor de atención médica de inmediato en cualquiera de los siguientes casos:     Si el bebé se mueve menos de junior veces en dos horas mientras usted está llevando la cuenta  de las pataditas.    Si el bebé se mueve con mucha menos frecuencia que en días anteriores.    Si usted no ha sentido movimientos del bebé en todo el día.  Date Last Reviewed: 8/5/2015 2000-2017 [employer]. [address] [place] [address], [place] [address]. Todos los derechos reservados. Esta información no pretende sustituir la atención médica profesional. Sólo martin médico puede diagnosticar y tratar un problema de chino.

## 2018-10-18 NOTE — TELEPHONE ENCOUNTER
Pt presented to clinic for BPP. Score 6/8 for breathing. Borderline low fluid. Patient complained of decreased FM to sonographer.     Advised send patient to birth place. Nurse called and reported to birthplace

## 2018-10-18 NOTE — PROGRESS NOTES
Patient seen for decreased fetal movement. See flow sheet for fetal and uterine monitoring. VSS, afebrile, complains os cramping not seeing on monitor. Labs sent. Patient will be transferred to [facility] for observation. Will notify the provider with changes

## 2018-10-18 NOTE — PROGRESS NOTES
"Obstetrics Triage Note    HPI:  Dai Guadarrama is a 33 year old  at 31w6d by 7w6d US, here with complaints of abdominal pain, 6/8 BPP for extended monitoring, and decreased fetal movement.      Patient states that for the last few days she has felt decreased fetal movement.  She is very concerned because she had an IUFD at 33 weeks and is nervous about the wellbeing of this infant.  She also reports since yesterday have some nausea, has vomited twice though once she attributes to the 3hr GTT.  Reports two episodes of diarrhea in the last 2 days. Reports mild headache since yesterday and abdominal pain especially in the suprapubic area.   + FM.  She states that she has otherwise been feeling well. She denies fever, chest pain, SOB, vaginal bleeding, vaginal discharge, dysuria, constipation.    Pregnancy is complicated by:  - Advanced maternal age  - history of IUFD at 33 weeks  - Elevated 1 hr GCT, passed 3hr GTT  - obesity, BMI 42    ROS:  Negative except as mentioned in HPI.    PMH:  Migraine  Hx of periophthalmic aneurysm    PSHx:  Past Surgical History:   Procedure Laterality Date     C EACH ADD TOOTH EXTRACTION      bad reaction to anesthia for local      NO HISTORY OF SURGERY         Medications:  PNV  Omeprazole    Allergies:   No Known Allergies    Physical Exam:   Vitals:    10/18/18 1615 10/18/18 1621   BP: 97/55    Resp: 18    Temp:  98.4  F (36.9  C)   TempSrc:  Oral   Weight:  104.3 kg (230 lb)   Height:  1.55 m (5' 1.02\")      Gen: resting comfortably, in NAD  CV: Regular rate   Pulm:no increased work of breathing  Abd: soft, gravid, non-tender, non-distended    SVE: normal appearing vaginal mucosa    NST:  FHT: , moderate variability, no accelerations, single variable decelerations  [place]: No contractions in 10 min    Labs:  Results for orders placed or performed during the hospital encounter of 10/18/18   Wet prep   Result Value Ref Range    Specimen Description Vagina     Wet Prep " No motile Trichomonas seen     Wet Prep No yeast seen     Wet Prep Few  PMNs seen       Wet Prep No clue cells seen        Assessment/Plan: Dai Guadarrama is a 33 year old  at 31w6d by 7w6d US, here for decreased fetal movement and extended fetal monitoring following 6/8 BPP today (for practice breathing).    Decreased fetal movement  - Pt reports some increase in fetal movement but not back to baseline  - Encouraged fluids, food will continue to monitor    Fetal wellbeing  - 6/8 BPP today in clinic  - Given history of IUFD patient has plan for weekly BPPs starting at 32 weeks  - Will continue to monitor fetal heart tracing until 1900 and reassess at that point in time.  Overall reassuring fetal heart tracing, through category II due to single variable decel  - Dispo: to home with follow up in the next week. Discussed tylenol for pain as needed. Discussed labor warning signs and indication to return to care.    Discussed with Dr. Salazar.    Amy Schumer, MD  Obstetrics and Gyncology, PGY-2  2018  5:02 PM      Addendum:     Signout received from day team. 34yo  at 31w6d by 7w6d US here for decreased fetal movement and 6/8 BPP in clinic. Prolonged monitoring in triage initially Cat II with single variable decel, but improved to Cat I although not reactive. Subjectively, the patient felt better and was appreciating normal movement from the baby after eating. Plan to discharge home tonight with follow up BPP tomorrow in clinic for equivocal BPP today. Will initiate weekly BPPs now for history of IUFD at 33 weeks with subsequent term pregnancy. Return precautions discussed.     Shanice Dale MD  OB/Gyn PGY-2  10/18/2018    Appreciate note by Dr. Dale. Patient has been seen and examined by me separate from the resident, agree with above note.     Vicky Salazar MD  8:53 PM

## 2018-10-19 ENCOUNTER — OFFICE VISIT (OUTPATIENT)
Dept: INTERPRETER SERVICES | Facility: CLINIC | Age: 33
End: 2018-10-19
Payer: COMMERCIAL

## 2018-10-19 ENCOUNTER — HOSPITAL ENCOUNTER (OUTPATIENT)
Dept: ULTRASOUND IMAGING | Facility: CLINIC | Age: 33
Discharge: HOME | End: 2018-10-19
Attending: OBSTETRICS & GYNECOLOGY | Admitting: OBSTETRICS & GYNECOLOGY
Payer: COMMERCIAL

## 2018-10-19 ENCOUNTER — TELEPHONE (OUTPATIENT)
Dept: OBGYN | Facility: CLINIC | Age: 33
End: 2018-10-19

## 2018-10-19 DIAGNOSIS — O09.93 ENCOUNTER FOR SUPERVISION OF HIGH RISK PREGNANCY IN THIRD TRIMESTER, ANTEPARTUM: Primary | ICD-10-CM

## 2018-10-19 DIAGNOSIS — O09.93 ENCOUNTER FOR SUPERVISION OF HIGH RISK PREGNANCY IN THIRD TRIMESTER, ANTEPARTUM: ICD-10-CM

## 2018-10-19 LAB
BACTERIA SPEC CULT: NORMAL
SPECIMEN SOURCE: NORMAL

## 2018-10-19 PROCEDURE — T1013 SIGN LANG/ORAL INTERPRETER: HCPCS | Mod: U3

## 2018-10-19 PROCEDURE — 76819 FETAL BIOPHYS PROFIL W/O NST: CPT

## 2018-10-19 NOTE — PLAN OF CARE
Extended monitoring done. Pt denies pain, leaking or bleeding. Tomorrow will have a follow up US in clinic; pt agreed with plan and discharge at 2030.

## 2018-10-19 NOTE — DISCHARGE INSTRUCTIONS
Discharge Instruction for Undelivered Patients      You were seen for: Fetal Assessment  We Consulted: DR Salazar  You had (Test or Medicine): NST and lab tests    Call your provider if you notice:  Swelling in your face or increased swelling in your hands or legs.  Headaches that are not relieved by Tylenol (acetaminophen).  Changes in your vision (blurring: seeing spots or stars.)  Nausea (sick to your stomach) and vomiting (throwing up).   Weight gain of 5 pounds or more per week.  Heartburn that doesn't go away.  Signs of bladder infection: pain when you urinate (use the toilet), need to go more often and more urgently.  The bag of parada (rupture of membranes) breaks, or you notice leaking in your underwear.  Bright red blood in your underwear.  Abdominal (lower belly) or stomach pain.  For first baby: Contractions (tightening) less than 5 minutes apart for one hour or more.  Second (plus) baby: Contractions (tightening) less than 10 minutes apart and getting stronger.  *If less than 34 weeks: Contractions (tightenings) more than 6 times in one hour.  Increase or change in vaginal discharge (note the color and amount)      Follow-up:  The clinic will contact you tomorrow morning for an US  to be done tomorrow. La clinica se contactara con usted para hacer justa reza para manana para ecografia para martin brad.

## 2018-10-19 NOTE — TELEPHONE ENCOUNTER
----- Message from Vicky Salazar MD sent at 10/18/2018  8:37 PM CDT -----  Regarding: Needs BPP Friday  Can you schedule this patient for follow BPP today Friday 10/19. She will be expecting phone call with appointment. Can be anytime during day.     Thanks!

## 2018-10-19 NOTE — TELEPHONE ENCOUNTER
Scheduled ultrasound appointment at adult imaging in hospital. Called to inform patient but reached voicemail. Left message via  services and requested call-back to confirm. Routing back to triage to check in with patient.

## 2018-10-22 NOTE — ADDENDUM NOTE
Encounter addended by: Maria Eugenia Damon on: 10/22/2018 10:37 AM<BR>     Actions taken: Visit Navigator Flowsheet section accepted, Charge Capture section accepted

## 2018-10-22 NOTE — ADDENDUM NOTE
Encounter addended by: Maria Eugenia Damon on: 10/22/2018 10:36 AM<BR>     Actions taken: Visit Navigator Flowsheet section accepted

## 2018-10-25 ENCOUNTER — HOSPITAL ENCOUNTER (OUTPATIENT)
Facility: CLINIC | Age: 33
Discharge: HOME | End: 2018-10-25
Attending: OBSTETRICS & GYNECOLOGY | Admitting: OBSTETRICS & GYNECOLOGY
Payer: COMMERCIAL

## 2018-10-25 ENCOUNTER — RADIANT APPOINTMENT (OUTPATIENT)
Dept: ULTRASOUND IMAGING | Facility: CLINIC | Age: 33
End: 2018-10-25
Payer: COMMERCIAL

## 2018-10-25 ENCOUNTER — OFFICE VISIT (OUTPATIENT)
Dept: OBGYN | Facility: CLINIC | Age: 33
End: 2018-10-25
Payer: COMMERCIAL

## 2018-10-25 VITALS — RESPIRATION RATE: 18 BRPM | TEMPERATURE: 97.7 F | DIASTOLIC BLOOD PRESSURE: 61 MMHG | SYSTOLIC BLOOD PRESSURE: 98 MMHG

## 2018-10-25 VITALS
HEART RATE: 120 BPM | BODY MASS INDEX: 43.33 KG/M2 | DIASTOLIC BLOOD PRESSURE: 65 MMHG | WEIGHT: 229.5 LBS | SYSTOLIC BLOOD PRESSURE: 99 MMHG

## 2018-10-25 DIAGNOSIS — O09.93 ENCOUNTER FOR SUPERVISION OF HIGH RISK PREGNANCY IN THIRD TRIMESTER, ANTEPARTUM: ICD-10-CM

## 2018-10-25 DIAGNOSIS — O09.893 SUPERVISION OF OTHER HIGH RISK PREGNANCIES, THIRD TRIMESTER: Primary | ICD-10-CM

## 2018-10-25 PROCEDURE — G0463 HOSPITAL OUTPT CLINIC VISIT: HCPCS | Mod: 25,ZF

## 2018-10-25 PROCEDURE — 59025 FETAL NON-STRESS TEST: CPT

## 2018-10-25 PROCEDURE — 76819 FETAL BIOPHYS PROFIL W/O NST: CPT

## 2018-10-25 PROCEDURE — G0463 HOSPITAL OUTPT CLINIC VISIT: HCPCS | Mod: 25

## 2018-10-25 ASSESSMENT — PAIN SCALES - GENERAL: PAINLEVEL: EXTREME PAIN (8)

## 2018-10-25 NOTE — DISCHARGE INSTRUCTIONS
Undelivered Patients Discharge Instructions: [place]olan    La vieron por: non stress test  Consultamos a: Dr. Louis     Actividad:  Call your doctor or nurse midwife if your baby is moving less than usual.     Llame a martin proveedor si nota:    Hinchazón en el milka o aumento de la hinchazón en avelina celestina o piernas.    Melvi de savannah que no se alivian con Tylenol (acetaminofén).    Cambios en martin visión (borrosa: ve manchas o estrellas.)    Náuseas (sensación de malestar estomacal) y vómitos (devolver).    Aumento de peso de 5 libras o más por semana.    Acidez estomacal que no se kendra.    Signos de infección de vejiga: dolor al orinar (hacer pis), necesidad de ir con más frecuencia y más urgencia.    Se rompe la bolsa (ruptura de membranas) o nota que gotea en martin ropa interior.    Tyron wilian brillante en martin ropa interior.    Dolor en la parte baja del vientre (abdomen) o estómago.    [place] bebé (en adelante): Contracciones (tirantez) con menos de 10 minutos de diferencia entre justa y otra y cada vez más eliezer.    Aumento o cambio en las secreciones vaginales (note el color y la cantidad)    Antes de las 37 semanas, llame si nota los siguientes:    Contracciones que se dan cada 10 minutos o más     Cambios en las secreciones vaginales    Presión pélvica    Dolor de espalda sordo en la región lumbar    Calambres que se sienten tatiana justa menstruación    Calambres abdominales con o sin diarrea      Otro: Make an appointment to be seen on early next week for a BPP

## 2018-10-25 NOTE — IP AVS SNAPSHOT
[facility]    [address]    [place] [address]    Phone:  [phone]                                       After Visit Summary   10/25/2018    Dai Guadarrama    MRN: [id number]           After Visit Summary Signature Page     I have received my discharge instructions, and my questions have been answered. I have discussed any challenges I see with this plan with the nurse or doctor.    ..........................................................................................................................................  Patient/Patient Representative Signature      ..........................................................................................................................................  Patient Representative Print Name and Relationship to Patient    ..................................................               ................................................  Date                                   Time    ..........................................................................................................................................  Reviewed by Signature/Title    ...................................................              ..............................................  Date                                               Time          22EPIC Rev 08/18

## 2018-10-25 NOTE — MR AVS SNAPSHOT
After Visit Summary   10/25/2018    Dai Guadarrama    MRN: [id number]           Patient Information     Date Of Birth          1985        Visit Information        Provider Department      10/25/2018 1:45 PM Maria Eugenia Urrutia MD; MULTILINGUAL WORD [facility]        Today's Diagnoses     Supervision of other high risk pregnancies, third trimester    -  1       Follow-ups after your visit        Follow-up notes from your care team     Return in about 2 weeks (around 11/8/2018).      Your next 10 appointments already scheduled     Oct 30, 2018  3:15 PM CDT   US FETAL BIOPHYS PROFILE W/O NON STRESS TEST with [id number]   [facility] ([facility])    [facility]  3rd Floor, [address]  [address] [place] [id number] [phone]           How do I prepare for my exam? (Food and drink instructions) Drink four 8-ounce glasses of fluid an hour before your exam. If you need to empty your bladder before your exam, try to release only a little urine. Then, drink another glass of fluid.  How do I prepare for my exam? (Other instructions) You may have up to two family members in the exam room. If you bring a small child, an adult must be there to care for him or her. No video or camera photography during the procedure.  What should I wear: Wear comfortable clothes.  How long does the exam take: Most ultrasounds take 30 to 60 minutes.  What should I bring: Bring a list of your medicines, including vitamins, minerals and over-the-counter drugs. It is safest to leave personal items at home.  Do I need a :  No  is needed.  What do I need to tell my doctor: Tell your doctor about any allergies you may have.  What should I do after the exam: No restrictions, You may resume normal activities.  What is this test: An ultrasound uses sound waves to make pictures of the body. Sound waves do not cause  pain. The only discomfort may be the pressure of the wand against your skin or full bladder.  Who should I call with questions: If you have any questions, please call the Imaging Department where you will have your exam. Directions, parking instructions, and other information is available on our website, [email or web address].              Who to contact     Please call your clinic at [phone] to:    Ask questions about your health    Make or cancel appointments    Discuss your medicines    Learn about your test results    Speak to your doctor            Additional Information About Your Visit        [employer] Information     [employer] gives you secure access to your electronic health record. If you see a primary care provider, you can also send messages to your care team and make appointments. If you have questions, please call your primary care clinic.  If you do not have a primary care provider, please call [phone] and they will assist you.      [employer] is an electronic gateway that provides easy, online access to your medical records. With [employer], you can request a clinic appointment, read your test results, renew a prescription or communicate with your care team.     To access your existing account, please contact your [facility] or call [phone] for assistance.        Care EveryWhere ID     This is your Care EveryWhere ID. This could be used by other organizations to access your [facility] medical records  [id number]        Your Vitals Were     Pulse Last Period Breastfeeding? BMI (Body Mass Index)          120 02/28/2018 (Exact Date) No 43.33 kg/m2         Blood Pressure from Last 3 Encounters:   10/25/18 98/61   10/25/18 99/65   10/18/18 111/55    Weight from Last 3 Encounters:   10/25/18 104.1 kg (229 lb 8 oz)   10/18/18 104.3 kg (230 lb)   10/11/18 104.5 kg (230 lb 6.4 oz)              Today, you had the following     No orders found for display       Primary Care  Provider Fax #    Physician No Ref-Primary [phone]       No address on file        Equal Access to Services     AISHA SONG : Hadii aad ku hadmollysumi Deisyjakob, phuda americaleonardha, primo dolan estuardomaki, kodi shardain hayaapilar marteconor cobb tiffanie alcaraz. So [facility] [phone].    ATENCIÓN: Si habla español, tiene a martin disposición servicios gratuitos de asistencia lingüística. Llame al [phone].    We comply with applicable federal civil rights laws and [place] laws. We do not discriminate on the basis of race, color, national origin, age, disability, sex, sexual orientation, or gender identity.            Thank you!     Thank you for choosing [facility]  for your care. Our goal is always to provide you with excellent care. Hearing back from our patients is one way we can continue to improve our services. Please take a few minutes to complete the written survey that you may receive in the mail after your visit with us. Thank you!             Your Updated Medication List - Protect others around you: Learn how to safely use, store and throw away your medicines at www.disposemymeds.org.          This list is accurate as of 10/25/18  3:27 PM.  Always use your most recent med list.                   Brand Name Dispense Instructions for use Diagnosis    doxylamine 25 MG Tabs tablet    UNISOM    30 each    Take 0.5-1 tablets (12.5-25 mg) by mouth nightly as needed for sleep    Normal pregnancy in third trimester       ferrous sulfate 325 (65 Fe) MG tablet    IRON    30 tablet    Take 1 tablet (325 mg) by mouth daily (with breakfast)    Iron deficiency anemia, unspecified iron deficiency anemia type       omeprazole 10 MG CR capsule    priLOSEC    60 capsule    Take 2 capsules (20 mg) by mouth daily    Gastroesophageal reflux disease without esophagitis       prenatal multivitamin plus iron 27-0.8 MG Tabs per tablet      Take 1 tablet by mouth daily    Supervision of high risk pregnancy in first  trimester, Hyperemesis gravidarum, Aneurysm (H), History of intrauterine fetal death, currently pregnant in first trimester

## 2018-10-25 NOTE — PROGRESS NOTES
Dai Guadarrama is a 33 year old  at 32w6d here for NST after  BPP in clinic. History notable for AMA, history of IUFD at 33w.     FHR monitoring: Baseline 140bpm, moderate variability, initially no accels, however after additional monitoring, +10x10 accels, no decels  [place]: No contractions    A/P: Dai Guadarrama is a 33 year old  at 32w6d presenting for NST. 8/10 BPP. Message sent to imaging pool for Monday BPP.     Discussed with Dr. Heriberto Downs MD, [facility]  Ob/Gyn PGY3  10/25/18     I have reviewed the EFM tracing and agree with the excellent note as written by Dr. Downs.     Sharmin Louis

## 2018-10-25 NOTE — PROGRESS NOTES
Subjective:      33 year old  at 32w6d presentst for a routine prenatal appointment. Pregnancy complicated by AMA, obesity, and a hx of IUFD at 33 weeks. The patient is having crampy lower abdominal pain that also feels like increased pressure. She rates it as an 8/10 in severity and that it is worse when she is up and walking around. She presented to L&D on 10/18 for similar symptoms and was discharge home after a reassuring NST. The pain got better after last week, but was worse again this morning. She also feels like she has been having contractions as close as ever 2 minutes, that get more painful after she goes to the bathroom. No vaginal bleeding or leakage of fluid. The baby has been moving a little bit less today. Additionally, she has a headache that she also rates as an 8/10, and feels overall fatigued and nauseated today. No chest pain, SOB, visual changes, RUQ pain or vomiting.     She had a BPP of 8/8 today, YARITZA 7.7 (borderline oligohydramnios)    Reviewed EOB labs with patient.  Reviewed TDAP and influenza vaccination: Previously given    Objective:  Vitals:    10/25/18 1332   BP: 99/65   Pulse: 120   Weight: 104.1 kg (229 lb 8 oz)   Fundal height: 40 cm but difficult to assess given body habitus  Cervix: closed/long/high     Assessment:  34 y/o  at 32+6 weeks, overall states she is not feeling well today with HA, pelvic pressure, cramping.  BPP today 8/8 but YARITZA now in the borderline low range.  Her cervix is closed/long.      Plan:  Attempted an NST in clinic, however could not get a continuous tracing 2/2 body habitus.  Recommend evaluation on the [facility] which she was agreeable to.  Reviewed her low normal YARITZA and plan to continue weekly BPPs with serial growth assessment.  Encouraged her to rest on her left side when she can and increase her PO fluid intake.      - Reviewed why/how to contact provider if headache/visual changes/RUQ or epigastric pain, decreased fetal movement,  vaginal bleeding, leakage of fluid or increased contractions     Return for BPP in 1 week and to clinic in 2 weeks- or prn if questions or concerns.     Nayeli Deluca, MS3    Patient was seen with student who acted as my scribe and was present for learning. I personally assessed, examined and made medical decisions reflected in the documentation. Any necessary edits to the note have been made by myself. Maria Eugenia Urrutia MD

## 2018-10-25 NOTE — IP AVS SNAPSHOT
MRN:[id number]                      After Visit Summary   10/25/2018    Dai Guadarrama    MRN: [id number]           Thank you!     Thank you for choosing [facility] for your care. Our goal is always to provide you with excellent care. Hearing back from our patients is one way we can continue to improve our services. Please take a few minutes to complete the written survey that you may receive in the mail after you visit with us. Thank you!        Patient Information     Date Of Birth          1985        About your hospital stay     You were admitted on:  October 25, 2018 You last received care in the:  [facility]    You were discharged on:  October 25, 2018       Who to Call     For medical emergencies, please call 911.  For non-urgent questions about your medical care, please call your primary care provider or clinic, None          Attending Provider     Provider Specialty    Noreen Harris MD OB/Gyn       Primary Care Provider Fax #    Physician No Ref-Primary [phone]      Further instructions from your care team       Undelivered Patients Discharge Instructions: Mongolian    La vieron por: non stress test  Consultamos a: Dr. Louis     Actividad:  Call your doctor or nurse midwife if your baby is moving less than usual.     Llame a martin proveedor si nota:    Hinchazón en el milka o aumento de la hinchazón en avelina celestina o piernas.    Melvi de savannah que no se alivian con Tylenol (acetaminofén).    Cambios en martin visión (borrosa: ve manchas o estrellas.)    Náuseas (sensación de malestar estomacal) y vómitos (devolver).    Aumento de peso de 5 libras o más por semana.    Acidez estomacal que no se kendra.    Signos de infección de vejiga: dolor al orinar (hacer pis), necesidad de ir con más frecuencia y más urgencia.    Se rompe la bolsa (ruptura de membranas) o nota que gotea en martin ropa interior.    Tyron wilian brillante en martin ropa interior.    Dolor en la parte baja del vientre  (abdomen) o estómago.    Rohith bebé (en adelante): Contracciones (tirantez) con menos de 10 minutos de diferencia entre justa y otra y cada vez más eliezer.    Aumento o cambio en las secreciones vaginales (note el color y la cantidad)    Antes de las 37 semanas, llame si nota los siguientes:    Contracciones que se dan cada 10 minutos o más     Cambios en las secreciones vaginales    Presión pélvica    Dolor de espalda sordo en la región lumbar    Calambres que se sienten tatiana justa menstruación    Calambres abdominales con o sin diarrea      Otro: Make an appointment to be seen on early next week for a BPP       Pending Results     Date and Time Order Name Status Description    10/25/2018 1258 US OB FETAL BIOPHY PROFILE W/O NON STRESS SINGLE In process             Statement of Approval     Ordered          10/25/18 4301  I have reviewed and agree with all the recommendations and orders detailed in this document.  EFFECTIVE NOW     Approved and electronically signed by:  Savana Downs MD             Admission Information     Date & Time Provider Department Dept. Phone    10/25/2018 Noreen Harris MD [facility] [phone]      Your Vitals Were     Blood Pressure Temperature Respirations Last Period          98/61 97.7  F (36.5  C) (Oral) 18 02/28/2018 (Exact Date)        MyChart Information     [employer] gives you secure access to your electronic health record. If you see a primary care provider, you can also send messages to your care team and make appointments. If you have questions, please call your primary care clinic.  If you do not have a primary care provider, please call [phone] and they will assist you.        Care EveryWhere ID     This is your Care EveryWhere ID. This could be used by other organizations to access your [place] medical records  [id number]        Equal Access to Services     AISHA SONG : cuco Melendez, kodi valera  audra minoraan ah. So [facility] [phone].    ATENCIÓN: Si habla tameraañol, tiene a martin disposición servicios gratuitos de asistencia lingüística. Monica al [phone].    We comply with applicable federal civil rights laws and [place] laws. We do not discriminate on the basis of race, color, national origin, age, disability, sex, sexual orientation, or gender identity.               Review of your medicines      CONTINUE these medicines which have NOT CHANGED        Dose / Directions    doxylamine 25 MG Tabs tablet   Commonly known as:  UNISOM   Used for:  Normal pregnancy in third trimester        Dose:  12.5-25 mg   Take 0.5-1 tablets (12.5-25 mg) by mouth nightly as needed for sleep   Quantity:  30 each   Refills:  3       ferrous sulfate 325 (65 Fe) MG tablet   Commonly known as:  IRON   Used for:  Iron deficiency anemia, unspecified iron deficiency anemia type        Dose:  325 mg   Take 1 tablet (325 mg) by mouth daily (with breakfast)   Quantity:  30 tablet   Refills:  2       omeprazole 10 MG CR capsule   Commonly known as:  priLOSEC   Used for:  Gastroesophageal reflux disease without esophagitis        Dose:  20 mg   Take 2 capsules (20 mg) by mouth daily   Quantity:  60 capsule   Refills:  3       prenatal multivitamin plus iron 27-0.8 MG Tabs per tablet   Used for:  Supervision of high risk pregnancy in first trimester, Hyperemesis gravidarum, Aneurysm (H), History of intrauterine fetal death, currently pregnant in first trimester        Dose:  1 tablet   Take 1 tablet by mouth daily   Refills:  0                Protect others around you: Learn how to safely use, store and throw away your medicines at www.disposemymeds.org.             Medication List: This is a list of all your medications and when to take them. Check marks below indicate your daily home schedule. Keep this list as a reference.      Medications           Morning Afternoon Evening Bedtime As Needed    doxylamine 25 MG Tabs  tablet   Commonly known as:  UNISOM   Take 0.5-1 tablets (12.5-25 mg) by mouth nightly as needed for sleep                                ferrous sulfate 325 (65 Fe) MG tablet   Commonly known as:  IRON   Take 1 tablet (325 mg) by mouth daily (with breakfast)                                omeprazole 10 MG CR capsule   Commonly known as:  priLOSEC   Take 2 capsules (20 mg) by mouth daily                                prenatal multivitamin plus iron 27-0.8 MG Tabs per tablet   Take 1 tablet by mouth daily                                          More Information        Recuento de patadas    Es normal que le preocupe la chino de martin bebé. Para saber si el bebé está elissa, justa de las cosas que puede hacer es anotar los movimientos del bebé justa vez al día. [place] es lo que se llama recuento de patadas. Recuerde llevar avelina anotaciones con el recuento de patadas del bebé a todas avelina citas con martin proveedor de atención médica.  Cómo contar las patadas de martin bebé  Aquí tiene algunos consejos para contar las patadas de martin bebé:    Escoja justa hora cuando el bebé esté activo, tatiana por ejemplo después de justa comida.    Siéntese cómodamente o acuéstese de lado.    La primera vez que el bebé se mueva, anote la hora.    Cuente cada movimiento hasta que el bebé se haya movido junior veces. ([place] puede llevar entre veinte minutos y dos horas).    Trate de hacer esto a la misma hora todos los días.  Cuándo llamar a martin proveedor de atención médica  Llame a martin proveedor de atención médica de inmediato en cualquiera de los siguientes casos:     Si el bebé se mueve menos de junior veces en dos horas mientras usted está llevando la cuenta de las pataditas.    Si el bebé se mueve con mucha menos frecuencia que en días anteriores.    Si usted no ha sentido movimientos del bebé en todo el día.  Date Last Reviewed: 8/5/2015 2000-2017 [employer]. [address] [place] [address], [place] [address]. Todos los derechos reservados. Esta  información no pretende sustituir la atención médica profesional. Sólo martin médico puede diagnosticar y tratar un problema de chino.

## 2018-10-30 ENCOUNTER — RADIANT APPOINTMENT (OUTPATIENT)
Dept: ULTRASOUND IMAGING | Facility: CLINIC | Age: 33
End: 2018-10-30
Attending: OBSTETRICS & GYNECOLOGY
Payer: COMMERCIAL

## 2018-10-30 DIAGNOSIS — O09.93 ENCOUNTER FOR SUPERVISION OF HIGH RISK PREGNANCY IN THIRD TRIMESTER, ANTEPARTUM: ICD-10-CM

## 2018-10-30 PROCEDURE — 76819 FETAL BIOPHYS PROFIL W/O NST: CPT

## 2018-11-07 ENCOUNTER — RADIANT APPOINTMENT (OUTPATIENT)
Dept: ULTRASOUND IMAGING | Facility: CLINIC | Age: 33
End: 2018-11-07
Attending: OBSTETRICS & GYNECOLOGY
Payer: COMMERCIAL

## 2018-11-07 ENCOUNTER — OFFICE VISIT (OUTPATIENT)
Dept: OBGYN | Facility: CLINIC | Age: 33
End: 2018-11-07
Attending: OBSTETRICS & GYNECOLOGY
Payer: COMMERCIAL

## 2018-11-07 VITALS
HEART RATE: 99 BPM | WEIGHT: 229.1 LBS | BODY MASS INDEX: 43.25 KG/M2 | DIASTOLIC BLOOD PRESSURE: 69 MMHG | HEIGHT: 61 IN | SYSTOLIC BLOOD PRESSURE: 103 MMHG

## 2018-11-07 DIAGNOSIS — Z34.93 PRENATAL CARE IN THIRD TRIMESTER: Primary | ICD-10-CM

## 2018-11-07 DIAGNOSIS — D50.8 OTHER IRON DEFICIENCY ANEMIA: ICD-10-CM

## 2018-11-07 DIAGNOSIS — O09.93 ENCOUNTER FOR SUPERVISION OF HIGH RISK PREGNANCY IN THIRD TRIMESTER, ANTEPARTUM: ICD-10-CM

## 2018-11-07 PROBLEM — D50.9 IRON DEFICIENCY ANEMIA: Status: ACTIVE | Noted: 2018-11-07

## 2018-11-07 LAB
ERYTHROCYTE [DISTWIDTH] IN BLOOD BY AUTOMATED COUNT: 16.4 % (ref 10–15)
HCT VFR BLD AUTO: 30.5 % (ref 35–47)
HGB BLD-MCNC: 9.3 G/DL (ref 11.7–15.7)
MCH RBC QN AUTO: 24.8 PG (ref 26.5–33)
MCHC RBC AUTO-ENTMCNC: 30.5 G/DL (ref 31.5–36.5)
MCV RBC AUTO: 81 FL (ref 78–100)
PLATELET # BLD AUTO: 413 10E9/L (ref 150–450)
RBC # BLD AUTO: 3.75 10E12/L (ref 3.8–5.2)
WBC # BLD AUTO: 8.9 10E9/L (ref 4–11)

## 2018-11-07 PROCEDURE — 76819 FETAL BIOPHYS PROFIL W/O NST: CPT

## 2018-11-07 PROCEDURE — 36415 COLL VENOUS BLD VENIPUNCTURE: CPT | Performed by: OBSTETRICS & GYNECOLOGY

## 2018-11-07 PROCEDURE — 85027 COMPLETE CBC AUTOMATED: CPT | Performed by: OBSTETRICS & GYNECOLOGY

## 2018-11-07 PROCEDURE — G0463 HOSPITAL OUTPT CLINIC VISIT: HCPCS | Mod: ZF

## 2018-11-07 PROCEDURE — 86780 TREPONEMA PALLIDUM: CPT | Performed by: OBSTETRICS & GYNECOLOGY

## 2018-11-07 RX ORDER — DIPHENHYDRAMINE HCL 25 MG
25 CAPSULE ORAL
Status: CANCELLED | OUTPATIENT
Start: 2018-11-07

## 2018-11-07 RX ORDER — ACETAMINOPHEN 325 MG/1
650 TABLET ORAL
Status: CANCELLED
Start: 2018-11-07

## 2018-11-07 NOTE — PROGRESS NOTES
"Doing ok. Some increased pelvic pressure and cramps, but no regular ctx. Occ feels SOB on exertion. Denies CP.      Vitals:    18 1048   BP: 103/69   BP Location: Right arm   Patient Position: Chair   Pulse: 99   Weight: 103.9 kg (229 lb 1.6 oz)   Height: 1.549 m (5' 1\")     See flow    A/p: 34 yo  at 34+5 wks here for PNC.  preg c/b AMA, obesity and h/o IUFD at 33wks on prior pregnancy.      1. Pnc: repeat CBC and RPR today.  She describes symptoms of anemia and last hgb was <10. If lower, consider Iron infusions. GBS at next visit.    2. H/o IUFD. Weekly BPPs until delivery.  H/o oligohydramnios in last pregnancy that required IOL between 37-38 wks. Consider IOL by 38 wks this pregnancy.     3. Transverse position. If still transverse at 37 wks, offer version / c/s.     4. F/u weekly for BPP and in 2 wks for OBFU.     Mariana Marsh MD, FACOG  [facility]  OB/GYN    2018  2:48 PM      "

## 2018-11-07 NOTE — NURSING NOTE
Chief Complaint   Patient presents with     Prenatal Care     34w5d       See [facility] Sheriff 11/7/2018

## 2018-11-07 NOTE — MR AVS SNAPSHOT
After Visit Summary   11/7/2018    Dai Guadarrama    MRN: [id number]           Patient Information     Date Of Birth          1985        Visit Information        Provider Department      11/7/2018 10:00 AM Mariana Marsh MD; [facility]        Today's Diagnoses     Prenatal care in third trimester    -  1       Follow-ups after your visit        Follow-up notes from your care team     Return in about 1 week (around 11/14/2018) for ultrasound.      Your next 10 appointments already scheduled     Nov 15, 2018  3:30 PM CST   (Arrive by 3:15 PM)   US FETAL BIOPHYS PROFILE W/O NON STRESS TEST with URWHSUS1   [facility] ([facility])    [facility]  3rd Floor, [address]  [address] [place] [address] [phone]           How do I prepare for my exam? (Food and drink instructions) Drink four 8-ounce glasses of fluid an hour before your exam. If you need to empty your bladder before your exam, try to release only a little urine. Then, drink another glass of fluid.  How do I prepare for my exam? (Other instructions) You may have up to two family members in the exam room. If you bring a small child, an adult must be there to care for him or her. No video or camera photography during the procedure.  What should I wear: Wear comfortable clothes.  How long does the exam take: Most ultrasounds take 30 to 60 minutes.  What should I bring: Bring a list of your medicines, including vitamins, minerals and over-the-counter drugs. It is safest to leave personal items at home.  Do I need a :  No  is needed.  What do I need to tell my doctor: Tell your doctor about any allergies you may have.  What should I do after the exam: No restrictions, You may resume normal activities.  What is this test: An ultrasound uses sound waves to make pictures of the body. Sound waves  do not cause pain. The only discomfort may be the pressure of the wand against your skin or full bladder.  Who should I call with questions: If you have any questions, please call the Imaging Department where you will have your exam. Directions, parking instructions, and other information is available on our website, [email or web address].            Nov 21, 2018  3:00 PM CST   (Arrive by 2:45 PM)   US FETAL BIOPHYS PROFILE W/O NON STRESS TEST with URWHSUS2   [facility] ([facility])    [facility]  3rd Floor, [address]  [address] [place] [address] [phone]           How do I prepare for my exam? (Food and drink instructions) Drink four 8-ounce glasses of fluid an hour before your exam. If you need to empty your bladder before your exam, try to release only a little urine. Then, drink another glass of fluid.  How do I prepare for my exam? (Other instructions) You may have up to two family members in the exam room. If you bring a small child, an adult must be there to care for him or her. No video or camera photography during the procedure.  What should I wear: Wear comfortable clothes.  How long does the exam take: Most ultrasounds take 30 to 60 minutes.  What should I bring: Bring a list of your medicines, including vitamins, minerals and over-the-counter drugs. It is safest to leave personal items at home.  Do I need a :  No  is needed.  What do I need to tell my doctor: Tell your doctor about any allergies you may have.  What should I do after the exam: No restrictions, You may resume normal activities.  What is this test: An ultrasound uses sound waves to make pictures of the body. Sound waves do not cause pain. The only discomfort may be the pressure of the wand against your skin or full bladder.  Who should I call with questions: If you have any questions, please call the Imaging Department where you will have your exam.  "Directions, parking instructions, and other information is available on our website, [email or web address].            Nov 21, 2018  3:30 PM CST   RETURN OB with Naima Mcintosh MD   [facility] ([facility])    [facility]  [address]  [address]  [facility] [id number] [phone]              Future tests that were ordered for you today     Open Future Orders        Priority Expected Expires Ordered    US OB Fetal Biophys Prf wo NonStrs Singls Sgl Routine  11/7/2019 11/7/2018    US OB Fetal Biophys Prf wo NonStrs Singls Sgl Routine  11/7/2019 11/7/2018            Who to contact     Please call your clinic at [phone] to:    Ask questions about your health    Make or cancel appointments    Discuss your medicines    Learn about your test results    Speak to your doctor            Additional Information About Your Visit        [employer]not[employer]kart Information     [facility] gives you secure access to your electronic health record. If you see a primary care provider, you can also send messages to your care team and make appointments. If you have questions, please call your primary care clinic.  If you do not have a primary care provider, please call [phone] and they will assist you.      [facility] is an electronic gateway that provides easy, online access to your medical records. With [facility], you can request a clinic appointment, read your test results, renew a prescription or communicate with your care team.     To access your existing account, please contact your [facility] or call [phone] for assistance.        Care EveryWhere ID     This is your Care EveryWhere ID. This could be used by other organizations to access your [place] medical records  [id number]        Your Vitals Were     Pulse Height Last Period BMI (Body Mass Index)          99 1.549 m (5' 1\") 02/28/2018 (Exact Date) 43.29 kg/m2         Blood Pressure from " Last 3 Encounters:   11/07/18 103/69   10/25/18 98/61   10/25/18 99/65    Weight from Last 3 Encounters:   11/07/18 103.9 kg (229 lb 1.6 oz)   10/25/18 104.1 kg (229 lb 8 oz)   10/18/18 104.3 kg (230 lb)              We Performed the Following     CBC with Platelets     Treponema Abs w Reflex to RPR and Titer        Primary Care Provider Fax #    Physician No Ref-Primary [phone]       No address on file        Equal Access to Services     AISHA SONG : Hadii isaiah bailey hadmollyo Sojakob, waaxda luqadaha, qaybta kaalmada gaganyaluis, kodi moreno . So [facility] [phone].    ATENCIÓN: Si habla español, tiene a martin disposición servicios gratuitos de asistencia lingüística. Llame al [phone].    We comply with applicable federal civil rights laws and [place] laws. We do not discriminate on the basis of race, color, national origin, age, disability, sex, sexual orientation, or gender identity.            Thank you!     Thank you for choosing [facility]  for your care. Our goal is always to provide you with excellent care. Hearing back from our patients is one way we can continue to improve our services. Please take a few minutes to complete the written survey that you may receive in the mail after your visit with us. Thank you!             Your Updated Medication List - Protect others around you: Learn how to safely use, store and throw away your medicines at www.disposemymeds.org.          This list is accurate as of 11/7/18  2:49 PM.  Always use your most recent med list.                   Brand Name Dispense Instructions for use Diagnosis    doxylamine 25 MG Tabs tablet    UNISOM    30 each    Take 0.5-1 tablets (12.5-25 mg) by mouth nightly as needed for sleep    Normal pregnancy in third trimester       ferrous sulfate 325 (65 Fe) MG tablet    IRON    30 tablet    Take 1 tablet (325 mg) by mouth daily (with breakfast)    Iron deficiency anemia, unspecified iron  deficiency anemia type       omeprazole 10 MG CR capsule    priLOSEC    60 capsule    Take 2 capsules (20 mg) by mouth daily    Gastroesophageal reflux disease without esophagitis       prenatal multivitamin plus iron 27-0.8 MG Tabs per tablet      Take 1 tablet by mouth daily    Supervision of high risk pregnancy in first trimester, Hyperemesis gravidarum, Aneurysm (H), History of intrauterine fetal death, currently pregnant in first trimester

## 2018-11-07 NOTE — LETTER
"2018       RE: Dai Guadarrama  [address]  [facility] [place] [address]     Dear Colleague,    Thank you for referring your patient, Dai Guadarrama, to the [facility] at [facility]. Please see a copy of my visit note below.    Doing ok. Some increased pelvic pressure and cramps, but no regular ctx. Occ feels SOB on exertion. Denies CP.      Vitals:    18 1048   BP: 103/69   BP Location: Right arm   Patient Position: Chair   Pulse: 99   Weight: 103.9 kg (229 lb 1.6 oz)   Height: 1.549 m (5' 1\")     See flow    A/p: 34 yo  at 34+5 wks here for PNC.  preg c/b AMA, obesity and h/o IUFD at 33wks on prior pregnancy.      1. Pnc: repeat CBC and RPR today.  She describes symptoms of anemia and last hgb was <10. If lower, consider Iron infusions. GBS at next visit.    2. H/o IUFD. Weekly BPPs until delivery.  H/o oligohydramnios in last pregnancy that required IOL between 37-38 wks. Consider IOL by 38 wks this pregnancy.     3. Transverse position. If still transverse at 37 wks, offer version / c/s.     4. F/u weekly for BPP and in 2 wks for OBFU.     Mariana Marsh MD, FACOG  [facility]  OB/GYN    2018  2:48 PM    "

## 2018-11-08 ENCOUNTER — TELEPHONE (OUTPATIENT)
Dept: OBGYN | Facility: CLINIC | Age: 33
End: 2018-11-08

## 2018-11-08 LAB — T PALLIDUM AB SER QL: NONREACTIVE

## 2018-11-08 NOTE — TELEPHONE ENCOUNTER
----- Message from Mariana Marsh MD sent at 11/7/2018  2:59 PM CST -----  Please call patient with a  to set up iron infusions.   Thank you!      Mariana Marsh MD, FACOG  [facility]  OB/GYN    11/7/2018  3:00 PM

## 2018-11-08 NOTE — TELEPHONE ENCOUNTER
Spoke with Dai with  and gave her phone number for infusion center. She will call to schedule appointments before delivery.

## 2018-11-13 ENCOUNTER — TELEPHONE (OUTPATIENT)
Dept: OBGYN | Facility: CLINIC | Age: 33
End: 2018-11-13

## 2018-11-13 ENCOUNTER — INFUSION THERAPY VISIT (OUTPATIENT)
Dept: INFUSION THERAPY | Facility: CLINIC | Age: 33
End: 2018-11-13
Attending: OBSTETRICS & GYNECOLOGY
Payer: COMMERCIAL

## 2018-11-13 ENCOUNTER — RADIANT APPOINTMENT (OUTPATIENT)
Dept: ULTRASOUND IMAGING | Facility: CLINIC | Age: 33
End: 2018-11-13
Payer: COMMERCIAL

## 2018-11-13 VITALS
RESPIRATION RATE: 18 BRPM | OXYGEN SATURATION: 98 % | HEART RATE: 109 BPM | SYSTOLIC BLOOD PRESSURE: 103 MMHG | DIASTOLIC BLOOD PRESSURE: 60 MMHG

## 2018-11-13 DIAGNOSIS — D50.8 OTHER IRON DEFICIENCY ANEMIA: Primary | ICD-10-CM

## 2018-11-13 DIAGNOSIS — O09.93 ENCOUNTER FOR SUPERVISION OF HIGH RISK PREGNANCY IN THIRD TRIMESTER, ANTEPARTUM: ICD-10-CM

## 2018-11-13 PROCEDURE — 25000128 H RX IP 250 OP 636: Performed by: OBSTETRICS & GYNECOLOGY

## 2018-11-13 PROCEDURE — 96374 THER/PROPH/DIAG INJ IV PUSH: CPT

## 2018-11-13 PROCEDURE — T1013 SIGN LANG/ORAL INTERPRETER: HCPCS | Mod: U3,ZF

## 2018-11-13 PROCEDURE — T1013 SIGN LANG/ORAL INTERPRETER: HCPCS | Mod: U3

## 2018-11-13 PROCEDURE — 76819 FETAL BIOPHYS PROFIL W/O NST: CPT

## 2018-11-13 RX ORDER — DIPHENHYDRAMINE HCL 25 MG
25 CAPSULE ORAL
Status: CANCELLED | OUTPATIENT
Start: 2018-11-13

## 2018-11-13 RX ORDER — ACETAMINOPHEN 325 MG/1
650 TABLET ORAL
Status: CANCELLED
Start: 2018-11-13

## 2018-11-13 RX ADMIN — IRON SUCROSE 300 MG: 20 INJECTION, SOLUTION INTRAVENOUS at 12:57

## 2018-11-13 NOTE — TELEPHONE ENCOUNTER
Patient in clinic for BPP with complaints of headache and uterine cramping. Endorses LE edema. Denies vision disturbance, epigastric pain, swelling of face/hands. BP 99/66 today.  Cramping is period-like and headache is moderate. Has experienced krystal mercado contractions over past several days but random and sporadic. Pt endorses she has not drank as much as much water as she should (4 8-10 oz glasses/ day).    Spoke with Dr. aHrris who advises pt rest and hydrate. If cramping and headache persist or worsen to present to birthplace.    Informed patient who agrees with plan and has no further questions at this time.

## 2018-11-13 NOTE — MR AVS SNAPSHOT
After Visit Summary   11/13/2018    Dai Guadarrama    MRN: [id number]           Patient Information     Date Of Birth          1985        Visit Information        Provider Department      11/13/2018 12:30 PM Yari Crystal; UR BD 01  Services Department        Today's Diagnoses     Other iron deficiency anemia    -  1       Follow-ups after your visit        Your next 10 appointments already scheduled     Nov 13, 2018  3:30 PM CST   (Arrive by 3:15 PM)   US FETAL BIOPHYS PROFILE W/O NON STRESS TEST with URWHSUS2   [facility] ([facility])    [facility]  3rd Floor, [address]  [address] [address] [phone]           How do I prepare for my exam? (Food and drink instructions) Drink four 8-ounce glasses of fluid an hour before your exam. If you need to empty your bladder before your exam, try to release only a little urine. Then, drink another glass of fluid.  How do I prepare for my exam? (Other instructions) You may have up to two family members in the exam room. If you bring a small child, an adult must be there to care for him or her. No video or camera photography during the procedure.  What should I wear: Wear comfortable clothes.  How long does the exam take: Most ultrasounds take 30 to 60 minutes.  What should I bring: Bring a list of your medicines, including vitamins, minerals and over-the-counter drugs. It is safest to leave personal items at home.  Do I need a :  No  is needed.  What do I need to tell my doctor: Tell your doctor about any allergies you may have.  What should I do after the exam: No restrictions, You may resume normal activities.  What is this test: An ultrasound uses sound waves to make pictures of the body. Sound waves do not cause pain. The only discomfort may be the pressure of the wand against your skin or full bladder.  Who should I call with  questions: If you have any questions, please call the Imaging Department where you will have your exam. Directions, parking instructions, and other information is available on our website, [email or web address].            Nov 15, 2018  1:30 PM CST   Level 3 with UR BD 01   [facility] [place] ([facility])    [address]  [address]  [facility] [address]   [phone]            Nov 20, 2018  1:30 PM CST   Level 3 with UR CH 04   [facility] [place] ([facility])    [address]  [address]  [facility] [address]   [phone]            Nov 21, 2018  3:00 PM CST   (Arrive by 2:45 PM)   US FETAL BIOPHYS PROFILE W/O NON STRESS TEST with URWHSUS2   [facility] ([email or web address])    [facility]  3rd Floor, [address]  [address] [place] [address] [phone]           How do I prepare for my exam? (Food and drink instructions) Drink four 8-ounce glasses of fluid an hour before your exam. If you need to empty your bladder before your exam, try to release only a little urine. Then, drink another glass of fluid.  How do I prepare for my exam? (Other instructions) You may have up to two family members in the exam room. If you bring a small child, an adult must be there to care for him or her. No video or camera photography during the procedure.  What should I wear: Wear comfortable clothes.  How long does the exam take: Most ultrasounds take 30 to 60 minutes.  What should I bring: Bring a list of your medicines, including vitamins, minerals and over-the-counter drugs. It is safest to leave personal items at home.  Do I need a :  No  is needed.  What do I need to tell my doctor: Tell your doctor about any allergies you may have.  What should I do after the exam: No restrictions, You may resume normal activities.  What  is this test: An ultrasound uses sound waves to make pictures of the body. Sound waves do not cause pain. The only discomfort may be the pressure of the wand against your skin or full bladder.  Who should I call with questions: If you have any questions, please call the Imaging Department where you will have your exam. Directions, parking instructions, and other information is available on our website, [email or web address].            Nov 21, 2018  3:30 PM CST   RETURN OB with Naima Mcintosh MD   [facility] ([facility])    [facility]  [address]  [address]  [facility] [address] [phone]              Who to contact     If you have questions or need follow up information about today's clinic visit or your schedule please contact [facility] directly at [phone].  Normal or non-critical lab and imaging results will be communicated to you by MyChart, letter or phone within 4 business days after the clinic has received the results. If you do not hear from us within 7 days, please contact the clinic through [employer] or phone. If you have a critical or abnormal lab result, we will notify you by phone as soon as possible.  Submit refill requests through [employer] or call your pharmacy and they will forward the refill request to us. Please allow 3 business days for your refill to be completed.          Additional Information About Your Visit        MyChart Information     [employer] gives you secure access to your electronic health record. If you see a primary care provider, you can also send messages to your care team and make appointments. If you have questions, please call your primary care clinic.  If you do not have a primary care provider, please call [phone] and they will assist you.        Care EveryWhere ID     This is your Care EveryWhere ID. This could be used by other organizations to access your [facility]  records  [id number]        Your Vitals Were     Pulse Respirations Last Period Pulse Oximetry          109 18 02/28/2018 (Exact Date) 98%         Blood Pressure from Last 3 Encounters:   11/13/18 103/60   11/07/18 103/69   10/25/18 98/61    Weight from Last 3 Encounters:   11/07/18 103.9 kg (229 lb 1.6 oz)   10/25/18 104.1 kg (229 lb 8 oz)   10/18/18 104.3 kg (230 lb)              Today, you had the following     No orders found for display       Primary Care Provider Fax #    Physician No Ref-Primary [phone]       No address on file        Equal Access to Services     NITA SONG : Mariano Andrew, cuco manuel, primo brewstermaluis stauffer, kodi moreno . So [facility] [phone].    ATENCIÓN: Si habla español, tiene a martin disposición servicios gratuitos de asistencia lingüística. Llame al [phone].    We comply with applicable federal civil rights laws and [place] laws. We do not discriminate on the basis of race, color, national origin, age, disability, sex, sexual orientation, or gender identity.            Thank you!     Thank you for choosing [facility]  for your care. Our goal is always to provide you with excellent care. Hearing back from our patients is one way we can continue to improve our services. Please take a few minutes to complete the written survey that you may receive in the mail after your visit with us. Thank you!             Your Updated Medication List - Protect others around you: Learn how to safely use, store and throw away your medicines at www.disposemymeds.org.          This list is accurate as of 11/13/18  2:50 PM.  Always use your most recent med list.                   Brand Name Dispense Instructions for use Diagnosis    doxylamine 25 MG Tabs tablet    UNISOM    30 each    Take 0.5-1 tablets (12.5-25 mg) by mouth nightly as needed for sleep    Normal pregnancy in third trimester       ferrous sulfate 325 (65  Fe) MG tablet    IRON    30 tablet    Take 1 tablet (325 mg) by mouth daily (with breakfast)    Iron deficiency anemia, unspecified iron deficiency anemia type       omeprazole 10 MG CR capsule    priLOSEC    60 capsule    Take 2 capsules (20 mg) by mouth daily    Gastroesophageal reflux disease without esophagitis       prenatal multivitamin plus iron 27-0.8 MG Tabs per tablet      Take 1 tablet by mouth daily    Supervision of high risk pregnancy in first trimester, Hyperemesis gravidarum, Aneurysm (H), History of intrauterine fetal death, currently pregnant in first trimester

## 2018-11-13 NOTE — PROGRESS NOTES
Pt here w/  for first dose venofer.  Asks is it is ok to get venofer while she has a HA.  Told her that is fine.  Given venofer handout in [place].    Venofer infused over 90 min via P IV.  Tolerated well.  Stayed for 30 min post.     Discharged w/  to OB appmnt upstairs.   RTC Thursday.

## 2018-11-15 ENCOUNTER — INFUSION THERAPY VISIT (OUTPATIENT)
Dept: INFUSION THERAPY | Facility: CLINIC | Age: 33
End: 2018-11-15
Attending: OBSTETRICS & GYNECOLOGY
Payer: COMMERCIAL

## 2018-11-15 VITALS
SYSTOLIC BLOOD PRESSURE: 138 MMHG | OXYGEN SATURATION: 96 % | HEART RATE: 109 BPM | TEMPERATURE: 98.4 F | DIASTOLIC BLOOD PRESSURE: 82 MMHG | RESPIRATION RATE: 18 BRPM

## 2018-11-15 DIAGNOSIS — D50.8 OTHER IRON DEFICIENCY ANEMIA: Primary | ICD-10-CM

## 2018-11-15 PROCEDURE — 96374 THER/PROPH/DIAG INJ IV PUSH: CPT

## 2018-11-15 PROCEDURE — 25000128 H RX IP 250 OP 636: Performed by: OBSTETRICS & GYNECOLOGY

## 2018-11-15 RX ORDER — DIPHENHYDRAMINE HCL 25 MG
25 CAPSULE ORAL
Status: CANCELLED | OUTPATIENT
Start: 2018-11-15

## 2018-11-15 RX ORDER — ACETAMINOPHEN 325 MG/1
650 TABLET ORAL
Status: CANCELLED
Start: 2018-11-15

## 2018-11-15 RX ADMIN — IRON SUCROSE 300 MG: 20 INJECTION, SOLUTION INTRAVENOUS at 14:25

## 2018-11-15 ASSESSMENT — PAIN SCALES - GENERAL: PAINLEVEL: SEVERE PAIN (6)

## 2018-11-15 NOTE — MR AVS SNAPSHOT
After Visit Summary   11/15/2018    Dai Guadarrama    MRN: [id number]           Patient Information     Date Of Birth          1985        Visit Information        Provider Department      11/15/2018 1:30 PM [place] [facility]; [facility]        Today's Diagnoses     Other iron deficiency anemia    -  1       Follow-ups after your visit        Your next 10 appointments already scheduled     Nov 20, 2018  1:30 PM CST   Level 3 with UR CH 04   [facility] ([facility])    [address] [place] [address].  [address]  [facility] [address]   [phone]            Nov 21, 2018  3:00 PM CST   (Arrive by 2:45 PM)   US FETAL BIOPHYS PROFILE W/O NON STRESS TEST with URWHSUS2   [facility] ([facility])    [facility]  3rd Floor, [address]  [address] [place] [id number] [phone]           How do I prepare for my exam? (Food and drink instructions) Drink four 8-ounce glasses of fluid an hour before your exam. If you need to empty your bladder before your exam, try to release only a little urine. Then, drink another glass of fluid.  How do I prepare for my exam? (Other instructions) You may have up to two family members in the exam room. If you bring a small child, an adult must be there to care for him or her. No video or camera photography during the procedure.  What should I wear: Wear comfortable clothes.  How long does the exam take: Most ultrasounds take 30 to 60 minutes.  What should I bring: Bring a list of your medicines, including vitamins, minerals and over-the-counter drugs. It is safest to leave personal items at home.  Do I need a :  No  is needed.  What do I need to tell my doctor: Tell your doctor about any allergies you may have.  What should I do after the exam: No restrictions, You may resume normal  activities.  What is this test: An ultrasound uses sound waves to make pictures of the body. Sound waves do not cause pain. The only discomfort may be the pressure of the wand against your skin or full bladder.  Who should I call with questions: If you have any questions, please call the Imaging Department where you will have your exam. Directions, parking instructions, and other information is available on our website, [email or web address].            Nov 21, 2018  3:30 PM CST   RETURN OB with Naima Mcintosh MD   [facility] ([facility])    [place] [facility] [address]  [address]  [address]  [facility] [address] [phone]              Who to contact     If you have questions or need follow up information about today's clinic visit or your schedule please contact [facility] directly at [phone].  Normal or non-critical lab and imaging results will be communicated to you by MyChart, letter or phone within 4 business days after the clinic has received the results. If you do not hear from us within 7 days, please contact the clinic through MyChart or phone. If you have a critical or abnormal lab result, we will notify you by phone as soon as possible.  Submit refill requests through Vibby or call your pharmacy and they will forward the refill request to us. Please allow 3 business days for your refill to be completed.          Additional Information About Your Visit        Multicast Media[employer]PublicRelay Information     Vibby gives you secure access to your electronic health record. If you see a primary care provider, you can also send messages to your care team and make appointments. If you have questions, please call your primary care clinic.  If you do not have a primary care provider, please call [phone] and they will assist you.        Care EveryWhere ID     This is your Care EveryWhere ID. This could be used by other organizations to access your  [place] medical records  [id number]        Your Vitals Were     Pulse Temperature Respirations Last Period Pulse Oximetry       109 98.4  F (36.9  C) 18 02/28/2018 (Exact Date) 96%        Blood Pressure from Last 3 Encounters:   11/15/18 138/82   11/13/18 103/60   11/07/18 103/69    Weight from Last 3 Encounters:   11/07/18 103.9 kg (229 lb 1.6 oz)   10/25/18 104.1 kg (229 lb 8 oz)   10/18/18 104.3 kg (230 lb)              Today, you had the following     No orders found for display       Primary Care Provider Fax #    Physician No Ref-Primary [phone]       No address on file        Equal Access to Services     AISHA SONG : Mariano Andrew, wasarabjit manuel, qaybta kaalmada xiomy, kodi moreno . So [facility] [place] [phone].    ATENCIÓN: Si habla español, tiene a martin disposición servicios gratuitos de asistencia lingüística. Llame al [phone].    We comply with applicable federal civil rights laws and [place] laws. We do not discriminate on the basis of race, color, national origin, age, disability, sex, sexual orientation, or gender identity.            Thank you!     Thank you for choosing [facility]  for your care. Our goal is always to provide you with excellent care. Hearing back from our patients is one way we can continue to improve our services. Please take a few minutes to complete the written survey that you may receive in the mail after your visit with us. Thank you!             Your Updated Medication List - Protect others around you: Learn how to safely use, store and throw away your medicines at www.disposemymeds.org.          This list is accurate as of 11/15/18  4:33 PM.  Always use your most recent med list.                   Brand Name Dispense Instructions for use Diagnosis    doxylamine 25 MG Tabs tablet    UNISOM    30 each    Take 0.5-1 tablets (12.5-25 mg) by mouth nightly as needed for sleep    Normal pregnancy in  third trimester       ferrous sulfate 325 (65 Fe) MG tablet    IRON    30 tablet    Take 1 tablet (325 mg) by mouth daily (with breakfast)    Iron deficiency anemia, unspecified iron deficiency anemia type       omeprazole 10 MG CR capsule    priLOSEC    60 capsule    Take 2 capsules (20 mg) by mouth daily    Gastroesophageal reflux disease without esophagitis       prenatal multivitamin plus iron 27-0.8 MG Tabs per tablet      Take 1 tablet by mouth daily    Supervision of high risk pregnancy in first trimester, Hyperemesis gravidarum, Aneurysm (H), History of intrauterine fetal death, currently pregnant in first trimester

## 2018-11-20 ENCOUNTER — INFUSION THERAPY VISIT (OUTPATIENT)
Dept: INFUSION THERAPY | Facility: CLINIC | Age: 33
End: 2018-11-20
Attending: OBSTETRICS & GYNECOLOGY
Payer: COMMERCIAL

## 2018-11-20 VITALS
TEMPERATURE: 97.7 F | OXYGEN SATURATION: 97 % | RESPIRATION RATE: 18 BRPM | HEART RATE: 98 BPM | DIASTOLIC BLOOD PRESSURE: 72 MMHG | SYSTOLIC BLOOD PRESSURE: 116 MMHG

## 2018-11-20 DIAGNOSIS — D50.8 OTHER IRON DEFICIENCY ANEMIA: Primary | ICD-10-CM

## 2018-11-20 PROCEDURE — 96374 THER/PROPH/DIAG INJ IV PUSH: CPT

## 2018-11-20 PROCEDURE — 25000128 H RX IP 250 OP 636: Performed by: OBSTETRICS & GYNECOLOGY

## 2018-11-20 RX ORDER — DIPHENHYDRAMINE HCL 25 MG
25 CAPSULE ORAL
Status: CANCELLED | OUTPATIENT
Start: 2018-11-20

## 2018-11-20 RX ORDER — ACETAMINOPHEN 325 MG/1
650 TABLET ORAL
Status: CANCELLED
Start: 2018-11-20

## 2018-11-20 RX ADMIN — IRON SUCROSE 300 MG: 20 INJECTION, SOLUTION INTRAVENOUS at 14:33

## 2018-11-20 NOTE — PROGRESS NOTES
Infusion Nursing Note:  Dai Guadarrama presents today for venofer 300mg.    Patient seen by provider today: No   present during visit today: Yes, Language: Setswana.     Note: N/A.    Intravenous Access:  Peripheral IV placed.    Treatment Conditions:  Not Applicable.      Post Infusion Assessment:  Patient tolerated infusion without incident.  Patient observed for 30 minutes post iron infusion per protocol.  Blood return noted pre and post infusion.  Site patent and intact, free from redness, edema or discomfort.  No evidence of extravasations.  Access discontinued per protocol.    Discharge Plan:   Discharge instructions reviewed with: Patient.  Patient discharged in stable condition accompanied by: , daughter and .  Departure Mode: Ambulatory.    Sofia Dsouza RN

## 2018-11-20 NOTE — MR AVS SNAPSHOT
After Visit Summary   11/20/2018    Dai Guadarrama    MRN: [id number]           Patient Information     Date Of Birth          1985        Visit Information        Provider Department      11/20/2018 1:30 PM [place] [facility]; UR CH 04 [facility] [place]        Today's Diagnoses     Other iron deficiency anemia    -  1       Follow-ups after your visit        Your next 10 appointments already scheduled     Nov 21, 2018  2:45 PM CST   US FETAL BIOPHYS PROFILE W/O NON STRESS TEST with URWHSUS2   [facility] ([facility])    [facility]  3rd Floor, [address]  [address] [address] [phone]           How do I prepare for my exam? (Food and drink instructions) Drink four 8-ounce glasses of fluid. Finish drinking an hour before your exam, so you have a full bladder. If you need to empty your bladder before your exam, try to release only a little urine. Then, drink another glass of fluid.  How do I prepare for my exam? (Other instructions) You may have up to two family members in the exam room. If you bring a small child, an adult must be there to care for him or her. No video or camera photography during the procedure.  What should I wear: Wear comfortable clothes.  How long does the exam take: Most ultrasounds take 30 to 60 minutes.  What should I bring: Bring a list of your medicines, including vitamins, minerals and over-the-counter drugs. It is safest to leave personal items at home.  Do I need a :  No  is needed.  What do I need to tell my doctor: Tell your doctor about any allergies you may have.  What should I do after the exam: No restrictions, you may resume normal activities.  What is this test: An ultrasound uses sound waves to make pictures of the body. Sound waves do not cause pain. The only discomfort may be the pressure of the wand against your skin or full  bladder.  Who should I call with questions: If you have any questions, please call the Imaging Department where you will have your exam. Directions, parking instructions, and other information are available on our website, [email or web address].            Nov 21, 2018  3:15 PM CST   RETURN OB with Naima Mcintosh MD   [facility] ([facility])    [facility] [facility] [address]  [address]  [address]  [facility] [id number] [phone]              Who to contact     If you have questions or need follow up information about today's clinic visit or your schedule please contact [facility] directly at [phone].  Normal or non-critical lab and imaging results will be communicated to you by AutoGnomicshart, letter or phone within 4 business days after the clinic has received the results. If you do not hear from us within 7 days, please contact the clinic through AutoGnomicshart or phone. If you have a critical or abnormal lab result, we will notify you by phone as soon as possible.  Submit refill requests through Tapatap or call your pharmacy and they will forward the refill request to us. Please allow 3 business days for your refill to be completed.          Additional Information About Your Visit        Tapatap Information     Tapatap gives you secure access to your electronic health record. If you see a primary care provider, you can also send messages to your care team and make appointments. If you have questions, please call your primary care clinic.  If you do not have a primary care provider, please call [phone] and they will assist you.        Care EveryWhere ID     This is your Care EveryWhere ID. This could be used by other organizations to access your [place] medical records  [id number]        Your Vitals Were     Pulse Temperature Respirations Last Period Pulse Oximetry       98 97.7  F (36.5  C) 18 02/28/2018 (Exact Date) 97%        Blood  Pressure from Last 3 Encounters:   11/20/18 116/72   11/15/18 138/82   11/13/18 103/60    Weight from Last 3 Encounters:   11/07/18 103.9 kg (229 lb 1.6 oz)   10/25/18 104.1 kg (229 lb 8 oz)   10/18/18 104.3 kg (230 lb)              Today, you had the following     No orders found for display       Primary Care Provider Fax #    Physician No Ref-Primary [phone]       No address on file        Equal Access to Services     AISHA SONG : Hadii aad ku hadasho Soomaali, waaxda luqadaha, qaybta kaalmada adeegyada, kodi moreno . So [facility] [phone].    ATENCIÓN: Si habla español, tiene a martin disposición servicios gratuitos de asistencia lingüística. Llame al [phone].    We comply with applicable federal civil rights laws and [place] laws. We do not discriminate on the basis of race, color, national origin, age, disability, sex, sexual orientation, or gender identity.            Thank you!     Thank you for choosing [facility]  for your care. Our goal is always to provide you with excellent care. Hearing back from our patients is one way we can continue to improve our services. Please take a few minutes to complete the written survey that you may receive in the mail after your visit with us. Thank you!             Your Updated Medication List - Protect others around you: Learn how to safely use, store and throw away your medicines at www.disposemymeds.org.          This list is accurate as of 11/20/18  4:56 PM.  Always use your most recent med list.                   Brand Name Dispense Instructions for use Diagnosis    doxylamine 25 MG Tabs tablet    UNISOM    30 each    Take 0.5-1 tablets (12.5-25 mg) by mouth nightly as needed for sleep    Normal pregnancy in third trimester       ferrous sulfate 325 (65 Fe) MG tablet    IRON    30 tablet    Take 1 tablet (325 mg) by mouth daily (with breakfast)    Iron deficiency anemia, unspecified iron deficiency anemia  type       omeprazole 10 MG CR capsule    priLOSEC    60 capsule    Take 2 capsules (20 mg) by mouth daily    Gastroesophageal reflux disease without esophagitis       prenatal multivitamin plus iron 27-0.8 MG Tabs per tablet      Take 1 tablet by mouth daily    Supervision of high risk pregnancy in first trimester, Hyperemesis gravidarum, Aneurysm (H), History of intrauterine fetal death, currently pregnant in first trimester

## 2018-11-21 ENCOUNTER — OFFICE VISIT (OUTPATIENT)
Dept: OBGYN | Facility: CLINIC | Age: 33
End: 2018-11-21
Payer: COMMERCIAL

## 2018-11-21 ENCOUNTER — RADIANT APPOINTMENT (OUTPATIENT)
Dept: ULTRASOUND IMAGING | Facility: CLINIC | Age: 33
End: 2018-11-21
Payer: COMMERCIAL

## 2018-11-21 VITALS
SYSTOLIC BLOOD PRESSURE: 107 MMHG | HEART RATE: 97 BPM | DIASTOLIC BLOOD PRESSURE: 75 MMHG | BODY MASS INDEX: 43.27 KG/M2 | WEIGHT: 229 LBS

## 2018-11-21 DIAGNOSIS — O09.93 ENCOUNTER FOR SUPERVISION OF HIGH RISK PREGNANCY IN THIRD TRIMESTER, ANTEPARTUM: ICD-10-CM

## 2018-11-21 DIAGNOSIS — Z3A.36 36 WEEKS GESTATION OF PREGNANCY: Primary | ICD-10-CM

## 2018-11-21 PROCEDURE — 76819 FETAL BIOPHYS PROFIL W/O NST: CPT

## 2018-11-21 PROCEDURE — G0463 HOSPITAL OUTPT CLINIC VISIT: HCPCS | Mod: 25

## 2018-11-21 NOTE — NURSING NOTE
Chief Complaint   Patient presents with     Prenatal Care     36 weeks 5 days      Health Maintenance Due   Topic Date Due     FOOT EXAM Q1 YEAR  05/21/1984     LIPID MONITORING Q1 YEAR  05/21/1984     MICROALBUMIN Q1 YEAR  05/21/1984     PNEUMOVAX 1X HI RISK PATIENT < 65 (NO IB MSG)  05/21/1985     EYE EXAM Q1 YEAR  10/13/2017     PHQ-2 Q1 YR  11/28/2017     MATERNAL SCREENING  06/22/2018     REPEAT ANTIBODY SCREEN (OB)  09/21/2018     RH IMMUNE GLOBULIN (OB)  09/21/2018     A1C Q6 MO  11/03/2018     GROUP B STREP SCREENING  11/16/2018     Aydee Persaud CMA on 11/21/2018 at 4:01 PM

## 2018-11-21 NOTE — MR AVS SNAPSHOT
After Visit Summary   11/21/2018    Dai Guadarrama    MRN: [id number]           Patient Information     Date Of Birth          1985        Visit Information        Provider Department      11/21/2018 3:15 PM Naima Mcintosh MD; [place] [facility]        Today's Diagnoses     36 weeks gestation of pregnancy    -  1      Care Instructions    Induction of labor scheduled for Saturday at 8 AM          Follow-ups after your visit        Follow-up notes from your care team     Return if symptoms worsen or fail to improve.      Who to contact     Please call your clinic at [phone] to:    Ask questions about your health    Make or cancel appointments    Discuss your medicines    Learn about your test results    Speak to your doctor            Additional Information About Your Visit        [employer] Information     [employer] gives you secure access to your electronic health record. If you see a primary care provider, you can also send messages to your care team and make appointments. If you have questions, please call your primary care clinic.  If you do not have a primary care provider, please call [phone] and they will assist you.      [employer] is an electronic gateway that provides easy, online access to your medical records. With [employer], you can request a clinic appointment, read your test results, renew a prescription or communicate with your care team.     To access your existing account, please contact your [facility] or call [phone] for assistance.        Care EveryWhere ID     This is your Care EveryWhere ID. This could be used by other organizations to access your [place] medical records  [id number]        Your Vitals Were     Pulse Last Period BMI (Body Mass Index)             97 02/28/2018 (Exact Date) 43.27 kg/m2          Blood Pressure from Last 3 Encounters:   No data found for BP    Weight  from Last 3 Encounters:   No data found for Wt              Today, you had the following     No orders found for display       Primary Care Provider Fax #    Physician No Ref-Primary [phone]       No address on file        Equal Access to Services     GRACENITA NOHEMI : Mariano aad ku hadgrady Andrew, washayda luvinod, qaybta kasree stauffer, kodi nevarezpilar alcaraz. So [facility] [phone].    ATENCIÓN: Si habla español, tiene a martin disposición servicios gratuitos de asistencia lingüística. Llame al [phone].    We comply with applicable federal civil rights laws and [place] laws. We do not discriminate on the basis of race, color, national origin, age, disability, sex, sexual orientation, or gender identity.            Thank you!     Thank you for choosing [facility]  for your care. Our goal is always to provide you with excellent care. Hearing back from our patients is one way we can continue to improve our services. Please take a few minutes to complete the written survey that you may receive in the mail after your visit with us. Thank you!             Your Updated Medication List - Protect others around you: Learn how to safely use, store and throw away your medicines at www.disposemymeds.org.          This list is accurate as of 11/21/18 11:59 PM.  Always use your most recent med list.                   Brand Name Dispense Instructions for use Diagnosis    doxylamine 25 MG Tabs tablet    UNISOM    30 each    Take 0.5-1 tablets (12.5-25 mg) by mouth nightly as needed for sleep    Normal pregnancy in third trimester       ferrous sulfate 325 (65 Fe) MG tablet    FEROSUL    30 tablet    Take 1 tablet (325 mg) by mouth daily (with breakfast)    Iron deficiency anemia, unspecified iron deficiency anemia type       omeprazole 10 MG DR capsule    priLOSEC    60 capsule    Take 2 capsules (20 mg) by mouth daily    Gastroesophageal reflux disease without esophagitis        prenatal multivitamin w/iron 27-0.8 MG tablet      Take 1 tablet by mouth daily    Supervision of high risk pregnancy in first trimester, Hyperemesis gravidarum, Aneurysm (H), History of intrauterine fetal death, currently pregnant in first trimester

## 2018-11-21 NOTE — PROGRESS NOTES
[facility]  Return OB Visit    S: Ms. Darwin Guadarrama is a 33-year-old  at 36w5d by 7w6d US here for a return OB visit. She is being followed with weekly BPPs given history of IUFD at 33 weeks. She scored 6/8 today, off for breathing. She is very nervous about baby's well being. She has poor sleep at night, because she is so worried the baby is not moving. She has been feeling the baby move during the day. She feels intermittent, irregular abdominal cramping similar to mild contractions. She denies vaginal bleeding and loss of fluid. She denies headache, changes in vision, chest pain, shortness of breath, and RUQ pain.    Pregnancy complicated by:  - Anemia, most recent Hgb 9.3, on daily Fe supplementation  - History of IUFD at 33 weeks  - History of IOL at 37 weeks for oligohydramnios  - Morbid obesity, BMI 41  - GBS bacteruria  - Family history of aneurysm- patient reports she had negative head imaging. However review of chart revealed an MRI w/small (3x2mm) periophthalmic aneurysm. She was seen by neurology at that time and due to its small size it did not warrant clips, coils, or surgery. She was cleared for valsalva during vaginal delivery.   - AMA    O: /75 (BP Location: Left arm, Patient Position: Sitting, Cuff Size: Adult Large)  Pulse 97  Wt 103.9 kg (229 lb)  LMP 2018 (Exact Date)  BMI 43.27 kg/m2  Gen: Well-appearing, NAD  NST: Baseline 140 bpm, moderate variability, + accels  See OB Flowsheet    A/P:  Dai Guadarrama is a 33 year old  at 36w5d by 7w6d US here for a return OB visit.    #History of IUFD at 33 weeks  - 6/8 BPP today (performed weekly for history of IUFD)  - Reactive NST within 20 minutes following BPP, moderate variability, no contractions on the monitor  - Discussed with Dr. Canales. Offered IOL at 37 weeks given history of IUFD and multiple 6/8 BPPs. IOL scheduled for  (patient will be 37w1d)  - Patient feeling very reassured after  NST    #Prenatal care  - Rh positive, antibody screen negative, Rubella immune, Hepatitis B nonimmune  - GBS bacteruria - needs PCN ppx in labor  - Failed GCT (134), Passed GTT  - Anemia, Hgb 9.3, continue oral Fe  - S/p TDaP, influenza vaccines  - Feeding: formula  - Contraception: undecided  - Pain management in labor: epidural    #History of periophthalmic aneurysm - s/p neurology, cleared for valsalva    Naima Mcintosh MD  OB/GYN, PGY3  11/21/18    The Patient was seen in [facility] by    NAIMA MCINTOSH  [place] [facility] [facility].  I reviewed the history & exam. Assessment and plan were jointly made.    Vicky Salazar MD

## 2018-11-24 ENCOUNTER — SURGERY (OUTPATIENT)
Age: 33
End: 2018-11-24

## 2018-11-24 ENCOUNTER — ANESTHESIA EVENT (OUTPATIENT)
Dept: OBGYN | Facility: CLINIC | Age: 33
End: 2018-11-24
Payer: COMMERCIAL

## 2018-11-24 ENCOUNTER — OFFICE VISIT (OUTPATIENT)
Dept: INTERPRETER SERVICES | Facility: CLINIC | Age: 33
End: 2018-11-24
Payer: COMMERCIAL

## 2018-11-24 ENCOUNTER — HOSPITAL ENCOUNTER (INPATIENT)
Facility: CLINIC | Age: 33
LOS: 2 days | Discharge: HOME | End: 2018-11-26
Attending: OBSTETRICS & GYNECOLOGY | Admitting: OBSTETRICS & GYNECOLOGY
Payer: COMMERCIAL

## 2018-11-24 ENCOUNTER — ANESTHESIA (OUTPATIENT)
Dept: OBGYN | Facility: CLINIC | Age: 33
End: 2018-11-24
Payer: COMMERCIAL

## 2018-11-24 DIAGNOSIS — D50.9 IRON DEFICIENCY ANEMIA, UNSPECIFIED IRON DEFICIENCY ANEMIA TYPE: ICD-10-CM

## 2018-11-24 DIAGNOSIS — Z98.891 S/P CESAREAN SECTION: Primary | ICD-10-CM

## 2018-11-24 PROBLEM — Z34.90 ENCOUNTER FOR INDUCTION OF LABOR: Status: ACTIVE | Noted: 2018-11-24

## 2018-11-24 LAB
ABO + RH BLD: NORMAL
ABO + RH BLD: NORMAL
BASOPHILS # BLD AUTO: 0 10E9/L (ref 0–0.2)
BASOPHILS NFR BLD AUTO: 0.1 %
BLD GP AB SCN SERPL QL: NORMAL
BLOOD BANK CMNT PATIENT-IMP: NORMAL
DIFFERENTIAL METHOD BLD: ABNORMAL
EOSINOPHIL # BLD AUTO: 0.1 10E9/L (ref 0–0.7)
EOSINOPHIL NFR BLD AUTO: 1.2 %
ERYTHROCYTE [DISTWIDTH] IN BLOOD BY AUTOMATED COUNT: 20.9 % (ref 10–15)
HCT VFR BLD AUTO: 32.4 % (ref 35–47)
HGB BLD-MCNC: 9.6 G/DL (ref 11.7–15.7)
IMM GRANULOCYTES # BLD: 0.1 10E9/L (ref 0–0.4)
IMM GRANULOCYTES NFR BLD: 1.3 %
LYMPHOCYTES # BLD AUTO: 1.9 10E9/L (ref 0.8–5.3)
LYMPHOCYTES NFR BLD AUTO: 23.2 %
MCH RBC QN AUTO: 25.3 PG (ref 26.5–33)
MCHC RBC AUTO-ENTMCNC: 29.6 G/DL (ref 31.5–36.5)
MCV RBC AUTO: 85 FL (ref 78–100)
MONOCYTES # BLD AUTO: 0.4 10E9/L (ref 0–1.3)
MONOCYTES NFR BLD AUTO: 4.8 %
NEUTROPHILS # BLD AUTO: 5.8 10E9/L (ref 1.6–8.3)
NEUTROPHILS NFR BLD AUTO: 69.4 %
NRBC # BLD AUTO: 0 10*3/UL
NRBC BLD AUTO-RTO: 0 /100
PLATELET # BLD AUTO: 315 10E9/L (ref 150–450)
RBC # BLD AUTO: 3.8 10E12/L (ref 3.8–5.2)
SPECIMEN EXP DATE BLD: NORMAL
T PALLIDUM AB SER QL: NONREACTIVE
WBC # BLD AUTO: 8.3 10E9/L (ref 4–11)

## 2018-11-24 PROCEDURE — 40000169 ZZH STATISTIC PRE-PROCEDURE ASSESSMENT I: Performed by: OBSTETRICS & GYNECOLOGY

## 2018-11-24 PROCEDURE — 25000128 H RX IP 250 OP 636: Performed by: INTERNAL MEDICINE

## 2018-11-24 PROCEDURE — 25000128 H RX IP 250 OP 636: Performed by: STUDENT IN AN ORGANIZED HEALTH CARE EDUCATION/TRAINING PROGRAM

## 2018-11-24 PROCEDURE — 37000009 ZZH ANESTHESIA TECHNICAL FEE, EACH ADDTL 15 MIN: Performed by: OBSTETRICS & GYNECOLOGY

## 2018-11-24 PROCEDURE — 25000128 H RX IP 250 OP 636: Performed by: NURSE ANESTHETIST, CERTIFIED REGISTERED

## 2018-11-24 PROCEDURE — 86901 BLOOD TYPING SEROLOGIC RH(D): CPT | Performed by: OBSTETRICS & GYNECOLOGY

## 2018-11-24 PROCEDURE — 12000032 ZZH R&B OB CRITICAL UMMC

## 2018-11-24 PROCEDURE — 71000015 ZZH RECOVERY PHASE 1 LEVEL 2 EA ADDTL HR: Performed by: OBSTETRICS & GYNECOLOGY

## 2018-11-24 PROCEDURE — 37000008 ZZH ANESTHESIA TECHNICAL FEE, 1ST 30 MIN: Performed by: OBSTETRICS & GYNECOLOGY

## 2018-11-24 PROCEDURE — 25000132 ZZH RX MED GY IP 250 OP 250 PS 637: Performed by: INTERNAL MEDICINE

## 2018-11-24 PROCEDURE — 25000132 ZZH RX MED GY IP 250 OP 250 PS 637: Performed by: STUDENT IN AN ORGANIZED HEALTH CARE EDUCATION/TRAINING PROGRAM

## 2018-11-24 PROCEDURE — 25000125 ZZHC RX 250: Performed by: NURSE ANESTHETIST, CERTIFIED REGISTERED

## 2018-11-24 PROCEDURE — 27210794 ZZH OR GENERAL SUPPLY STERILE: Performed by: OBSTETRICS & GYNECOLOGY

## 2018-11-24 PROCEDURE — 25000128 H RX IP 250 OP 636

## 2018-11-24 PROCEDURE — C9290 INJ, BUPIVACAINE LIPOSOME: HCPCS | Performed by: STUDENT IN AN ORGANIZED HEALTH CARE EDUCATION/TRAINING PROGRAM

## 2018-11-24 PROCEDURE — 27110028 ZZH OR GENERAL SUPPLY NON-STERILE: Performed by: OBSTETRICS & GYNECOLOGY

## 2018-11-24 PROCEDURE — 86780 TREPONEMA PALLIDUM: CPT | Performed by: OBSTETRICS & GYNECOLOGY

## 2018-11-24 PROCEDURE — 36415 COLL VENOUS BLD VENIPUNCTURE: CPT | Performed by: OBSTETRICS & GYNECOLOGY

## 2018-11-24 PROCEDURE — 36000059 ZZH SURGERY LEVEL 3 EA 15 ADDTL MIN UMMC: Performed by: OBSTETRICS & GYNECOLOGY

## 2018-11-24 PROCEDURE — 25000128 H RX IP 250 OP 636: Performed by: ANESTHESIOLOGY

## 2018-11-24 PROCEDURE — 3E0T3BZ INTRODUCTION OF ANESTHETIC AGENT INTO PERIPHERAL NERVES AND PLEXI, PERCUTANEOUS APPROACH: ICD-10-PCS | Performed by: STUDENT IN AN ORGANIZED HEALTH CARE EDUCATION/TRAINING PROGRAM

## 2018-11-24 PROCEDURE — 85025 COMPLETE CBC W/AUTO DIFF WBC: CPT | Performed by: OBSTETRICS & GYNECOLOGY

## 2018-11-24 PROCEDURE — 86850 RBC ANTIBODY SCREEN: CPT | Performed by: OBSTETRICS & GYNECOLOGY

## 2018-11-24 PROCEDURE — 71000014 ZZH RECOVERY PHASE 1 LEVEL 2 FIRST HR: Performed by: OBSTETRICS & GYNECOLOGY

## 2018-11-24 PROCEDURE — T1013 SIGN LANG/ORAL INTERPRETER: HCPCS | Mod: U3

## 2018-11-24 PROCEDURE — 36000057 ZZH SURGERY LEVEL 3 1ST 30 MIN - UMMC: Performed by: OBSTETRICS & GYNECOLOGY

## 2018-11-24 PROCEDURE — 25000125 ZZHC RX 250: Performed by: STUDENT IN AN ORGANIZED HEALTH CARE EDUCATION/TRAINING PROGRAM

## 2018-11-24 PROCEDURE — 86900 BLOOD TYPING SEROLOGIC ABO: CPT | Performed by: OBSTETRICS & GYNECOLOGY

## 2018-11-24 RX ORDER — BUPIVACAINE HYDROCHLORIDE 7.5 MG/ML
INJECTION, SOLUTION INTRASPINAL PRN
Status: DISCONTINUED | OUTPATIENT
Start: 2018-11-24 | End: 2018-11-24

## 2018-11-24 RX ORDER — OXYTOCIN 10 [USP'U]/ML
10 INJECTION, SOLUTION INTRAMUSCULAR; INTRAVENOUS
Status: DISCONTINUED | OUTPATIENT
Start: 2018-11-24 | End: 2018-11-24

## 2018-11-24 RX ORDER — FLUMAZENIL 0.1 MG/ML
0.2 INJECTION, SOLUTION INTRAVENOUS
Status: DISCONTINUED | OUTPATIENT
Start: 2018-11-24 | End: 2018-11-24 | Stop reason: HOSPADM

## 2018-11-24 RX ORDER — MEPERIDINE HYDROCHLORIDE 25 MG/ML
12.5 INJECTION INTRAMUSCULAR; INTRAVENOUS; SUBCUTANEOUS
Status: CANCELLED | OUTPATIENT
Start: 2018-11-24

## 2018-11-24 RX ORDER — MEPERIDINE HYDROCHLORIDE 25 MG/ML
12.5 INJECTION INTRAMUSCULAR; INTRAVENOUS; SUBCUTANEOUS EVERY 5 MIN PRN
Status: DISCONTINUED | OUTPATIENT
Start: 2018-11-24 | End: 2018-11-24 | Stop reason: HOSPADM

## 2018-11-24 RX ORDER — AMOXICILLIN 250 MG
2 CAPSULE ORAL 2 TIMES DAILY PRN
Status: DISCONTINUED | OUTPATIENT
Start: 2018-11-24 | End: 2018-11-26 | Stop reason: HOSPADM

## 2018-11-24 RX ORDER — SODIUM CHLORIDE, SODIUM LACTATE, POTASSIUM CHLORIDE, CALCIUM CHLORIDE 600; 310; 30; 20 MG/100ML; MG/100ML; MG/100ML; MG/100ML
INJECTION, SOLUTION INTRAVENOUS CONTINUOUS
Status: DISCONTINUED | OUTPATIENT
Start: 2018-11-24 | End: 2018-11-24 | Stop reason: HOSPADM

## 2018-11-24 RX ORDER — OXYTOCIN/0.9 % SODIUM CHLORIDE 30/500 ML
PLASTIC BAG, INJECTION (ML) INTRAVENOUS CONTINUOUS PRN
Status: DISCONTINUED | OUTPATIENT
Start: 2018-11-24 | End: 2018-11-24

## 2018-11-24 RX ORDER — OXYTOCIN 10 [USP'U]/ML
10 INJECTION, SOLUTION INTRAMUSCULAR; INTRAVENOUS
Status: DISCONTINUED | OUTPATIENT
Start: 2018-11-24 | End: 2018-11-26 | Stop reason: HOSPADM

## 2018-11-24 RX ORDER — OXYTOCIN/0.9 % SODIUM CHLORIDE 30/500 ML
100 PLASTIC BAG, INJECTION (ML) INTRAVENOUS CONTINUOUS
Status: DISCONTINUED | OUTPATIENT
Start: 2018-11-24 | End: 2018-11-26 | Stop reason: HOSPADM

## 2018-11-24 RX ORDER — NALOXONE HYDROCHLORIDE 0.4 MG/ML
.1-.4 INJECTION, SOLUTION INTRAMUSCULAR; INTRAVENOUS; SUBCUTANEOUS
Status: DISCONTINUED | OUTPATIENT
Start: 2018-11-24 | End: 2018-11-26 | Stop reason: HOSPADM

## 2018-11-24 RX ORDER — CITRIC ACID/SODIUM CITRATE 334-500MG
30 SOLUTION, ORAL ORAL ONCE
Status: DISCONTINUED | OUTPATIENT
Start: 2018-11-24 | End: 2018-11-24

## 2018-11-24 RX ORDER — CEFAZOLIN SODIUM 1 G/3ML
1 INJECTION, POWDER, FOR SOLUTION INTRAMUSCULAR; INTRAVENOUS SEE ADMIN INSTRUCTIONS
Status: DISCONTINUED | OUTPATIENT
Start: 2018-11-24 | End: 2018-11-24 | Stop reason: HOSPADM

## 2018-11-24 RX ORDER — MORPHINE SULFATE 1 MG/ML
INJECTION, SOLUTION EPIDURAL; INTRATHECAL; INTRAVENOUS
Status: DISCONTINUED
Start: 2018-11-24 | End: 2018-11-24 | Stop reason: HOSPADM

## 2018-11-24 RX ORDER — ONDANSETRON 2 MG/ML
4 INJECTION INTRAMUSCULAR; INTRAVENOUS EVERY 6 HOURS PRN
Status: DISCONTINUED | OUTPATIENT
Start: 2018-11-24 | End: 2018-11-26 | Stop reason: HOSPADM

## 2018-11-24 RX ORDER — AMOXICILLIN 250 MG
1 CAPSULE ORAL 2 TIMES DAILY PRN
Status: DISCONTINUED | OUTPATIENT
Start: 2018-11-24 | End: 2018-11-26 | Stop reason: HOSPADM

## 2018-11-24 RX ORDER — MISOPROSTOL 200 UG/1
TABLET ORAL
Status: DISCONTINUED
Start: 2018-11-24 | End: 2018-11-24 | Stop reason: WASHOUT

## 2018-11-24 RX ORDER — SODIUM CHLORIDE, SODIUM LACTATE, POTASSIUM CHLORIDE, CALCIUM CHLORIDE 600; 310; 30; 20 MG/100ML; MG/100ML; MG/100ML; MG/100ML
INJECTION, SOLUTION INTRAVENOUS
Status: COMPLETED
Start: 2018-11-24 | End: 2018-11-24

## 2018-11-24 RX ORDER — OXYCODONE HYDROCHLORIDE 5 MG/1
5 TABLET ORAL EVERY 4 HOURS PRN
Status: CANCELLED | OUTPATIENT
Start: 2018-11-24

## 2018-11-24 RX ORDER — METHYLERGONOVINE MALEATE 0.2 MG/ML
INJECTION INTRAVENOUS PRN
Status: DISCONTINUED | OUTPATIENT
Start: 2018-11-24 | End: 2018-11-24

## 2018-11-24 RX ORDER — PENICILLIN G POTASSIUM 5000000 [IU]/1
5 INJECTION, POWDER, FOR SOLUTION INTRAMUSCULAR; INTRAVENOUS ONCE
Status: DISCONTINUED | OUTPATIENT
Start: 2018-11-24 | End: 2018-11-24

## 2018-11-24 RX ORDER — CITRIC ACID/SODIUM CITRATE 334-500MG
30 SOLUTION, ORAL ORAL
Status: COMPLETED | OUTPATIENT
Start: 2018-11-24 | End: 2018-11-24

## 2018-11-24 RX ORDER — NALOXONE HYDROCHLORIDE 0.4 MG/ML
.1-.4 INJECTION, SOLUTION INTRAMUSCULAR; INTRAVENOUS; SUBCUTANEOUS
Status: CANCELLED | OUTPATIENT
Start: 2018-11-24 | End: 2018-11-25

## 2018-11-24 RX ORDER — DIPHENHYDRAMINE HYDROCHLORIDE 50 MG/ML
25 INJECTION INTRAMUSCULAR; INTRAVENOUS EVERY 6 HOURS PRN
Status: DISCONTINUED | OUTPATIENT
Start: 2018-11-24 | End: 2018-11-26 | Stop reason: HOSPADM

## 2018-11-24 RX ORDER — ONDANSETRON 2 MG/ML
4 INJECTION INTRAMUSCULAR; INTRAVENOUS EVERY 30 MIN PRN
Status: DISCONTINUED | OUTPATIENT
Start: 2018-11-24 | End: 2018-11-24 | Stop reason: HOSPADM

## 2018-11-24 RX ORDER — ACETAMINOPHEN 325 MG/1
650 TABLET ORAL EVERY 4 HOURS PRN
Status: DISCONTINUED | OUTPATIENT
Start: 2018-11-24 | End: 2018-11-24

## 2018-11-24 RX ORDER — KETOROLAC TROMETHAMINE 30 MG/ML
INJECTION, SOLUTION INTRAMUSCULAR; INTRAVENOUS PRN
Status: DISCONTINUED | OUTPATIENT
Start: 2018-11-24 | End: 2018-11-24

## 2018-11-24 RX ORDER — DIPHENHYDRAMINE HCL 25 MG
25 CAPSULE ORAL EVERY 6 HOURS PRN
Status: DISCONTINUED | OUTPATIENT
Start: 2018-11-24 | End: 2018-11-26 | Stop reason: HOSPADM

## 2018-11-24 RX ORDER — ONDANSETRON 2 MG/ML
4 INJECTION INTRAMUSCULAR; INTRAVENOUS EVERY 30 MIN PRN
Status: CANCELLED | OUTPATIENT
Start: 2018-11-24

## 2018-11-24 RX ORDER — FENTANYL CITRATE 50 UG/ML
25-50 INJECTION, SOLUTION INTRAMUSCULAR; INTRAVENOUS
Status: CANCELLED | OUTPATIENT
Start: 2018-11-24

## 2018-11-24 RX ORDER — KETOROLAC TROMETHAMINE 30 MG/ML
30 INJECTION, SOLUTION INTRAMUSCULAR; INTRAVENOUS EVERY 6 HOURS
Status: DISPENSED | OUTPATIENT
Start: 2018-11-24 | End: 2018-11-25

## 2018-11-24 RX ORDER — BISACODYL 10 MG
10 SUPPOSITORY, RECTAL RECTAL DAILY PRN
Status: DISCONTINUED | OUTPATIENT
Start: 2018-11-26 | End: 2018-11-26 | Stop reason: HOSPADM

## 2018-11-24 RX ORDER — ONDANSETRON 2 MG/ML
INJECTION INTRAMUSCULAR; INTRAVENOUS PRN
Status: DISCONTINUED | OUTPATIENT
Start: 2018-11-24 | End: 2018-11-24

## 2018-11-24 RX ORDER — EPHEDRINE SULFATE 50 MG/ML
INJECTION, SOLUTION INTRAMUSCULAR; INTRAVENOUS; SUBCUTANEOUS PRN
Status: DISCONTINUED | OUTPATIENT
Start: 2018-11-24 | End: 2018-11-24

## 2018-11-24 RX ORDER — DEXTROSE, SODIUM CHLORIDE, SODIUM LACTATE, POTASSIUM CHLORIDE, AND CALCIUM CHLORIDE 5; .6; .31; .03; .02 G/100ML; G/100ML; G/100ML; G/100ML; G/100ML
INJECTION, SOLUTION INTRAVENOUS CONTINUOUS
Status: DISCONTINUED | OUTPATIENT
Start: 2018-11-24 | End: 2018-11-26 | Stop reason: HOSPADM

## 2018-11-24 RX ORDER — ACETAMINOPHEN 325 MG/1
975 TABLET ORAL EVERY 8 HOURS
Status: DISCONTINUED | OUTPATIENT
Start: 2018-11-24 | End: 2018-11-26 | Stop reason: HOSPADM

## 2018-11-24 RX ORDER — OXYTOCIN/0.9 % SODIUM CHLORIDE 30/500 ML
PLASTIC BAG, INJECTION (ML) INTRAVENOUS
Status: DISCONTINUED
Start: 2018-11-24 | End: 2018-11-24 | Stop reason: HOSPADM

## 2018-11-24 RX ORDER — FENTANYL CITRATE 50 UG/ML
50-100 INJECTION, SOLUTION INTRAMUSCULAR; INTRAVENOUS
Status: DISCONTINUED | OUTPATIENT
Start: 2018-11-24 | End: 2018-11-24

## 2018-11-24 RX ORDER — IBUPROFEN 800 MG/1
800 TABLET, FILM COATED ORAL EVERY 6 HOURS PRN
Status: DISCONTINUED | OUTPATIENT
Start: 2018-11-24 | End: 2018-11-26 | Stop reason: HOSPADM

## 2018-11-24 RX ORDER — OXYTOCIN/0.9 % SODIUM CHLORIDE 30/500 ML
340 PLASTIC BAG, INJECTION (ML) INTRAVENOUS CONTINUOUS PRN
Status: DISCONTINUED | OUTPATIENT
Start: 2018-11-24 | End: 2018-11-26 | Stop reason: HOSPADM

## 2018-11-24 RX ORDER — NALOXONE HYDROCHLORIDE 0.4 MG/ML
.1-.4 INJECTION, SOLUTION INTRAMUSCULAR; INTRAVENOUS; SUBCUTANEOUS
Status: DISCONTINUED | OUTPATIENT
Start: 2018-11-24 | End: 2018-11-24 | Stop reason: HOSPADM

## 2018-11-24 RX ORDER — METHYLERGONOVINE MALEATE 0.2 MG/ML
200 INJECTION INTRAVENOUS
Status: DISCONTINUED | OUTPATIENT
Start: 2018-11-24 | End: 2018-11-24

## 2018-11-24 RX ORDER — ONDANSETRON 2 MG/ML
4 INJECTION INTRAMUSCULAR; INTRAVENOUS EVERY 6 HOURS PRN
Status: DISCONTINUED | OUTPATIENT
Start: 2018-11-24 | End: 2018-11-24

## 2018-11-24 RX ORDER — HYDROMORPHONE HYDROCHLORIDE 1 MG/ML
.3-.5 INJECTION, SOLUTION INTRAMUSCULAR; INTRAVENOUS; SUBCUTANEOUS EVERY 10 MIN PRN
Status: CANCELLED | OUTPATIENT
Start: 2018-11-24

## 2018-11-24 RX ORDER — FENTANYL CITRATE 50 UG/ML
25-50 INJECTION, SOLUTION INTRAMUSCULAR; INTRAVENOUS
Status: DISCONTINUED | OUTPATIENT
Start: 2018-11-24 | End: 2018-11-24 | Stop reason: HOSPADM

## 2018-11-24 RX ORDER — NALOXONE HYDROCHLORIDE 0.4 MG/ML
.1-.4 INJECTION, SOLUTION INTRAMUSCULAR; INTRAVENOUS; SUBCUTANEOUS
Status: DISCONTINUED | OUTPATIENT
Start: 2018-11-24 | End: 2018-11-24

## 2018-11-24 RX ORDER — OXYCODONE AND ACETAMINOPHEN 5; 325 MG/1; MG/1
1 TABLET ORAL
Status: DISCONTINUED | OUTPATIENT
Start: 2018-11-24 | End: 2018-11-24

## 2018-11-24 RX ORDER — OXYTOCIN/0.9 % SODIUM CHLORIDE 30/500 ML
100-340 PLASTIC BAG, INJECTION (ML) INTRAVENOUS CONTINUOUS PRN
Status: DISCONTINUED | OUTPATIENT
Start: 2018-11-24 | End: 2018-11-24

## 2018-11-24 RX ORDER — BUPIVACAINE HYDROCHLORIDE 2.5 MG/ML
INJECTION, SOLUTION EPIDURAL; INFILTRATION; INTRACAUDAL PRN
Status: DISCONTINUED | OUTPATIENT
Start: 2018-11-24 | End: 2018-11-24

## 2018-11-24 RX ORDER — OXYCODONE HYDROCHLORIDE 5 MG/1
5-10 TABLET ORAL
Status: DISCONTINUED | OUTPATIENT
Start: 2018-11-24 | End: 2018-11-26

## 2018-11-24 RX ORDER — CEFAZOLIN SODIUM 2 G/100ML
2 INJECTION, SOLUTION INTRAVENOUS
Status: COMPLETED | OUTPATIENT
Start: 2018-11-24 | End: 2018-11-24

## 2018-11-24 RX ORDER — ACETAMINOPHEN 325 MG/1
650 TABLET ORAL EVERY 4 HOURS PRN
Status: DISCONTINUED | OUTPATIENT
Start: 2018-11-27 | End: 2018-11-26 | Stop reason: HOSPADM

## 2018-11-24 RX ORDER — SODIUM CHLORIDE, SODIUM LACTATE, POTASSIUM CHLORIDE, CALCIUM CHLORIDE 600; 310; 30; 20 MG/100ML; MG/100ML; MG/100ML; MG/100ML
INJECTION, SOLUTION INTRAVENOUS CONTINUOUS PRN
Status: DISCONTINUED | OUTPATIENT
Start: 2018-11-24 | End: 2018-11-24

## 2018-11-24 RX ORDER — SODIUM CHLORIDE, SODIUM LACTATE, POTASSIUM CHLORIDE, CALCIUM CHLORIDE 600; 310; 30; 20 MG/100ML; MG/100ML; MG/100ML; MG/100ML
INJECTION, SOLUTION INTRAVENOUS CONTINUOUS
Status: DISCONTINUED | OUTPATIENT
Start: 2018-11-24 | End: 2018-11-24

## 2018-11-24 RX ORDER — IBUPROFEN 800 MG/1
800 TABLET, FILM COATED ORAL
Status: DISCONTINUED | OUTPATIENT
Start: 2018-11-24 | End: 2018-11-24

## 2018-11-24 RX ORDER — MISOPROSTOL 200 UG/1
800 TABLET ORAL
Status: DISCONTINUED | OUTPATIENT
Start: 2018-11-24 | End: 2018-11-26 | Stop reason: HOSPADM

## 2018-11-24 RX ORDER — SODIUM CHLORIDE, SODIUM LACTATE, POTASSIUM CHLORIDE, CALCIUM CHLORIDE 600; 310; 30; 20 MG/100ML; MG/100ML; MG/100ML; MG/100ML
INJECTION, SOLUTION INTRAVENOUS CONTINUOUS
Status: CANCELLED | OUTPATIENT
Start: 2018-11-24

## 2018-11-24 RX ORDER — MORPHINE SULFATE 1 MG/ML
INJECTION, SOLUTION EPIDURAL; INTRATHECAL; INTRAVENOUS PRN
Status: DISCONTINUED | OUTPATIENT
Start: 2018-11-24 | End: 2018-11-24

## 2018-11-24 RX ORDER — LANOLIN 100 %
OINTMENT (GRAM) TOPICAL
Status: DISCONTINUED | OUTPATIENT
Start: 2018-11-24 | End: 2018-11-26 | Stop reason: HOSPADM

## 2018-11-24 RX ORDER — CARBOPROST TROMETHAMINE 250 UG/ML
250 INJECTION, SOLUTION INTRAMUSCULAR
Status: DISCONTINUED | OUTPATIENT
Start: 2018-11-24 | End: 2018-11-24

## 2018-11-24 RX ORDER — MORPHINE SULFATE 2 MG/ML
2-4 INJECTION, SOLUTION INTRAMUSCULAR; INTRAVENOUS EVERY 5 MIN PRN
Status: DISCONTINUED | OUTPATIENT
Start: 2018-11-24 | End: 2018-11-24 | Stop reason: HOSPADM

## 2018-11-24 RX ORDER — SIMETHICONE 80 MG
80 TABLET,CHEWABLE ORAL 4 TIMES DAILY PRN
Status: DISCONTINUED | OUTPATIENT
Start: 2018-11-24 | End: 2018-11-26 | Stop reason: HOSPADM

## 2018-11-24 RX ORDER — LIDOCAINE 40 MG/G
CREAM TOPICAL
Status: DISCONTINUED | OUTPATIENT
Start: 2018-11-24 | End: 2018-11-26 | Stop reason: HOSPADM

## 2018-11-24 RX ORDER — HYDROCORTISONE 2.5 %
CREAM (GRAM) TOPICAL 3 TIMES DAILY PRN
Status: DISCONTINUED | OUTPATIENT
Start: 2018-11-24 | End: 2018-11-26 | Stop reason: HOSPADM

## 2018-11-24 RX ORDER — ONDANSETRON 4 MG/1
4 TABLET, ORALLY DISINTEGRATING ORAL EVERY 30 MIN PRN
Status: DISCONTINUED | OUTPATIENT
Start: 2018-11-24 | End: 2018-11-24 | Stop reason: HOSPADM

## 2018-11-24 RX ORDER — ONDANSETRON 4 MG/1
4 TABLET, ORALLY DISINTEGRATING ORAL EVERY 30 MIN PRN
Status: CANCELLED | OUTPATIENT
Start: 2018-11-24

## 2018-11-24 RX ORDER — LIDOCAINE 40 MG/G
CREAM TOPICAL
Status: DISCONTINUED | OUTPATIENT
Start: 2018-11-24 | End: 2018-11-24 | Stop reason: HOSPADM

## 2018-11-24 RX ADMIN — ACETAMINOPHEN 975 MG: 325 TABLET, FILM COATED ORAL at 21:32

## 2018-11-24 RX ADMIN — OXYTOCIN-SODIUM CHLORIDE 0.9% IV SOLN 30 UNIT/500ML 100 ML/HR: 30-0.9/5 SOLUTION at 18:49

## 2018-11-24 RX ADMIN — SODIUM CHLORIDE, POTASSIUM CHLORIDE, SODIUM LACTATE AND CALCIUM CHLORIDE: 600; 310; 30; 20 INJECTION, SOLUTION INTRAVENOUS at 16:17

## 2018-11-24 RX ADMIN — SODIUM CHLORIDE, SODIUM LACTATE, POTASSIUM CHLORIDE, CALCIUM CHLORIDE AND DEXTROSE MONOHYDRATE: 5; 600; 310; 30; 20 INJECTION, SOLUTION INTRAVENOUS at 23:00

## 2018-11-24 RX ADMIN — PHENYLEPHRINE HYDROCHLORIDE 0.2 MCG/KG/MIN: 10 INJECTION, SOLUTION INTRAMUSCULAR; INTRAVENOUS; SUBCUTANEOUS at 16:12

## 2018-11-24 RX ADMIN — MORPHINE SULFATE 0.1 MG: 1 INJECTION, SOLUTION EPIDURAL; INTRATHECAL; INTRAVENOUS at 16:12

## 2018-11-24 RX ADMIN — FENTANYL CITRATE 25 MCG: 50 INJECTION, SOLUTION INTRAMUSCULAR; INTRAVENOUS at 19:48

## 2018-11-24 RX ADMIN — SODIUM CHLORIDE, POTASSIUM CHLORIDE, SODIUM LACTATE AND CALCIUM CHLORIDE: 600; 310; 30; 20 INJECTION, SOLUTION INTRAVENOUS at 16:00

## 2018-11-24 RX ADMIN — SENNOSIDES AND DOCUSATE SODIUM 1 TABLET: 8.6; 5 TABLET ORAL at 21:32

## 2018-11-24 RX ADMIN — Medication 300 MG: at 17:21

## 2018-11-24 RX ADMIN — ONDANSETRON 4 MG: 2 INJECTION INTRAMUSCULAR; INTRAVENOUS at 19:47

## 2018-11-24 RX ADMIN — BUPIVACAINE HYDROCHLORIDE IN DEXTROSE 1.5 ML: 7.5 INJECTION, SOLUTION SUBARACHNOID at 16:12

## 2018-11-24 RX ADMIN — SODIUM CITRATE AND CITRIC ACID MONOHYDRATE 30 ML: 500; 334 SOLUTION ORAL at 15:40

## 2018-11-24 RX ADMIN — SODIUM CHLORIDE, POTASSIUM CHLORIDE, SODIUM LACTATE AND CALCIUM CHLORIDE 1000 ML: 600; 310; 30; 20 INJECTION, SOLUTION INTRAVENOUS at 15:40

## 2018-11-24 RX ADMIN — SODIUM CHLORIDE, POTASSIUM CHLORIDE, SODIUM LACTATE AND CALCIUM CHLORIDE: 600; 310; 30; 20 INJECTION, SOLUTION INTRAVENOUS at 16:58

## 2018-11-24 RX ADMIN — KETOROLAC TROMETHAMINE 30 MG: 30 INJECTION, SOLUTION INTRAMUSCULAR at 17:25

## 2018-11-24 RX ADMIN — BUPIVACAINE HYDROCHLORIDE 20 ML: 2.5 INJECTION, SOLUTION EPIDURAL; INFILTRATION; INTRACAUDAL at 17:43

## 2018-11-24 RX ADMIN — SODIUM CHLORIDE, SODIUM LACTATE, POTASSIUM CHLORIDE, CALCIUM CHLORIDE: 600; 310; 30; 20 INJECTION, SOLUTION INTRAVENOUS at 11:53

## 2018-11-24 RX ADMIN — OXYTOCIN-SODIUM CHLORIDE 0.9% IV SOLN 30 UNIT/500ML 300 ML/HR: 30-0.9/5 SOLUTION at 16:36

## 2018-11-24 RX ADMIN — CEFAZOLIN SODIUM 2 G: 2 INJECTION, SOLUTION INTRAVENOUS at 16:10

## 2018-11-24 RX ADMIN — ONDANSETRON 4 MG: 2 INJECTION INTRAMUSCULAR; INTRAVENOUS at 16:38

## 2018-11-24 RX ADMIN — BUPIVACAINE 20 ML: 13.3 INJECTION, SUSPENSION, LIPOSOMAL INFILTRATION at 17:43

## 2018-11-24 RX ADMIN — SODIUM CHLORIDE, POTASSIUM CHLORIDE, SODIUM LACTATE AND CALCIUM CHLORIDE: 600; 310; 30; 20 INJECTION, SOLUTION INTRAVENOUS at 11:53

## 2018-11-24 RX ADMIN — METHYLERGONOVINE MALEATE 200 MCG: 0.2 INJECTION INTRAMUSCULAR; INTRAVENOUS at 16:45

## 2018-11-24 ASSESSMENT — ACTIVITIES OF DAILY LIVING (ADL)
RETIRED_COMMUNICATION: 0-->UNDERSTANDS/COMMUNICATES WITHOUT DIFFICULTY
FALL_HISTORY_WITHIN_LAST_SIX_MONTHS: NO
AMBULATION: 0-->INDEPENDENT
TOILETING: 0-->INDEPENDENT
BATHING: 0-->INDEPENDENT
DRESS: 0-->INDEPENDENT
COGNITION: 0 - NO COGNITION ISSUES REPORTED
SWALLOWING: 0-->SWALLOWS FOODS/LIQUIDS WITHOUT DIFFICULTY
RETIRED_EATING: 0-->INDEPENDENT
TRANSFERRING: 0-->INDEPENDENT

## 2018-11-24 NOTE — PROGRESS NOTES
OB/GYN Interval Update    Repeat BSUS confirmed breech presentation, head to maternal LUQ. Will proceed with primary  section now.    FHT: baseline 145 bpm, moderate variability, + accels, intermittent variable decel  [place]: quiet    Naima Mcintosh MD  OB/GYN, PGY3  18

## 2018-11-24 NOTE — ANESTHESIA PREPROCEDURE EVALUATION
Anesthesia Pre-Procedure Evaluation    Patient: Dai Guadarrama   MRN:     [id number] Gender:   female   Age:    33 year old :      1985        Preoperative Diagnosis: pregnancy   Procedure(s):   SECTION     Past Medical History:   Diagnosis Date     Aneurysm (H)      Migraine headache without aura      Pregnancy related nausea and vomiting, antepartum 2016    two ED visits.     Recurrent pregnancy loss (CODE)      Retained placenta 2014     Stillbirth 2014    32 weeks     Tachycardia       Past Surgical History:   Procedure Laterality Date     C EACH ADD TOOTH EXTRACTION      bad reaction to anesthia for local      NO HISTORY OF SURGERY            Anesthesia Evaluation     . Pt has not had prior anesthetic            ROS/MED HX    ENT/Pulmonary:  - neg pulmonary ROS     Neurologic: Comment: Small henri-opthalmic aneurysm;  No treatment indicated.    (+)migraines, other neuro     Cardiovascular:  - neg cardiovascular ROS       METS/Exercise Tolerance:     Hematologic:     (+) Anemia, -      Musculoskeletal:         GI/Hepatic:  - neg GI/hepatic ROS       Renal/Genitourinary:  - ROS Renal section negative       Endo:     (+) Obesity, .      Psychiatric:  - neg psychiatric ROS       Infectious Disease:  - neg infectious disease ROS       Malignancy:         Other:                         PHYSICAL EXAM:   Mental Status/Neuro: A/A/O   Airway: Facies: Feasible  Mallampati: III  Mouth/Opening: Full  TM distance: > 6 cm  Neck ROM: Full   Respiratory: Auscultation: CTAB     Resp. Rate: Normal     Resp. Effort: Normal      CV: Rhythm: Regular  Rate: Age appropriate  Heart: Normal Sounds   Comments:      Dental: Normal                  Lab Results   Component Value Date    WBC 8.3 2018    HGB 9.6 (L) 2018    HCT 32.4 (L) 2018     2018     2018    POTASSIUM 3.0 (L) 2018    CHLORIDE 109 2018    CO2 22 2018    BUN 4 (L) 2018    CR 0.42 (L)  "08/27/2018     (H) 08/27/2018    DAGO 8.5 08/27/2018    MAG 2.0 05/24/2018    ALBUMIN 3.0 (L) 07/17/2018    PROTTOTAL 7.4 07/17/2018    ALT 14 07/17/2018    AST 9 07/17/2018    ALKPHOS 96 07/17/2018    BILITOTAL 0.2 07/17/2018    LIPASE 179 07/17/2018    PTT 32 02/04/2017    INR 1.05 02/04/2017    FIBR 608 (H) 02/04/2017    TSH 2.36 05/03/2018    HCG Positive 04/13/2018       Preop Vitals  BP Readings from Last 3 Encounters:   11/24/18 112/72   11/21/18 107/75   11/20/18 116/72    Pulse Readings from Last 3 Encounters:   11/21/18 97   11/20/18 98   11/15/18 109      Resp Readings from Last 3 Encounters:   11/24/18 16   11/20/18 18   11/15/18 18    SpO2 Readings from Last 3 Encounters:   11/20/18 97%   11/15/18 96%   11/13/18 98%      Temp Readings from Last 1 Encounters:   11/24/18 36.3  C (97.4  F) (Oral)    Ht Readings from Last 1 Encounters:   11/07/18 1.549 m (5' 1\")      Wt Readings from Last 1 Encounters:   11/21/18 103.9 kg (229 lb)    Estimated body mass index is 43.27 kg/(m^2) as calculated from the following:    Height as of 11/7/18: 1.549 m (5' 1\").    Weight as of 11/21/18: 103.9 kg (229 lb).     LDA:  Peripheral IV 05/24/18 Left Upper forearm (Active)   Number of days:184       Peripheral IV 07/17/18 Left (Active)   Number of days:130            Assessment:   ASA SCORE: 3    Will be NPO appropriate at: 11/24/2018 4:00 PM   Documentation: H&P complete; Preop Testing complete; Consents complete   Proceeding: Proceed without further delay  Tobacco Use:  NO Active use of Tobacco/UNKNOWN Tobacco use status     Plan:   Anes. Type:  General; Regional     RA-Location/Type: Spinal      Induction:  IV (Standard)      Access/Monitoring: PIV      Emergence: Procedure Site   Logistics: Same Day Surgery     Postop Pain/Sedation Strategy:  Standard-Options: Opioids PRN     PONV Management:  Adult Risk Factors: Female, Non-Smoker, Postop Opioids  Prevention: Ondansetron     CONSENT: Direct conversation   Plan and " risks discussed with: Patient   Blood Products: Consented (ALL Blood Products)                         Jayy Quispe MD, MD

## 2018-11-24 NOTE — OP NOTE
[facility]  Full Operative Progress Note     Surgery Date:  2018  Surgeon:  Noreen Harris MD  Assistants:  Naima Mcintosh MD, PGY3, Olive Estevez MD PGY-1     Pre-op Diagnosis:  1. Intrauterine pregnancy at 37w1d     2. H/o IUFD     3. Breech presentation, unstable lie     4. Obesity     5. Anemia      Post-op Diagnosis:  1. Same      2. Liveborn female infant   Procedure:  Primary low-transverse  section with double layer uterine closure via Pfannenstiel incision    Anesthesia: Spinal  EBL:  900 mL  IVF:  1100 mL crystalloid  UOP:  150 mL clear urine at the end of the case  Drains: Clemens Catheter   Specimens:  Cord blood  Complications:  None   Indications:   Dai Guadarrama is a 33 year old  at 37w1d initially admitted for IOL of labor at 37w1d due to h/o IUFD at 32w and significant accompanying anxiety during this pregnancy. On admission, fetus found to be in transverse presentation with spine down. Discussed options with patient including having her return in several days for an induction to see if baby converted spontaneously to cephalic presentation, attempting an ECV, or proceeding with a primary  section. Ms. Darwin Guadarrama was concerned about the well being of this baby given her history of IUFD, and after considering her options, desired to proceed with a primary  section. Discussed the risks, benefits, and alternatives, including the risks of bleeding, infection, and unintentional injury to surrounding structures. She agreed to proceed.      Findings:   1. Clear amniotic fluid  2. Liveborn female infant in footling breech presentation. Apgars 8 at 1 minute & 9 at 5 minutes. Weight pending.  3. Normal uterus, fallopian tubes, and ovaries.     Procedure Details:   The patient was brought to the OR, where adequate spinal anesthesia was administered.  She was placed in the dorsal supine position with a slight leftward tilt. She was  prepped and draped in the usual sterile fashion. A surgical time out was performed. A pfannenstiel skin incision was made with the scalpel, and carried down to the underlying fascia with sharp and blunt dissection. The fascia was incised in the midline, and the incision was extended laterally with the Tim scissors. The superior aspect of the fascia was grasped with the Kocher clamps and dissected off of the underlying rectus muscles with blunt dissection. Attention was then turned to the inferior aspect of the fascia, which was similarly dissected off of the underlying rectus muscles. The rectus muscles were  in the midline, and the peritoneum was entered bluntly, and the opening was extended with digital pressure. The bladder blade was placed. A transverse hysterotomy was made with the scalpel in the lower uterine segment, and the incision was extended with digital pressure. The infant was noted to be in the footling breech position, and was delivered atraumatically using standard breech maneuvers. No nuchal cord was noted. The cord was doubly clamped and cut after 60 second of delayed cord clmaping, and the infant was handed off to the awaiting nursery staff. A segment of cord was cut. The placenta was delivered with gentle traction on the umbilical cord and uterine massage. The uterus was exteriorized and cleared of all clots and debris. Uterine tone was noted to be suboptimal with pitocin given through the running IV and uterine massage. IM methergine 200 mcg was then given with subsequent improvement in tone. The hysterotomy was closed with a running locked suture of 0 Vicryl.  The hysterotomy was then imbricated using an 0 Vicryl suture. There was continued bleeding at the right corner of the hysterotomy which was controlled with one figure of X stitch of 0-Victryl.  The hysterotomy was then noted to be hemostatic. The posterior cul-de-sac was cleared of all clots and debris. The uterus was returned  to the abdomen. The pericolic gutters were cleared of all clots and debris. The hysterotomy was reexamined and noted to be hemostatic. The fascia and rectus muscles were examined and areas of oozing were controlled with electrocautery. The fascia was closed with a running 0 Vicryl suture. The subcutaneous tissue was irrigated and areas of oozing were controlled with electrocautery. The subcutaneous tissue was greater than 2 cm in thickness, and was therefore closed with 3-0 Vicryl in a running fashion. The skin was closed with 4-0 Monocryl and covered with a sterile dressing.    All sponge, needle, and instrument counts were correct. The patient tolerated the procedure well, and was transferred to recovery in stable condition. Dr. Harris was present and scrubbed for the entirety of the procedure.     Olive Estevez MD  OB/GYN PGY-1  11/24/18 5:42 PM     Staff MD Note  I was present and scrubbed for the entire procedure noted above.  I agree with the description above and any necessary changes have been made by me.  Noreen Harris MD

## 2018-11-24 NOTE — IP AVS SNAPSHOT
MRN:[id number]                      After Visit Summary   2018    Dai Guadarrama    MRN: [id number]           Thank you!     Thank you for choosing [facility] for your care. Our goal is always to provide you with excellent care. Hearing back from our patients is one way we can continue to improve our services. Please take a few minutes to complete the written survey that you may receive in the mail after you visit with us. Thank you!        Patient Information     Date Of Birth          1985        Designated Caregiver       Most Recent Value    Caregiver    Will someone help with your care after discharge? no      About your hospital stay     You were admitted on:  2018 You last received care in the:  [facility]    You were discharged on:  2018        Reason for your hospital stay       Maternity care                  Who to Call     For medical emergencies, please call 911.  For non-urgent questions about your medical care, please call your primary care provider or clinic, None  For questions related to your surgery, please call your surgery clinic        Attending Provider     Provider Specialty    Mariana Marsh MD OB/Gyn    Mascot, Noreen Aparicio MD OB/Gyn       Primary Care Provider Fax #    Physician No Ref-Primary [phone]      After Care Instructions     Activity       Review discharge instructions            Diet       Resume previous diet            Discharge Instructions - Postpartum visit       samson justa reza en 6 semanas. Digales que quiere un IUD entonces. Si tiene problemas con martin incision, llame a la clinica para hacer justa reza mas pronto.                  Follow-up Appointments     Follow Up and recommended labs and tests       F/u at 6 wk postpartum                  Further instructions from your care team        Birth Discharge Instructions: [place]ian  Actividad    No levante más de 10 libras lilibeth las 6 semanas posteriores  a martin cirugía. Pida a los miembros de martin charlene y amigos que la ayuden cuando lo necesite.    No conduzca si está tomando píldoras para el dolor recetadas por martin médico. Puede conducir si está tomando píldoras de venta mark para el dolor.    No samson ejercicio ni actividades intensas por 6 semanas. No samson nada que requiera un esfuerzo en el sitio de martin cirugía.    No samson fuerza al usar el baño. Martin equipo de atención podría recetarle un laxante si tiene problemas para  el intestino (estreñimiento).    Para cuidar de martin incisión:    Mantenga la incisión limpia y seca    No empape martin incisión en agua. No nade ni use la elizabeth ni jacuzzis hasta que martin incisión haya cicatrizado por completo. Puede sentarse en la elizabeth si el nivel del agua está por debajo de martin incisión.    Después de lavarse, seque elissa martin incisión. Incluya la piel que podría entrar en contacto con martin incisión.    No use agua oxigenada, gel, cremas, lociones ni ungüentos sobre martin incisión.    Ajuste martin ropa para evitar la presión en el sitio de martin cirugía (compruebe el elástico en martin ropa interior, por ejemplo)    Si tiene Steri-Strips, deje las pequeñas tiras de cinta en martin sitio. Se caerán solas o puede quitárselas después de justa semana.    Podría jayden justa pequeña cantidad de secreción transparente o rosada y esto es normal. Consulte con martin proveedor de atención médica:    Si el drenaje aumenta o tiene olor.    Si tiene hinchazón, enrojecimiento o justa erupción alrededor de la incisión.    Si tiene más dolor y martin medicamento para el dolor no ayuda    Si tiene fiebre de 100.4  F (38  C) o más (temperatura tomada bajo martin lengua) con o sin escalofríos     El área alrededor de martin incisión (herida de la cirugía) se sentirá adormecida. [place] es normal. El adormecimiento debería desaparecer en menos de un año.       Mantenga avelina celestina limpias:  Siempre lávese las celestina antes de tocar martin incisión. [place] ayuda a reducir martin riesgo de infección. Si avelina celestina no  están sucias, puede usar un gel de alcohol para limpiarse las celestina. Mantenga avelina uñas cortas y limpias.   Llame a martin proveedor de atención médica si tiene alguno de estos síntomas:    Empapa justa toalla femenina con ermelinda en el correr de 1 hora o ve coágulos más grandes que justa pelota de golf.    Sangrado que dura más de 6 semanas.    Tiene justa secreción vaginal que huele mal.    Dolor, calambres o sensibilidad graves en la región inferior de martin vientre.    Necesidad más frecuente o urgente de orinar (hacer pis), o ardor al hacerlo.    Náuseas y vómitos    Enrojecimiento, hinchazón o dolor alrededor de justa vena en martin pierna.    Problemas para amamantar o un área enrojecida o dolorosa en martin pecho.    Si tiene dolor en el pecho y tos o dificultad para respirar.    Problemas para manejar la tristeza, ansiedad o depresión.     Si le preocupa hacerse daño o hacerle daño al bebé, llame al médico de inmediato.    Tiene preguntas o inquietudes después de regresar a casa.       Birth Discharge Instructions  Activity    Do not lift more than 10 pounds for 6 weeks after surgery. Ask family and friends for help when you need it.    Do not drive while taking pain pills prescribed by your doctor. You may drive if taking over-the-counter pain pills.    No heavy exercise or activity for 6 weeks. Don t do anything that will put a strain on your surgery site.    Don t strain when using the toilet. Your care team may prescribe a stool softener if you have problems with your bowel movements (constipation).    To care for your incision:    Keep the incision clean and dry    Do not soak your incision in water. No swimming or hot tubs until your incision has fully healed. You may soak in the bathtub if the water level is below your incision.    After washing, dry your incision well. Include the skin that may come in contact with your incision.    Do not use any peroxide, gel, cream, lotion, or ointment on your incision.      Adjust your clothes to avoid pressure on your surgery site (check the elastic in your underwear for example)    If you have Steri-Strips, leave the small strips of tape in place. They will fall off on their own, or you can remove them after one week.    You may see a small amount of clear or pink drainage and this is normal. Check with your health care provider:    If the drainage increases or has an odor.    If you have swelling, redness, or a rash around the incision.    If you have increased pain and your pain medicine doesn t help    If you have a fever of 100.4  F (38  C) or higher (temperature taken under your tongue), with or without chills   The area around your incision (surgery wound) will feel numb. This is normal. The numbness should go away in less than a year.   Keep your hands clean:   Always wash your hands before touching your incision. This helps reduce your risk of infection. If your hands aren t dirty, you may use an alcohol hand-rub to clean your hands. Keep your nails clean and short.     Call your health care provider if you have any of these symptoms:    You soak a sanitary pad with blood within 1 hour, or you see blood clots larger than a golf ball.    Bleeding that lasts more than 6 weeks.    You have vaginal discharge that smells bad.    Severe pain, cramping or tenderness in your lower belly area.    A more frequent or urgent need to urinate (pee), or it burns when you pee.    Nausea and vomiting.    Redness, swelling or pain around a vein in your leg.    Problems breastfeeding, or a red or painful area on your breast.    If you have chest pain and cough or are gasping for air.    Problems coping with sadness, anxiety, or depression.     If you have any concerns about hurting yourself of the baby, call your doctor right away.      You have questions or concerns after you return home.    Pending Results     No orders found from 11/22/2018 to 11/25/2018.            Statement of Approval      Ordered          11/26/18 1252  I have reviewed and agree with all the recommendations and orders detailed in this document.  EFFECTIVE NOW     Approved and electronically signed by:  Maria Eugenia Urrutia MD             Admission Information     Date & Time Provider Department Dept. Phone    11/24/2018 Noreen Harris MD [facility] [phone]      Your Vitals Were     Blood Pressure Pulse Temperature Respirations Last Period Pulse Oximetry    104/67 72 98.2  F (36.8  C) (Oral) 16 02/28/2018 (Exact Date) 96%      MyChart Information     [email or web address] gives you secure access to your electronic health record. If you see a primary care provider, you can also send messages to your care team and make appointments. If you have questions, please call your primary care clinic.  If you do not have a primary care provider, please call [phone] and they will assist you.        Care EveryWhere ID     This is your Care EveryWhere ID. This could be used by other organizations to access your [facility] medical records  [id number]        Equal Access to Services     AISHA SONG AH: Hadii aad ku hadasho Soulisesali, waaxda luqadaha, qaybta kaalmada adeegyaluis, kodi alcaraz. So [facility] [phone].    ATENCIÓN: Si habla español, tiene a martin disposición servicios gratuitos de asistencia lingüística. Llame al [phone].    We comply with applicable federal civil rights laws and [place] laws. We do not discriminate on the basis of race, color, national origin, age, disability, sex, sexual orientation, or gender identity.               Review of your medicines      START taking        Dose / Directions    acetaminophen 325 MG tablet   Commonly known as:  TYLENOL        Dose:  650 mg   Start taking on:  11/27/2018   Take 2 tablets (650 mg) by mouth every 4 hours as needed for other (multimodal surgical pain management along with NSAIDS and opioid medication as indicated based on pain control and physical  function.)   Quantity:  100 tablet   Refills:  0       HYDROmorphone 2 MG tablet   Commonly known as:  DILAUDID        Dose:  2-4 mg   Take 1-2 tablets (2-4 mg) by mouth every 4 hours as needed for severe pain   Quantity:  20 tablet   Refills:  0       ibuprofen 800 MG tablet   Commonly known as:  ADVIL/MOTRIN        Dose:  800 mg   Take 1 tablet (800 mg) by mouth every 6 hours as needed for other (cramping)   Quantity:  90 tablet   Refills:  0       senna-docusate 8.6-50 MG per tablet   Commonly known as:  SENOKOT-S;PERICOLACE        Dose:  1 tablet   Take 1 tablet by mouth 2 times daily as needed for constipation   Quantity:  100 tablet   Refills:  0         CONTINUE these medicines which have NOT CHANGED        Dose / Directions    ferrous sulfate 325 (65 Fe) MG tablet   Commonly known as:  IRON   Used for:  Iron deficiency anemia, unspecified iron deficiency anemia type        Dose:  325 mg   Take 1 tablet (325 mg) by mouth daily (with breakfast)   Quantity:  90 tablet   Refills:  2       omeprazole 10 MG DR capsule   Commonly known as:  priLOSEC   Used for:  Gastroesophageal reflux disease without esophagitis        Dose:  20 mg   Take 2 capsules (20 mg) by mouth daily   Quantity:  60 capsule   Refills:  3       prenatal multivitamin plus iron 27-0.8 MG Tabs per tablet   Used for:  Supervision of high risk pregnancy in first trimester, Hyperemesis gravidarum, Aneurysm (H), History of intrauterine fetal death, currently pregnant in first trimester        Dose:  1 tablet   Take 1 tablet by mouth daily   Refills:  0         STOP taking     doxylamine 25 MG Tabs tablet   Commonly known as:  UNISOM                Where to get your medicines      These medications were sent to [facility] [place], [place] - [address]  [address] [address]     Phone:  [phone]     ferrous sulfate 325 (65 Fe) MG tablet    ibuprofen 800 MG tablet    senna-docusate 8.6-50 MG per tablet     "     Some of these will need a paper prescription and others can be bought over the counter. Ask your nurse if you have questions.     Bring a paper prescription for each of these medications     acetaminophen 325 MG tablet    HYDROmorphone 2 MG tablet                Protect others around you: Learn how to safely use, store and throw away your medicines at www.disposemymeds.org.        Information about your nerve block     Today you received a block to numb the nerves near your surgery site.    This is a block using local anesthetic or \"numbing\" medication injected around the nerves to anesthetize or \"numb\" the area supplied by those nerves. This block is injected into the muscle layer near your surgical site. The type of anesthesia (Exparel) your anesthesia team used to numb your abdomen may give you relief for up to 72 hours.     Diet: There are no diet restrictions, but you should drink plenty of fluids, unless you are on a fluid-restricted diet.     Activity: If your surgical site is an arm or leg you should be careful with your affected limb, since it is possible to injure your limb without being aware of it due to the numbing. Until full feeling returns, you should guard against bumping or hitting your limb, and avoid extreme hot or cold temperatures on the skin.    Pain Medication: As the block wears off, the feeling will return as a tingling or prickly sensation near your surgical site. You will experience more discomfort from your incisions as the feeling returns. You may want to take a pain pill (a narcotic or Tylenol if this was prescribed by your surgeon) when you start to experience mild pain, before the pain becomes more severe. If your pain medications do not control your pain, you should notify your surgeon. If you are taking narcotics for pain management, do not drink alcohol, drive a car, or perform hazardous activities.  If you have questions or concerns you may call your surgeon at the number " provided with your discharge instructions.     Call your surgeon if you experience blurry vision, ringing in the ears or metallic taste in your mouth.         Information about OPIOIDS     PRESCRIPTION OPIOIDS: WHAT YOU NEED TO KNOW   We gave you an opioid (narcotic) pain medicine. It is important to manage your pain, but opioids are not always the best choice. You should first try all the other options your care team gave you. Take this medicine for as short a time (and as few doses) as possible.    Some activities can increase your pain, such as bandage changes or therapy sessions. It may help to take your pain medicine 30 to 60 minutes before these activities. Reduce your stress by getting enough sleep, working on hobbies you enjoy and practicing relaxation or meditation. Talk to your care team about ways to manage your pain beyond prescription opioids.    These medicines have risks:    DO NOT drive when on new or higher doses of pain medicine. These medicines can affect your alertness and reaction times, and you could be arrested for driving under the influence (DUI). If you need to use opioids long-term, talk to your care team about driving.    DO NOT operate heavy machinery    DO NOT do any other dangerous activities while taking these medicines.    DO NOT drink any alcohol while taking these medicines.     If the opioid prescribed includes acetaminophen, DO NOT take with any other medicines that contain acetaminophen. Read all labels carefully. Look for the word  acetaminophen  or  Tylenol.  Ask your pharmacist if you have questions or are unsure.    You can get addicted to pain medicines, especially if you have a history of addiction (chemical, alcohol or substance dependence). Talk to your care team about ways to reduce this risk.    All opioids tend to cause constipation. Drink plenty of water and eat foods that have a lot of fiber, such as fruits, vegetables, prune juice, apple juice and high-fiber  cereal. Take a laxative (Miralax, milk of magnesia, Colace, Senna) if you don t move your bowels at least every other day. Other side effects include upset stomach, sleepiness, dizziness, throwing up, tolerance (needing more of the medicine to have the same effect), physical dependence and slowed breathing.    Store your pills in a secure place, locked if possible. We will not replace any lost or stolen medicine. If you don t finish your medicine, please throw away (dispose) as directed by your pharmacist. The [place] Pollution [employer] has more information about safe disposal: https://www.pca.[facility].[place].us/living-green/managing-unwanted-medications             Medication List: This is a list of all your medications and when to take them. Check marks below indicate your daily home schedule. Keep this list as a reference.      Medications           Morning Afternoon Evening Bedtime As Needed    acetaminophen 325 MG tablet   Commonly known as:  TYLENOL   Take 2 tablets (650 mg) by mouth every 4 hours as needed for other (multimodal surgical pain management along with NSAIDS and opioid medication as indicated based on pain control and physical function.)   Start taking on:  11/27/2018   Last time this was given:  975 mg on 11/26/2018  5:45 AM                                ferrous sulfate 325 (65 Fe) MG tablet   Commonly known as:  IRON   Take 1 tablet (325 mg) by mouth daily (with breakfast)                                HYDROmorphone 2 MG tablet   Commonly known as:  DILAUDID   Take 1-2 tablets (2-4 mg) by mouth every 4 hours as needed for severe pain   Last time this was given:  2 mg on 11/26/2018  9:47 AM                                ibuprofen 800 MG tablet   Commonly known as:  ADVIL/MOTRIN   Take 1 tablet (800 mg) by mouth every 6 hours as needed for other (cramping)   Last time this was given:  800 mg on 11/26/2018  7:53 AM                                omeprazole 10 MG DR capsule   Commonly known as:   priLOSEC   Take 2 capsules (20 mg) by mouth daily                                prenatal multivitamin plus iron 27-0.8 MG Tabs per tablet   Take 1 tablet by mouth daily                                senna-docusate 8.6-50 MG per tablet   Commonly known as:  SENOKOT-S;PERICOLACE   Take 1 tablet by mouth 2 times daily as needed for constipation   Last time this was given:  2 tablets on 11/26/2018  7:53 AM

## 2018-11-24 NOTE — IP AVS SNAPSHOT
[facility]    [address] [address]    Phone:  [phone]                                       After Visit Summary   11/24/2018    Dai Guadarrama    MRN: [id number]           After Visit Summary Signature Page     I have received my discharge instructions, and my questions have been answered. I have discussed any challenges I see with this plan with the nurse or doctor.    ..........................................................................................................................................  Patient/Patient Representative Signature      ..........................................................................................................................................  Patient Representative Print Name and Relationship to Patient    ..................................................               ................................................  Date                                   Time    ..........................................................................................................................................  Reviewed by Signature/Title    ...................................................              ..............................................  Date                                               Time          22EPIC Rev 08/18

## 2018-11-24 NOTE — PLAN OF CARE
Fetal lie was determined to be transverse after BSUS. Pt decided to proceed with C/S. Ate BF at 0900 and is now NPO. PIV started, LR running at 125ml/hr. Continuous monitor however FHT are very difficult to find due to fetal position and high BMI. Pt is also very uncomfortable when laying down and is more difficult to find FHT.   Pain: left sided abdominal pain that is not new for her. Hot packs and repositioning helps.   Vitals WNL  Report given to NEFTALY Eddy to assume care.

## 2018-11-24 NOTE — H&P
"[facility]  OB History and Physical      Dai Guadarrama MRN# [id number]   Age: 33 year old YOB: 1985     CC:  IOL    HPI:  Ms. Dai Guadarrama is a 33 year old  at 37w1d by 7w6d US, who presents for IOL for history of IUFD at 33 weeks, report of decreased fetal movement at home, and intermittently 6/8 BPPs (off for breathing). She is feeling well this morning. She reports some fetal movement but states this baby does not \"move much.\" She has had irregular cramping similar to menstrual cramps overnight. She denies vaginal bleeding and loss of fluid. She denies headache, changes in vision, chest pain, shortness of breath, and RUQ pain    Patient seen with in-person .    Pregnancy Complications:  - Anemia, most recent Hgb 9.3, on daily Fe supplementation  - History of IUFD at 33 weeks  - History of IOL at 37 weeks for oligohydramnios  - Morbid obesity, BMI 41  - GBS bacteruria  - Family history of aneurysm- patient reports she had negative head imaging. However review of chart revealed an MRI w/small (3x2mm) periophthalmic aneurysm. She was seen by neurology at that time and due to its small size it did not warrant clips, coils, or surgery. She was cleared for valsalva during vaginal delivery.   - AMA    Prenatal Labs:   Lab Results   Component Value Date    ABO O 2018    RH Pos 2018    AS Neg 2018    HEPBANG Nonreactive 2018    CHPCRT Negative 10/18/2018    GCPCRT Negative 10/18/2018    TREPAB Negative 2017    HGB 9.6 (L) 2018       GBS Status:   Lab Results   Component Value Date    GBS (A) 2017     Positive  Positive: GBS DNA detected, presumed positive for GBS.   Assay performed on incubated broth culture of specimen using [employer] real-time   PCR.         OB History  Obstetric History       T1      L1     SAB2   TAB0   Ectopic0   Multiple0   Live Births2       # Outcome Date GA " Lbr Spike/2nd Weight Sex Delivery Anes PTL Lv   5 Current            4 Term / 37w5d 02:30 / 00:13 3.374 kg (7 lb 7 oz) F Vag-Spont EPI N KASEY      Name: Marjorie       Apgar1:  9                Apgar5: 9   3  14 32w0d   F I.U. FETAL D   ND   2 SAB            1 SAB               Obstetric Comments      2nd pregnancy oligohydramnios IOL 37.5        PMHx:   Past Medical History:   Diagnosis Date     Aneurysm (H)      Migraine headache without aura      Pregnancy related nausea and vomiting, antepartum 2016    two ED visits.     Recurrent pregnancy loss (CODE)      Retained placenta      Stillbirth     32 weeks     Tachycardia      PSHx:   Past Surgical History:   Procedure Laterality Date     C EACH ADD TOOTH EXTRACTION      bad reaction to anesthia for local      NO HISTORY OF SURGERY       Meds:   Prescriptions Prior to Admission   Medication Sig Dispense Refill Last Dose     doxylamine (UNISOM) 25 MG TABS tablet Take 0.5-1 tablets (12.5-25 mg) by mouth nightly as needed for sleep 30 each 3 2018 at Unknown time     omeprazole (PRILOSEC) 10 MG CR capsule Take 2 capsules (20 mg) by mouth daily 60 capsule 3 Past Week at Unknown time     Prenatal Vit-Fe Fumarate-FA (PRENATAL MULTIVITAMIN PLUS IRON) 27-0.8 MG TABS per tablet Take 1 tablet by mouth daily   2018 at Unknown time     ferrous sulfate (IRON) 325 (65 Fe) MG tablet Take 1 tablet (325 mg) by mouth daily (with breakfast) (Patient not taking: Reported on 2018) 30 tablet 2 Unknown at Unknown time     Allergies:  No Known Allergies   FmHx:   Family History   Problem Relation Age of Onset     Anxiety Disorder No family hx of      Mental Illness No family hx of      Substance Abuse No family hx of      Anesthesia Reaction No family hx of      Asthma No family hx of      Osteoporosis No family hx of      Genetic Disorder No family hx of      Thyroid Disease No family hx of      Hyperlipidemia No family hx of      Cerebrovascular  Disease No family hx of      Breast Cancer No family hx of      Colon Cancer No family hx of      Prostate Cancer No family hx of      Other Cancer No family hx of      Depression No family hx of      Diabetes No family hx of      Coronary Artery Disease No family hx of      Hypertension No family hx of      SocHx: She denies any tobacco, alcohol, or other drug use during this pregnancy.    ROS:   Complete 10-point ROS negative except as noted in HPI.    PE:  Vit:   Patient Vitals for the past 4 hrs:   BP Temp Temp src Resp   18 0825 112/72 97.4  F (36.3  C) Oral 16      Gen: Well-appearing, NAD, comfortable   CV: rrr, no mrg   Pulm: Ctab, no wheezes or crackles   Abd: Soft, gravid, non-tender  Ext: Trace LE edema b/l    Pres:  Transverse, spine down on bedside US  Memb: Intact           FHT: Baseline 140 bpm, moderate variability, + accels, no decels  [place]: Baseline irritability, no paige contractions    Assessment  Ms. Dai Guadarrama is a 33 year old , at 37w1d by 7w2d US who presents for IOL for h/o IUFD at 32w. On bedside US, fetus now in transverse presentation with spine down. Discussed options with patient including having her return in several days for an induction to see if baby converts spontaneously to cephalic presentation, attempting an ECV, or proceeding with a primary  section. The chance of a successful ECV is lower given the patient's BMI and anterior placenta. There are additional risks of a  section given the fetus's presentation that may necessitate a vertical hysterotomy. Discussed with patient this would commit her to  section deliveries in all subsequent pregnancies if a vertical hysterotomy is made. Ms. Darwin Guadarrama is concerned about the well being of this baby given her history of IUFD, and after considering her options, desires to proceed with a primary  section. Discussed the risks, benefits, and alternatives, including the risks of  bleeding, infection, and unintentional injury to surrounding structures. She agrees to proceed.     Plan  Admit to L&D for PLTCS  - NPO. IV fluids  - Consult anesthesia  - Ancef for preoperative antibiotics  - SCDs    Fetal well being  - FHT reactive and reassuring    Prenatal care  - GBS bacteruria  - Failed GCT, passed GTT  - Rh positive, Rubella immune    Anemia  - Hgb 9.3 (11/7), admission Hgb pending  - Type and screen for delivery      The patient was discussed with Dr. Harris who is in agreement with the treatment plan.    Naima Mcintosh MD  OB/GYN, PGY3  11/24/18    Staff MD Note    I appreciate the note by Dr. Mcintosh.  Any necessary changes have been made by me.  I evaluated the patient with the resident and agree with the assessment and plan.    Noreen Harris MD

## 2018-11-24 NOTE — PLAN OF CARE
Data: Patient presented to [facility] at 0800.   Reason for maternal/fetal assessment per patient is Induction Of Labor  .  Patient is a . Prenatal record reviewed.      Obstetric History       T1      L1     SAB2   TAB0   Ectopic0   Multiple0   Live Births2       # Outcome Date GA Lbr Spike/2nd Weight Sex Delivery Anes PTL Lv   5 Current            4 Term 17 37w5d 02:30 / 00:13 3.374 kg (7 lb 7 oz) F Vag-Spont EPI N KASEY      Name: Marjorie       Apgar1:  9                Apgar5: 9   3  14 32w0d   F I.U. FETAL D   ND   2 SAB            1 SAB               Obstetric Comments      2nd pregnancy oligohydramnios IOL 37.5    . Medical history:   Past Medical History:   Diagnosis Date     Aneurysm (H)      Migraine headache without aura      Pregnancy related nausea and vomiting, antepartum     two ED visits.     Recurrent pregnancy loss (CODE)      Retained placenta      Stillbirth     32 weeks     Tachycardia    . Gestational Age 37w1d. VSS. Fetal movement present. Patient denies cramping, backache, vaginal discharge, pelvic pressure, UTI symptoms, GI problems, bloody show, vaginal bleeding, edema, headache, visual disturbances, epigastric or URQ pain, abdominal pain, rupture of membranes. Support persons , Perry present.  Action: Verbal consent for EFM. Triage assessment completed. EFM applied. Uterine assessment none. Fetal assessment: Presumed adequate fetal oxygenation documented (see flow record).   Response: Dr. Mcintosh informed of arrival and initial assessment. Plan per provider is admit for IOL. Patient verbalized agreement with plan. Patient transferred to room 475 ambulatory, oriented to room and call light.

## 2018-11-24 NOTE — ANESTHESIA PROCEDURE NOTES
Spinal/LP Procedure Note    Spinal Block  Staff:     Anesthesiologist:  ALY DELANEY  Location: OB  Procedure Start/Stop Times:      11/24/2018 4:10 PM     11/24/2018 4:12 PM    patient identified, IV checked, site marked, risks and benefits discussed, informed consent, monitors and equipment checked, pre-op evaluation, at physician/surgeon's request and post-op pain management      Correct Patient: Yes      Correct Position: Yes      Correct Site: Yes      Correct Procedure: Yes      Correct Laterality:  Yes    Site Marked:  Yes  Procedure:     Procedure:  Intrathecal    ASA:  3 and Emergent    Diagnosis:  Transverse lie    Position:  Sitting    Sterile Prep: chloraprep, mask, sterile gloves and patient draped      Insertion site:  L2-3    Approach:  Midline    Needle Type:  Naga    Needle gauge (G):  25    Local Skin Infiltration:  1% lidocaine    amount (ml):  3    Needle Length (in):  3.5    Introducer used: Yes      Introducer gauge:  20 G    Attempts:  1    Redirects:  0    CSF:  Clear    Paresthesias:  No    Time injected:  16:12  Assessment/Narrative:     Sensory Level:  T5

## 2018-11-24 NOTE — BRIEF OP NOTE
[facility] OB/GYN Brief Operative Note    Pre-operative diagnosis: IUP at 37w1d   H/o IUFD   Unstable lie, breech presentation   Post-operative diagnosis: Same  S/p primary low transverse    Procedure: Primary low transverse c section     Surgeon: Dr. Noreen Harris   Assistant(s): Naima Mcintosh MD, G3  Olive Estevez MD G1      Anesthesia: Spinal     Estimated blood loss: 900 mL, QBL pending   Total IV fluids: 1100 mL, crystalloid   Blood transfusion: No transfusion was given during surgery   Total urine output: 150 mL, clear urine   Drains: [place]   Specimens: cord blood   Implants: none   Complications: None apparent    Condition: Patient taken to recovery in stable condition.     Findings: A single vigorous, liveborn female infant with apgars of 8 and 9. Normal appearing uterus, fallopian tubes, ovaries. No nuchal cord.  Clear amniotic fluid.  Anterior placenta.      Olive Estevez MD  OB/GYN PGY-1  18 5:34 PM

## 2018-11-24 NOTE — ANESTHESIA CARE TRANSFER NOTE
Patient: Dai Guadarrama    Procedure(s):   SECTION    Diagnosis: pregnancy  Diagnosis Additional Information: No value filed.    Anesthesia Type:   Spinal, Peripheral Nerve Block for post-op pain at surgeon's request     Note:  Airway :Room Air  Patient transferred to:Labor and Delivery  Comments: Report to RN.  Pt alert, hungry, is in no distress.  SAB @ T 8,  TAP block placed intra op by Dr Barr.  119/66  SaO2 97 % RA  HR regular 83  Temp 97.8 F poHandoff Report: Identifed the Patient, Identified the Reponsible Provider, Reviewed the pertinent medical history, Discussed the surgical course, Reviewed Intra-OP anesthesia mangement and issues during anesthesia, Set expectations for post-procedure period and Allowed opportunity for questions and acknowledgement of understanding      Vitals: (Last set prior to Anesthesia Care Transfer)    CRNA VITALS  2018 1714 - 2018 1755      2018             NIBP: 119/66    Pulse: 83                Electronically Signed By: CHANELLE Wood CRNA  2018  5:55 PM

## 2018-11-25 LAB — HGB BLD-MCNC: 8.2 G/DL (ref 11.7–15.7)

## 2018-11-25 PROCEDURE — 25000128 H RX IP 250 OP 636: Performed by: STUDENT IN AN ORGANIZED HEALTH CARE EDUCATION/TRAINING PROGRAM

## 2018-11-25 PROCEDURE — 36415 COLL VENOUS BLD VENIPUNCTURE: CPT | Performed by: INTERNAL MEDICINE

## 2018-11-25 PROCEDURE — 25000128 H RX IP 250 OP 636: Performed by: INTERNAL MEDICINE

## 2018-11-25 PROCEDURE — 12000028 ZZH R&B OB UMMC

## 2018-11-25 PROCEDURE — 85018 HEMOGLOBIN: CPT | Performed by: INTERNAL MEDICINE

## 2018-11-25 PROCEDURE — 25000132 ZZH RX MED GY IP 250 OP 250 PS 637: Performed by: STUDENT IN AN ORGANIZED HEALTH CARE EDUCATION/TRAINING PROGRAM

## 2018-11-25 RX ADMIN — SIMETHICONE CHEW TAB 80 MG 80 MG: 80 TABLET ORAL at 20:32

## 2018-11-25 RX ADMIN — SENNOSIDES AND DOCUSATE SODIUM 2 TABLET: 8.6; 5 TABLET ORAL at 07:48

## 2018-11-25 RX ADMIN — IBUPROFEN 800 MG: 800 TABLET, FILM COATED ORAL at 18:32

## 2018-11-25 RX ADMIN — OXYCODONE HYDROCHLORIDE 5 MG: 5 TABLET ORAL at 16:00

## 2018-11-25 RX ADMIN — ACETAMINOPHEN 975 MG: 325 TABLET, FILM COATED ORAL at 21:53

## 2018-11-25 RX ADMIN — OXYCODONE HYDROCHLORIDE 5 MG: 5 TABLET ORAL at 23:22

## 2018-11-25 RX ADMIN — ENOXAPARIN SODIUM 40 MG: 40 INJECTION SUBCUTANEOUS at 08:05

## 2018-11-25 RX ADMIN — SIMETHICONE CHEW TAB 80 MG 80 MG: 80 TABLET ORAL at 07:48

## 2018-11-25 RX ADMIN — ACETAMINOPHEN 975 MG: 325 TABLET, FILM COATED ORAL at 06:00

## 2018-11-25 RX ADMIN — OXYCODONE HYDROCHLORIDE 5 MG: 5 TABLET ORAL at 07:03

## 2018-11-25 RX ADMIN — KETOROLAC TROMETHAMINE 30 MG: 30 INJECTION, SOLUTION INTRAMUSCULAR at 00:27

## 2018-11-25 RX ADMIN — KETOROLAC TROMETHAMINE 30 MG: 30 INJECTION, SOLUTION INTRAMUSCULAR at 06:00

## 2018-11-25 RX ADMIN — SENNOSIDES AND DOCUSATE SODIUM 2 TABLET: 8.6; 5 TABLET ORAL at 20:05

## 2018-11-25 RX ADMIN — SODIUM CHLORIDE, POTASSIUM CHLORIDE, SODIUM LACTATE AND CALCIUM CHLORIDE 500 ML: 600; 310; 30; 20 INJECTION, SOLUTION INTRAVENOUS at 04:45

## 2018-11-25 RX ADMIN — KETOROLAC TROMETHAMINE 30 MG: 30 INJECTION, SOLUTION INTRAMUSCULAR at 12:37

## 2018-11-25 RX ADMIN — ACETAMINOPHEN 975 MG: 325 TABLET, FILM COATED ORAL at 13:50

## 2018-11-25 RX ADMIN — OXYCODONE HYDROCHLORIDE 5 MG: 5 TABLET ORAL at 20:05

## 2018-11-25 NOTE — DISCHARGE SUMMARY
[facility] Discharge Summary    Dai Guadarrama MRN# [id number]   Age: 33 year old YOB: 1985     Date of Admission:  2018  Date of Discharge:  18    Admitting Physician:  Noreen Harris MD  Discharge Physician:  Maria Eugenia Urrutia MD     Admit Dx:   - Intrauterine pregnancy at 37w1d  - AMA  - small (3x2mm) periophthalmic aneurysm  - GBS bacteriuria  - Morbid obesity  - h/o IUFD at 33 weeks  - Anemia of pregnancy  - Unstable fetal lie, transverse presentation    Discharge Dx:  - Same as above, s/p primary low transverse  section    Procedures:  - Primary low transverse  section with double layer uterine closure via Pfannenstiel incision  - Spinal analgesia  - TAP block    Admit HPI:  Dai Guadarrama is a 33 year old  at 37w1d initially admitted for IOL of labor at 37w1d due to h/o IUFD at 32w and significant accompanying anxiety during this pregnancy. On admission, fetus found to be in transverse presentation with spine down. Discussed options with patient including having her return in several days for an induction to see if baby converted spontaneously to cephalic presentation, attempting an ECV, or proceeding with a primary  section. Ms. Darwin Guadarrama was concerned about the well being of this baby given her history of IUFD, and after considering her options, desired to proceed with a primary  section. Discussed the risks, benefits, and alternatives, including the risks of bleeding, infection, and unintentional injury to surrounding structures. She agreed to proceed.    Please see her admit H&P for full details of her PMH, PSH, Meds, Allergies and exam on admit.    Operative Course:   Surgery was uncomplicated. QBL from the delivery was 1010 mL. Please see her  Section Operative Note for full details regarding her delivery.    Operative Findings:   1. Clear amniotic fluid  2. Liveborn female infant  in complete breech presentation. Apgars 8 at 1 minute & 9 at 5 minutes. Weight 2740 g.  3. Normal uterus, fallopian tubes, and ovaries.     Postoperative Course:  Her postoperative course was uncomplicated. On POD#2, she was meeting all of her postpartum goals and deemed stable for discharge. She was transitioned from oxycodone to dilaudid for itching. She was voiding without difficulty, tolerating a regular diet without nausea and vomiting, her pain was well controlled on oral pain medicines and her lochia was appropriate. Her hemoglobin prior to delivery was 9.6 and after delivery was 8.2. She was asymptomatic from her acute on chronic blood loss anemia. Her Rh status was positive and Rhogam was not indicated.    Discharge Medications:     Review of your medicines      START taking       Dose / Directions    acetaminophen 325 MG tablet   Commonly known as:  TYLENOL        Dose:  650 mg   Start taking on:  11/27/2018   Take 2 tablets (650 mg) by mouth every 4 hours as needed for other (multimodal surgical pain management along with NSAIDS and opioid medication as indicated based on pain control and physical function.)   Quantity:  100 tablet   Refills:  0       HYDROmorphone 2 MG tablet   Commonly known as:  DILAUDID        Dose:  2-4 mg   Take 1-2 tablets (2-4 mg) by mouth every 4 hours as needed for severe pain   Quantity:  20 tablet   Refills:  0       ibuprofen 800 MG tablet   Commonly known as:  ADVIL/MOTRIN        Dose:  800 mg   Take 1 tablet (800 mg) by mouth every 6 hours as needed for other (cramping)   Quantity:  90 tablet   Refills:  0       senna-docusate 8.6-50 MG per tablet   Commonly known as:  SENOKOT-S;PERICOLACE        Dose:  1 tablet   Take 1 tablet by mouth 2 times daily as needed for constipation   Quantity:  100 tablet   Refills:  0         CONTINUE these medicines which have NOT CHANGED       Dose / Directions    ferrous sulfate 325 (65 Fe) MG tablet   Commonly known as:  IRON   Used for:   Iron deficiency anemia, unspecified iron deficiency anemia type        Dose:  325 mg   Take 1 tablet (325 mg) by mouth daily (with breakfast)   Quantity:  90 tablet   Refills:  2       omeprazole 10 MG CR capsule   Commonly known as:  priLOSEC   Used for:  Gastroesophageal reflux disease without esophagitis        Dose:  20 mg   Take 2 capsules (20 mg) by mouth daily   Quantity:  60 capsule   Refills:  3       prenatal multivitamin plus iron 27-0.8 MG Tabs per tablet   Used for:  Supervision of high risk pregnancy in first trimester, Hyperemesis gravidarum, Aneurysm (H), History of intrauterine fetal death, currently pregnant in first trimester        Dose:  1 tablet   Take 1 tablet by mouth daily   Refills:  0         STOP taking          doxylamine 25 MG Tabs tablet   Commonly known as:  UNISOM                Where to get your medicines      These medications were sent to [facility] [place], [place] - [address]  [address] [facility] [address], [facility] [address]     Phone:  [phone]      ferrous sulfate 325 (65 Fe) MG tablet     ibuprofen 800 MG tablet     senna-docusate 8.6-50 MG per tablet         Some of these will need a paper prescription and others can be bought over the counter. Ask your nurse if you have questions.     Bring a paper prescription for each of these medications      acetaminophen 325 MG tablet     HYDROmorphone 2 MG tablet               Discharge/Disposition:  Dai Guadarrama was discharged to home in stable condition with the following instructions/medications:  1) Call for temperature > 100.4, bright red vaginal bleeding >1 pad an hour x 2 hours, foul smelling vaginal discharge, pain not controlled by usual oral pain meds, persistent nausea and vomiting not controlled on medications, drainage or redness from incision site  2) She desired a Mirena for contraception.  3) For feeding she decided to breast feed.  4) She was instructed to follow-up with her primary OB  in 6 weeks for a routine postpartum visit.  5) Discharge activity:  No heavy lifting >15 lbs or strenuous activity for 6 weeks, pelvic rest for 6 weeks, no driving or operating machinery while on narcotics.    Gaby Dickey MD   PGY-3 Ob/Gyn    The patient was seen and examined by me on the day of discharge.  I have reviewed and agree with the above note.    Maria Eugenia Urrutia MD, FACOG

## 2018-11-25 NOTE — ANESTHESIA POSTPROCEDURE EVALUATION
Anesthesia POST Procedure Evaluation    Patient: Dai Guadarrama   MRN:     [id number] Gender:   female   Age:    33 year old :      1985        Preoperative Diagnosis: pregnancy   Procedure(s):   SECTION   Postop Comments: No value filed.       Anesthesia Type:  General, Regional    Reportable Event: NO     PAIN: Uncomplicated   Sign Out status: Comfortable, Well controlled pain     PONV: No PONV   Sign Out status:  No Nausea or Vomiting     Neuro/Psych: Uneventful perioperative course   Sign Out Status: Preoperative baseline; Age appropriate mentation     Airway/Resp.: Uneventful perioperative course   Sign Out Status: Non labored breathing, age appropriate RR; Resp. Status within EXPECTED Parameters     CV: Uneventful perioperative course   Sign Out status: Appropriate BP and perfusion indices; Appropriate HR/Rhythm     Disposition:   Sign Out in:  PACU  Disposition:  Phase II; Home  Recovery Course: Uneventful  Follow-Up: Not required           Last Anesthesia Record Vitals:  CRNA VITALS  2018 1714 - 2018 1814      2018             NIBP: 119/66    Pulse: 83          Last PACU/Preop Vitals:  Vitals:    18 1905 18 1920 18 1935   BP: 117/80 (!) 123/92 116/70   Resp: 19 14 16   Temp:   36.6  C (97.8  F)   SpO2: 95% 97% 97%         Electronically Signed By: Aamir Gonzalez MD, 2018, 8:26 PM

## 2018-11-25 NOTE — PROVIDER NOTIFICATION
11/25/18 0436   Provider Notification   Provider Name/Title Dr. Mcintosh   Method of Notification Electronic Page   Request Evaluate-Remote   Notification Reason Other  (low urine output)   Patient has low urine output in [place]. She has only had 75 mL in her catheter since 2230. she has D5LR running at 125mL per hour. her blood pressures have also been running 93/58 and 96/60 tonight, pulse in the 80's with scant to no bleeding. would you like me to give her a LR bolus? Thanks.

## 2018-11-25 NOTE — PLAN OF CARE
Afebrile, VSS. Fundus firm with scant lochia.  Pain is controlled with the exception of fundal checks.  Pt became nauseated and vomited before leaving the PACU, zofran given and fentanyl afterwards to help with discomfort.

## 2018-11-25 NOTE — PLAN OF CARE
Problem: Patient Care Overview  Goal: Plan of Care/Patient Progress Review  Outcome: Improving  Patients vitals have been stable. Fundus has been firm with scant amount of bleeding. Incision is covered and dressing is clean, dry, and intact. Is taking toradol, tylenol, and oxycodone for pain. Had some low urine output overnight so she was given a 500 mL lactated ringer's bolus, output is picking up now and is less concentrated. Patient was able to stand at the side of the bed with assist of one, and take some steps in place at the side of the bed. Was not able to walk away from the side of the bed due to patient having too much pain. Will continue to monitor.

## 2018-11-25 NOTE — ANESTHESIA PROCEDURE NOTES
Peripheral Nerve Block Procedure Note    Staff:     Anesthesiologist:  ALY DELANEY  Location: OB and OR AFTER induction  Procedure Start/Stop TImes:      11/24/2018 5:38 PM     11/24/2018 5:43 PM    patient identified, IV checked, site marked, risks and benefits discussed, informed consent, monitors and equipment checked, pre-op evaluation, at physician/surgeon's request and post-op pain management      Correct Patient: Yes      Correct Position: Yes      Correct Site: Yes      Correct Procedure: Yes      Correct Laterality:  Yes    Site Marked:  Yes  Procedure details:     Procedure:  TAP    ASA:  2 and Emergent    Diagnosis:  C/s    Laterality:  Bilateral    Position:  Supine    Sterile Prep: chloraprep, mask and sterile gloves      Needle:  Short bevel    Needle gauge:  21    Needle length (mm):  110    Ultrasound: Yes      Ultrasound used to identify targeted nerve, plexus, or vascular structure and placed a needle adjacent to it      Permanent Image entered into patiient's record      Abnormal pain on injection: No      Blood Aspirated: No      Paresthesias:  No    Bleeding at site: No      Bolus via:  Needle    Infusion Method:  Single Shot    Blood aspirated via catheter: No      Complications:  None  Assessment/Narrative:     Injection made incrementally with aspirations every (mL):  5     Bilateral classcial TAP blocks (10ml exparel + 10ml 0.25% bupivacaine epi into each TAP site)

## 2018-11-25 NOTE — PROGRESS NOTES
[facility]   Post-partum Note    Name:  Dai Guadarrama  MRN: [id number]    S: Patient has had pain overnight that has made moving around somewhat difficult. Tolerating regular diet without nausea or vomiting.  Denies dizziness while laying in bed, but has not yet gotten up due to pain.  Lochia minimal.  Doing some breast feeding, but had difficulty breast feeding with last baby so is not sure if she will be able to during this pregnancy.     O:   Patient Vitals for the past 24 hrs:   BP Temp Temp src Pulse Heart Rate Resp SpO2   18 0422 96/60 98.2  F (36.8  C) Oral 82 - 18 95 %   18 0000 93/58 97.9  F (36.6  C) Oral 80 - 18 96 %   18 2300 (!) 86/55 97.8  F (36.6  C) Oral 74 - 18 96 %   18 2200 99/59 97.9  F (36.6  C) Oral - - 18 99 %   18 2100 98/68 97.9  F (36.6  C) Oral 81 - 18 100 %   18 100/61 98  F (36.7  C) Oral 85 - 20 100 %   18 1935 116/70 97.8  F (36.6  C) Oral - 76 16 97 %   18 1920 (!) 123/92 - - - 78 14 97 %   18 1905 117/80 - - - 81 19 95 %   18 1850 103/79 - - - 72 17 96 %   18 1835 111/75 - - - 87 18 94 %   18 1820 116/74 - - - 77 14 98 %   18 1805 114/74 - - - 86 20 98 %   18 1750 120/71 - - - - 14 98 %   18 1748 119/66 97.8  F (36.6  C) Oral - - - -   18 1500 101/53 98.2  F (36.8  C) Oral - - - -   18 1311 101/55 - - - - 18 -   18 0825 112/72 97.4  F (36.3  C) Oral - - 16 -     Gen:  Resting comfortably, NAD  CV:  Normal perfusion  Pulm:  No respiratory distress   Abd:  Soft, appropriately ttp, non-distended.Fundus at umbilicus, firm and non-tender. Incision c/d/i, abd in place.    Ext:  non-tender, 1+ LE edema b/l    I/O last 3 completed shifts:  In: 2200 [P.O.:600; I.V.:1600]  Out: 1460 [Urine:450; Blood:1010]    Hgb:   Hemoglobin   Date Value Ref Range Status   2018 8.2 (L) 11.7 - 15.7 g/dL Final       Assessment/Plan:  33 year old  on  PPD #1 s/p PLTCS.  - continue with routine postpartum management  Pain: Well-controlled with Toradol and tylenol. Transition to ibuprofen today. PRN oxycodone available, but has not taken overnight. Received first dose during our conversation this AM. Will monitor her pain with current regimen.   Hgb: 9.6> QBL 1010 > 8.2. Appropriate drop, low suspicion for ongoing bleeding.   : UOP has been minimal overnight (75 ml total). She received 500 ml bolus with subsequent 125 mL urine over an hour, then 200 mL over past hour. Clemens still in place, will remove once ambulating, hopefully this AM.   Rh: pos  Rubella: imm  Feed: Working on breast, also using formula.   BC: Interested in Nexplanon vs IUD at 6 week PP visit. Will given info in Tunisian today.   Ppx: Given BMI, , and blood loss, will give lovenox for DVT prophlaxis starting this AM.  Dispo: DC likely PPD#2-3    Olive Estevez MD  Ob/Gyn, PGY-1  2018, 6:35 AM    [facility] staff:  Appreciate note by Dr. Estevez.  I have seen and examined the patient without the resident. I have reviewed, edited, and agree with the note.      Mariana Marsh MD, FACOG  2018  8:33 AM

## 2018-11-25 NOTE — PLAN OF CARE
"Problem: Patient Care Overview  Goal: Plan of Care/Patient Progress Review  Outcome: Improving  VSS, fundus firm and minimal lochia.  States she experienced fertility problems, and has multiple losses, and had no breast changes in pregnancy, and has been told she has \"low hormones\" with low milk supply with 2 year old sibling. Lactation consult placed.  Declines to attempt latch after transfer to [facility], requesting formula instead.  States she will use breast pump tomorrow. Tylenol and toradol are adequate for pain control except during fundal checks.  will stay overnight to assist.        "

## 2018-11-25 NOTE — PLAN OF CARE
Problem: Surgery Nonspecified (Adult)  Goal: Signs and Symptoms of Listed Potential Problems Will be Absent, Minimized or Managed (Surgery Nonspecified)  Signs and symptoms of listed potential problems will be absent, minimized or managed by discharge/transition of care (reference Surgery Nonspecified (Adult) CPG).  Outcome: Improving  OR to PACU Transfer Note  Data: Pt to OB PACU at 1747 via cart. PIV infusing NS with pitocin. Pt without complications, cook with clear urine to gravity, vitals WNL, pt does not complain of pain and/or nausea.   Interventions: IV to pump, monitors and alarms on, warm blankets, SCD on. Cares explained. Comfort provided. TAP block done in OR  Response: stable. Family at bedside  Plan: Patient instructed to notify RN for pain or nausea, routine post op cares, initiate breastfeeding/pumping as soon as patient/infant able.

## 2018-11-25 NOTE — PLAN OF CARE
Problem: Patient Care Overview  Goal: Plan of Care/Patient Progress Review  Outcome: Improving  Pt VSS. Due to void after cook removal. Up ad feliz & walking in her room. Planning to formula feed through . PP assessments are within normal limits. Will continue on supportive cares.

## 2018-11-25 NOTE — PLAN OF CARE
Data: Dai Guadarrama transferred to 7130 via wheelchair at 2000. Baby transferred via crib.  Action: Receiving unit notified of transfer: Yes. Patient and family notified of room change. Report given to Rosario at 1945. Belongings sent to receiving unit. Accompanied by Registered Nurse. Oriented patient to surroundings. Call light within reach. ID bands double-checked with receiving RN.  Response: Patient tolerated transfer and is stable.

## 2018-11-26 ENCOUNTER — DOCUMENTATION ONLY (OUTPATIENT)
Dept: CARE COORDINATION | Facility: CLINIC | Age: 33
End: 2018-11-26

## 2018-11-26 ENCOUNTER — OFFICE VISIT (OUTPATIENT)
Dept: INTERPRETER SERVICES | Facility: CLINIC | Age: 33
End: 2018-11-26
Payer: COMMERCIAL

## 2018-11-26 VITALS
HEART RATE: 72 BPM | DIASTOLIC BLOOD PRESSURE: 67 MMHG | RESPIRATION RATE: 16 BRPM | SYSTOLIC BLOOD PRESSURE: 104 MMHG | OXYGEN SATURATION: 96 % | TEMPERATURE: 98.2 F

## 2018-11-26 PROCEDURE — T1013 SIGN LANG/ORAL INTERPRETER: HCPCS | Mod: U3

## 2018-11-26 PROCEDURE — 25000128 H RX IP 250 OP 636: Performed by: STUDENT IN AN ORGANIZED HEALTH CARE EDUCATION/TRAINING PROGRAM

## 2018-11-26 PROCEDURE — 25000132 ZZH RX MED GY IP 250 OP 250 PS 637: Performed by: STUDENT IN AN ORGANIZED HEALTH CARE EDUCATION/TRAINING PROGRAM

## 2018-11-26 RX ORDER — AMOXICILLIN 250 MG
1 CAPSULE ORAL 2 TIMES DAILY PRN
Qty: 100 TABLET | Refills: 0 | Status: SHIPPED | OUTPATIENT
Start: 2018-11-26 | End: 2019-01-15

## 2018-11-26 RX ORDER — FERROUS SULFATE 325(65) MG
325 TABLET ORAL
Qty: 90 TABLET | Refills: 2 | Status: SHIPPED | OUTPATIENT
Start: 2018-11-26 | End: 2020-02-26

## 2018-11-26 RX ORDER — HYDROMORPHONE HYDROCHLORIDE 2 MG/1
2-4 TABLET ORAL EVERY 4 HOURS PRN
Qty: 20 TABLET | Refills: 0 | Status: SHIPPED | OUTPATIENT
Start: 2018-11-26 | End: 2018-12-05

## 2018-11-26 RX ORDER — IBUPROFEN 800 MG/1
800 TABLET, FILM COATED ORAL EVERY 6 HOURS PRN
Qty: 90 TABLET | Refills: 0 | Status: SHIPPED | OUTPATIENT
Start: 2018-11-26 | End: 2019-01-15

## 2018-11-26 RX ORDER — ACETAMINOPHEN 325 MG/1
650 TABLET ORAL EVERY 4 HOURS PRN
Qty: 100 TABLET | Refills: 0 | Status: SHIPPED | OUTPATIENT
Start: 2018-11-27 | End: 2019-01-15

## 2018-11-26 RX ORDER — HYDROMORPHONE HYDROCHLORIDE 2 MG/1
2-4 TABLET ORAL EVERY 4 HOURS PRN
Status: DISCONTINUED | OUTPATIENT
Start: 2018-11-26 | End: 2018-11-26 | Stop reason: HOSPADM

## 2018-11-26 RX ADMIN — OXYCODONE HYDROCHLORIDE 5 MG: 5 TABLET ORAL at 05:46

## 2018-11-26 RX ADMIN — ACETAMINOPHEN 975 MG: 325 TABLET, FILM COATED ORAL at 13:51

## 2018-11-26 RX ADMIN — SENNOSIDES AND DOCUSATE SODIUM 2 TABLET: 8.6; 5 TABLET ORAL at 07:53

## 2018-11-26 RX ADMIN — HYDROMORPHONE HYDROCHLORIDE 2 MG: 2 TABLET ORAL at 13:51

## 2018-11-26 RX ADMIN — ENOXAPARIN SODIUM 40 MG: 40 INJECTION SUBCUTANEOUS at 07:53

## 2018-11-26 RX ADMIN — ACETAMINOPHEN 975 MG: 325 TABLET, FILM COATED ORAL at 05:45

## 2018-11-26 RX ADMIN — OXYCODONE HYDROCHLORIDE 5 MG: 5 TABLET ORAL at 02:28

## 2018-11-26 RX ADMIN — IBUPROFEN 800 MG: 800 TABLET, FILM COATED ORAL at 01:18

## 2018-11-26 RX ADMIN — IBUPROFEN 800 MG: 800 TABLET, FILM COATED ORAL at 07:53

## 2018-11-26 RX ADMIN — HYDROMORPHONE HYDROCHLORIDE 2 MG: 2 TABLET ORAL at 09:47

## 2018-11-26 NOTE — PLAN OF CARE
Problem: Patient Care Overview  Goal: Plan of Care/Patient Progress Review  VS stable, fundus is firm and midline. Patient is ambulating and caring for  independently. Incision bandage is still in place and she is wearing an abdominal binder for comfort. She has been feeding baby formula. Will continue to monitor and support.

## 2018-11-26 NOTE — PLAN OF CARE
Problem: Patient Care Overview  Goal: Plan of Care/Patient Progress Review  Outcome: Adequate for Discharge Date Met: 11/26/18  Data: Vital signs within normal limits and postpartum checks within normal limits - see flow record. Patient eating and drinking normally. Patient able to empty bladder independently and is up ambulating in the room. Incision is in tact, the dressing is off and sterile strips are in place. Pt instructed to take off any sterile strips left after a week.  No apparent signs of infection noted. Pt is formula feeding only and plans to pump at home for baby. Patient performing self cares and is able to care for infant. Complains of incisional pain.   Action: Patient medicated during the shift for for pain control. Encouraged to ambulate more. See MAR. Patient reassessed for pain and pt patient stated she was comfortable. Reviewed teaching and discharge instructions with pt using the . Checked ID band. Pt was given discharge medications with instructions and copies of the discharge papers.   Response: Positive attachment behaviors observed with infant. Support persons present.   Plan:  Pt discharged home today with baby in a car seat.

## 2018-11-26 NOTE — DISCHARGE INSTRUCTIONS
Birth Discharge Instructions: [place]ese  Actividad    No levante más de 10 libras lilibeth las 6 semanas posteriores a martin cirugía. Pida a los miembros de martin charlene y amigos que la ayuden cuando lo necesite.    No conduzca si está tomando píldoras para el dolor recetadas por martin médico. Puede conducir si está tomando píldoras de venta mark para el dolor.    No samson ejercicio ni actividades intensas por 6 semanas. No samson nada que requiera un esfuerzo en el sitio de martin cirugía.    No samson fuerza al usar el baño. Martin equipo de atención podría recetarle un laxante si tiene problemas para  el intestino (estreñimiento).    Para cuidar de martin incisión:    Mantenga la incisión limpia y seca    No empape martin incisión en agua. No nade ni use la elizabeth ni jacuzzis hasta que martin incisión haya cicatrizado por completo. Puede sentarse en la elizabeth si el nivel del agua está por debajo de martin incisión.    Después de lavarse, seque elissa martin incisión. Incluya la piel que podría entrar en contacto con martin incisión.    No use agua oxigenada, gel, cremas, lociones ni ungüentos sobre martin incisión.    Ajuste martin ropa para evitar la presión en el sitio de martin cirugía (compruebe el elástico en martin ropa interior, por ejemplo)    Si tiene Steri-Strips, deje las pequeñas tiras de cinta en martin sitio. Se caerán solas o puede quitárselas después de justa semana.    Podría jayden justa pequeña cantidad de secreción transparente o rosada y esto es normal. Consulte con martin proveedor de atención médica:    Si el drenaje aumenta o tiene olor.    Si tiene hinchazón, enrojecimiento o justa erupción alrededor de la incisión.    Si tiene más dolor y martin medicamento para el dolor no ayuda    Si tiene fiebre de 100.4  F (38  C) o más (temperatura tomada bajo martin lengua) con o sin escalofríos     El área alrededor de martin incisión (herida de la cirugía) se sentirá adormecida. [place] es normal. El adormecimiento debería desaparecer en menos de un año.       Mantenga avelina celestina  limpias:  Siempre lávese las celestina antes de tocar martin incisión. Convent ayuda a reducir martin riesgo de infección. Si avelina celestina no están sucias, puede usar un gel de alcohol para limpiarse las celestina. Mantenga avelina uñas cortas y limpias.   Llame a martin proveedor de atención médica si tiene alguno de estos síntomas:    Empapa justa toalla femenina con ermelinda en el correr de 1 hora o ve coágulos más grandes que justa pelota de golf.    Sangrado que dura más de 6 semanas.    Tiene justa secreción vaginal que huele mal.    Dolor, calambres o sensibilidad graves en la región inferior de martin vientre.    Necesidad más frecuente o urgente de orinar (hacer pis), o ardor al hacerlo.    Náuseas y vómitos    Enrojecimiento, hinchazón o dolor alrededor de justa vena en martin pierna.    Problemas para amamantar o un área enrojecida o dolorosa en martin pecho.    Si tiene dolor en el pecho y tos o dificultad para respirar.    Problemas para manejar la tristeza, ansiedad o depresión.     Si le preocupa hacerse daño o hacerle daño al bebé, llame al médico de inmediato.    Tiene preguntas o inquietudes después de regresar a casa.       Birth Discharge Instructions  Activity    Do not lift more than 10 pounds for 6 weeks after surgery. Ask family and friends for help when you need it.    Do not drive while taking pain pills prescribed by your doctor. You may drive if taking over-the-counter pain pills.    No heavy exercise or activity for 6 weeks. Don t do anything that will put a strain on your surgery site.    Don t strain when using the toilet. Your care team may prescribe a stool softener if you have problems with your bowel movements (constipation).    To care for your incision:    Keep the incision clean and dry    Do not soak your incision in water. No swimming or hot tubs until your incision has fully healed. You may soak in the bathtub if the water level is below your incision.    After washing, dry your incision well. Include the skin that may  come in contact with your incision.    Do not use any peroxide, gel, cream, lotion, or ointment on your incision.     Adjust your clothes to avoid pressure on your surgery site (check the elastic in your underwear for example)    If you have Steri-Strips, leave the small strips of tape in place. They will fall off on their own, or you can remove them after one week.    You may see a small amount of clear or pink drainage and this is normal. Check with your health care provider:    If the drainage increases or has an odor.    If you have swelling, redness, or a rash around the incision.    If you have increased pain and your pain medicine doesn t help    If you have a fever of 100.4  F (38  C) or higher (temperature taken under your tongue), with or without chills   The area around your incision (surgery wound) will feel numb. This is normal. The numbness should go away in less than a year.   Keep your hands clean:   Always wash your hands before touching your incision. This helps reduce your risk of infection. If your hands aren t dirty, you may use an alcohol hand-rub to clean your hands. Keep your nails clean and short.     Call your health care provider if you have any of these symptoms:    You soak a sanitary pad with blood within 1 hour, or you see blood clots larger than a golf ball.    Bleeding that lasts more than 6 weeks.    You have vaginal discharge that smells bad.    Severe pain, cramping or tenderness in your lower belly area.    A more frequent or urgent need to urinate (pee), or it burns when you pee.    Nausea and vomiting.    Redness, swelling or pain around a vein in your leg.    Problems breastfeeding, or a red or painful area on your breast.    If you have chest pain and cough or are gasping for air.    Problems coping with sadness, anxiety, or depression.     If you have any concerns about hurting yourself of the baby, call your doctor right away.      You have questions or concerns after you  return home.

## 2018-11-26 NOTE — PROGRESS NOTES
Post  Anesthesia Follow Up Note    Patient: Dai Guadarrama    Patient location: Postpartum floor.    Chief complaint: Acute postoperative pain management s/p intrathecal morphine administration     Procedure(s) Performed:  Procedure(s):   SECTION    Anesthesia type: Spinal Block    Subjective:     Pain Control: 0/10 at rest and 1/10 with ambulation    Additional ROS:  She denies weakness, denies paresthesia, denies difficulties breathing or voiding, denies nausea or vomiting. She is able to ambulate and tolerates regular diet.    Objective:    Respiratory Function (RR / SpO2 / Airway Patency): Satisfactory    Cardiac Function (HR / Rhythm / BP): Satisfactory    Strength and sensation lower extremities: Normal    Site of spinal/epidural insertion: No signs of infection or inflammation.     Last Vitals: BP 92/67  Pulse 80  Temp 36.4  C (97.6  F)  Resp 14  LMP 2018 (Exact Date)  SpO2 96%  Breastfeeding? Unknown    Assessment and plan:   Dai Guadarrama is a 33 year old female  POD #2 s/p   SECTION with IT bupivacaine (1.5mL 0.75%), fentanyl morphine (100mcg); and single shot TAP nerve block injections with 20 mL bupivacaine 0.25% with epinephrine 1:200,000, then 20mL liposome bupivacaine (Exparel) long-acting 1.3% given in the PACU for postoperative analgesia.  Pt is ambulating without difficulty, no weakness or paresthesias.  There is no evidence of adverse side effects associated with spinal and nerve block injections.  The patient is receiving adequate incisional pain control at this time and anticipate up to 72 hours of incisional pain control.  However, we further anticipate that the patient will require opioid/nonopioid analgesics for visceral and muscle pain that is not controlled with local anesthetic.      In brief summary, her post-operative analgesia is adequate today. Further interventional analgesic strategies would be of little utility at this time.  Thus, we recommend proceeding with PO analgesics including staggered dosing of NSAIDs (ibuprofen) and acetaminophen, with a taper of oxycodone.     Thank you for including us in the care for this patient.    Michelle Sauceda MD CA-1

## 2018-11-26 NOTE — PLAN OF CARE
"Problem: Patient Care Overview  Goal: Plan of Care/Patient Progress Review  Outcome: Improving  Data: Vital signs within normal limits. Postpartum checks within normal limits - see flow record. Patient eating and drinking normally. Patient able to empty bladder independently and is up ambulating. No apparent signs of infection. C/S dressing clean, dry, and intact. Patient performing self cares and is able to care for infant. Patient declines to breastfeed saying she has \"no milk\" and declines any breastfeeding assistance. Discussed ways to promote a healthy breast milk supply including offering baby the breast before formula and hand expressing. Patient verbalizes understanding but declines breastfeeding assistance.   Action: Pain has been adeautely managed with oral pain medication. Patient reported slight itching around abdomen, offered patient Benadryl which she declined. Wearing abdominal binder for support and comfort.   Response: Positive attachment behaviors observed with infant. Support person, significant other, was present but left for the evening.   Plan: Continue with the plan of care.       "

## 2018-11-26 NOTE — PROGRESS NOTES
[facility]   Post-partum Note    Name:  Dai Guadarrama  MRN: [id number]    S: Patient has had pain on her right side as well as itching. Used 5mg oxycodone x6 in past 24 hours. Tolerating regular diet without nausea or vomiting.  Denies dizziness while ambulating.  Lochia minimal.  Doing some breast feeding. Has passed gas. Voiding with no difficulty.    O:   Patient Vitals for the past 24 hrs:   BP Temp Temp src Pulse Resp SpO2   18 1523 106/67 98.5  F (36.9  C) Oral 90 18 96 %   18 1350 - - - - 18 96 %   18 1237 111/73 98.4  F (36.9  C) Oral 91 18 97 %   18 1000 - - - - 18 98 %   18 0747 104/59 98  F (36.7  C) Oral 82 18 96 %   18 0422 96/60 98.2  F (36.8  C) Oral 82 18 95 %   18 0000 93/58 97.9  F (36.6  C) Oral 80 18 96 %   18 2300 (!) 86/55 97.8  F (36.6  C) Oral 74 18 96 %     Gen:  Resting comfortably, NAD  CV:  Normal perfusion  Pulm:  No respiratory distress   Abd:  Soft, appropriately ttp, non-distended.Fundus at umbilicus, firm and non-tender. Incision c/d/i, abd in place. Dressing still on and pt did not want me to take it off. No swelling or bruising on R side. No rebound or guarding.  Ext:  non-tender, 1+ LE edema b/l    I/O last 3 completed shifts:  In: 2600 [P.O.:1000; I.V.:1600]  Out: 2310 [Urine:1300; Blood:1010]    Hgb: 9.6> QBL 1010 > 8.2.      Assessment/Plan:  33 year old  on PPD #2 s/p PLTCS for breech/unstable lie. Recovering appropriately    Pain: Well-controlled with ibuprofen, tylenol and oxycodone. Will switch to dilaudid today because of itching.   Hgb: 9.6> QBL 1010 > 8.2. Appropriate drop, low suspicion for ongoing bleeding. Will discharge home with PO Fe.   : S/p cook, voiding spontaneously.    Rh: pos  Rubella: imm  Feed: Working on breast, also using formula.   BC: Interested in Nexplanon vs IUD at 6 week PP visit. Given info. Thinking copper IUD.   Ppx: Given BMI, , and blood loss,  on lovenox ppx while admitted  Dispo: discontinue possibly this afternoon vs POD# 3    Gaby Dickey MD  Ob/Gyn, PGY-3  11/26/2018     The patient was seen and examined by me separately from the team.  I have reviewed and agree with the above note.  She is doing well this morning, pain is controlled with oral mediations, asking when she can go home.  Baby is bottle feeding without difficulty.  Plan to shower and remove dressing this am, pediatrician to see baby this morning.  If no concerns and doing well, likely discharge this afternoon.      Maria Eugenia Urrutia MD, FACOG

## 2018-11-26 NOTE — PROGRESS NOTES
[facility] will be sharing updates with you on Maternal Child Health Referral requests for home care services.  This is for care coordination purposes and alert you to referral status.  We received the referral for  Dai Guadarrama; MRN [id number] and want to update you:    Home visit for postpartum/  assessment and education offered to patient.  Patient declined need for home care visit.  Advised to follow up with Primary Care Providers for mom and baby.     Sincerely [facility]  Edmund Sheriff  [phone]

## 2018-12-05 ENCOUNTER — OFFICE VISIT (OUTPATIENT)
Dept: OBGYN | Facility: CLINIC | Age: 33
End: 2018-12-05
Attending: OBSTETRICS & GYNECOLOGY
Payer: COMMERCIAL

## 2018-12-05 VITALS
TEMPERATURE: 98 F | HEART RATE: 64 BPM | SYSTOLIC BLOOD PRESSURE: 132 MMHG | WEIGHT: 217.4 LBS | BODY MASS INDEX: 41.08 KG/M2 | DIASTOLIC BLOOD PRESSURE: 82 MMHG

## 2018-12-05 DIAGNOSIS — Z98.891 S/P CESAREAN SECTION: ICD-10-CM

## 2018-12-05 PROCEDURE — G0463 HOSPITAL OUTPT CLINIC VISIT: HCPCS | Mod: ZF

## 2018-12-05 PROCEDURE — T1013 SIGN LANG/ORAL INTERPRETER: HCPCS | Mod: U3,ZF

## 2018-12-05 RX ORDER — HYDROMORPHONE HYDROCHLORIDE 2 MG/1
2-4 TABLET ORAL EVERY 4 HOURS PRN
Qty: 20 TABLET | Refills: 0 | Status: SHIPPED | OUTPATIENT
Start: 2018-12-05 | End: 2019-01-15

## 2018-12-05 NOTE — MR AVS SNAPSHOT
After Visit Summary   2018    Dai Guadarrama    MRN: [id number]           Patient Information     Date Of Birth          1985        Visit Information        Provider Department      2018 3:00 PM Yamile Anderson; Sharmin Louis MD [facility]        Today's Diagnoses     S/P  section           Follow-ups after your visit        Who to contact     Please call your clinic at [phone] to:    Ask questions about your health    Make or cancel appointments    Discuss your medicines    Learn about your test results    Speak to your doctor            Additional Information About Your Visit        MyChart Information     Vitrue gives you secure access to your electronic health record. If you see a primary care provider, you can also send messages to your care team and make appointments. If you have questions, please call your primary care clinic.  If you do not have a primary care provider, please call [phone] and they will assist you.      Vitrue is an electronic gateway that provides easy, online access to your medical records. With Vitrue, you can request a clinic appointment, read your test results, renew a prescription or communicate with your care team.     To access your existing account, please contact your [facility] or call [phone] for assistance.        Care EveryWhere ID     This is your Care EveryWhere ID. This could be used by other organizations to access your [place] medical records  [id number]        Your Vitals Were     Pulse Temperature Last Period BMI (Body Mass Index)          64 98  F (36.7  C) (Oral) 2018 (Exact Date) 41.08 kg/m2         Blood Pressure from Last 3 Encounters:   18 132/82   18 104/67   18 107/75    Weight from Last 3 Encounters:   18 98.6 kg (217 lb 6.4 oz)   18 103.9 kg (229 lb)   18 103.9 kg (229 lb 1.6 oz)               Today, you had the following     No orders found for display         Where to get your medicines      Some of these will need a paper prescription and others can be bought over the counter.  Ask your nurse if you have questions.     Bring a paper prescription for each of these medications     HYDROmorphone 2 MG tablet         Information about OPIOIDS     PRESCRIPTION OPIOIDS: WHAT YOU NEED TO KNOW   We gave you an opioid (narcotic) pain medicine. It is important to manage your pain, but opioids are not always the best choice. You should first try all the other options your care team gave you. Take this medicine for as short a time (and as few doses) as possible.    Some activities can increase your pain, such as bandage changes or therapy sessions. It may help to take your pain medicine 30 to 60 minutes before these activities. Reduce your stress by getting enough sleep, working on hobbies you enjoy and practicing relaxation or meditation. Talk to your care team about ways to manage your pain beyond prescription opioids.    These medicines have risks:    DO NOT drive when on new or higher doses of pain medicine. These medicines can affect your alertness and reaction times, and you could be arrested for driving under the influence (DUI). If you need to use opioids long-term, talk to your care team about driving.    DO NOT operate heavy machinery    DO NOT do any other dangerous activities while taking these medicines.    DO NOT drink any alcohol while taking these medicines.     If the opioid prescribed includes acetaminophen, DO NOT take with any other medicines that contain acetaminophen. Read all labels carefully. Look for the word  acetaminophen  or  Tylenol.  Ask your pharmacist if you have questions or are unsure.    You can get addicted to pain medicines, especially if you have a history of addiction (chemical, alcohol or substance dependence). Talk to your care team about ways to reduce this risk.    All  opioids tend to cause constipation. Drink plenty of water and eat foods that have a lot of fiber, such as fruits, vegetables, prune juice, apple juice and high-fiber cereal. Take a laxative (Miralax, milk of magnesia, Colace, Senna) if you don t move your bowels at least every other day. Other side effects include upset stomach, sleepiness, dizziness, throwing up, tolerance (needing more of the medicine to have the same effect), physical dependence and slowed breathing.    Store your pills in a secure place, locked if possible. We will not replace any lost or stolen medicine. If you don t finish your medicine, please throw away (dispose) as directed by your pharmacist. The [place] Pollution [employer] has more information about safe disposal: [email or web address].mn.us/living-green/managing-unwanted-medications         Primary Care Provider Fax #    Physician No Ref-Primary [phone]       No address on file        Equal Access to Services     AISHA SONG : Mariano Andrew, wasarabjit manuel, qabrittany koalmaluis stauffer, kodi moreno . So [facility] [phone].    ATENCIÓN: Si habla español, tiene a martin disposición servicios gratuitos de asistencia lingüística. Llame al [phone].    We comply with applicable federal civil rights laws and [place] laws. We do not discriminate on the basis of race, color, national origin, age, disability, sex, sexual orientation, or gender identity.            Thank you!     Thank you for choosing [facility]  for your care. Our goal is always to provide you with excellent care. Hearing back from our patients is one way we can continue to improve our services. Please take a few minutes to complete the written survey that you may receive in the mail after your visit with us. Thank you!             Your Updated Medication List - Protect others around you: Learn how to safely use, store and throw away your medicines at  www.disposemymeds.org.          This list is accurate as of 18 11:59 PM.  Always use your most recent med list.                   Brand Name Dispense Instructions for use Diagnosis    acetaminophen 325 MG tablet    TYLENOL    100 tablet    Take 2 tablets (650 mg) by mouth every 4 hours as needed for other (multimodal surgical pain management along with NSAIDS and opioid medication as indicated based on pain control and physical function.)    S/P  section       ferrous sulfate 325 (65 Fe) MG tablet    FEROSUL    90 tablet    Take 1 tablet (325 mg) by mouth daily (with breakfast)    Iron deficiency anemia, unspecified iron deficiency anemia type       HYDROmorphone 2 MG tablet    DILAUDID    20 tablet    Take 1-2 tablets (2-4 mg) by mouth every 4 hours as needed for severe pain    S/P  section       ibuprofen 800 MG tablet    ADVIL/MOTRIN    90 tablet    Take 1 tablet (800 mg) by mouth every 6 hours as needed for other (cramping)    S/P  section       senna-docusate 8.6-50 MG tablet    SENOKOT-S/PERICOLACE    100 tablet    Take 1 tablet by mouth 2 times daily as needed for constipation    S/P  section

## 2018-12-05 NOTE — NURSING NOTE
Chief Complaint   Patient presents with     Wound Check     Incision check       See TESSA Sheriff 12/5/2018

## 2018-12-05 NOTE — LETTER
2018       RE: Laura Guadarrama  [address]  [facility] [address]     Dear Colleague,    Thank you for referring your patient, Laura Guadarrama, to the [facility] at [facility]. Please see a copy of my visit note below.    S: presents POD 11 from C/Swith concerns of bumps she can feel on incision and pain from incision.   No nausea, emesis, diarrhea, fevers, chills.   Breastfeed is going well and no other concerns.     Obstetric History       T2      L2     SAB2   TAB0   Ectopic0   Multiple0   Live Births3       # Outcome Date GA Lbr Spike/2nd Weight Sex Delivery Anes PTL Lv   5 Term 18 37w1d  2.74 kg (6 lb 0.7 oz) F CS-LTranv Spinal N KASEY      Name: KINJAL GUADARRAMA,BABY1 LAURA      Apgar1:  8                Apgar5: 9   4 Term 17 37w5d 02:30 / 00:13 3.374 kg (7 lb 7 oz) F Vag-Spont EPI N KASEY      Name: Marjorie       Apgar1:  9                Apgar5: 9   3  14 32w0d   F I.U. FETAL D   ND   2 SAB            1 SAB               Obstetric Comments      2nd pregnancy oligohydramnios IOL 37.5      /82 (BP Location: Left arm, Patient Position: Chair)  Pulse 64  Temp 98  F (36.7  C) (Oral)  Wt 98.6 kg (217 lb 6.4 oz)  LMP 2018 (Exact Date)  BMI 41.08 kg/m2  Appears well  Abdomens obese, soft, NT, ND  Incision CDI with steristrips in place. Attempted to remove steristrips but patient states too painful.   No erythema, no drainage.   A: post operative pain and normal incision  P: recommend remove steristrips at home (may use soap or oil to lessen adhesive) and refill dilauded oral printed.       Again, thank you for allowing me to participate in the care of your patient.      Sincerely,    Sharmin Louis MD

## 2018-12-06 NOTE — PROGRESS NOTES
S: presents POD 11 from C/Swith concerns of bumps she can feel on incision and pain from incision.   No nausea, emesis, diarrhea, fevers, chills.   Breastfeed is going well and no other concerns.     Obstetric History       T2      L2     SAB2   TAB0   Ectopic0   Multiple0   Live Births3       # Outcome Date GA Lbr Spike/2nd Weight Sex Delivery Anes PTL Lv   5 Term 18 37w1d  2.74 kg (6 lb 0.7 oz) F CS-LTranv Spinal N KASEY      Name: KINJAL COCHRAN,BABY1 LAURA      Apgar1:  8                Apgar5: 9   4 Term 17 37w5d 02:30 / 00:13 3.374 kg (7 lb 7 oz) F Vag-Spont EPI N KASEY      Name: Marjorie       Apgar1:  9                Apgar5: 9   3  / 32w0d   F I.U. FETAL D   ND   2 SAB            1 SAB               Obstetric Comments      2nd pregnancy oligohydramnios IOL 37.5      /82 (BP Location: Left arm, Patient Position: Chair)  Pulse 64  Temp 98  F (36.7  C) (Oral)  Wt 98.6 kg (217 lb 6.4 oz)  LMP 2018 (Exact Date)  BMI 41.08 kg/m2  Appears well  Abdomens obese, soft, NT, ND  Incision CDI with steristrips in place. Attempted to remove steristrips but patient states too painful.   No erythema, no drainage.   A: post operative pain and normal incision  P: recommend remove steristrips at home (may use soap or oil to lessen adhesive) and refill dilauded oral printed.     Sharmin Louis

## 2018-12-27 ENCOUNTER — TELEPHONE (OUTPATIENT)
Dept: OBGYN | Facility: CLINIC | Age: 33
End: 2018-12-27

## 2018-12-27 NOTE — TELEPHONE ENCOUNTER
Attempted to reach Dai with [facility] . She had c/s 11/24/18 and was seen in clinic 12/5/18 at which time she was given dilaudid refill.     left  for her to call triage,number given, if still needs assistance

## 2018-12-27 NOTE — TELEPHONE ENCOUNTER
----- Message from Claire Hubbard sent at 12/27/2018 11:48 AM CST -----  Regarding: c section pain   Contact: [phone]  Patient called in with , she needed to schedule her 6 wk PP, we did get it scheduled but it isn't till 1/15 and she is having c section pain and didn't want to come in for multiple appointments.     Please call her back with an  @ [phone]    Thank you!    Claire Hubbard  Senior Intake representative   Call Center

## 2019-01-15 ENCOUNTER — OFFICE VISIT (OUTPATIENT)
Dept: OBGYN | Facility: CLINIC | Age: 34
End: 2019-01-15
Payer: COMMERCIAL

## 2019-01-15 VITALS
WEIGHT: 211.2 LBS | SYSTOLIC BLOOD PRESSURE: 120 MMHG | DIASTOLIC BLOOD PRESSURE: 81 MMHG | BODY MASS INDEX: 39.91 KG/M2 | HEART RATE: 108 BPM

## 2019-01-15 PROCEDURE — T1013 SIGN LANG/ORAL INTERPRETER: HCPCS | Mod: U3,ZF

## 2019-01-15 PROCEDURE — G0463 HOSPITAL OUTPT CLINIC VISIT: HCPCS | Mod: ZF

## 2019-01-15 ASSESSMENT — PAIN SCALES - GENERAL: PAINLEVEL: NO PAIN (0)

## 2019-01-15 NOTE — PROGRESS NOTES
+Nursing Notes:   Alis Arora LPN  1/15/2019  3:31 PM  Incomplete  Chief Complaint   Patient presents with     Post Partum Exam      2018       SUBJECTIVE:   Dai Guadarrama is here for her 6-week postpartum checkup.     PHQ-9 score:   Hx of Abuse:  No    Delivery Date: 2018.    Delivering provider:  Noreen Harris MD.    Type of delivery:  .     Delivery complications: Breech  Infant gender:  girl, weight 6 pounds 3 oz.  Feeding Method:  Bottlefed.  Complications reported with feeding:  none, infant thriving .    Bleeding:  None.  Duration:.  Menses resumed:  Yes   Bowel/Urinary problems:  No    Contraception Planned:  condoms    Alis Arora LPN     ================================================================  MsXimena Guadarrama a 32yo  who presents for 6 week postpartum visit following PLTCS for breech presentation. She reports that she is doing well. She is formula feeding. She has resumed sexual activity without difficulty. She would like to continue to use condoms and does not want Nexplanon as was discussed during her inpatient stay. She reports a small amount of continued numbness around her incision but otherwise feels her incision is well healed. She reports mild headaches that improve with Excedrin. She thinks her HA is related to stiff neck muscles.  Her menses resumed 19. She reports regular periods that last 1-2 days without cramping.     She feels she is recovering well from delivery. Her sleep is disrupted due to infant but she is able to nap and rest daily. She feels well supported at home.      10 point ROS neg other than the symptoms noted above in the HPI.     EXAM:  /81   Pulse 108   Wt 95.8 kg (211 lb 3.2 oz)   LMP 2019   Breastfeeding? No   BMI 39.91 kg/m      General: healthy, alert and no distress  Psych: engaged, appropriate  PHQ-9: 5  Breasts:  Nipples intact with no lesions, Non-tender and No S/S of yeast  or mastitis  Abdomen: obese, non-tender  Incision:  well healed   Vulva:  Normal genitalia and Bartholin's, Urethra, [place]'s normal  Vagina:  normal with good muscle tone, blood in vault   Cervix:  no lesions.    Uterus:  fully involuted and non-tender    Adnexa:  Within normal limits and No masses, nodularity, tenderness  Recto-vaginal:   anus normal, small 1-2cm outpouching on skin on left gluteus     ASSESSMENT/PLAN: Ms. Dai Guadarrama a 32yo  who presents for 6 week postpartum visit following PLTCS. She declines contraception today although Paragard IUD discussed.    Encounter Diagnosis   Name Primary?     Routine postpartum follow-up Yes      Normal postpartum exam after c/s for breech or transverse  Skin tag on left gluteus, possible lipoma. Patient states that it has always been present. Denies irritation or dyscomfort.  Contraception methods discussed  Exercise encouraged  Stretching exercises demonstrated to address neck pain and HA. Pain likely 2/2 to MSK dysfunction. Demonstrated ways to improve ergonomics when holding and caring for infant. Patient demonstrated understanding.   Follow up in 1 year for annual exam. Pap due 2021.    Patient discussed with Dr. Darnell Alicia Myhre, DO  Obstetrics and Gyncology, PGY-2  January 15, 2019 , 5:18 PM     [facility] staff:  Appreciate note by Dr. Myhre.  I have seen and examined the patient without the resident. I have reviewed, edited, and agree with the note.        Mariana Marsh MD, FACOG  2/8/2019  9:15 AM

## 2019-01-15 NOTE — PATIENT INSTRUCTIONS
It was nice to meet you today.     Please return in 1 year for your annual exam or sooner if you have any concerns.     Patient Education     Después de mely a maikel: Cómo sentirse saludable    Tratar de mantenerse en forma es justa de las mejores cosas que usted puede hacer por martin bebé. Un poco de ejercicio le tonificará los músculos. Se sentirá más meri, tendrá más energía y también se sentirá más despierta y alerta. No se preocupe por martin peso en kennedi momento. Martin objetivo es sentirse en forma. Parte de kennedi objetivo implica vestirse de forma confortable. Si se viste con buen gusto, podrá seguir siendo justa mamá atareada y aún así tener un aspecto elegante.  Continúe haciendo los ejercicios de Kegel  Probablemente le hayan dicho que rex ejercicios de Kegel lilibeth el embarazo. Estos ejercicios fortalecen los músculos que deben hacer mayor esfuerzo lilibeth el embarazo y el parto. Podrá reanudar avelina ejercicios de Kegel hyde pronto tatiana se sienta capaz.  Por qué no empezar hoy mismo? Contraiga los músculos del suelo pélvico (los que controlan el flujo de orina) lilibeth al menos rebecca segundos. Relájese y luego contraiga de nuevo los músculos. Aumente gradualmente hasta 50 o 100 ejercicios de Kegel al día.  Rex ejercicio a menudo  El ejercicio le ayudará a mantenerse en forma y a fortalecer los músculos a fin de que esté mejor preparada para alzar al bebé. [place] beneficio adicional, el ejercicio le da la sensación de que está haciendo algo burgess para usted. Salga a mely un paseo con el bebé o rex ejercicios de estiramiento lilibeth 10 minutos. Si se mantuvo activa lilibeth el embarazo, probablemente podrá comenzar a hacer ejercicio ligero tan pronto tatiana se sienta capaz. Malik asegúrese de consultar con martin proveedor de atención médica antes de empezar.  Manténgase alejada de la balanza  Lilibeth el primer mes, piense en cómo recuperar la energía, no cómo perder peso. Perder peso demasiado pronto puede hacerle sentirse más  cansada. Concéntrese en cuidar a martin bebé y comer de forma elissa equilibrada. Es probable que pierda peso sin siquiera intentarlo, especialmente si está amamantando. Justa vez que martin nivel de energía regrese a la normalidad, podrá comenzar a perder peso. Justa pérdida de peso gradual de cuatro o rebecca libras al mes es el método más seguro.  Inclinación pélvica  Acuéstese boca arriba con las rodillas flexionadas y las plantas de los pies apoyadas en el piso. Contraiga los músculos del abdomen y de las nalgas. A medida que inhala, samson presión hacia abajo hasta que la parte inferior de martin espalda se ponga plana contra el suelo. Mantenga esta posición unos instantes, luego relájese. Repita hasta rebecca inclinaciones, dos veces al día.  Flexión abdominal  Acuéstese boca arriba con las rodillas flexionadas y las plantas de los pies apoyadas en el piso. Cruce los brazos sobre el pecho. Expulse el aire y contraiga los músculos del abdomen. Eleve ligeramente los hombros del suelo. Sostenga esta posición mientras cuenta hasta rebecca y luego baje los hombros lentamente. Relájese y repita el ejercicio lanie a rebecca veces al día.  Vístase de forma adecuada  Es aconsejable que se vista confortablemente lilibeth los primeros días después del parto. Póngase justa bata, piyama o sudadera: lo que le resulte más cómodo. Al poco tiempo es probable que quiera comenzar a tener el aspecto que tenía antes del embarazo. Hágase un pravin y póngase maquillaje si esto es algo que hace normalmente. Un vestido holgado probablemente le resultará cómodo. Malik hágase un favor: no intente ponerse pantalones vaqueros (jeans) todavía. Probablemente pasará más de un mes antes de que pueda usarlos. Si tiene pérdidas de leche, póngase discos protectores absorbentes en el interior del sostén y vístase usando varias capas de ropa. Si está amamantando, es aconsejable que use blusas fáciles de abrir o suéteres. Puede usar un chal o mantón para cubrirse si está  amamantando en presencia de otras personas.  Cuándo debe llamar a martin proveedor de atención médica  Recuerde programar justa visita posparto. Si ha tenido el parto mediante cesárea, vaya en un plazo de dos semanas. Si ha tenido un parto vaginal, programe justa visita al proveedor de atención médica en cuatro a seis semanas después del parto. Además, llame al proveedor de atención médica si tiene un sangrado abundante, fiebre, enrojecimiento o un bulto persistente en los senos, no puede orinar, evacuar los intestinos después de justa semana, dolor meri o martin depresión empeora.    Date Last Reviewed: 8/16/2015 2000-2018 [employer] [address] [place] [id number]. Todos los derechos reservados. Esta información no pretende sustituir la atención médica profesional. Sólo martin médico puede diagnosticar y tratar un problema de chino.

## 2019-01-16 ASSESSMENT — ANXIETY QUESTIONNAIRES
7. FEELING AFRAID AS IF SOMETHING AWFUL MIGHT HAPPEN: NOT AT ALL
6. BECOMING EASILY ANNOYED OR IRRITABLE: SEVERAL DAYS
1. FEELING NERVOUS, ANXIOUS, OR ON EDGE: NOT AT ALL
5. BEING SO RESTLESS THAT IT IS HARD TO SIT STILL: NOT AT ALL
GAD7 TOTAL SCORE: 3
3. WORRYING TOO MUCH ABOUT DIFFERENT THINGS: SEVERAL DAYS
2. NOT BEING ABLE TO STOP OR CONTROL WORRYING: NOT AT ALL

## 2019-01-16 ASSESSMENT — PATIENT HEALTH QUESTIONNAIRE - PHQ9
5. POOR APPETITE OR OVEREATING: SEVERAL DAYS
SUM OF ALL RESPONSES TO PHQ QUESTIONS 1-9: 5

## 2019-01-17 ASSESSMENT — ANXIETY QUESTIONNAIRES: GAD7 TOTAL SCORE: 3

## 2020-02-12 ENCOUNTER — APPOINTMENT (OUTPATIENT)
Dept: INTERPRETER SERVICES | Facility: CLINIC | Age: 35
End: 2020-02-12
Payer: MEDICAID

## 2020-02-18 ENCOUNTER — APPOINTMENT (OUTPATIENT)
Dept: ULTRASOUND IMAGING | Facility: CLINIC | Age: 35
End: 2020-02-18
Attending: EMERGENCY MEDICINE
Payer: MEDICAID

## 2020-02-18 ENCOUNTER — HOSPITAL ENCOUNTER (EMERGENCY)
Facility: CLINIC | Age: 35
Discharge: HOME | End: 2020-02-18
Attending: EMERGENCY MEDICINE | Admitting: EMERGENCY MEDICINE
Payer: MEDICAID

## 2020-02-18 ENCOUNTER — OFFICE VISIT (OUTPATIENT)
Dept: INTERPRETER SERVICES | Facility: CLINIC | Age: 35
End: 2020-02-18
Payer: MEDICAID

## 2020-02-18 VITALS
TEMPERATURE: 96.7 F | OXYGEN SATURATION: 97 % | RESPIRATION RATE: 16 BRPM | DIASTOLIC BLOOD PRESSURE: 85 MMHG | SYSTOLIC BLOOD PRESSURE: 101 MMHG | HEART RATE: 111 BPM

## 2020-02-18 DIAGNOSIS — R82.71 BACTERIURIA IN PREGNANCY: ICD-10-CM

## 2020-02-18 DIAGNOSIS — O99.891 BACTERIURIA IN PREGNANCY: ICD-10-CM

## 2020-02-18 DIAGNOSIS — Z34.90 EARLY STAGE OF PREGNANCY: ICD-10-CM

## 2020-02-18 DIAGNOSIS — K80.20 CALCULUS OF GALLBLADDER WITHOUT CHOLECYSTITIS WITHOUT OBSTRUCTION: ICD-10-CM

## 2020-02-18 DIAGNOSIS — R11.2 NAUSEA AND VOMITING, INTRACTABILITY OF VOMITING NOT SPECIFIED, UNSPECIFIED VOMITING TYPE: ICD-10-CM

## 2020-02-18 LAB
ALBUMIN SERPL-MCNC: 3.6 G/DL (ref 3.4–5)
ALBUMIN UR-MCNC: 30 MG/DL
ALP SERPL-CCNC: 84 U/L (ref 40–150)
ALT SERPL W P-5'-P-CCNC: 29 U/L (ref 0–50)
ANION GAP SERPL CALCULATED.3IONS-SCNC: 6 MMOL/L (ref 3–14)
APPEARANCE UR: ABNORMAL
AST SERPL W P-5'-P-CCNC: 22 U/L (ref 0–45)
BACTERIA #/AREA URNS HPF: ABNORMAL /HPF
BASOPHILS # BLD AUTO: 0 10E9/L (ref 0–0.2)
BASOPHILS NFR BLD AUTO: 0 %
BILIRUB SERPL-MCNC: 0.4 MG/DL (ref 0.2–1.3)
BILIRUB UR QL STRIP: NEGATIVE
BUN SERPL-MCNC: 12 MG/DL (ref 7–30)
CALCIUM SERPL-MCNC: 8.7 MG/DL (ref 8.5–10.1)
CHLORIDE SERPL-SCNC: 106 MMOL/L (ref 94–109)
CO2 SERPL-SCNC: 28 MMOL/L (ref 20–32)
COLOR UR AUTO: YELLOW
CREAT SERPL-MCNC: 0.52 MG/DL (ref 0.52–1.04)
DIFFERENTIAL METHOD BLD: NORMAL
EOSINOPHIL # BLD AUTO: 0.1 10E9/L (ref 0–0.7)
EOSINOPHIL NFR BLD AUTO: 1.5 %
ERYTHROCYTE [DISTWIDTH] IN BLOOD BY AUTOMATED COUNT: 14.5 % (ref 10–15)
GFR SERPL CREATININE-BSD FRML MDRD: >90 ML/MIN/{1.73_M2}
GLUCOSE SERPL-MCNC: 93 MG/DL (ref 70–99)
GLUCOSE UR STRIP-MCNC: NEGATIVE MG/DL
HCG UR QL: POSITIVE
HCT VFR BLD AUTO: 41.8 % (ref 35–47)
HGB BLD-MCNC: 13.3 G/DL (ref 11.7–15.7)
HGB UR QL STRIP: NEGATIVE
IMM GRANULOCYTES # BLD: 0.1 10E9/L (ref 0–0.4)
IMM GRANULOCYTES NFR BLD: 0.7 %
INTERNAL QC OK POCT: YES
KETONES UR STRIP-MCNC: 5 MG/DL
LEUKOCYTE ESTERASE UR QL STRIP: ABNORMAL
LIPASE SERPL-CCNC: 144 U/L (ref 73–393)
LYMPHOCYTES # BLD AUTO: 1.4 10E9/L (ref 0.8–5.3)
LYMPHOCYTES NFR BLD AUTO: 20.7 %
MAGNESIUM SERPL-MCNC: 2.2 MG/DL (ref 1.6–2.3)
MCH RBC QN AUTO: 27.8 PG (ref 26.5–33)
MCHC RBC AUTO-ENTMCNC: 31.8 G/DL (ref 31.5–36.5)
MCV RBC AUTO: 87 FL (ref 78–100)
MONOCYTES # BLD AUTO: 0.3 10E9/L (ref 0–1.3)
MONOCYTES NFR BLD AUTO: 4.9 %
MUCOUS THREADS #/AREA URNS LPF: PRESENT /LPF
NEUTROPHILS # BLD AUTO: 4.8 10E9/L (ref 1.6–8.3)
NEUTROPHILS NFR BLD AUTO: 72.2 %
NITRATE UR QL: NEGATIVE
NRBC # BLD AUTO: 0 10*3/UL
NRBC BLD AUTO-RTO: 0 /100
PH UR STRIP: 6.5 PH (ref 5–7)
PLATELET # BLD AUTO: 299 10E9/L (ref 150–450)
POTASSIUM SERPL-SCNC: 3.5 MMOL/L (ref 3.4–5.3)
PROT SERPL-MCNC: 7.5 G/DL (ref 6.8–8.8)
RBC # BLD AUTO: 4.78 10E12/L (ref 3.8–5.2)
RBC #/AREA URNS AUTO: 1 /HPF (ref 0–2)
SODIUM SERPL-SCNC: 140 MMOL/L (ref 133–144)
SOURCE: ABNORMAL
SP GR UR STRIP: 1.03 (ref 1–1.03)
SQUAMOUS #/AREA URNS AUTO: 12 /HPF (ref 0–1)
UROBILINOGEN UR STRIP-MCNC: 4 MG/DL (ref 0–2)
WBC # BLD AUTO: 6.7 10E9/L (ref 4–11)
WBC #/AREA URNS AUTO: 24 /HPF (ref 0–5)

## 2020-02-18 PROCEDURE — 80053 COMPREHEN METABOLIC PANEL: CPT | Performed by: EMERGENCY MEDICINE

## 2020-02-18 PROCEDURE — 99284 EMERGENCY DEPT VISIT MOD MDM: CPT | Mod: Z6 | Performed by: EMERGENCY MEDICINE

## 2020-02-18 PROCEDURE — 81001 URINALYSIS AUTO W/SCOPE: CPT | Performed by: EMERGENCY MEDICINE

## 2020-02-18 PROCEDURE — T1013 SIGN LANG/ORAL INTERPRETER: HCPCS | Mod: U3

## 2020-02-18 PROCEDURE — 99285 EMERGENCY DEPT VISIT HI MDM: CPT | Mod: 25 | Performed by: EMERGENCY MEDICINE

## 2020-02-18 PROCEDURE — 76705 ECHO EXAM OF ABDOMEN: CPT

## 2020-02-18 PROCEDURE — 85025 COMPLETE CBC W/AUTO DIFF WBC: CPT | Performed by: EMERGENCY MEDICINE

## 2020-02-18 PROCEDURE — 25000128 H RX IP 250 OP 636: Performed by: EMERGENCY MEDICINE

## 2020-02-18 PROCEDURE — 83735 ASSAY OF MAGNESIUM: CPT | Performed by: EMERGENCY MEDICINE

## 2020-02-18 PROCEDURE — 96374 THER/PROPH/DIAG INJ IV PUSH: CPT | Performed by: EMERGENCY MEDICINE

## 2020-02-18 PROCEDURE — 96361 HYDRATE IV INFUSION ADD-ON: CPT | Performed by: EMERGENCY MEDICINE

## 2020-02-18 PROCEDURE — 87086 URINE CULTURE/COLONY COUNT: CPT | Performed by: EMERGENCY MEDICINE

## 2020-02-18 PROCEDURE — 25800030 ZZH RX IP 258 OP 636: Performed by: EMERGENCY MEDICINE

## 2020-02-18 PROCEDURE — 96375 TX/PRO/DX INJ NEW DRUG ADDON: CPT | Performed by: EMERGENCY MEDICINE

## 2020-02-18 PROCEDURE — 81025 URINE PREGNANCY TEST: CPT | Performed by: FAMILY MEDICINE

## 2020-02-18 PROCEDURE — 76817 TRANSVAGINAL US OBSTETRIC: CPT

## 2020-02-18 PROCEDURE — 83690 ASSAY OF LIPASE: CPT | Performed by: EMERGENCY MEDICINE

## 2020-02-18 RX ORDER — METOCLOPRAMIDE 10 MG/1
10 TABLET ORAL 4 TIMES DAILY PRN
Qty: 30 TABLET | Refills: 0 | Status: SHIPPED | OUTPATIENT
Start: 2020-02-18 | End: 2020-02-26

## 2020-02-18 RX ORDER — CEPHALEXIN 500 MG/1
500 CAPSULE ORAL 2 TIMES DAILY
Qty: 14 CAPSULE | Refills: 0 | Status: SHIPPED | OUTPATIENT
Start: 2020-02-18 | End: 2020-02-26

## 2020-02-18 RX ORDER — METOCLOPRAMIDE HYDROCHLORIDE 5 MG/ML
10 INJECTION INTRAMUSCULAR; INTRAVENOUS ONCE
Status: COMPLETED | OUTPATIENT
Start: 2020-02-18 | End: 2020-02-18

## 2020-02-18 RX ORDER — PYRIDOXINE HCL (VITAMIN B6) 25 MG
25 TABLET ORAL DAILY
Qty: 30 TABLET | Refills: 0 | Status: SHIPPED | OUTPATIENT
Start: 2020-02-18 | End: 2020-02-26

## 2020-02-18 RX ORDER — DIPHENHYDRAMINE HYDROCHLORIDE 50 MG/ML
25 INJECTION INTRAMUSCULAR; INTRAVENOUS ONCE
Status: COMPLETED | OUTPATIENT
Start: 2020-02-18 | End: 2020-02-18

## 2020-02-18 RX ORDER — SODIUM CHLORIDE 9 MG/ML
1000 INJECTION, SOLUTION INTRAVENOUS CONTINUOUS
Status: DISCONTINUED | OUTPATIENT
Start: 2020-02-18 | End: 2020-02-18 | Stop reason: HOSPADM

## 2020-02-18 RX ADMIN — SODIUM CHLORIDE 1000 ML: 9 INJECTION, SOLUTION INTRAVENOUS at 16:56

## 2020-02-18 RX ADMIN — SODIUM CHLORIDE 1000 ML: 9 INJECTION, SOLUTION INTRAVENOUS at 19:06

## 2020-02-18 RX ADMIN — DIPHENHYDRAMINE HYDROCHLORIDE 25 MG: 50 INJECTION, SOLUTION INTRAMUSCULAR; INTRAVENOUS at 16:56

## 2020-02-18 RX ADMIN — METOCLOPRAMIDE 10 MG: 5 INJECTION, SOLUTION INTRAMUSCULAR; INTRAVENOUS at 16:56

## 2020-02-18 NOTE — ED PROVIDER NOTES
[facility] EMERGENCY DEPARTMENT ([facility])    20        History     Chief Complaint   Patient presents with     Emesis During Pregnancy     pregnant, not tolerating much po for past 7 days. LMP      The history is provided by the patient and medical records. A  was used (Official i-Pad ).     Dai Guadarrama is a 34 year old  female who is 7w4d pregnant by LMP of 2019 who presents here to the Emergency Department due to nausea, vomiting and epigastric abdominal pain. Patient reports that she has had nausea her entire pregnancy that has since worsened 3 days ago. Reports that she had 10 episodes of emesis today. She states that when she is eating/drinking her symptoms worsen. Also endorses epigastric abdominal pain and upper/lower abdominal pain. Denies radiation to her shoulders. Patient still has her gallbladder and her only abdominal surgeries have been C-sections. She is not currently on any nausea medications at home. Denies hematuria, vaginal bleeding, chest pain, shortness of breath or fevers. Denies daily medication use.    I have reviewed the Medications, Allergies, Past Medical and Surgical History, and Social History in the [employer] system.    Past Medical History:   Diagnosis Date     Aneurysm (H)      Migraine headache without aura      Pregnancy related nausea and vomiting, antepartum 2016    two ED visits.     Recurrent pregnancy loss (CODE)      Retained placenta 2014     Stillbirth 2014    32 weeks     Tachycardia        Past Surgical History:   Procedure Laterality Date     C EACH ADD TOOTH EXTRACTION      bad reaction to anesthia for local       SECTION N/A 2018    Procedure:  SECTION;  Surgeon: Noreen Harris MD;  Location:  L+D     NO HISTORY OF SURGERY         Family History   Problem Relation Age of Onset     Anxiety Disorder No family hx of      Mental Illness No family hx of      Substance  Abuse No family hx of      Anesthesia Reaction No family hx of      Asthma No family hx of      Osteoporosis No family hx of      Genetic Disorder No family hx of      Thyroid Disease No family hx of      Hyperlipidemia No family hx of      Cerebrovascular Disease No family hx of      Breast Cancer No family hx of      Colon Cancer No family hx of      Prostate Cancer No family hx of      Other Cancer No family hx of      Depression No family hx of      Diabetes No family hx of      Coronary Artery Disease No family hx of      Hypertension No family hx of        Social History     Tobacco Use     Smoking status: Never Smoker     Smokeless tobacco: Never Used   Substance Use Topics     Alcohol use: Yes     Alcohol/week: 0.0 standard drinks     Comment: rare alcohol use now nothing in pregnancy       Current Facility-Administered Medications   Medication     0.9% sodium chloride BOLUS    Followed by     sodium chloride 0.9% infusion     diphenhydrAMINE (BENADRYL) injection 25 mg     metoclopramide (REGLAN) injection 10 mg     Current Outpatient Medications   Medication     doxylamine 25 MG PO TABS tablet     metoclopramide 10 MG PO tablet     pyridOXINE 25 MG PO tablet     ferrous sulfate (IRON) 325 (65 Fe) MG tablet      No Known Allergies    General: No fevers or chills  Skin: No rash or diaphoresis  Eyes: No eye redness or discharge  Ears/Nose/Throat: No rhinorrhea or nasal congestion  Respiratory: No cough or SOB  Cardiovascular: No chest pain or palpitations  Gastrointestinal: No diarrhea. Positive for nausea, vomiting, and abdominal pain  Genitourinary: No urinary frequency, hematuria, or dysuria. No vaginal bleeding.  Musculoskeletal: No arthralgias or myalgias  Neurologic: No numbness or weakness  Psychiatric: No depression or SI  Hematologic/Lymphatic/Immunologic: No leg swelling, no easy bruising/bleeding  Endocrine: No polyuria/polydypsia      Physical Exam   BP: 107/72  Pulse: 111  Temp: 96.7  F (35.9   C)  Resp: 16  SpO2: 97 %      General: Well nourished, well developed, NAD  HEENT: EOMI, anicteric. NCAT, MMM  Neck: no jugular venous distension, supple, nl ROM  Cardiac: Regular rate and rhythm. No murmurs, rubs, or gallops. Normal S1, S2.  Intact peripheral pulses  Pulm: CTAB, no stridor, wheezes, rales, rhonchi  Abd: Soft, TTP in RUQ and epigastric region w/o rebound or guarding, nondistended.  No masses palpated.    Skin: Warm and dry to the touch.  No rash  Extremities: No LE edema, no cyanosis, w/w/p  Neuro: A&Ox3, no gross focal deficits    ED Course        Procedures                           Labs Ordered and Resulted from Time of ED Arrival Up to the Time of Departure from the ED   UA MACROSCOPIC WITH REFLEX TO MICRO AND CULTURE - Abnormal; Notable for the following components:       Result Value    Ketones Urine 5 (*)     Protein Albumin Urine 30 (*)     Urobilinogen mg/dL 4.0 (*)     Leukocyte Esterase Urine Large (*)     WBC Urine 24 (*)     Bacteria Urine Few (*)     Squamous Epithelial /HPF Urine 12 (*)     Mucous Urine Present (*)     All other components within normal limits   HCG QUAL URINE POCT - Abnormal   CBC WITH PLATELETS DIFFERENTIAL   COMPREHENSIVE METABOLIC PANEL   LIPASE   MAGNESIUM          Results for orders placed or performed during the hospital encounter of 02/18/20 (from the past 24 hour(s))   UA reflex to Microscopic and Culture   Result Value Ref Range    Color Urine Yellow     Appearance Urine Slightly Cloudy     Glucose Urine Negative NEG^Negative mg/dL    Bilirubin Urine Negative NEG^Negative    Ketones Urine 5 (A) NEG^Negative mg/dL    Specific Gravity Urine 1.030 1.003 - 1.035    Blood Urine Negative NEG^Negative    pH Urine 6.5 5.0 - 7.0 pH    Protein Albumin Urine 30 (A) NEG^Negative mg/dL    Urobilinogen mg/dL 4.0 (H) 0.0 - 2.0 mg/dL    Nitrite Urine Negative NEG^Negative    Leukocyte Esterase Urine Large (A) NEG^Negative    Source Midstream Urine     RBC Urine 1 0 - 2  /HPF    WBC Urine 24 (H) 0 - 5 /HPF    Bacteria Urine Few (A) NEG^Negative /HPF    Squamous Epithelial /HPF Urine 12 (H) 0 - 1 /HPF    Mucous Urine Present (A) NEG^Negative /LPF   CBC with platelets differential   Result Value Ref Range    WBC 6.7 4.0 - 11.0 10e9/L    RBC Count 4.78 3.8 - 5.2 10e12/L    Hemoglobin 13.3 11.7 - 15.7 g/dL    Hematocrit 41.8 35.0 - 47.0 %    MCV 87 78 - 100 fl    MCH 27.8 26.5 - 33.0 pg    MCHC 31.8 31.5 - 36.5 g/dL    RDW 14.5 10.0 - 15.0 %    Platelet Count 299 150 - 450 10e9/L    Diff Method Automated Method     % Neutrophils 72.2 %    % Lymphocytes 20.7 %    % Monocytes 4.9 %    % Eosinophils 1.5 %    % Basophils 0.0 %    % Immature Granulocytes 0.7 %    Nucleated RBCs 0 0 /100    Absolute Neutrophil 4.8 1.6 - 8.3 10e9/L    Absolute Lymphocytes 1.4 0.8 - 5.3 10e9/L    Absolute Monocytes 0.3 0.0 - 1.3 10e9/L    Absolute Eosinophils 0.1 0.0 - 0.7 10e9/L    Absolute Basophils 0.0 0.0 - 0.2 10e9/L    Abs Immature Granulocytes 0.1 0 - 0.4 10e9/L    Absolute Nucleated RBC 0.0    Comprehensive metabolic panel   Result Value Ref Range    Sodium 140 133 - 144 mmol/L    Potassium 3.5 3.4 - 5.3 mmol/L    Chloride 106 94 - 109 mmol/L    Carbon Dioxide 28 20 - 32 mmol/L    Anion Gap 6 3 - 14 mmol/L    Glucose 93 70 - 99 mg/dL    Urea Nitrogen 12 7 - 30 mg/dL    Creatinine 0.52 0.52 - 1.04 mg/dL    GFR Estimate >90 >60 mL/min/[1.73_m2]    GFR Estimate If Black >90 >60 mL/min/[1.73_m2]    Calcium 8.7 8.5 - 10.1 mg/dL    Bilirubin Total 0.4 0.2 - 1.3 mg/dL    Albumin 3.6 3.4 - 5.0 g/dL    Protein Total 7.5 6.8 - 8.8 g/dL    Alkaline Phosphatase 84 40 - 150 U/L    ALT 29 0 - 50 U/L    AST 22 0 - 45 U/L   Lipase   Result Value Ref Range    Lipase 144 73 - 393 U/L   Magnesium   Result Value Ref Range    Magnesium 2.2 1.6 - 2.3 mg/dL   hCG qual urine POCT   Result Value Ref Range    HCG Qual Urine Positive neg    Internal QC OK Yes      Results for orders placed or performed during the hospital  encounter of 02/18/20 (from the past 24 hour(s))   Abdomen US, limited (RUQ only)    Narrative    US ABDOMEN LIMITED   2/18/2020 6:26 PM     HISTORY: Abdominal pain, nausea and vomiting.      COMPARISON: None available    FINDINGS:    Gallbladder: Cholelithiasis. No gallbladder wall thickening or  pericholecystic fluid. Sonographic Caban sign is negative.    Bile ducts:  CHD is normal diameter.  No intrahepatic biliary  dilatation. The distal portion of the common bile duct is obscured by  overlying bowel gas.    Liver: The liver demonstrates increased parenchymal echogenicity. No  focal hepatic mass.    Pancreas:  Partially obscured by overlying bowel gas,  but grossly  unremarkable.     Right kidney:  No hydronephrosis or shadowing calculus.    Aorta and IVC:  Not specifically assessed.      Impression    IMPRESSION:    1. Cholelithiasis without CT evidence of acute cholecystitis.  2. Hepatic steatosis. No focal hepatic mass.    TOY DUARTE MD   US OB <14 Weeks W Transvaginal    Narrative    OBSTETRIC ULTRASOUND LESS THAN 14 WEEKS WITH TRANSVAGINAL SINGLE    2/18/2020 6:28 PM    HISTORY: Abdominal pain in pregnancy.    FINDINGS: There is a gestational sac within the uterine fundus. No  yolk sac or fetal pole visualized, could be due to early gestation.  The mean sac diameter measures 1 cm, which corresponds to 5 weeks and  5 days gestation. The gestational sac is normal in shape.    There is no free fluid in the pelvis. The right ovary measures 3.2 x  2.4 x 1.9 cm. The left ovary measures 4.8 x 2.5 x 3.0 cm. There is  normal blood flow to the ovaries. There is 2.3 cm thick-walled cyst in  the left ovary, likely represents corpus luteal cyst. No subchorionic  hemorrhage or adnexal masses.      Impression    IMPRESSION:   1. There is single intrauterine gestational sac with mean sac diameter  of 1 cm, corresponds to 5 weeks and 5 days gestation. No yolk sac or  fetal pole visualized, could be due to early  gestation.  2. Ovaries are visualized and appear normal. No suspicious adnexal  mass.    TOY DUARTE MD       Labs, vital signs, and imaging studies were reviewed by me.    Assessments & Plan (with Medical Decision Making)   Dai Guadarrama is a 34 year old female who presents with abdominal pain, nausea, and vomiting. Ddx includes nausea and vomiting in pregnancy, cholelithiasis, cholecystitis, pancreatitis, gastritis, hepatitis. Labs, US ordered to evaluate the patient. IV fluids and reglan/benadryl ordered to alleviate pt's symptoms.     Labs are unremarkable aside from UA c/w UTI/bacteruria. As pt is pregnant, will plan to treat despite lack of urinary symptoms. UA also has ketones, c/w mild dehydration.     I have reviewed the nursing notes.    I have reviewed the findings, diagnosis, plan and need for follow up with the patient.    Pt signed out to Dr. Chiu. US pending. If negative, pt to be DC'd home to follow up with OCGYN and PCP. Pt to be provided with Rx for anti nausea medications as well as antibiotics for bacteruria.     New Prescriptions    CEPHALEXIN 500 MG PO CAPSULE    Take 1 capsule (500 mg) by mouth 2 times daily for 7 days    DOXYLAMINE 25 MG PO TABS TABLET    Take 1 tablet (25 mg) by mouth At Bedtime    METOCLOPRAMIDE 10 MG PO TABLET    Take 1 tablet (10 mg) by mouth 4 times daily as needed (nausea)    PYRIDOXINE 25 MG PO TABLET    Take 1 tablet (25 mg) by mouth daily       Final diagnoses:   Nausea and vomiting, intractability of vomiting not specified, unspecified vomiting type   Bacteriuria in pregnancy   Calculus of gallbladder without cholecystitis without obstruction   Early stage of pregnancy     IMehdi, am serving as a trained medical scribe to document services personally performed by Sierra Carney MD, based on the provider's statements to me.   I, Sierra Carney MD, was physically present and have reviewed and verified the accuracy of this note documented  by Mehdi Castellanos.    2/18/2020   [facility], EMERGENCY DEPARTMENT     Sierra Carney MD  02/19/20 4275

## 2020-02-18 NOTE — DISCHARGE INSTRUCTIONS
TODAY'S VISIT:  You were seen today for nausea, vomiting, and abdominal pain  -   - If you had any labs or imaging/radiology tests performed today, you should also discuss these tests with your usual provider.     FOLLOW-UP:  Please make an appointment to follow up with:  - Your Primary Care Provider. If you do not have a PCP, please call the Primary Care Center (phone: [phone] for an appointment  - your OBGYN as scheduled     - Have your provider review the results from today's visit with you again to make sure no further follow-up or additional testing is needed based on those results.     PRESCRIPTIONS / MEDICATIONS:  - vitamin B6  - doxylamine  - reglan  - keflex    OTHER INSTRUCTIONS:  - try eating a bland diet, avoid fatty foods, spicy foods, and acidic foods  (like indio, oranges, or tomato sauce)    RETURN TO THE EMERGENCY DEPARTMENT  Return to the Emergency Department at any time for any new or worsening symptoms or any concerns.

## 2020-02-18 NOTE — ED AVS SNAPSHOT
[facility], [place], Emergency Department  [address]  [place] [address]  Phone:  [phone]  Fax:  [phone]                                    Dai Guadarrama   MRN: [id number]    Department:  [facility], Emergency Department   Date of Visit:  2/18/2020           After Visit Summary Signature Page    I have received my discharge instructions, and my questions have been answered. I have discussed any challenges I see with this plan with the nurse or doctor.    ..........................................................................................................................................  Patient/Patient Representative Signature      ..........................................................................................................................................  Patient Representative Print Name and Relationship to Patient    ..................................................               ................................................  Date                                   Time    ..........................................................................................................................................  Reviewed by Signature/Title    ...................................................              ..............................................  Date                                               Time          22EPIC Rev 08/18

## 2020-02-19 NOTE — ED NOTES
Patient left without waiting for her copy of of discharge instruction. The printer was not working and was being fix. Discharge instruction was discussed with the patient using the computer in pt's room and with the . Patient received her prescriptions but can not wait for the printed discharge instructions. She left with out signing the AVS.

## 2020-02-19 NOTE — ED NOTES
Patient was signed out to me at change of shift at 1800 by Dr. Carney , please see her note for full details.    Briefly, this is a 34 year-old female who presented to the ED with n/v in early pregnancy.     Labs are unremarkable.    At time of signout the following was pending:   Labs: none  Imaging studies: pelvic US, limited abd US  Consults: none  Expected disposition: home after PO challenge  Reassessment of patient: feeling improved, able to tolerate PO challenge    Diagnosis: n/v in pregnancy, abnormal UA, cholelithiasis    Disposition:home    Follow-up recommended: encouraged patient to f/u with primary or OB clinic for short term US follow up    Results for orders placed or performed during the hospital encounter of 02/18/20   Abdomen US, limited (RUQ only)     Status: None    Narrative    US ABDOMEN LIMITED   2/18/2020 6:26 PM     HISTORY: Abdominal pain, nausea and vomiting.      COMPARISON: None available    FINDINGS:    Gallbladder: Cholelithiasis. No gallbladder wall thickening or  pericholecystic fluid. Sonographic Caban sign is negative.    Bile ducts:  CHD is normal diameter.  No intrahepatic biliary  dilatation. The distal portion of the common bile duct is obscured by  overlying bowel gas.    Liver: The liver demonstrates increased parenchymal echogenicity. No  focal hepatic mass.    Pancreas:  Partially obscured by overlying bowel gas,  but grossly  unremarkable.     Right kidney:  No hydronephrosis or shadowing calculus.    Aorta and IVC:  Not specifically assessed.      Impression    IMPRESSION:    1. Cholelithiasis without CT evidence of acute cholecystitis.  2. Hepatic steatosis. No focal hepatic mass.    TOY DUARTE MD   US OB <14 Weeks W Transvaginal     Status: None    Narrative    OBSTETRIC ULTRASOUND LESS THAN 14 WEEKS WITH TRANSVAGINAL SINGLE    2/18/2020 6:28 PM    HISTORY: Abdominal pain in pregnancy.    FINDINGS: There is a gestational sac within the uterine fundus. No  yolk  sac or fetal pole visualized, could be due to early gestation.  The mean sac diameter measures 1 cm, which corresponds to 5 weeks and  5 days gestation. The gestational sac is normal in shape.    There is no free fluid in the pelvis. The right ovary measures 3.2 x  2.4 x 1.9 cm. The left ovary measures 4.8 x 2.5 x 3.0 cm. There is  normal blood flow to the ovaries. There is 2.3 cm thick-walled cyst in  the left ovary, likely represents corpus luteal cyst. No subchorionic  hemorrhage or adnexal masses.      Impression    IMPRESSION:   1. There is single intrauterine gestational sac with mean sac diameter  of 1 cm, corresponds to 5 weeks and 5 days gestation. No yolk sac or  fetal pole visualized, could be due to early gestation.  2. Ovaries are visualized and appear normal. No suspicious adnexal  mass.    TOY DUARTE MD   CBC with platelets differential     Status: None   Result Value Ref Range    WBC 6.7 4.0 - 11.0 10e9/L    RBC Count 4.78 3.8 - 5.2 10e12/L    Hemoglobin 13.3 11.7 - 15.7 g/dL    Hematocrit 41.8 35.0 - 47.0 %    MCV 87 78 - 100 fl    MCH 27.8 26.5 - 33.0 pg    MCHC 31.8 31.5 - 36.5 g/dL    RDW 14.5 10.0 - 15.0 %    Platelet Count 299 150 - 450 10e9/L    Diff Method Automated Method     % Neutrophils 72.2 %    % Lymphocytes 20.7 %    % Monocytes 4.9 %    % Eosinophils 1.5 %    % Basophils 0.0 %    % Immature Granulocytes 0.7 %    Nucleated RBCs 0 0 /100    Absolute Neutrophil 4.8 1.6 - 8.3 10e9/L    Absolute Lymphocytes 1.4 0.8 - 5.3 10e9/L    Absolute Monocytes 0.3 0.0 - 1.3 10e9/L    Absolute Eosinophils 0.1 0.0 - 0.7 10e9/L    Absolute Basophils 0.0 0.0 - 0.2 10e9/L    Abs Immature Granulocytes 0.1 0 - 0.4 10e9/L    Absolute Nucleated RBC 0.0    Comprehensive metabolic panel     Status: None   Result Value Ref Range    Sodium 140 133 - 144 mmol/L    Potassium 3.5 3.4 - 5.3 mmol/L    Chloride 106 94 - 109 mmol/L    Carbon Dioxide 28 20 - 32 mmol/L    Anion Gap 6 3 - 14 mmol/L    Glucose 93 70  - 99 mg/dL    Urea Nitrogen 12 7 - 30 mg/dL    Creatinine 0.52 0.52 - 1.04 mg/dL    GFR Estimate >90 >60 mL/min/[1.73_m2]    GFR Estimate If Black >90 >60 mL/min/[1.73_m2]    Calcium 8.7 8.5 - 10.1 mg/dL    Bilirubin Total 0.4 0.2 - 1.3 mg/dL    Albumin 3.6 3.4 - 5.0 g/dL    Protein Total 7.5 6.8 - 8.8 g/dL    Alkaline Phosphatase 84 40 - 150 U/L    ALT 29 0 - 50 U/L    AST 22 0 - 45 U/L   Lipase     Status: None   Result Value Ref Range    Lipase 144 73 - 393 U/L   Magnesium     Status: None   Result Value Ref Range    Magnesium 2.2 1.6 - 2.3 mg/dL   UA reflex to Microscopic and Culture     Status: Abnormal   Result Value Ref Range    Color Urine Yellow     Appearance Urine Slightly Cloudy     Glucose Urine Negative NEG^Negative mg/dL    Bilirubin Urine Negative NEG^Negative    Ketones Urine 5 (A) NEG^Negative mg/dL    Specific Gravity Urine 1.030 1.003 - 1.035    Blood Urine Negative NEG^Negative    pH Urine 6.5 5.0 - 7.0 pH    Protein Albumin Urine 30 (A) NEG^Negative mg/dL    Urobilinogen mg/dL 4.0 (H) 0.0 - 2.0 mg/dL    Nitrite Urine Negative NEG^Negative    Leukocyte Esterase Urine Large (A) NEG^Negative    Source Midstream Urine     RBC Urine 1 0 - 2 /HPF    WBC Urine 24 (H) 0 - 5 /HPF    Bacteria Urine Few (A) NEG^Negative /HPF    Squamous Epithelial /HPF Urine 12 (H) 0 - 1 /HPF    Mucous Urine Present (A) NEG^Negative /LPF   hCG qual urine POCT     Status: Abnormal   Result Value Ref Range    HCG Qual Urine Positive neg    Internal QC OK Yes      Abdominal ultrasound shows evidence of cholelithiasis without evidence of cholecystitis or obstruction.  Labs are unremarkable.  OB ultrasound shows an intrauterine gestational sac corresponding to 5 weeks 5 days.  There is no yolk sac or fetal pole visualized possibly due to early gestation.    She does have potential evidence of a urinary tract infection, will be treated with Keflex per Dr. Myrick.  Dr. Jimenez also did write prescriptions for pyridoxine and  Unisom to help with nausea and vomiting.    Patient was advised to follow-up with her OB clinic.  She has cholelithiasis without cholecystitis.  Warning signs were discussed with her including uncontrolled pain, uncontrolled nausea and vomiting, fever, or yellowing of the eyes or skin in which case she should come back to the emergency department for reevaluation.  With regard to no yolk sac visualized on the embryo, it is possible that she is simply too early.  Recommend short-term follow-up ultrasound through her clinic.  Patient verbalizes understanding.     Anna Chiu MD  02/18/20 2018

## 2020-02-20 LAB
BACTERIA SPEC CULT: NORMAL
Lab: NORMAL
SPECIMEN SOURCE: NORMAL

## 2020-02-20 NOTE — RESULT ENCOUNTER NOTE
Final urine culture report is NEGATIVE per [facility] Lab Result protocol.    If NEGATIVE result, no change in treatment, per [facility] Lab Result protocol.

## 2020-02-26 ENCOUNTER — OFFICE VISIT (OUTPATIENT)
Dept: OBGYN | Facility: CLINIC | Age: 35
End: 2020-02-26
Attending: ADVANCED PRACTICE MIDWIFE
Payer: MEDICAID

## 2020-02-26 ENCOUNTER — HOSPITAL ENCOUNTER (EMERGENCY)
Facility: CLINIC | Age: 35
Discharge: HOME | End: 2020-02-26
Attending: EMERGENCY MEDICINE | Admitting: EMERGENCY MEDICINE
Payer: MEDICAID

## 2020-02-26 ENCOUNTER — OFFICE VISIT (OUTPATIENT)
Dept: INTERPRETER SERVICES | Facility: CLINIC | Age: 35
End: 2020-02-26
Payer: MEDICAID

## 2020-02-26 ENCOUNTER — APPOINTMENT (OUTPATIENT)
Dept: ULTRASOUND IMAGING | Facility: CLINIC | Age: 35
End: 2020-02-26
Attending: EMERGENCY MEDICINE
Payer: MEDICAID

## 2020-02-26 ENCOUNTER — ANCILLARY PROCEDURE (OUTPATIENT)
Dept: ULTRASOUND IMAGING | Facility: CLINIC | Age: 35
End: 2020-02-26
Attending: OBSTETRICS & GYNECOLOGY
Payer: MEDICAID

## 2020-02-26 VITALS
TEMPERATURE: 97.3 F | RESPIRATION RATE: 16 BRPM | BODY MASS INDEX: 41.66 KG/M2 | SYSTOLIC BLOOD PRESSURE: 100 MMHG | WEIGHT: 220.5 LBS | HEART RATE: 73 BPM | DIASTOLIC BLOOD PRESSURE: 61 MMHG | OXYGEN SATURATION: 100 %

## 2020-02-26 VITALS
HEIGHT: 61 IN | DIASTOLIC BLOOD PRESSURE: 71 MMHG | BODY MASS INDEX: 41.52 KG/M2 | HEART RATE: 92 BPM | WEIGHT: 219.9 LBS | SYSTOLIC BLOOD PRESSURE: 105 MMHG

## 2020-02-26 DIAGNOSIS — Z98.891 S/P CESAREAN SECTION: ICD-10-CM

## 2020-02-26 DIAGNOSIS — N39.0 URINARY TRACT INFECTION WITHOUT HEMATURIA, SITE UNSPECIFIED: ICD-10-CM

## 2020-02-26 DIAGNOSIS — O21.9 NAUSEA AND VOMITING IN PREGNANCY: ICD-10-CM

## 2020-02-26 DIAGNOSIS — E66.01 MORBID OBESITY DUE TO EXCESS CALORIES (H): ICD-10-CM

## 2020-02-26 DIAGNOSIS — Z34.90 EARLY STAGE OF PREGNANCY: ICD-10-CM

## 2020-02-26 DIAGNOSIS — R10.9 LEFT FLANK PAIN: ICD-10-CM

## 2020-02-26 DIAGNOSIS — I72.9 ANEURYSM (H): ICD-10-CM

## 2020-02-26 DIAGNOSIS — O09.299 HISTORY OF INTRAUTERINE FETAL DEATH, CURRENTLY PREGNANT: ICD-10-CM

## 2020-02-26 DIAGNOSIS — O36.71X0 DELIVERY OF VIABLE FETUS DURING ABDOMINAL PREGNANCY IN FIRST TRIMESTER, SINGLE OR UNSPECIFIED FETUS: ICD-10-CM

## 2020-02-26 DIAGNOSIS — O09.529 HIGH-RISK PREGNANCY, MULTIGRAVIDA OF ADVANCED MATERNAL AGE, ANTEPARTUM: Primary | ICD-10-CM

## 2020-02-26 DIAGNOSIS — O09.299 HISTORY OF MISCARRIAGE, CURRENTLY PREGNANT: ICD-10-CM

## 2020-02-26 PROBLEM — B95.1 GROUP B STREPTOCOCCUS URINARY TRACT INFECTION AFFECTING PREGNANCY IN FIRST TRIMESTER: Status: RESOLVED | Noted: 2018-05-24 | Resolved: 2020-02-26

## 2020-02-26 PROBLEM — Z36.89 ENCOUNTER FOR TRIAGE IN PREGNANT PATIENT: Status: RESOLVED | Noted: 2018-10-18 | Resolved: 2020-02-26

## 2020-02-26 PROBLEM — R73.09 ELEVATED HEMOGLOBIN A1C: Status: ACTIVE | Noted: 2020-02-26

## 2020-02-26 PROBLEM — O23.41 GROUP B STREPTOCOCCUS URINARY TRACT INFECTION AFFECTING PREGNANCY IN FIRST TRIMESTER: Status: RESOLVED | Noted: 2018-05-24 | Resolved: 2020-02-26

## 2020-02-26 LAB
ABO + RH BLD: NORMAL
ABO + RH BLD: NORMAL
ALBUMIN SERPL-MCNC: 3.8 G/DL (ref 3.4–5)
ALBUMIN UR-MCNC: 30 MG/DL
ALP SERPL-CCNC: 86 U/L (ref 40–150)
ALT SERPL W P-5'-P-CCNC: 38 U/L (ref 0–50)
ANION GAP SERPL CALCULATED.3IONS-SCNC: 7 MMOL/L (ref 3–14)
APPEARANCE UR: ABNORMAL
AST SERPL W P-5'-P-CCNC: 14 U/L (ref 0–45)
BACTERIA #/AREA URNS HPF: ABNORMAL /HPF
BASOPHILS # BLD AUTO: 0 10E9/L (ref 0–0.2)
BASOPHILS NFR BLD AUTO: 0.1 %
BILIRUB SERPL-MCNC: 0.3 MG/DL (ref 0.2–1.3)
BILIRUB UR QL STRIP: NEGATIVE
BLD GP AB SCN SERPL QL: NORMAL
BLOOD BANK CMNT PATIENT-IMP: NORMAL
BUN SERPL-MCNC: 9 MG/DL (ref 7–30)
CALCIUM SERPL-MCNC: 8.9 MG/DL (ref 8.5–10.1)
CHLORIDE SERPL-SCNC: 105 MMOL/L (ref 94–109)
CO2 SERPL-SCNC: 25 MMOL/L (ref 20–32)
COLOR UR AUTO: YELLOW
CREAT SERPL-MCNC: 0.49 MG/DL (ref 0.52–1.04)
DIFFERENTIAL METHOD BLD: ABNORMAL
EOSINOPHIL # BLD AUTO: 0.1 10E9/L (ref 0–0.7)
EOSINOPHIL NFR BLD AUTO: 0.5 %
ERYTHROCYTE [DISTWIDTH] IN BLOOD BY AUTOMATED COUNT: 14.1 % (ref 10–15)
GFR SERPL CREATININE-BSD FRML MDRD: >90 ML/MIN/{1.73_M2}
GLUCOSE SERPL-MCNC: 87 MG/DL (ref 70–99)
GLUCOSE UR STRIP-MCNC: NEGATIVE MG/DL
HBA1C MFR BLD: 5.7 % (ref 0–5.6)
HCT VFR BLD AUTO: 40.9 % (ref 35–47)
HGB BLD-MCNC: 13.4 G/DL (ref 11.7–15.7)
HGB UR QL STRIP: ABNORMAL
HYALINE CASTS #/AREA URNS LPF: 2 /LPF (ref 0–2)
IMM GRANULOCYTES # BLD: 0 10E9/L (ref 0–0.4)
IMM GRANULOCYTES NFR BLD: 0.4 %
KETONES UR STRIP-MCNC: NEGATIVE MG/DL
LEUKOCYTE ESTERASE UR QL STRIP: ABNORMAL
LIPASE SERPL-CCNC: 175 U/L (ref 73–393)
LYMPHOCYTES # BLD AUTO: 2.4 10E9/L (ref 0.8–5.3)
LYMPHOCYTES NFR BLD AUTO: 21.4 %
MCH RBC QN AUTO: 28.4 PG (ref 26.5–33)
MCHC RBC AUTO-ENTMCNC: 32.8 G/DL (ref 31.5–36.5)
MCV RBC AUTO: 87 FL (ref 78–100)
MONOCYTES # BLD AUTO: 0.6 10E9/L (ref 0–1.3)
MONOCYTES NFR BLD AUTO: 5.5 %
MUCOUS THREADS #/AREA URNS LPF: PRESENT /LPF
NEUTROPHILS # BLD AUTO: 8.2 10E9/L (ref 1.6–8.3)
NEUTROPHILS NFR BLD AUTO: 72.1 %
NITRATE UR QL: NEGATIVE
NRBC # BLD AUTO: 0 10*3/UL
NRBC BLD AUTO-RTO: 0 /100
PH UR STRIP: 6 PH (ref 5–7)
PLATELET # BLD AUTO: 378 10E9/L (ref 150–450)
POTASSIUM SERPL-SCNC: 3.4 MMOL/L (ref 3.4–5.3)
PROT SERPL-MCNC: 7.8 G/DL (ref 6.8–8.8)
RBC # BLD AUTO: 4.72 10E12/L (ref 3.8–5.2)
RBC #/AREA URNS AUTO: 8 /HPF (ref 0–2)
SODIUM SERPL-SCNC: 137 MMOL/L (ref 133–144)
SOURCE: ABNORMAL
SP GR UR STRIP: 1.01 (ref 1–1.03)
SPECIMEN EXP DATE BLD: NORMAL
SQUAMOUS #/AREA URNS AUTO: 3 /HPF (ref 0–1)
TSH SERPL DL<=0.005 MIU/L-ACNC: 1.23 MU/L (ref 0.4–4)
UROBILINOGEN UR STRIP-MCNC: NORMAL MG/DL (ref 0–2)
WBC # BLD AUTO: 11.4 10E9/L (ref 4–11)
WBC #/AREA URNS AUTO: >182 /HPF (ref 0–5)
WBC CLUMPS #/AREA URNS HPF: PRESENT /HPF

## 2020-02-26 PROCEDURE — 80053 COMPREHEN METABOLIC PANEL: CPT | Performed by: EMERGENCY MEDICINE

## 2020-02-26 PROCEDURE — 86900 BLOOD TYPING SEROLOGIC ABO: CPT | Performed by: ADVANCED PRACTICE MIDWIFE

## 2020-02-26 PROCEDURE — 76770 US EXAM ABDO BACK WALL COMP: CPT

## 2020-02-26 PROCEDURE — 86762 RUBELLA ANTIBODY: CPT | Performed by: ADVANCED PRACTICE MIDWIFE

## 2020-02-26 PROCEDURE — 96361 HYDRATE IV INFUSION ADD-ON: CPT | Performed by: EMERGENCY MEDICINE

## 2020-02-26 PROCEDURE — 87086 URINE CULTURE/COLONY COUNT: CPT | Performed by: EMERGENCY MEDICINE

## 2020-02-26 PROCEDURE — 76817 TRANSVAGINAL US OBSTETRIC: CPT

## 2020-02-26 PROCEDURE — G0463 HOSPITAL OUTPT CLINIC VISIT: HCPCS | Mod: 25,ZF

## 2020-02-26 PROCEDURE — T1013 SIGN LANG/ORAL INTERPRETER: HCPCS | Mod: U3

## 2020-02-26 PROCEDURE — 84443 ASSAY THYROID STIM HORMONE: CPT | Performed by: EMERGENCY MEDICINE

## 2020-02-26 PROCEDURE — T1013 SIGN LANG/ORAL INTERPRETER: HCPCS | Mod: U3,ZF

## 2020-02-26 PROCEDURE — 87389 HIV-1 AG W/HIV-1&-2 AB AG IA: CPT | Performed by: ADVANCED PRACTICE MIDWIFE

## 2020-02-26 PROCEDURE — 87088 URINE BACTERIA CULTURE: CPT | Performed by: EMERGENCY MEDICINE

## 2020-02-26 PROCEDURE — 96374 THER/PROPH/DIAG INJ IV PUSH: CPT | Performed by: EMERGENCY MEDICINE

## 2020-02-26 PROCEDURE — 82306 VITAMIN D 25 HYDROXY: CPT | Performed by: ADVANCED PRACTICE MIDWIFE

## 2020-02-26 PROCEDURE — 86901 BLOOD TYPING SEROLOGIC RH(D): CPT | Performed by: ADVANCED PRACTICE MIDWIFE

## 2020-02-26 PROCEDURE — 81001 URINALYSIS AUTO W/SCOPE: CPT | Performed by: EMERGENCY MEDICINE

## 2020-02-26 PROCEDURE — 86706 HEP B SURFACE ANTIBODY: CPT | Performed by: ADVANCED PRACTICE MIDWIFE

## 2020-02-26 PROCEDURE — 86803 HEPATITIS C AB TEST: CPT | Performed by: ADVANCED PRACTICE MIDWIFE

## 2020-02-26 PROCEDURE — 83036 HEMOGLOBIN GLYCOSYLATED A1C: CPT | Performed by: ADVANCED PRACTICE MIDWIFE

## 2020-02-26 PROCEDURE — 25000128 H RX IP 250 OP 636: Performed by: EMERGENCY MEDICINE

## 2020-02-26 PROCEDURE — 86780 TREPONEMA PALLIDUM: CPT | Performed by: ADVANCED PRACTICE MIDWIFE

## 2020-02-26 PROCEDURE — 83690 ASSAY OF LIPASE: CPT | Performed by: EMERGENCY MEDICINE

## 2020-02-26 PROCEDURE — 87340 HEPATITIS B SURFACE AG IA: CPT | Performed by: ADVANCED PRACTICE MIDWIFE

## 2020-02-26 PROCEDURE — 85025 COMPLETE CBC W/AUTO DIFF WBC: CPT | Performed by: EMERGENCY MEDICINE

## 2020-02-26 PROCEDURE — 25800030 ZZH RX IP 258 OP 636: Performed by: EMERGENCY MEDICINE

## 2020-02-26 PROCEDURE — 86850 RBC ANTIBODY SCREEN: CPT | Performed by: ADVANCED PRACTICE MIDWIFE

## 2020-02-26 PROCEDURE — 99285 EMERGENCY DEPT VISIT HI MDM: CPT | Mod: 25 | Performed by: EMERGENCY MEDICINE

## 2020-02-26 PROCEDURE — 87186 SC STD MICRODIL/AGAR DIL: CPT | Performed by: EMERGENCY MEDICINE

## 2020-02-26 PROCEDURE — 86787 VARICELLA-ZOSTER ANTIBODY: CPT | Performed by: ADVANCED PRACTICE MIDWIFE

## 2020-02-26 PROCEDURE — 99284 EMERGENCY DEPT VISIT MOD MDM: CPT | Mod: Z6 | Performed by: EMERGENCY MEDICINE

## 2020-02-26 RX ORDER — ONDANSETRON 4 MG/1
4 TABLET, FILM COATED ORAL EVERY 8 HOURS PRN
Qty: 10 TABLET | Refills: 0 | Status: SHIPPED | OUTPATIENT
Start: 2020-02-26 | End: 2020-03-20

## 2020-02-26 RX ORDER — ONDANSETRON 2 MG/ML
4 INJECTION INTRAMUSCULAR; INTRAVENOUS ONCE
Status: CANCELLED | OUTPATIENT
Start: 2020-02-26

## 2020-02-26 RX ORDER — ONDANSETRON 4 MG/1
4 TABLET, ORALLY DISINTEGRATING ORAL EVERY 8 HOURS PRN
Qty: 60 TABLET | Refills: 3 | Status: SHIPPED | OUTPATIENT
Start: 2020-02-26 | End: 2020-09-21

## 2020-02-26 RX ORDER — PYRIDOXINE HCL (VITAMIN B6) 25 MG
25 TABLET ORAL EVERY 8 HOURS
Qty: 90 TABLET | Refills: 4 | Status: SHIPPED | OUTPATIENT
Start: 2020-02-26 | End: 2020-09-21

## 2020-02-26 RX ORDER — ONDANSETRON 2 MG/ML
4 INJECTION INTRAMUSCULAR; INTRAVENOUS ONCE
Status: COMPLETED | OUTPATIENT
Start: 2020-02-26 | End: 2020-02-26

## 2020-02-26 RX ORDER — HYDROCODONE BITARTRATE AND ACETAMINOPHEN 5; 325 MG/1; MG/1
1 TABLET ORAL EVERY 6 HOURS PRN
Qty: 10 TABLET | Refills: 0 | Status: SHIPPED | OUTPATIENT
Start: 2020-02-26 | End: 2020-03-20

## 2020-02-26 RX ORDER — NITROFURANTOIN 25; 75 MG/1; MG/1
100 CAPSULE ORAL 2 TIMES DAILY
Qty: 14 CAPSULE | Refills: 0 | Status: SHIPPED | OUTPATIENT
Start: 2020-02-26 | End: 2020-03-20

## 2020-02-26 RX ORDER — PNV NO.95/FERROUS FUM/FOLIC AC 28MG-0.8MG
TABLET ORAL
COMMUNITY
End: 2020-09-21

## 2020-02-26 RX ADMIN — ONDANSETRON 4 MG: 2 INJECTION INTRAMUSCULAR; INTRAVENOUS at 16:30

## 2020-02-26 RX ADMIN — SODIUM CHLORIDE 1000 ML: 9 INJECTION, SOLUTION INTRAVENOUS at 16:29

## 2020-02-26 ASSESSMENT — ENCOUNTER SYMPTOMS
NECK PAIN: 0
FEVER: 0
PSYCHIATRIC NEGATIVE: 1
DYSURIA: 0
HEADACHES: 0
DIFFICULTY URINATING: 0
FLANK PAIN: 1
NAUSEA: 1
EYES NEGATIVE: 1
SHORTNESS OF BREATH: 0
HEMATURIA: 0
ABDOMINAL PAIN: 0
VOMITING: 1

## 2020-02-26 ASSESSMENT — PAIN SCALES - GENERAL: PAINLEVEL: NO PAIN (0)

## 2020-02-26 ASSESSMENT — MIFFLIN-ST. JEOR: SCORE: 1624.84

## 2020-02-26 NOTE — NURSING NOTE
Chief Complaint   Patient presents with     Prenatal Care     PT c/o low back pain that radiates to abdomen.

## 2020-02-26 NOTE — DISCHARGE INSTRUCTIONS
Please make an appointment to follow up with Your Primary Care Provider in 7 days.   Take all your antibiotics.  Take Tylenol for pain and Norco for severe pain.  Do not drive while taking Norco.

## 2020-02-26 NOTE — PROGRESS NOTES
[facility] OB Intake note  Subjective   34 year old woman presents to clinic for initiation of OB care with her partner and two kids.  Communicating effectively with in person Samuel interpeter.  at 6w1d  by Estimated Date of Delivery: Oct 20, 2020 based on US confirms. Reviewed dating ultrasound - IUP with heartbeat after previous US on 20 that showed no fetal pole or yolk sac. Pregnancy is planned.      Symptoms since LMP include nausea, vomiting and fatigue. Patient has tried these relief measures: diet modification.    Pt stated she was too uncomfortable to continue with intake questioning.  Stated her nausea was too intense and her lower left abdominal pain was too intense.  Requested to stop and go to urgent care or ER for evaluation.  She is in agreement to follow up in 1 week for the remainder of her prenatal care initiation.  Consider repeat US given FHR.    Pt desires IV home infusions for persistent nausea and vomiting which was ordered and sent to RN team to assist with setting up.      - Co-morbids    Past Medical History:   Diagnosis Date     Aneurysm (H)      Migraine headache without aura      Pregnancy related nausea and vomiting, antepartum     two ED visits.     Recurrent pregnancy loss (CODE)      Retained placenta 2014     Stillbirth     32 weeks     Tachycardia      - Risk for GDM -  has Pre pregnancy BMI>30 will recommend an early GCT and Hgb A1C - pt declined to continue visit so has to discuss    - High Risk for Pre E-  Has No known risk factors of High risk for Pre E    - Moderate risk - has moderate risk factors for Pre E including Age =35 years or older , Pre Pregnancy body mass index >30  and Personal history factors (e.g., low birthweight or small for gestational age, previous adverse pregnancy outcome, >10-year pregnancy interval)   Since she meets two or more of the moderate risk facrtors for Pre E -   so WILL RECOMMEND starting low dose aspirin (81mg) starting  between 12 and 28 weeks to prevent early onset preeclampsia.    - The patient  does not have a history of spontaneous  birth so  WILL NOT consider progesterone starting at 16-20 weeks and/or serial transvaginal cervical length ultrasounds from 16-24 weeks.     -The patient does not have a history of immunosuppresion or HIV so Toxoplasma IgG/IgM WILL NOT be ordered.     PERSONAL/SOCIAL HISTORY  Here with a supportive partner    Objective  -VS: reviewed and within normal limits   -General appearance: Pt declined to continue her visit as she was too uncomfortable and felt like she needed medical attention aside from prenatal cares. Pt declined wheelchair assistance/transport.    NEUROLOGICAL/PSYCHIATRIC   - Orientated x3,   -Mood and affect: : normal     Assessment/Plan  34 year old  6w1d weeks of pregnancy with HORTENCIA of Oct 20, 2020 by US as > 5 day difference from  Patient's last menstrual period was 2019.. Ultrasound confirms.      Orders Placed This Encounter   Procedures     Hepatitis B surface antigen     HIV Antigen Antibody Combo     Rubella Antibody IgG Quantitative     Treponema Abs w Reflex to RPR and Titer     Hepatitis B Surface Antibody     Vitamin D Deficiency     Hemoglobin A1c     Hepatitis C antibody     TSH with free T4 reflex     Varicella Zoster Virus Antibody IgG     ABO/Rh type and screen         - Oriented to Practice, types of care, and how to reach a provider.  Will need to discuss MD vs CNM as pt declined to continue the intake  - Patient received 1st trimester new OB education packet complete with aide of The Expectant Family booklet including information on genetic screening test options.  - Patient will need to disucss options further at new OB visit as she declined to continue this intake  - Educational handout on the prevention of infections diseases during pregnancy provided.  - Patient was encouraged to start prenatal vitamins as tolerated.    - Patient declined to  have her lab work done, stopped visit to go to ER or urgent care  - At risk for diabetes in pregnancy, pt did not discuss early 1 hour today as she declined to continue visit, will need to discuss next visit.   - Need to review recommendation for low dose aspirin daily to prevent pre eclampsia as she declined to continue visit, will need to discuss next visit   - Pregnancy concerns to be addressed by provider at new OB exam include:   - f/u US for low FHR?   - previous C/S x1 - with previous vaginal birth  - Her  Body mass index is 41.55 kg/m . - need to discuss IOL and  testing recommendations  - Need to follow up with MFM consultation from 2016 per Dr. Quintero recommendation for growth US, and weekly BPPs at 32 wks  - need to discuss if needs follow up Neurology to be cleared again for Vaginal Delivery?    Pt to RTO for NOB visit/finish intake/follow up on medical cares in 1 week and prn if questions or concerns    CHANELLE MirelesM

## 2020-02-26 NOTE — LETTER
2020       RE: Dai Guadarrama  [address]  [facility] [address]     Dear Colleague,    Thank you for referring your patient, Dai Guadarrama, to the [facility] at [facility]. Please see a copy of my visit note below.          [facility] OB Intake note  Subjective   34 year old woman presents to clinic for initiation of OB care with her partner and two kids.  Communicating effectively with in person Kyrgyz interpeter.  at 6w1d  by Estimated Date of Delivery: Oct 20, 2020 based on US confirms. Reviewed dating ultrasound - IUP with heartbeat after previous US on 20 that showed no fetal pole or yolk sac. Pregnancy is planned.      Symptoms since LMP include nausea, vomiting and fatigue. Patient has tried these relief measures: diet modification.    Pt stated she was too uncomfortable to continue with intake questioning.  Stated her nausea was too intense and her lower left abdominal pain was too intense.  Requested to stop and go to urgent care or ER for evaluation.  She is in agreement to follow up in 1 week for the remainder of her prenatal care initiation.  Consider repeat US given FHR.    Pt desires IV home infusions for persistent nausea and vomiting which was ordered and sent to RN team to assist with setting up.      - Co-morbids    Past Medical History:   Diagnosis Date     Aneurysm (H)      Migraine headache without aura      Pregnancy related nausea and vomiting, antepartum     two ED visits.     Recurrent pregnancy loss (CODE)      Retained placenta      Stillbirth     32 weeks     Tachycardia      - Risk for GDM -  has Pre pregnancy BMI>30 will recommend an early GCT and Hgb A1C - pt declined to continue visit so has to discuss    - High Risk for Pre E-  Has No known risk factors of High risk for Pre E    - Moderate risk - has moderate risk factors for Pre E including Age =35 years or older , Pre  Pregnancy body mass index >30  and Personal history factors (e.g., low birthweight or small for gestational age, previous adverse pregnancy outcome, >10-year pregnancy interval)   Since she meets two or more of the moderate risk facrtors for Pre E -   so WILL RECOMMEND starting low dose aspirin (81mg) starting between 12 and 28 weeks to prevent early onset preeclampsia.    - The patient  does not have a history of spontaneous  birth so  WILL NOT consider progesterone starting at 16-20 weeks and/or serial transvaginal cervical length ultrasounds from 16-24 weeks.     -The patient does not have a history of immunosuppresion or HIV so Toxoplasma IgG/IgM WILL NOT be ordered.     PERSONAL/SOCIAL HISTORY  Here with a supportive partner    Objective  -VS: reviewed and within normal limits   -General appearance: Pt declined to continue her visit as she was too uncomfortable and felt like she needed medical attention aside from prenatal cares. Pt declined wheelchair assistance/transport.    NEUROLOGICAL/PSYCHIATRIC   - Orientated x3,   -Mood and affect: : normal     Assessment/Plan  34 year old  6w1d weeks of pregnancy with HORTENCIA of Oct 20, 2020 by US as > 5 day difference from  Patient's last menstrual period was 2019.. Ultrasound confirms.      Orders Placed This Encounter   Procedures     Hepatitis B surface antigen     HIV Antigen Antibody Combo     Rubella Antibody IgG Quantitative     Treponema Abs w Reflex to RPR and Titer     Hepatitis B Surface Antibody     Vitamin D Deficiency     Hemoglobin A1c     Hepatitis C antibody     TSH with free T4 reflex     Varicella Zoster Virus Antibody IgG     ABO/Rh type and screen         - Oriented to Practice, types of care, and how to reach a provider.  Will need to discuss MD vs CNM as pt declined to continue the intake  - Patient received 1st trimester new OB education packet complete with aide of The Expectant Family booklet including information on genetic  screening test options.  - Patient will need to disucss options further at new OB visit as she declined to continue this intake  - Educational handout on the prevention of infections diseases during pregnancy provided.  - Patient was encouraged to start prenatal vitamins as tolerated.    - Patient declined to have her lab work done, stopped visit to go to ER or urgent care  - At risk for diabetes in pregnancy, pt did not discuss early 1 hour today as she declined to continue visit, will need to discuss next visit.   - Need to review recommendation for low dose aspirin daily to prevent pre eclampsia as she declined to continue visit, will need to discuss next visit   - Pregnancy concerns to be addressed by provider at new OB exam include:   - f/u US for low FHR?   - previous C/S x1 - with previous vaginal birth  - Her  Body mass index is 41.55 kg/m . - need to discuss IOL and  testing recommendations  - Need to follow up with MFM consultation from  per Dr. Quintero recommendation for growth US, and weekly BPPs at 32 wks  - need to discuss if needs follow up Neurology to be cleared again for Vaginal Delivery?    Pt to RTO for NOB visit/finish intake/follow up on medical cares in 1 week and prn if questions or concerns    Umm Lopes, CHANELLE ARREGUINM

## 2020-02-26 NOTE — ED PROVIDER NOTES
[facility] EMERGENCY DEPARTMENT ([facility])    20       History     Chief Complaint   Patient presents with     Flank Pain     Left flank pain radiating to front lower abd, N/V since last night.  Dinies frequent or painful urination.  6 weeks pregnant     The history is provided by the patient. A  was used (Official In-Person ).     Dai Guadarrama is a 34 year old  female at 6w1d gestation who presents to the Emergency Department from the [facility] for evaluation of left flank pain.  The patient reports that her pain started last night, the pain has been constant and she describes the pain as a pulsating-type pain.  The patient reports that any kind of movement makes the pain worse and nothing makes the pain better.  The patient reports she had a similar kind of pain when she was 11 years old and she was diagnosed with a renal infection at that time.  The patient denies any urinary symptoms currently and she denies any fevers.  The patient does report that she has had nausea and vomiting, but she is unsure if this is related to the flank pain or her pregnancy.  The patient reports she has had 15 episodes of vomiting today and 15 episodes yesterday.  The patient reports her last bowel movement was this morning and was normal.  The patient was being seen at her OB clinic today to have an ultrasound done, she was sent here to the Emergency Department for further evaluation and management of her flank pain.    Social: Here with  and 2 children.    I have reviewed the Medications, Allergies, Past Medical and Surgical History, and Social History in the [facility] system.    Past Medical History:   Diagnosis Date     Aneurysm (H)      Migraine headache without aura      Pregnancy related nausea and vomiting, antepartum     two ED visits.     Recurrent pregnancy loss (CODE)      Retained placenta 2014     Stillbirth     32  weeks     Tachycardia        Past Surgical History:   Procedure Laterality Date     C EACH ADD TOOTH EXTRACTION      bad reaction to anesthia for local       SECTION N/A 2018    Procedure:  SECTION;  Surgeon: Noreen Harris MD;  Location: [facility] L+D       Family History   Problem Relation Age of Onset     Anxiety Disorder No family hx of      Mental Illness No family hx of      Substance Abuse No family hx of      Anesthesia Reaction No family hx of      Asthma No family hx of      Osteoporosis No family hx of      Genetic Disorder No family hx of      Thyroid Disease No family hx of      Hyperlipidemia No family hx of      Cerebrovascular Disease No family hx of      Breast Cancer No family hx of      Colon Cancer No family hx of      Prostate Cancer No family hx of      Other Cancer No family hx of      Depression No family hx of      Diabetes No family hx of      Coronary Artery Disease No family hx of      Hypertension No family hx of        Social History     Tobacco Use     Smoking status: Never Smoker     Smokeless tobacco: Never Used   Substance Use Topics     Alcohol use: Not Currently     Alcohol/week: 0.0 standard drinks     Comment: rare alcohol use now nothing in pregnancy       No current facility-administered medications for this encounter.      Current Outpatient Medications   Medication     HYDROcodone-acetaminophen (NORCO) 5-325 MG tablet     nitroFURantoin macrocrystal-monohydrate (MACROBID) 100 MG capsule     ondansetron (ZOFRAN) 4 MG tablet     doxylamine (UNISOM) 25 MG TABS tablet     ondansetron (ZOFRAN ODT) 4 MG ODT tab     Prenatal Vit-Fe Fumarate-FA (PRENATAL VITAMIN) 27-0.8 MG TABS     progesterone (PROMETRIUM) 200 MG capsule     pyridOXINE (VITAMIN B6) 25 MG tablet      No Known Allergies      Review of Systems   Constitutional: Negative for fever.   Eyes: Negative.    Respiratory: Negative for shortness of breath.    Cardiovascular: Negative for chest pain.    Gastrointestinal: Positive for nausea and vomiting. Negative for abdominal pain.   Genitourinary: Positive for flank pain (left). Negative for decreased urine volume, difficulty urinating, dysuria, hematuria and urgency.   Musculoskeletal: Negative for neck pain.   Skin: Negative for rash.   Neurological: Negative for headaches.   Psychiatric/Behavioral: Negative.    All other systems reviewed and are negative.      Physical Exam   BP: 112/72  Heart Rate: 93  Temp: 97.3  F (36.3  C)  Resp: 20  Weight: 100 kg (220 lb 8 oz)  SpO2: 99 %      Physical Exam  Musculoskeletal:        Back:        Physical Exam   Constitutional:   well nourished, well developed, appears uncomfortable  HENT:   Head: Normocephalic and atraumatic.   Eyes: Conjunctivae are normal. Pupils are equal, round, and reactive to light.   TMS are clear, pharynx has no erythema or exudate, mucous membranes are slightly dry  Neck:   no adenopathy, no bony tenderness  Cardiovascular: regular rate and rhythm without murmurs or gallops  Pulmonary/Chest: Clear to auscultation bilaterally, with no wheezes or retractions. No respiratory distress.  GI: Soft with good bowel sounds.  LUQ tenderness, non-distended, with no guarding, no rebound, no peritoneal signs.   Back:  Left CVA tenderness,No bony tenderness   Musculoskeletal:  no edema or clubbing   Skin: Skin is warm and dry.   Neurological: alert and oriented to person, place, and time. Nonfocal exam  Psychiatric:  normal mood and affect.   ED Course   4:00 PM  The patient was seen and examined by Sujey Simental MD in Room ED05.        Procedures                         Results for orders placed or performed during the hospital encounter of 02/26/20   US Renal Complete     Status: None (Preliminary result)    Narrative    ULTRASOUND RETROPERITONEAL COMPLETE 2/26/2020 4:49 PM     HISTORY: Left flank pain.    COMPARISON: February 18, 2020    FINDINGS: The bilateral renal parenchyma are unremarkable  in  echogenicity without evidence for shadowing stone or mass. No renal  cysts. No hydronephrosis. Right kidney measures 12.3 cm and the left  measures 12.6 cm. Cortical thickness is normal bilaterally. Bladder is  unremarkable given its level of distention.      Impression    IMPRESSION: Negative bilateral renal ultrasound.   UA reflex to Microscopic and Culture     Status: Abnormal   Result Value Ref Range    Color Urine Yellow     Appearance Urine Slightly Cloudy     Glucose Urine Negative NEG^Negative mg/dL    Bilirubin Urine Negative NEG^Negative    Ketones Urine Negative NEG^Negative mg/dL    Specific Gravity Urine 1.015 1.003 - 1.035    Blood Urine Small (A) NEG^Negative    pH Urine 6.0 5.0 - 7.0 pH    Protein Albumin Urine 30 (A) NEG^Negative mg/dL    Urobilinogen mg/dL Normal 0.0 - 2.0 mg/dL    Nitrite Urine Negative NEG^Negative    Leukocyte Esterase Urine Large (A) NEG^Negative    Source Midstream Urine     RBC Urine 8 (H) 0 - 2 /HPF    WBC Urine >182 (H) 0 - 5 /HPF    WBC Clumps Present (A) NEG^Negative /HPF    Bacteria Urine Few (A) NEG^Negative /HPF    Squamous Epithelial /HPF Urine 3 (H) 0 - 1 /HPF    Mucous Urine Present (A) NEG^Negative /LPF    Hyaline Casts 2 0 - 2 /LPF   CBC with platelets differential     Status: Abnormal   Result Value Ref Range    WBC 11.4 (H) 4.0 - 11.0 10e9/L    RBC Count 4.72 3.8 - 5.2 10e12/L    Hemoglobin 13.4 11.7 - 15.7 g/dL    Hematocrit 40.9 35.0 - 47.0 %    MCV 87 78 - 100 fl    MCH 28.4 26.5 - 33.0 pg    MCHC 32.8 31.5 - 36.5 g/dL    RDW 14.1 10.0 - 15.0 %    Platelet Count 378 150 - 450 10e9/L    Diff Method Automated Method     % Neutrophils 72.1 %    % Lymphocytes 21.4 %    % Monocytes 5.5 %    % Eosinophils 0.5 %    % Basophils 0.1 %    % Immature Granulocytes 0.4 %    Nucleated RBCs 0 0 /100    Absolute Neutrophil 8.2 1.6 - 8.3 10e9/L    Absolute Lymphocytes 2.4 0.8 - 5.3 10e9/L    Absolute Monocytes 0.6 0.0 - 1.3 10e9/L    Absolute Eosinophils 0.1 0.0 - 0.7  10e9/L    Absolute Basophils 0.0 0.0 - 0.2 10e9/L    Abs Immature Granulocytes 0.0 0 - 0.4 10e9/L    Absolute Nucleated RBC 0.0    Comprehensive metabolic panel     Status: Abnormal   Result Value Ref Range    Sodium 137 133 - 144 mmol/L    Potassium 3.4 3.4 - 5.3 mmol/L    Chloride 105 94 - 109 mmol/L    Carbon Dioxide 25 20 - 32 mmol/L    Anion Gap 7 3 - 14 mmol/L    Glucose 87 70 - 99 mg/dL    Urea Nitrogen 9 7 - 30 mg/dL    Creatinine 0.49 (L) 0.52 - 1.04 mg/dL    GFR Estimate >90 >60 mL/min/[1.73_m2]    GFR Estimate If Black >90 >60 mL/min/[1.73_m2]    Calcium 8.9 8.5 - 10.1 mg/dL    Bilirubin Total 0.3 0.2 - 1.3 mg/dL    Albumin 3.8 3.4 - 5.0 g/dL    Protein Total 7.8 6.8 - 8.8 g/dL    Alkaline Phosphatase 86 40 - 150 U/L    ALT 38 0 - 50 U/L    AST 14 0 - 45 U/L   Lipase     Status: None   Result Value Ref Range    Lipase 175 73 - 393 U/L   TSH with free T4 reflex     Status: None   Result Value Ref Range    TSH 1.23 0.40 - 4.00 mU/L   Results for orders placed or performed in visit on 20   Hemoglobin A1c     Status: Abnormal   Result Value Ref Range    Hemoglobin A1C 5.7 (H) 0 - 5.6 %   ABO/Rh type and screen     Status: None (In process)   Result Value Ref Range    ABO PENDING     Antibody Screen PENDING     Test Valid Only At          [facility]    Specimen Expires 2020    Results for orders placed or performed in visit on 20   US OB Transvaginal Only     Status: None    Narrative    34 year old female, , with LMP , 8 5/7 weeks,  Estimated   Date of Delivery: 10/2/20  presents for confirmation of dates and   assessment of viability. This study was done transvaginally.    Measurements     CRL = 4.7 mm = 6 1/7 weeks  EGA.   HORTENCIA = 10/20/20.     Fetal anatomy appears normal for gestational age.     Fetal/Fetal Cardiac Activity: Present.  FHR = 115bpm.     Implantation: Intrauterine.     Cervix = 3.7 cm      Maternal structures  appear normal with a left ovarian cyst measuring 2.3   x 2.4 x 1.8cm.    Impression:     Single viable intrauterine pregnancy. Recommend using HORTENCIA of 10/20/20.   Simple appearing 2cm L ovarian cyst.  Recommend comprehensive scan at 18 to 20 weeks.    ANGEL Watson MD MSCI      Labs Ordered and Resulted from Time of ED Arrival Up to the Time of Departure from the ED   UA MACROSCOPIC WITH REFLEX TO MICRO AND CULTURE - Abnormal; Notable for the following components:       Result Value    Blood Urine Small (*)     Protein Albumin Urine 30 (*)     Leukocyte Esterase Urine Large (*)     RBC Urine 8 (*)     WBC Urine >182 (*)     WBC Clumps Present (*)     Bacteria Urine Few (*)     Squamous Epithelial /HPF Urine 3 (*)     Mucous Urine Present (*)     All other components within normal limits   CBC WITH PLATELETS DIFFERENTIAL - Abnormal; Notable for the following components:    WBC 11.4 (*)     All other components within normal limits   COMPREHENSIVE METABOLIC PANEL - Abnormal; Notable for the following components:    Creatinine 0.49 (*)     All other components within normal limits   LIPASE   TSH WITH FREE T4 REFLEX   URINE CULTURE AEROBIC BACTERIAL            Assessments & Plan (with Medical Decision Making)       I have reviewed the nursing notes.  Emergency Department course:  The patient was seen and examined at 1600 pm.  I treated the patient with a normal saline bolus IV as well as Zofran IV..   OB ultrasound done in clinic upstairs showed a single viable intrauterine pregnancy and a 2 cm left ovarian cyst.  Laboratory studies today show leukocytosis, with a WBC of 11.4.  UA  has large LCE with greater than 182 WBCs with WBC clumps suggestive of a urinary tract infection.  Urine culture is pending.  Comprehensive metabolic panel is essentially unremarkable.  Lipase is 175.  TSH is normal at 1.23.    I obtained a renal ultrasound which shows: Negative bilateral renal ultrasound.    The  patient is a 34-year-old female who presents with left flank pain of unclear etiology.  OB ultrasound done in clinic shows an intrauterine pregnancy with a 2 cm ovarian cyst..  UA suggests the patient has a urinary tract infection.  Laboratory studies do not suggest pyelonephritis.  I prescribed Macrobid for her UTI.  I recommend taking Tylenol for pain.  I also prescribed Zofran that she can use for nausea and vomiting.  She should drink small amounts of fluid frequently.  She can take Norco for severe pain and should not drive when taking this medication.  She should follow-up with her OB/GYN or her regular doctor for recheck within 1 week.  I counseled the patient in my usual and standard fashion for flank and abdominal pain  and reasons to return to the Emergency Department.         I have reviewed the findings, diagnosis, plan and need for follow up with the patient.    Discharge Medication List as of 2/26/2020  5:36 PM      START taking these medications    Details   HYDROcodone-acetaminophen (NORCO) 5-325 MG tablet Take 1 tablet by mouth every 6 hours as needed for severe pain, Disp-10 tablet, R-0, Local Print      nitroFURantoin macrocrystal-monohydrate (MACROBID) 100 MG capsule Take 1 capsule (100 mg) by mouth 2 times daily, Disp-14 capsule, R-0, Local Print         Zofran 4 mg-- tablet p.o. every 8 hours as needed nausea and vomiting, dispense 10    Final diagnoses:   Urinary tract infection without hematuria, site unspecified   Left flank pain   Early stage of pregnancy     IGary, am serving as a trained medical scribe to document services personally performed by Sujey Simental MD, based on the provider's statements to me.     ISujey MD, was physically present and have reviewed and verified the accuracy of this note documented by Gary Hopkins.  This note was created in part by the use of Dragon voice recognition dictation system. Inadvertent grammatical errors and  typographical errors may still exist.  Sujey Simental MD    2/26/2020   [facility], [place], EMERGENCY DEPARTMENT     Sujey Simental MD  02/26/20 [id number]

## 2020-02-26 NOTE — ED AVS SNAPSHOT
[facility], [place], Emergency Department  [address]  [facility] [place] [address]  Phone:  [phone]  Fax:  [phone]                                    Dai Guadarrama   MRN: [id number]    Department:  [facility], Emergency Department   Date of Visit:  2/26/2020           After Visit Summary Signature Page    I have received my discharge instructions, and my questions have been answered. I have discussed any challenges I see with this plan with the nurse or doctor.    ..........................................................................................................................................  Patient/Patient Representative Signature      ..........................................................................................................................................  Patient Representative Print Name and Relationship to Patient    ..................................................               ................................................  Date                                   Time    ..........................................................................................................................................  Reviewed by Signature/Title    ...................................................              ..............................................  Date                                               Time          22EPIC Rev 08/18

## 2020-02-27 LAB
BACTERIA SPEC CULT: ABNORMAL
BACTERIA SPEC CULT: ABNORMAL
DEPRECATED CALCIDIOL+CALCIFEROL SERPL-MC: <4 UG/L (ref 20–75)
HBV SURFACE AB SERPL IA-ACNC: 0.21 M[IU]/ML
HBV SURFACE AG SERPL QL IA: NONREACTIVE
HCV AB SERPL QL IA: NONREACTIVE
HIV 1+2 AB+HIV1 P24 AG SERPL QL IA: NONREACTIVE
Lab: ABNORMAL
RUBV IGG SERPL IA-ACNC: 184 IU/ML
SPECIMEN SOURCE: ABNORMAL
T PALLIDUM AB SER QL: NONREACTIVE
VZV IGG SER QL IA: 5.8 AI (ref 0–0.8)

## 2020-02-27 NOTE — RESULT ENCOUNTER NOTE
Emergency Dept/Urgent Care discharge antibiotic (if prescribed): Nitrofurantoin Macrocrystal-Monohydrate (Macrobid) 100 mg PO capsule, 1 capsule (100 mg) by mouth 2 times daily for 7 days  Date of Rx (if applicable):  2/26/20  No changes in treatment per Urine culture protocol.

## 2020-02-28 NOTE — RESULT ENCOUNTER NOTE
Final Urine Culture Report on 2/27/20  Emergency Dept/Urgent Care discharge antibiotic prescribed: Nitrofurantoin Macrocrystal-Monohydrate (Macrobid) 100 mg PO capsule, 1 capsule (100 mg) by mouth 2 times daily for 7 days  #1. Bacteria, 50,000 to 100,000 colonies/ml Escherichia coli, is SUSCEPTIBLE to Antibiotic.    As per [facility] ED Lab Result protocol, no change in antibiotic therapy.

## 2020-03-02 PROBLEM — E56.9 VITAMIN DEFICIENCY: Status: ACTIVE | Noted: 2020-03-02

## 2020-03-03 ENCOUNTER — APPOINTMENT (OUTPATIENT)
Dept: INTERPRETER SERVICES | Facility: CLINIC | Age: 35
End: 2020-03-03
Payer: MEDICAID

## 2020-03-06 ENCOUNTER — OFFICE VISIT (OUTPATIENT)
Dept: OBGYN | Facility: CLINIC | Age: 35
End: 2020-03-06
Attending: ADVANCED PRACTICE MIDWIFE
Payer: MEDICAID

## 2020-03-06 ENCOUNTER — TELEPHONE (OUTPATIENT)
Dept: OBGYN | Facility: CLINIC | Age: 35
End: 2020-03-06

## 2020-03-06 VITALS
SYSTOLIC BLOOD PRESSURE: 101 MMHG | WEIGHT: 215.9 LBS | HEART RATE: 96 BPM | BODY MASS INDEX: 40.76 KG/M2 | HEIGHT: 61 IN | DIASTOLIC BLOOD PRESSURE: 70 MMHG

## 2020-03-06 DIAGNOSIS — K21.9 GASTROESOPHAGEAL REFLUX DISEASE WITHOUT ESOPHAGITIS: ICD-10-CM

## 2020-03-06 DIAGNOSIS — O09.529 HIGH-RISK PREGNANCY, MULTIGRAVIDA OF ADVANCED MATERNAL AGE, ANTEPARTUM: Primary | ICD-10-CM

## 2020-03-06 DIAGNOSIS — E55.9 VITAMIN D DEFICIENCY: ICD-10-CM

## 2020-03-06 DIAGNOSIS — O21.9 NAUSEA AND VOMITING IN PREGNANCY: Primary | ICD-10-CM

## 2020-03-06 DIAGNOSIS — E66.01 MORBID OBESITY DUE TO EXCESS CALORIES (H): ICD-10-CM

## 2020-03-06 DIAGNOSIS — O09.299 HISTORY OF INTRAUTERINE FETAL DEATH, CURRENTLY PREGNANT: ICD-10-CM

## 2020-03-06 PROBLEM — D50.9 IRON DEFICIENCY ANEMIA: Status: RESOLVED | Noted: 2018-11-07 | Resolved: 2020-03-06

## 2020-03-06 PROCEDURE — G0463 HOSPITAL OUTPT CLINIC VISIT: HCPCS | Mod: ZF

## 2020-03-06 RX ORDER — CALCIUM CARBONATE 500(1250)
1 TABLET ORAL DAILY
Qty: 100 TABLET | Refills: 2 | Status: SHIPPED | OUTPATIENT
Start: 2020-03-06 | End: 2020-12-11

## 2020-03-06 RX ORDER — PROMETHAZINE HYDROCHLORIDE 25 MG/1
25 SUPPOSITORY RECTAL EVERY 6 HOURS PRN
Qty: 60 SUPPOSITORY | Refills: 3 | Status: SHIPPED | OUTPATIENT
Start: 2020-03-06 | End: 2020-09-21

## 2020-03-06 ASSESSMENT — MIFFLIN-ST. JEOR: SCORE: 1606.7

## 2020-03-06 NOTE — LETTER
3/6/2020       RE: Dai Guadarrama  [address]  [facility] [address]     Dear Colleague,    Thank you for referring your patient, Dai Guadarrama, to the [facility] at [facility]. Please see a copy of my visit note below.    [facility] OB Intake note  Subjective   34 year old woman presents to clinic for remaining intake visit unfinished from initial intake visit on 20. Patient's last menstrual period was 2019.  at 7w3d by Estimated Date of Delivery: Oct 20, 2020 based on US. Reviewed dating ultrasound. Pregnancy is planned.  The visit is completed with in-person .      Symptoms since LMP include significant nausea, vomiting and fatigue. Patient has tried these relief measures: Zofran po, diet modification, small frequent meals, increased rest and increased fluids.  4 lb weight loss in the past 9 days. Pt has tried PO zofran with no relief. Has had hyperemesis with past pregnancies and is requesting IV hydration and IV Zofran. Would also like a refill on her phenergan suppositories until this care is established.     - Current Medications    Current Outpatient Medications   Medication Sig Dispense Refill     cholecalciferol (VITAMIN D3) 5000 units (125 mcg) capsule Take 1 capsule (5,000 Units) by mouth daily Take one capsule daily. 90 capsule 3     doxylamine (UNISOM) 25 MG TABS tablet Take 1 tablet (25 mg) by mouth At Bedtime Take with 3rd dose of Vitamin B6 for nausea in pregnancy 60 tablet 3     nitroFURantoin macrocrystal-monohydrate (MACROBID) 100 MG capsule Take 1 capsule (100 mg) by mouth 2 times daily 14 capsule 0     omeprazole (PRILOSEC) 20 MG DR capsule Take 2 capsules (40 mg) by mouth daily 30 capsule 1     ondansetron (ZOFRAN ODT) 4 MG ODT tab Take 1 tablet (4 mg) by mouth every 8 hours as needed for nausea 60 tablet 3     Prenatal Vit-Fe Fumarate-FA (PRENATAL VITAMIN) 27-0.8 MG TABS         progesterone (PROMETRIUM) 200 MG capsule Take 1 capsule (200 mg) by mouth daily for 14 days 14 capsule 0     promethazine (PHENERGAN) 25 MG suppository Place 1 suppository (25 mg) rectally every 6 hours as needed for nausea 60 suppository 3     pyridOXINE (VITAMIN B6) 25 MG tablet Take 1 tablet (25 mg) by mouth every 8 hours 90 tablet 4     ondansetron (ZOFRAN) 4 MG tablet Take 1 tablet (4 mg) by mouth every 8 hours as needed for nausea 10 tablet 0         - Co-morbids    Past Medical History:   Diagnosis Date     Aneurysm (H)      Migraine headache without aura      Pregnancy related nausea and vomiting, antepartum     two ED visits.     Recurrent pregnancy loss (CODE)      Retained placenta      Stillbirth     32 weeks     Tachycardia      - Risk for GDM -  has Pre pregnancy BMI>30 WILL have an early GCT and possible Hgb A1C    - High Risk for Pre E-  Has No known risk factors of High risk for Pre E so WILL start low dose aspirin (81mg) starting between 12 and 28 weeks to prevent early onset preeclampsia.    - Moderate risk - has moderate risk factors for Pre E including Age =35 years or older  and Pre Pregnancy body mass index >30  , previous adverse pregnancy outcome  Since she meets two  of the moderate risk facrtors for Pre E -   so WILL consider starting low dose aspirin (81mg) starting between 12 and 28 weeks to prevent early onset preeclampsia.    - The patient  does not have a history of spontaneous  birth so  WILL NOT consider progesterone starting at 16-20 weeks and/or serial transvaginal cervical length ultrasounds from 16-24 weeks.     -The patient does not have a history of immunosuppresion or HIV so Toxoplasma IgG/IgM WILL NOT be ordered.     PERSONAL/SOCIAL HISTORY  , spouse at the appointment but out of the room with children.  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    Additional items: Please address at NOB    Objective  -VS: reviewed and  within normal limits   -General appearance: no acute distress, patient is comfortable   NEUROLOGICAL/PSYCHIATRIC   - Orientated x3  -Mood and affect: normal     Assessment/Plan  Dai was seen today for prenatal care.    Diagnoses and all orders for this visit:    Nausea and vomiting in pregnancy  -     promethazine (PHENERGAN) 25 MG suppository; Place 1 suppository (25 mg) rectally every 6 hours as needed for nausea    BMI 41    Gastroesophageal reflux disease without esophagitis  -     omeprazole (PRILOSEC) 20 MG DR capsule; Take 2 capsules (40 mg) by mouth daily    Vitamin D deficiency  -     cholecalciferol (VITAMIN D3) 5000 units (125 mcg) capsule; Take 1 capsule (5,000 Units) by mouth daily Take one capsule daily.    History of intrauterine fetal death, currently pregnant      34 year old  7w3d weeks of pregnancy with HORTENCIA of Oct 20, 2020 by LMP of Patient's last menstrual period was 2019.. Ultrasound confirms.   Outpatient Encounter Medications as of 3/6/2020   Medication Sig Dispense Refill     cholecalciferol (VITAMIN D3) 5000 units (125 mcg) capsule Take 1 capsule (5,000 Units) by mouth daily Take one capsule daily. 90 capsule 3     doxylamine (UNISOM) 25 MG TABS tablet Take 1 tablet (25 mg) by mouth At Bedtime Take with 3rd dose of Vitamin B6 for nausea in pregnancy 60 tablet 3     nitroFURantoin macrocrystal-monohydrate (MACROBID) 100 MG capsule Take 1 capsule (100 mg) by mouth 2 times daily 14 capsule 0     omeprazole (PRILOSEC) 20 MG DR capsule Take 2 capsules (40 mg) by mouth daily 30 capsule 1     ondansetron (ZOFRAN ODT) 4 MG ODT tab Take 1 tablet (4 mg) by mouth every 8 hours as needed for nausea 60 tablet 3     Prenatal Vit-Fe Fumarate-FA (PRENATAL VITAMIN) 27-0.8 MG TABS        progesterone (PROMETRIUM) 200 MG capsule Take 1 capsule (200 mg) by mouth daily for 14 days 14 capsule 0     promethazine (PHENERGAN) 25 MG suppository Place 1 suppository (25 mg) rectally every 6 hours as  needed for nausea 60 suppository 3     pyridOXINE (VITAMIN B6) 25 MG tablet Take 1 tablet (25 mg) by mouth every 8 hours 90 tablet 4     [] HYDROcodone-acetaminophen (NORCO) 5-325 MG tablet Take 1 tablet by mouth every 6 hours as needed for severe pain 10 tablet 0     ondansetron (ZOFRAN) 4 MG tablet Take 1 tablet (4 mg) by mouth every 8 hours as needed for nausea 10 tablet 0     No facility-administered encounter medications on file as of 3/6/2020.     No orders of the defined types were placed in this encounter.    - Oriented to Practice, types of care, and how to reach a provider.  Pt prefers MD team  Received their care in the past. Prefers to start at NOB exam.   - Patient received 1st trimester new OB education packet complete with aide of The Expectant Family booklet including information on genetic screening test options.    - Patient desires 1st trimester screening which was ordered.  - Educational handout on the prevention of infections diseases during pregnancy provided in english, Greenlandic speaking pt  - Reviewed risk for diabetes in pregnancy, pt agrees to early 1 hour,  plan for at NOB visit. A1c on 20 = 5.7  -home health coordinated for IV fluids and zofran administration  - Patient was encouraged to continue prenatal vitamins as tolerated, add Vit D d/t lab value of <4 on 20  -Omeprazole for heartburn per pt request  -Discussed using IV zofran and fluids for N&V. Not to use promethegan suppositories or unisom/B6 if IV is working.   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 Pt also wondering if gallbladder concerns could be addressed at same time as c/s.     - Pregnancy concerns to be addressed by provider at new OB exam include: start Hep B vaccination series. BMP to check potassium level (low in previous pregnancy). Review recommendation for low dose aspirin daily to prevent pre eclampsia.  Previous C/S x1, Pt  requests possible tubal ligation at time of delivery, if having a boy. Discuss recommendations based on prenatal BMI >40.     Pt to RTO for NOB visit and repeat US in 2 weeks and prn if questions or concerns, especially for continued weight loss.    I, ELSA Alba, am serving as a scribe to document services personally performed by Emilie Clayton CNM based on the provider's statements to me.- ELSA Alba    I agree with the PFSH and ROS as completed by the student, except for changes made by me. The remainder of the encounter was performed by me and scribed by the student. The scribed note accurately reflects my personal services and decisions made by me.  Merry Clayton, DARCI, KALLIEM, APRN

## 2020-03-06 NOTE — PROGRESS NOTES
S OB Intake note  Subjective   34 year old woman presents to clinic for remaining intake visit unfinished from initial intake visit on 20. Patient's last menstrual period was 2019.  at 7w3d by Estimated Date of Delivery: Oct 20, 2020 based on US. Reviewed dating ultrasound. Pregnancy is planned.  The visit is completed with in-person .      Symptoms since LMP include significant nausea, vomiting and fatigue. Patient has tried these relief measures: Zofran po, diet modification, small frequent meals, increased rest and increased fluids.  4 lb weight loss in the past 9 days. Pt has tried PO zofran with no relief. Has had hyperemesis with past pregnancies and is requesting IV hydration and IV Zofran. Would also like a refill on her phenergan suppositories until this care is established.     - Current Medications    Current Outpatient Medications   Medication Sig Dispense Refill     cholecalciferol (VITAMIN D3) 5000 units (125 mcg) capsule Take 1 capsule (5,000 Units) by mouth daily Take one capsule daily. 90 capsule 3     doxylamine (UNISOM) 25 MG TABS tablet Take 1 tablet (25 mg) by mouth At Bedtime Take with 3rd dose of Vitamin B6 for nausea in pregnancy 60 tablet 3     nitroFURantoin macrocrystal-monohydrate (MACROBID) 100 MG capsule Take 1 capsule (100 mg) by mouth 2 times daily 14 capsule 0     omeprazole (PRILOSEC) 20 MG DR capsule Take 2 capsules (40 mg) by mouth daily 30 capsule 1     ondansetron (ZOFRAN ODT) 4 MG ODT tab Take 1 tablet (4 mg) by mouth every 8 hours as needed for nausea 60 tablet 3     Prenatal Vit-Fe Fumarate-FA (PRENATAL VITAMIN) 27-0.8 MG TABS        progesterone (PROMETRIUM) 200 MG capsule Take 1 capsule (200 mg) by mouth daily for 14 days 14 capsule 0     promethazine (PHENERGAN) 25 MG suppository Place 1 suppository (25 mg) rectally every 6 hours as needed for nausea 60 suppository 3     pyridOXINE (VITAMIN B6) 25 MG tablet Take 1 tablet (25 mg) by mouth  every 8 hours 90 tablet 4     ondansetron (ZOFRAN) 4 MG tablet Take 1 tablet (4 mg) by mouth every 8 hours as needed for nausea 10 tablet 0         - Co-morbids    Past Medical History:   Diagnosis Date     Aneurysm (H)      Migraine headache without aura      Pregnancy related nausea and vomiting, antepartum     two ED visits.     Recurrent pregnancy loss (CODE)      Retained placenta      Stillbirth     32 weeks     Tachycardia      - Risk for GDM -  has Pre pregnancy BMI>30 WILL have an early GCT and possible Hgb A1C    - High Risk for Pre E-  Has No known risk factors of High risk for Pre E so WILL start low dose aspirin (81mg) starting between 12 and 28 weeks to prevent early onset preeclampsia.    - Moderate risk - has moderate risk factors for Pre E including Age =35 years or older  and Pre Pregnancy body mass index >30  , previous adverse pregnancy outcome  Since she meets two  of the moderate risk facrtors for Pre E -   so WILL consider starting low dose aspirin (81mg) starting between 12 and 28 weeks to prevent early onset preeclampsia.    - The patient  does not have a history of spontaneous  birth so  WILL NOT consider progesterone starting at 16-20 weeks and/or serial transvaginal cervical length ultrasounds from 16-24 weeks.     -The patient does not have a history of immunosuppresion or HIV so Toxoplasma IgG/IgM WILL NOT be ordered.     PERSONAL/SOCIAL HISTORY  , spouse at the appointment but out of the room with children.  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    Additional items: Please address at NOB    Objective  -VS: reviewed and within normal limits   -General appearance: no acute distress, patient is comfortable   NEUROLOGICAL/PSYCHIATRIC   - Orientated x3  -Mood and affect: normal     Assessment/Plan  Dai was seen today for prenatal care.    Diagnoses and all orders for this visit:    Nausea and vomiting in pregnancy  -      promethazine (PHENERGAN) 25 MG suppository; Place 1 suppository (25 mg) rectally every 6 hours as needed for nausea    BMI 41    Gastroesophageal reflux disease without esophagitis  -     omeprazole (PRILOSEC) 20 MG DR capsule; Take 2 capsules (40 mg) by mouth daily    Vitamin D deficiency  -     cholecalciferol (VITAMIN D3) 5000 units (125 mcg) capsule; Take 1 capsule (5,000 Units) by mouth daily Take one capsule daily.    History of intrauterine fetal death, currently pregnant      34 year old  7w3d weeks of pregnancy with HORTENCIA of Oct 20, 2020 by LMP of Patient's last menstrual period was 2019.. Ultrasound confirms.   Outpatient Encounter Medications as of 3/6/2020   Medication Sig Dispense Refill     cholecalciferol (VITAMIN D3) 5000 units (125 mcg) capsule Take 1 capsule (5,000 Units) by mouth daily Take one capsule daily. 90 capsule 3     doxylamine (UNISOM) 25 MG TABS tablet Take 1 tablet (25 mg) by mouth At Bedtime Take with 3rd dose of Vitamin B6 for nausea in pregnancy 60 tablet 3     nitroFURantoin macrocrystal-monohydrate (MACROBID) 100 MG capsule Take 1 capsule (100 mg) by mouth 2 times daily 14 capsule 0     omeprazole (PRILOSEC) 20 MG DR capsule Take 2 capsules (40 mg) by mouth daily 30 capsule 1     ondansetron (ZOFRAN ODT) 4 MG ODT tab Take 1 tablet (4 mg) by mouth every 8 hours as needed for nausea 60 tablet 3     Prenatal Vit-Fe Fumarate-FA (PRENATAL VITAMIN) 27-0.8 MG TABS        progesterone (PROMETRIUM) 200 MG capsule Take 1 capsule (200 mg) by mouth daily for 14 days 14 capsule 0     promethazine (PHENERGAN) 25 MG suppository Place 1 suppository (25 mg) rectally every 6 hours as needed for nausea 60 suppository 3     pyridOXINE (VITAMIN B6) 25 MG tablet Take 1 tablet (25 mg) by mouth every 8 hours 90 tablet 4     [] HYDROcodone-acetaminophen (NORCO) 5-325 MG tablet Take 1 tablet by mouth every 6 hours as needed for severe pain 10 tablet 0     ondansetron (ZOFRAN) 4 MG tablet  Take 1 tablet (4 mg) by mouth every 8 hours as needed for nausea 10 tablet 0     No facility-administered encounter medications on file as of 3/6/2020.     No orders of the defined types were placed in this encounter.    - Oriented to Practice, types of care, and how to reach a provider.  Pt prefers MD team  Received their care in the past. Prefers to start at NOB exam.   - Patient received 1st trimester new OB education packet complete with aide of The Expectant Family booklet including information on genetic screening test options.    - Patient desires 1st trimester screening which was ordered.  - Educational handout on the prevention of infections diseases during pregnancy provided in english, Kittitian speaking pt  - Reviewed risk for diabetes in pregnancy, pt agrees to early 1 hour,  plan for at NOB visit. A1c on 2/26/20 = 5.7  -home health coordinated for IV fluids and zofran administration  - Patient was encouraged to continue prenatal vitamins as tolerated, add Vit D d/t lab value of <4 on 2/26/20  -Omeprazole for heartburn per pt request  -Discussed using IV zofran and fluids for N&V. Not to use promethegan suppositories or unisom/B6 if IV is working.   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 Pt also wondering if gallbladder concerns could be addressed at same time as c/s.       - Pregnancy concerns to be addressed by provider at new OB exam include: start Hep B vaccination series. BMP to check potassium level (low in previous pregnancy). Review recommendation for low dose aspirin daily to prevent pre eclampsia.  Previous C/S x1, Pt requests possible tubal ligation at time of delivery, if having a boy. Discuss recommendations based on prenatal BMI >40.     Pt to RTO for NOB visit and repeat US in 2 weeks and prn if questions or concerns, especially for continued weight loss.    IAshely SNM, am serving as a scribe to document  services personally performed by Emilie Clayton CNM based on the provider's statements to me.- ELSA Alba    I agree with the PFSH and ROS as completed by the student, except for changes made by me. The remainder of the encounter was performed by me and scribed by the student. The scribed note accurately reflects my personal services and decisions made by me.  Merry Clayton, DARCI, PATT, APRN

## 2020-03-06 NOTE — TELEPHONE ENCOUNTER
Received request from Emilie Clayton asking for Home Infusion. Order entered.  Spoke with [facility] and they will watch for Epic order and call patient to get updated insurance.    Order entered.

## 2020-03-07 ENCOUNTER — HOME INFUSION (PRE-WILLOW HOME INFUSION) (OUTPATIENT)
Dept: PHARMACY | Facility: CLINIC | Age: 35
End: 2020-03-07

## 2020-03-08 ENCOUNTER — HOME INFUSION (PRE-WILLOW HOME INFUSION) (OUTPATIENT)
Dept: PHARMACY | Facility: CLINIC | Age: 35
End: 2020-03-08

## 2020-03-09 ENCOUNTER — APPOINTMENT (OUTPATIENT)
Dept: INTERPRETER SERVICES | Facility: CLINIC | Age: 35
End: 2020-03-09
Payer: MEDICAID

## 2020-03-09 ENCOUNTER — TELEPHONE (OUTPATIENT)
Dept: OBGYN | Facility: CLINIC | Age: 35
End: 2020-03-09

## 2020-03-09 NOTE — PROGRESS NOTES
This is a recent snapshot of the patient's [facility] medical record.  For current drug dose and complete information and questions, call [phone] or In Basket pool, [facility] ([id number])  CSN Number:  [id number]

## 2020-03-09 NOTE — TELEPHONE ENCOUNTER
----- Message from Merry Clayton CNM sent at 3/6/2020  3:38 PM CST -----  Regarding: need help with arranging home infusion  Hi there, this pt lost 4lbs in the past week and a half. She had an order placed for home iv therapy but never got contacted. Can you please try to follow-up on this yet today?    I;m walking back to discuss.     marie

## 2020-03-09 NOTE — PROGRESS NOTES
This is a recent snapshot of the patient's [facility] medical record.  For current drug dose and complete information and questions, call [phone] or In Basket pool, [facility] home [facility] ([id number])  CSN Number:  [id number]

## 2020-03-09 NOTE — TELEPHONE ENCOUNTER
Spoke with home infusion pharmacist at 5pm on Friday 3/6 and gave verbal orders for home infusion. Patient will start this weekend.

## 2020-03-10 ENCOUNTER — TELEPHONE (OUTPATIENT)
Dept: OBGYN | Facility: CLINIC | Age: 35
End: 2020-03-10

## 2020-03-10 ENCOUNTER — HEALTH MAINTENANCE LETTER (OUTPATIENT)
Age: 35
End: 2020-03-10

## 2020-03-10 ENCOUNTER — HOME INFUSION (PRE-WILLOW HOME INFUSION) (OUTPATIENT)
Dept: PHARMACY | Facility: CLINIC | Age: 35
End: 2020-03-10

## 2020-03-10 ENCOUNTER — APPOINTMENT (OUTPATIENT)
Dept: INTERPRETER SERVICES | Facility: CLINIC | Age: 35
End: 2020-03-10
Payer: MEDICAID

## 2020-03-10 NOTE — TELEPHONE ENCOUNTER
Spoke with Dai about her message. Home infusion nurse was present in the home as RN called. Home nurse states that patient is doing better now especially with the Phenergan suppositories. She does not need IV phenergan at this time as the suppositories are working. Patient also wanting IV vitamins. Nurse called in Rx for IV vitamins to Home infusion pharmacist.

## 2020-03-10 NOTE — TELEPHONE ENCOUNTER
----- Message from Ruchi Huffman sent at 3/9/2020 12:48 PM CDT -----  Regarding: Anti Nausea Medication  Pt called needing a prescription for anti-nausea medication via IV. Pt is getting home infusion from [facility] and it causes her to be nauseated. Per pt nurse instructed her to call provider. Pt was prescribed PHENERGAN by [facility] but pt states she has not picked up medication from pharmacy. Pt is also asking about getting vitamins via IV also. Pt is Slovak speaking only. Please call pt back at [phone]. Pt has an appt with home infusion today at 3pm.     Thank you,   Ruchi menjivar Viking

## 2020-03-11 ENCOUNTER — HOME INFUSION (PRE-WILLOW HOME INFUSION) (OUTPATIENT)
Dept: PHARMACY | Facility: CLINIC | Age: 35
End: 2020-03-11

## 2020-03-11 ENCOUNTER — TELEPHONE (OUTPATIENT)
Dept: OBGYN | Facility: CLINIC | Age: 35
End: 2020-03-11

## 2020-03-11 ENCOUNTER — APPOINTMENT (OUTPATIENT)
Dept: INTERPRETER SERVICES | Facility: CLINIC | Age: 35
End: 2020-03-11
Payer: MEDICAID

## 2020-03-11 DIAGNOSIS — O21.9 NAUSEA AND VOMITING IN PREGNANCY: Primary | ICD-10-CM

## 2020-03-11 NOTE — PROGRESS NOTES
This is a recent snapshot of the patient's [facility] medical record.  For current drug dose and complete information and questions, call [phone] or In Basket pool, [facility] ([id number])  CSN Number:  [id number]

## 2020-03-11 NOTE — TELEPHONE ENCOUNTER
Discussed with home infusion nurse. They state that Sherri is one of their best nurses and could not get peripheral IV placed. Patient states she had PICC line with last pregnancy.  Nurse discussed with Viridiana in clinic. She agrees that patient can have a midline.   Nurse called and gave verbal to Nurse specialist at  home infusion.

## 2020-03-12 ENCOUNTER — HOME INFUSION (PRE-WILLOW HOME INFUSION) (OUTPATIENT)
Dept: PHARMACY | Facility: CLINIC | Age: 35
End: 2020-03-12

## 2020-03-12 ENCOUNTER — HOSPITAL ENCOUNTER (OUTPATIENT)
Facility: AMBULATORY SURGERY CENTER | Age: 35
End: 2020-03-12
Attending: PHYSICIAN ASSISTANT

## 2020-03-12 ENCOUNTER — ANCILLARY PROCEDURE (OUTPATIENT)
Dept: RADIOLOGY | Facility: AMBULATORY SURGERY CENTER | Age: 35
End: 2020-03-12
Attending: NURSE PRACTITIONER

## 2020-03-12 VITALS
RESPIRATION RATE: 18 BRPM | BODY MASS INDEX: 40.22 KG/M2 | SYSTOLIC BLOOD PRESSURE: 126 MMHG | WEIGHT: 213 LBS | OXYGEN SATURATION: 99 % | DIASTOLIC BLOOD PRESSURE: 79 MMHG | HEART RATE: 80 BPM | HEIGHT: 61 IN | TEMPERATURE: 97.8 F

## 2020-03-12 DIAGNOSIS — O21.9 NAUSEA AND VOMITING IN PREGNANCY: ICD-10-CM

## 2020-03-12 DIAGNOSIS — O21.9 NAUSEA/VOMITING IN PREGNANCY: ICD-10-CM

## 2020-03-12 RX ORDER — NICOTINE POLACRILEX 4 MG
15-30 LOZENGE BUCCAL
Status: DISCONTINUED | OUTPATIENT
Start: 2020-03-12 | End: 2020-03-13 | Stop reason: HOSPADM

## 2020-03-12 RX ORDER — HEPARIN SODIUM,PORCINE 10 UNIT/ML
5 VIAL (ML) INTRAVENOUS EVERY 24 HOURS
Status: DISCONTINUED | OUTPATIENT
Start: 2020-03-12 | End: 2020-03-13 | Stop reason: HOSPADM

## 2020-03-12 RX ORDER — DEXTROSE MONOHYDRATE 25 G/50ML
25-50 INJECTION, SOLUTION INTRAVENOUS
Status: DISCONTINUED | OUTPATIENT
Start: 2020-03-12 | End: 2020-03-13 | Stop reason: HOSPADM

## 2020-03-12 RX ORDER — HEPARIN SODIUM,PORCINE 10 UNIT/ML
5-10 VIAL (ML) INTRAVENOUS
Status: DISCONTINUED | OUTPATIENT
Start: 2020-03-12 | End: 2020-03-13 | Stop reason: HOSPADM

## 2020-03-12 RX ORDER — HEPARIN SODIUM,PORCINE 10 UNIT/ML
VIAL (ML) INTRAVENOUS PRN
Status: DISCONTINUED | OUTPATIENT
Start: 2020-03-12 | End: 2020-03-12 | Stop reason: HOSPADM

## 2020-03-12 ASSESSMENT — MIFFLIN-ST. JEOR: SCORE: 1587.66

## 2020-03-12 NOTE — PROGRESS NOTES
This is a recent snapshot of the patient's [facility] medical record.  For current drug dose and complete information and questions, call [phone] or In Basket pool, [facility] ([id number])  CSN Number:  [id number]

## 2020-03-12 NOTE — BRIEF OP NOTE
[facility] [place]    Interventional Radiology Brief Post Procedure Note    Procedure: Peripherally inserted midline catheter placement.    Proceduralist: Shanell Arreola, BAILEY    Assistant: None    Time Out: Prior to the start of the procedure and with procedural staff participation, I verbally confirmed the patient s identity using two indicators, relevant allergies, that the procedure was appropriate and matched the consent or emergent situation, and that the correct equipment/implants were available. Immediately prior to starting the procedure I conducted the Time Out with the procedural staff and re-confirmed the patient s name, procedure, and site/side. (The [facility] universal protocol was followed.)  Yes        Sedation: None. Local Anesthestic used    Findings: Image guided placement of right basilic vein, 4 Fr., 26 cm., single lumen midline catheter placement. Midline specifically requested by primary team.    Estimated Blood Loss: Less than 10 ml    Fluoroscopy Time:  0.1 minute(s)    SPECIMENS: None    Complications: 1. None     Condition: Stable    Plan: Follow up per primary team.    Comments: See dictated procedure note for full details.    Ugo Webb PA-C

## 2020-03-12 NOTE — DISCHARGE INSTRUCTIONS
A collaboration between [facility] and [facility]  Experts in minimally invasive, targeted treatments performed using imaging guidance    Venous Access Device,  Port Catheter or Tunneled or Non-Tunneled Central Line Placement    Today you had a procedure today to install a venous access device; either a tunneled central vein catheter or a subcutaneous port catheter.    One of our Radiology PAs performed this procedure for you today:  ? Ugo Webb PA-C  ? CRICKET Hutchison PA-C  ? Moody Hood PA-C  ? Kassie Rcok PA-C   ? Misha Page PA-C    After you go home:  - Drink plenty of fluids.  Generally 6-8 (8 ounce) glasses a day is recommended.  - Resume your regular diet unless otherwise ordered by a medical provider.  - Keep any applied tape/gauze dressings clean and dry.  Change tape/gauze dressings if they get wet or soiled.  - You may shower the following day after procedure, however cover and protect from moisture any tape/gauze dressings.  You may let water hit and run over dried skin glue, but do not scrub.  Pat the area dry after showering.  - Port placement incisions are closed with absorbable suture, meaning they do not need to be removed at a later date, and a topical skin adhesive (skin glue).  This glue will wear off in 7-14 days.  Do not remove before this time.  If 14 days have passed and residual glue is present, you may gently remove it.  - Do not apply gels, lotions, or ointments to the glue site for the first 10 days as this may cause the glue to prematurely soften and fail.  - Do not perform strenuous activities or lift greater than 10 pounds for the next three days.  - If there is bleeding or oozing from the procedure site, apply firm pressure to the area for 5-10 minutes.  If the bleeding continues seek medical advice at the numbers below.  - Mild procedure site discomfort can be treated with an ice pack and over-the-counter pain  relievers.        For 24 hours after any sedation used:  - Relax and take it easy.  No strenuous activities.  - Do not drive or operate machines at home or at work.  - No alcohol consumption.  - Do not make any important or legal decisions.    Call our Interventional Radiology (IR) service if:  - If you start bleeding from the procedure site.  If you do start to bleed from the site, lie down and hold some pressure on the site.  Our radiology provider can help you decide if you need to return to the hospital.  - If you have new or worsening pain related to the procedure.  - If you have concerning swelling at the procedure site.  - If you develop persistent nausea or vomiting.  - If you develop hives or a rash or any unexplained itching.  - If you have a fever of greater than 100.5  F and chills in the first 5 days after procedure.  - Any other concerns related to your procedure.      [facility]  Interventional Radiology (IR)  [address] [place] [address]    Contact Number:  [phone]  (IR control desk)  - Monday - Friday 8:00 am - 4:30 pm    After hours for urgent concerns:  [phone]  - After 4:30 pm Monday - Friday, Weekends and Holidays.   - Ask for Interventional Radiology on-call.  Someone is available 24 hours a day.  - [facility] toll free number:  [phone]

## 2020-03-13 NOTE — PROGRESS NOTES
This is a recent snapshot of the patient's [facility] medical record.  For current drug dose and complete information and questions, call [phone] or In Basket pool, fv home infusion ([id number])  CSN Number:  [id number]

## 2020-03-14 ENCOUNTER — APPOINTMENT (OUTPATIENT)
Dept: ULTRASOUND IMAGING | Facility: CLINIC | Age: 35
End: 2020-03-14
Attending: EMERGENCY MEDICINE
Payer: MEDICAID

## 2020-03-14 ENCOUNTER — HOSPITAL ENCOUNTER (EMERGENCY)
Facility: CLINIC | Age: 35
Discharge: HOME | End: 2020-03-15
Attending: EMERGENCY MEDICINE | Admitting: EMERGENCY MEDICINE
Payer: MEDICAID

## 2020-03-14 DIAGNOSIS — T82.898A OCCLUSION OF PERIPHERALLY INSERTED CENTRAL CATHETER (PICC) LINE, INITIAL ENCOUNTER (H): ICD-10-CM

## 2020-03-14 DIAGNOSIS — Z34.91 FIRST TRIMESTER PREGNANCY: ICD-10-CM

## 2020-03-14 PROCEDURE — 36593 DECLOT VASCULAR DEVICE: CPT | Performed by: EMERGENCY MEDICINE

## 2020-03-14 PROCEDURE — 93971 EXTREMITY STUDY: CPT | Mod: RT

## 2020-03-14 PROCEDURE — 99284 EMERGENCY DEPT VISIT MOD MDM: CPT | Mod: 25 | Performed by: EMERGENCY MEDICINE

## 2020-03-14 PROCEDURE — 99284 EMERGENCY DEPT VISIT MOD MDM: CPT | Mod: Z6 | Performed by: EMERGENCY MEDICINE

## 2020-03-14 PROCEDURE — 25000128 H RX IP 250 OP 636: Performed by: EMERGENCY MEDICINE

## 2020-03-14 RX ORDER — ONDANSETRON 4 MG/1
4 TABLET, ORALLY DISINTEGRATING ORAL ONCE
Status: COMPLETED | OUTPATIENT
Start: 2020-03-14 | End: 2020-03-14

## 2020-03-14 RX ADMIN — ONDANSETRON 4 MG: 4 TABLET, ORALLY DISINTEGRATING ORAL at 21:52

## 2020-03-14 ASSESSMENT — ENCOUNTER SYMPTOMS
COUGH: 0
HEMATURIA: 0
DYSURIA: 0
ABDOMINAL PAIN: 1
VOMITING: 1
FEVER: 0
NAUSEA: 1

## 2020-03-14 NOTE — ED AVS SNAPSHOT
[facility], [place], Emergency Department  [address]  [place] [address]  Phone:  [phone]  Fax:  [phone]                                    Dai Guadarrama   MRN: [id number]    Department:  [facility], Emergency Department   Date of Visit:  3/14/2020           After Visit Summary Signature Page    I have received my discharge instructions, and my questions have been answered. I have discussed any challenges I see with this plan with the nurse or doctor.    ..........................................................................................................................................  Patient/Patient Representative Signature      ..........................................................................................................................................  Patient Representative Print Name and Relationship to Patient    ..................................................               ................................................  Date                                   Time    ..........................................................................................................................................  Reviewed by Signature/Title    ...................................................              ..............................................  Date                                               Time          22EPIC Rev 08/18

## 2020-03-15 VITALS
SYSTOLIC BLOOD PRESSURE: 110 MMHG | RESPIRATION RATE: 16 BRPM | WEIGHT: 215 LBS | HEART RATE: 84 BPM | DIASTOLIC BLOOD PRESSURE: 72 MMHG | TEMPERATURE: 98.2 F | OXYGEN SATURATION: 99 % | BODY MASS INDEX: 41.12 KG/M2

## 2020-03-15 PROCEDURE — 25000128 H RX IP 250 OP 636: Performed by: EMERGENCY MEDICINE

## 2020-03-15 RX ADMIN — ALTEPLASE 2 MG: 2.2 INJECTION, POWDER, LYOPHILIZED, FOR SOLUTION INTRAVENOUS at 00:13

## 2020-03-15 NOTE — DISCHARGE INSTRUCTIONS
You have been seen in the emergency department today for a clotted PICC line.  We have been able to remove this blockage.  Continue to use your PICC line as directed.  You are at higher risk for clotting of the PICC line during pregnancy, so definitely discuss this with your clinic.  Fortunately, the ultrasound does not show any sign of large clot on the line today.  Follow-up with your clinic as directed.

## 2020-03-15 NOTE — ED PROVIDER NOTES
[facility] EMERGENCY DEPARTMENT ([facility])     2020    ED Provider Note  [facility]      History     Chief Complaint   Patient presents with     Vascular Access Problem     9 weeks pregnant, receives daily infusions at home to control pregnancy symptoms, PICC line is blocked, has been vomiting     The history is provided by the patient. The history is limited by a language barrier. A  was used (official iPad  used per patient's request).     Dai Guadarrama is a 34 year old  female at 11 weeks based on ultrasound (estimated delivery date is 10/20/20) who presents to the ED for evaluation of a blocked PICC line. Patient reports she had a PICC line placed in her right arm on 3/11/20, and she was instructed on how to self-administer IV fluids and Zofran to help with nausea and vomiting. She states she receives 1 L of IV fluid twice a day. She reports she was able to administer IV fluids through her PICC line at 5:00 PM. However, patient states she tried to administer Zofran through her PICC line at 7:00 PM, but the Zofran wouldn't go through the line. She complains of pain in her right arm located at the site of the PICC line. Patient denies arm pain proximal or distal from the PICC line site. She reports she is nauseous and has vomited 5 times since she hasn't been able to get her IV Zofran. She states she has some epigastric abdominal pain, which is what she typically experiences after vomiting. Patient denies pelvic pain or vaginal bleeding. She also denies fever, cough, chest pain, dysuria, or hematuria.    Past Medical History  Past Medical History:   Diagnosis Date     Aneurysm (H)      Migraine headache without aura      Pregnancy related nausea and vomiting, antepartum 2016    two ED visits.     Recurrent pregnancy loss (CODE)      Retained placenta 2014     Stillbirth 2014    32 weeks     Tachycardia      Past  Surgical History:   Procedure Laterality Date     C EACH ADD TOOTH EXTRACTION      bad reaction to anesthia for local       SECTION N/A 2018    Procedure:  SECTION;  Surgeon: Noreen Harris MD;  [facility]     INSERT PICC LINE Right 3/12/2020    Procedure: Midline Picc Placement;  Surgeon: gUo Webb PA-C;  [facility] OR     IR PICC PLACEMENT > 5 YRS OF AGE  3/12/2020     cholecalciferol (VITAMIN D3) 5000 units (125 mcg) capsule  doxylamine (UNISOM) 25 MG TABS tablet  nitroFURantoin macrocrystal-monohydrate (MACROBID) 100 MG capsule  omeprazole (PRILOSEC) 20 MG DR capsule  ondansetron (ZOFRAN ODT) 4 MG ODT tab  ondansetron (ZOFRAN) 4 MG tablet  Prenatal Vit-Fe Fumarate-FA (GOODSENSE PRENATAL VITAMINS) 28-0.8 MG TABS  Prenatal Vit-Fe Fumarate-FA (PRENATAL VITAMIN) 27-0.8 MG TABS  promethazine (PHENERGAN) 25 MG suppository  pyridOXINE (VITAMIN B6) 25 MG tablet      No Known Allergies  Past medical history, past surgical history, medications, and allergies were reviewed with the patient. Additional pertinent items: None    Family History  Family History   Problem Relation Age of Onset     Anxiety Disorder No family hx of      Mental Illness No family hx of      Substance Abuse No family hx of      Anesthesia Reaction No family hx of      Asthma No family hx of      Osteoporosis No family hx of      Genetic Disorder No family hx of      Thyroid Disease No family hx of      Hyperlipidemia No family hx of      Cerebrovascular Disease No family hx of      Breast Cancer No family hx of      Colon Cancer No family hx of      Prostate Cancer No family hx of      Other Cancer No family hx of      Depression No family hx of      Diabetes No family hx of      Coronary Artery Disease No family hx of      Hypertension No family hx of      Family history was reviewed with the patient. Additional pertinent items: None    Social History  Social History     Tobacco Use     Smoking status:  Never Smoker     Smokeless tobacco: Never Used   Substance Use Topics     Alcohol use: Not Currently     Alcohol/week: 0.0 standard drinks     Comment: rare alcohol use now nothing in pregnancy     Drug use: No      Social history was reviewed with the patient. Additional pertinent items: None    Review of Systems   Constitutional: Negative for fever.   Respiratory: Negative for cough.    Cardiovascular: Negative for chest pain.   Gastrointestinal: Positive for abdominal pain (epigastric), nausea and vomiting (x5).   Genitourinary: Negative for dysuria, hematuria, pelvic pain and vaginal bleeding.   Musculoskeletal:        Positive for pain in R arm at PICC line site.   All other systems reviewed and are negative.    A complete review of systems was performed with pertinent positives and negatives noted in the HPI, and all other systems negative.    Physical Exam   BP: (!) 100/38  Pulse: 100  Temp: 98.2  F (36.8  C)  Resp: 16  Weight: 97.5 kg (215 lb)  SpO2: 97 %  Physical Exam  Vitals signs and nursing note reviewed.   Constitutional:       General: She is not in acute distress.     Appearance: She is well-developed. She is not diaphoretic.      Comments: Well appearing adult female, alert, cooperative, NAD   HENT:      Head: Normocephalic and atraumatic.   Eyes:      Conjunctiva/sclera: Conjunctivae normal.      Pupils: Pupils are equal, round, and reactive to light.   Cardiovascular:      Rate and Rhythm: Normal rate and regular rhythm.      Heart sounds: Normal heart sounds.   Pulmonary:      Effort: Pulmonary effort is normal. No respiratory distress.      Breath sounds: Normal breath sounds. No wheezing or rales.   Abdominal:      General: Bowel sounds are normal. There is no distension.      Palpations: Abdomen is soft.      Tenderness: There is no abdominal tenderness.   Musculoskeletal:      Comments: R arm: PICC line in place. No erythema. Slight bruising around site. Small bruises along distal forearm  (likely from prior IVs). No TTP distal or proximal to PICC line.   Skin:     General: Skin is warm and dry.   Neurological:      Mental Status: She is alert and oriented to person, place, and time.         ED Course      Procedures                        Results for orders placed or performed during the hospital encounter of 03/14/20   US Upper Extremity Venous Duplex Right     Status: None    Narrative    EXAM: US UPPER EXTREMITY VENOUS DUPLEX RIGHT  LOCATION: [facility]  DATE/TIME: 3/14/2020 10:38 PM    INDICATION: Unable to flush right PICC. Evaluate for thrombus surrounding right PICC.  COMPARISON: None.  TECHNIQUE: Venous Duplex ultrasound of the right upper extremity with (when possible) and without compression, augmentation, and duplex. Color flow and spectral Doppler with waveform analysis performed.    FINDINGS: Ultrasound includes evaluation of the internal jugular vein, innominate vein, subclavian vein, axillary vein, and brachial vein. The superficial cephalic and basilic veins were also evaluated where seen.     RIGHT: No deep venous thrombosis. No superficial thrombophlebitis. Right basilic PICC in place. No thrombus surrounding the PICC.      Impression    IMPRESSION:  1.  No deep venous thrombosis in the right upper extremity.     Medications   alteplase (CATHFLO ACTIVASE) injection 2 mg (2 mg Intravenous Given 3/15/20 0013)   ondansetron (ZOFRAN-ODT) ODT tab 4 mg (4 mg Oral Given 3/14/20 2152)        Assessments & Plan (with Medical Decision Making)   This is a 34-year-old female who is approximately 11 weeks and 1 day gestation today who presents with an issue related to her PICC line.  She has a history of hyperemesis and has a PICC line due to needing to give herself IV fluids at home.  She last used the PICC line at 5 PM today, and when she went to give herself her Zofran at 7 PM she stated that it would not flush.  She denies any pain anywhere in the arm other than at the PICC  insertion site, she had the PICC line placed just 2 days ago.    Differential diagnosis could include mechanical problem with the line itself such as DVT or a line associated clot.  Also need to consider incorrect technique with flushing or attempting to give herself her IV Zofran.    We did have the nurse try to evaluate the line by aspirating blood from it and flushing it.    We were unable to aspirate blood or flush the line.  Right upper extremity ultrasound does not show any sign of DVT.  We subsequently did give the PICC line appropriate dose of TPA.  This was helpful in restoring patency of the line.  It now does draw blood and also flushes easily.  Patient will be discharged home at this time.    This part of the medical record was transcribed by Candelaria Gutierrez,  Medical Scribe, from a dictation done by Anna Chiu MD.     I have reviewed the nursing notes. I have reviewed the findings, diagnosis, plan and need for follow up with the patient.    New Prescriptions    No medications on file       Final diagnoses:   Occlusion of peripherally inserted central catheter (PICC) line, initial encounter (H)   First trimester pregnancy     I, Candelaria Gutierrez, am serving as a trained medical scribe to document services personally performed by Anna Chiu MD, based on the provider's statements to me.      I, Anna Chiu MD, was physically present and have reviewed and verified the accuracy of this note documented by Candelaria Gutierrez.     --  Anna Chiu MD  Emergency Medicine   [facility], [place], EMERGENCY DEPARTMENT  3/14/2020     Anna Chiu MD  03/15/20 0053

## 2020-03-17 ENCOUNTER — APPOINTMENT (OUTPATIENT)
Dept: INTERPRETER SERVICES | Facility: CLINIC | Age: 35
End: 2020-03-17
Payer: MEDICAID

## 2020-03-19 ENCOUNTER — APPOINTMENT (OUTPATIENT)
Dept: INTERPRETER SERVICES | Facility: CLINIC | Age: 35
End: 2020-03-19
Payer: MEDICAID

## 2020-03-19 ENCOUNTER — HOME INFUSION (PRE-WILLOW HOME INFUSION) (OUTPATIENT)
Dept: PHARMACY | Facility: CLINIC | Age: 35
End: 2020-03-19

## 2020-03-20 ENCOUNTER — ANCILLARY PROCEDURE (OUTPATIENT)
Dept: ULTRASOUND IMAGING | Facility: CLINIC | Age: 35
End: 2020-03-20
Attending: ADVANCED PRACTICE MIDWIFE
Payer: MEDICAID

## 2020-03-20 ENCOUNTER — OFFICE VISIT (OUTPATIENT)
Dept: OBGYN | Facility: CLINIC | Age: 35
End: 2020-03-20
Attending: ADVANCED PRACTICE MIDWIFE
Payer: MEDICAID

## 2020-03-20 VITALS
HEART RATE: 82 BPM | SYSTOLIC BLOOD PRESSURE: 110 MMHG | WEIGHT: 217 LBS | BODY MASS INDEX: 40.97 KG/M2 | DIASTOLIC BLOOD PRESSURE: 70 MMHG | HEIGHT: 61 IN

## 2020-03-20 DIAGNOSIS — Z87.59 HISTORY OF IUFD: ICD-10-CM

## 2020-03-20 DIAGNOSIS — O21.9 NAUSEA AND VOMITING IN PREGNANCY: ICD-10-CM

## 2020-03-20 DIAGNOSIS — O09.529 HIGH-RISK PREGNANCY, MULTIGRAVIDA OF ADVANCED MATERNAL AGE, ANTEPARTUM: ICD-10-CM

## 2020-03-20 DIAGNOSIS — R73.09 ELEVATED HEMOGLOBIN A1C: ICD-10-CM

## 2020-03-20 DIAGNOSIS — Z78.9 NONIMMUNE TO HEPATITIS B VIRUS: Primary | ICD-10-CM

## 2020-03-20 DIAGNOSIS — Z3A.09 9 WEEKS GESTATION OF PREGNANCY: ICD-10-CM

## 2020-03-20 LAB — GLUCOSE 1H P 50 G GLC PO SERPL-MCNC: 141 MG/DL (ref 60–129)

## 2020-03-20 PROCEDURE — 90746 HEPB VACCINE 3 DOSE ADULT IM: CPT | Mod: ZF

## 2020-03-20 PROCEDURE — 87591 N.GONORRHOEAE DNA AMP PROB: CPT | Performed by: OBSTETRICS & GYNECOLOGY

## 2020-03-20 PROCEDURE — 36592 COLLECT BLOOD FROM PICC: CPT | Performed by: OBSTETRICS & GYNECOLOGY

## 2020-03-20 PROCEDURE — 82950 GLUCOSE TEST: CPT | Performed by: OBSTETRICS & GYNECOLOGY

## 2020-03-20 PROCEDURE — G0010 ADMIN HEPATITIS B VACCINE: HCPCS

## 2020-03-20 PROCEDURE — 87491 CHLMYD TRACH DNA AMP PROBE: CPT | Performed by: OBSTETRICS & GYNECOLOGY

## 2020-03-20 PROCEDURE — 76801 OB US < 14 WKS SINGLE FETUS: CPT

## 2020-03-20 PROCEDURE — G0463 HOSPITAL OUTPT CLINIC VISIT: HCPCS | Mod: 25,ZF

## 2020-03-20 PROCEDURE — 90471 IMMUNIZATION ADMIN: CPT | Mod: ZF

## 2020-03-20 PROCEDURE — 25000128 H RX IP 250 OP 636: Mod: ZF

## 2020-03-20 RX ORDER — AMOXICILLIN 250 MG
1 CAPSULE ORAL DAILY
Qty: 90 TABLET | Refills: 0 | Status: SHIPPED | OUTPATIENT
Start: 2020-03-20 | End: 2020-09-21

## 2020-03-20 RX ORDER — METOCLOPRAMIDE 10 MG/1
10 TABLET ORAL EVERY 6 HOURS PRN
Qty: 90 TABLET | Refills: 3 | Status: ON HOLD | OUTPATIENT
Start: 2020-03-20 | End: 2020-09-19

## 2020-03-20 ASSESSMENT — PAIN SCALES - GENERAL: PAINLEVEL: NO PAIN (0)

## 2020-03-20 ASSESSMENT — MIFFLIN-ST. JEOR: SCORE: 1615.65

## 2020-03-20 NOTE — PROGRESS NOTES
SUBJECTIVE:    34 year old, female, , 9w3d,  who presents to the clinic today for a new ob visit.    Feels well. Has started PNV.  Estimated Date of Delivery: Oct 20, 2020   Oct 20, 2020 is calculated from Patient's last menstrual period was 2019..     She has not had bleeding since her LMP.   She has had nausea resulting in persistent vomiting and a PICC line was ordered by CNM. Weight loss has occurred, for a total of 3 pounds.  She has been taking antiemetics and PPI.  Plans for repeat .    ===========================================  ROS  PSYCHIATRIC:  Denies mood changes  PHQ9: Last PHQ-9 score on record= No Value exists for the : HP#PHQ9  Social History     Tobacco Use     Smoking status: Never Smoker     Smokeless tobacco: Never Used   Substance Use Topics     Alcohol use: Not Currently     Alcohol/week: 0.0 standard drinks     Comment: rare alcohol use now nothing in pregnancy     History   Drug Use No     History   Smoking Status     Never Smoker   Smokeless Tobacco     Never Used     Social History    Substance and Sexual Activity      Alcohol use: Not Currently        Alcohol/week: 0.0 standard drinks        Comment: rare alcohol use now nothing in pregnancy    Family History   Problem Relation Age of Onset     Anxiety Disorder No family hx of      Mental Illness No family hx of      Substance Abuse No family hx of      Anesthesia Reaction No family hx of      Asthma No family hx of      Osteoporosis No family hx of      Genetic Disorder No family hx of      Thyroid Disease No family hx of      Hyperlipidemia No family hx of      Cerebrovascular Disease No family hx of      Breast Cancer No family hx of      Colon Cancer No family hx of      Prostate Cancer No family hx of      Other Cancer No family hx of      Depression No family hx of      Diabetes No family hx of      Coronary Artery Disease No family hx of      Hypertension No family hx of       ============================================  MEDICAL HISTORY   No Known Allergies    [unfilled]      Current Outpatient Medications:      cholecalciferol (VITAMIN D3) 5000 units (125 mcg) capsule, Take 1 capsule (5,000 Units) by mouth daily Take one capsule daily., Disp: 90 capsule, Rfl: 3     metoclopramide (REGLAN) 10 MG tablet, Take 1 tablet (10 mg) by mouth every 6 hours as needed (nausea), Disp: 90 tablet, Rfl: 3     omeprazole (PRILOSEC) 20 MG DR capsule, Take 2 capsules (40 mg) by mouth daily, Disp: 30 capsule, Rfl: 1     ondansetron (ZOFRAN ODT) 4 MG ODT tab, Take 1 tablet (4 mg) by mouth every 8 hours as needed for nausea, Disp: 60 tablet, Rfl: 3     Prenatal Vit-Fe Fumarate-FA (GOODSENSE PRENATAL VITAMINS) 28-0.8 MG TABS, Take 1 tablet by mouth daily, Disp: 100 tablet, Rfl: 2     promethazine (PHENERGAN) 25 MG suppository, Place 1 suppository (25 mg) rectally every 6 hours as needed for nausea, Disp: 60 suppository, Rfl: 3     pyridOXINE (VITAMIN B6) 25 MG tablet, Take 1 tablet (25 mg) by mouth every 8 hours, Disp: 90 tablet, Rfl: 4     senna-docusate (SENOKOT-S/PERICOLACE) 8.6-50 MG tablet, Take 1 tablet by mouth daily, Disp: 90 tablet, Rfl: 0     doxylamine (UNISOM) 25 MG TABS tablet, Take 1 tablet (25 mg) by mouth At Bedtime Take with 3rd dose of Vitamin B6 for nausea in pregnancy, Disp: 60 tablet, Rfl: 3     Prenatal Vit-Fe Fumarate-FA (PRENATAL VITAMIN) 27-0.8 MG TABS, , Disp: , Rfl:     Past Medical History:   Diagnosis Date     Aneurysm (H)      Migraine headache without aura      Pregnancy related nausea and vomiting, antepartum 2016    two ED visits.     Recurrent pregnancy loss (CODE)      Retained placenta      Stillbirth     32 weeks     Tachycardia        Past Surgical History:   Procedure Laterality Date     C EACH ADD TOOTH EXTRACTION      bad reaction to anesthia for local       SECTION N/A 2018    Procedure:  SECTION;  Surgeon: Noreen Harris MD;   "[facility]: [facility] L+D     INSERT PICC LINE Right 3/12/2020    Procedure: Midline Picc Placement;  Surgeon: Ugo Webb PA-C;  [facility] OR     IR PICC PLACEMENT > 5 YRS OF AGE  3/12/2020        OB History    Para Term  AB Living   6 3 2 1 2 2   SAB TAB Ectopic Multiple Live Births   2 0 0 0 3      # Outcome Date GA Lbr Spike/2nd Weight Sex Delivery Anes PTL Lv   6 Current            5 Term 18 37w1d  2.74 kg (6 lb 0.7 oz) F CS-LTranv Spinal N KASEY      Birth Comments: unstable fetal lie, hx IUFD      Name: KINJAL COCHRAN,BABY1 LAURA      Apgar1: 8  Apgar5: 9   4 Term 17 37w5d 02:30 / 00:13 3.374 kg (7 lb 7 oz) F Vag-Spont EPI N KASEY      Birth Comments: IOL for oligo, PP hemorrhage 914 mL      Name: Marjorie       Apgar1: 9  Apgar5: 9   3  14 32w0d   F I.U. FETAL D   ND   2 SAB            1 SAB               Obstetric Comments      2nd pregnancy oligohydramnios IOL 37.5        GYN History- denies Abnormal Pap Smears                        Cervical procedures: denies                        History of STI: denies    I personally reviewed the past social/family/medical and surgical history on the date of service.   I reviewed lab work done at Intake visit with patient.    OBJECTIVE:   PHYSICAL EXAM:  /70   Pulse 82   Ht 1.556 m (5' 1.25\")   Wt 98.4 kg (217 lb)   LMP 2019   Breastfeeding No   BMI 40.67 kg/m    BMI- Body mass index is 40.67 kg/m .   GENERAL:  Pleasant pregnant female, alert, cooperative and well groomed.  SKIN:  Warm and dry, without lesions or rashes  HEAD: Symmetrical features.  MOUTH:  Buccal mucosa pink, moist without lesions.  Teeth in good repair.    NECK:  Thyroid without enlargement and nodules.  Lymph nodes not palpable.   LUNGS:  Clear to auscultation.    HEART:  RRR without murmur.  ABDOMEN: Soft without masses , tenderness or organomegaly  MUSCULOSKELETAL:  Full range of motion  EXTREMITIES:  No edema. No significant varicosities. "   PELVIC EXAM: deferred  GC/CHLAMYDIA CULTURE OBTAINED: urine    ASSESSMENT:  Intrauterine pregnancy 9w3d   Genetic Screening: First Trimester Screen  Encounter Diagnoses   Name Primary?     Elevated hemoglobin A1c      Nausea and vomiting in pregnancy      Nonimmune to hepatitis B virus Yes     History of IUFD      9 weeks gestation of pregnancy         PLAN:  Orders Placed This Encounter   Procedures     HEPATITIS B VACCINE,ADULT,IM     Glucose 1 Hour     MAT FETAL MED CTR REFERRAL-PREGNANCY     Orders Placed This Encounter   Medications     metoclopramide (REGLAN) 10 MG tablet     Sig: Take 1 tablet (10 mg) by mouth every 6 hours as needed (nausea)     Dispense:  90 tablet     Refill:  3     senna-docusate (SENOKOT-S/PERICOLACE) 8.6-50 MG tablet     Sig: Take 1 tablet by mouth daily     Dispense:  90 tablet     Refill:  0     - nausea and vomiting of pregnancy: discussed need to wean off of IV meds as able.  Reglan PO added.  Did not review healthy weight gain in pregnancy, will need to discuss recommendations for total weight gain at subsequent visit.  - hep B nonimmune: discussed vaccination and patient is amenable  - suspected insulin resistance:  early glucose challenge test today  - history of IUFD @ 32wga: MFM referral placed.  This is a nonurgent consult.  Plan for  testing 30-32wga.  - All pt's questions discussed and answered.  Pt verbalized understanding of and agreement to plan of care.   - delivery planning: plan for repeat c/s scheduled at 39wga    - Continue scheduled prenatal care and prn if questions or concerns    Dee Dee Pete MD   PGY4 OBGYN    Patient was seen by the resident in Continuity of Care Clinic.  I reviewed the history & exam.  The patient's assessment and plan were made jointly.    Ailyn Duarte MD MPH

## 2020-03-20 NOTE — LETTER
3/20/2020       RE: Dai Guadarrama  [address]  [facility] [address]     Dear Colleague,    Thank you for referring your patient, Dai Guadarrama, to the [facility] at [facility]. Please see a copy of my visit note below.    SUBJECTIVE:    34 year old, female, , 9w3d,  who presents to the clinic today for a new ob visit.    Feels well. Has started PNV.  Estimated Date of Delivery: Oct 20, 2020   Oct 20, 2020 is calculated from Patient's last menstrual period was 2019..     She has not had bleeding since her LMP.   She has had nausea resulting in persistent vomiting and a PICC line was ordered by CNM. Weight loss has occurred, for a total of 3 pounds.  She has been taking antiemetics and PPI.  Plans for repeat .    ===========================================  ROS  PSYCHIATRIC:  Denies mood changes  PHQ9: Last PHQ-9 score on record= No Value exists for the : HP#PHQ9  Social History     Tobacco Use     Smoking status: Never Smoker     Smokeless tobacco: Never Used   Substance Use Topics     Alcohol use: Not Currently     Alcohol/week: 0.0 standard drinks     Comment: rare alcohol use now nothing in pregnancy     History   Drug Use No     History   Smoking Status     Never Smoker   Smokeless Tobacco     Never Used     Social History    Substance and Sexual Activity      Alcohol use: Not Currently        Alcohol/week: 0.0 standard drinks        Comment: rare alcohol use now nothing in pregnancy    Family History   Problem Relation Age of Onset     Anxiety Disorder No family hx of      Mental Illness No family hx of      Substance Abuse No family hx of      Anesthesia Reaction No family hx of      Asthma No family hx of      Osteoporosis No family hx of      Genetic Disorder No family hx of      Thyroid Disease No family hx of      Hyperlipidemia No family hx of      Cerebrovascular Disease No family hx of       Breast Cancer No family hx of      Colon Cancer No family hx of      Prostate Cancer No family hx of      Other Cancer No family hx of      Depression No family hx of      Diabetes No family hx of      Coronary Artery Disease No family hx of      Hypertension No family hx of      ============================================  MEDICAL HISTORY   No Known Allergies    [unfilled]      Current Outpatient Medications:      cholecalciferol (VITAMIN D3) 5000 units (125 mcg) capsule, Take 1 capsule (5,000 Units) by mouth daily Take one capsule daily., Disp: 90 capsule, Rfl: 3     metoclopramide (REGLAN) 10 MG tablet, Take 1 tablet (10 mg) by mouth every 6 hours as needed (nausea), Disp: 90 tablet, Rfl: 3     omeprazole (PRILOSEC) 20 MG DR capsule, Take 2 capsules (40 mg) by mouth daily, Disp: 30 capsule, Rfl: 1     ondansetron (ZOFRAN ODT) 4 MG ODT tab, Take 1 tablet (4 mg) by mouth every 8 hours as needed for nausea, Disp: 60 tablet, Rfl: 3     Prenatal Vit-Fe Fumarate-FA (GOODSENSE PRENATAL VITAMINS) 28-0.8 MG TABS, Take 1 tablet by mouth daily, Disp: 100 tablet, Rfl: 2     promethazine (PHENERGAN) 25 MG suppository, Place 1 suppository (25 mg) rectally every 6 hours as needed for nausea, Disp: 60 suppository, Rfl: 3     pyridOXINE (VITAMIN B6) 25 MG tablet, Take 1 tablet (25 mg) by mouth every 8 hours, Disp: 90 tablet, Rfl: 4     senna-docusate (SENOKOT-S/PERICOLACE) 8.6-50 MG tablet, Take 1 tablet by mouth daily, Disp: 90 tablet, Rfl: 0     doxylamine (UNISOM) 25 MG TABS tablet, Take 1 tablet (25 mg) by mouth At Bedtime Take with 3rd dose of Vitamin B6 for nausea in pregnancy, Disp: 60 tablet, Rfl: 3     Prenatal Vit-Fe Fumarate-FA (PRENATAL VITAMIN) 27-0.8 MG TABS, , Disp: , Rfl:     Past Medical History:   Diagnosis Date     Aneurysm (H)      Migraine headache without aura      Pregnancy related nausea and vomiting, antepartum 2016    two ED visits.     Recurrent pregnancy loss (CODE)      Retained placenta 2014      "Stillbirth 2014    32 weeks     Tachycardia        Past Surgical History:   Procedure Laterality Date     C EACH ADD TOOTH EXTRACTION      bad reaction to anesthia for local       SECTION N/A 2018    Procedure:  SECTION;  Surgeon: Noreen Harris MD;  Location: [facility] L+D     INSERT PICC LINE Right 3/12/2020    Procedure: Midline Picc Placement;  Surgeon: Ugo Webb PA-C;  Location: [facility] OR     IR PICC PLACEMENT > 5 YRS OF AGE  3/12/2020        OB History    Para Term  AB Living   6 3 2 1 2 2   SAB TAB Ectopic Multiple Live Births   2 0 0 0 3      # Outcome Date GA Lbr Spike/2nd Weight Sex Delivery Anes PTL Lv   6 Current            5 Term 18 37w1d  2.74 kg (6 lb 0.7 oz) F CS-LTranv Spinal N KASEY      Birth Comments: unstable fetal lie, hx IUFD      Name: KIJNAL COCHRAN,BABY1 LAURA      Apgar1: 8  Apgar5: 9   4 Term 17 37w5d 02:30 / 00:13 3.374 kg (7 lb 7 oz) F Vag-Spont EPI N KASEY      Birth Comments: IOL for oligo, PP hemorrhage 914 mL      Name: Marjorie       Apgar1: 9  Apgar5: 9   3  14 32w0d   F I.U. FETAL D   ND   2 SAB            1 SAB               Obstetric Comments      2nd pregnancy oligohydramnios IOL 37.5        GYN History- denies Abnormal Pap Smears                        Cervical procedures: denies                        History of STI: denies    I personally reviewed the past social/family/medical and surgical history on the date of service.   I reviewed lab work done at Intake visit with patient.    OBJECTIVE:   PHYSICAL EXAM:  /70   Pulse 82   Ht 1.556 m (5' 1.25\")   Wt 98.4 kg (217 lb)   LMP 2019   Breastfeeding No   BMI 40.67 kg/m    BMI- Body mass index is 40.67 kg/m .   GENERAL:  Pleasant pregnant female, alert, cooperative and well groomed.  SKIN:  Warm and dry, without lesions or rashes  HEAD: Symmetrical features.  MOUTH:  Buccal mucosa pink, moist without lesions.  Teeth in good repair.    NECK:  Thyroid " without enlargement and nodules.  Lymph nodes not palpable.   LUNGS:  Clear to auscultation.    HEART:  RRR without murmur.  ABDOMEN: Soft without masses , tenderness or organomegaly  MUSCULOSKELETAL:  Full range of motion  EXTREMITIES:  No edema. No significant varicosities.   PELVIC EXAM: deferred  GC/CHLAMYDIA CULTURE OBTAINED: urine    ASSESSMENT:  Intrauterine pregnancy 9w3d   Genetic Screening: First Trimester Screen  Encounter Diagnoses   Name Primary?     Elevated hemoglobin A1c      Nausea and vomiting in pregnancy      Nonimmune to hepatitis B virus Yes     History of IUFD      9 weeks gestation of pregnancy         PLAN:  Orders Placed This Encounter   Procedures     HEPATITIS B VACCINE,ADULT,IM     Glucose 1 Hour     [facility] REFERRAL-PREGNANCY     Orders Placed This Encounter   Medications     metoclopramide (REGLAN) 10 MG tablet     Sig: Take 1 tablet (10 mg) by mouth every 6 hours as needed (nausea)     Dispense:  90 tablet     Refill:  3     senna-docusate (SENOKOT-S/PERICOLACE) 8.6-50 MG tablet     Sig: Take 1 tablet by mouth daily     Dispense:  90 tablet     Refill:  0     - nausea and vomiting of pregnancy: discussed need to wean off of IV meds as able.  Reglan PO added.  Did not review healthy weight gain in pregnancy, will need to discuss recommendations for total weight gain at subsequent visit.  - hep B nonimmune: discussed vaccination and patient is amenable  - suspected insulin resistance:  early glucose challenge test today  - history of IUFD @ 32wga: MFM referral placed.  This is a nonurgent consult.  Plan for  testing 30-32wga.  - All pt's questions discussed and answered.  Pt verbalized understanding of and agreement to plan of care.   - delivery planning: plan for repeat c/s scheduled at 39wga    - Continue scheduled prenatal care and prn if questions or concerns    Dee Dee Pete MD   PGY4 OBGYN    Patient was seen by the resident in [facility].  I  reviewed the history & exam.  The patient's assessment and plan were made jointly.    Ailyn Duarte MD MPH          Again, thank you for allowing me to participate in the care of your patient.      Sincerely,    Chantell Duarte MD

## 2020-03-21 ENCOUNTER — HOME INFUSION (PRE-WILLOW HOME INFUSION) (OUTPATIENT)
Dept: PHARMACY | Facility: CLINIC | Age: 35
End: 2020-03-21

## 2020-03-23 DIAGNOSIS — R73.09 ELEVATED HEMOGLOBIN A1C: Primary | ICD-10-CM

## 2020-03-23 NOTE — PROGRESS NOTES
This is a recent snapshot of the patient's [facility] medical record.  For current drug dose and complete information and questions, call [phone] or In Basket pool, fv home infusion ([id number])  CSN Number:  [id number]

## 2020-03-24 ENCOUNTER — APPOINTMENT (OUTPATIENT)
Dept: INTERPRETER SERVICES | Facility: CLINIC | Age: 35
End: 2020-03-24
Payer: MEDICAID

## 2020-03-24 NOTE — PROGRESS NOTES
This is a recent snapshot of the patient's [facility] medical record.  For current drug dose and complete information and questions, call [phone] or In Basket pool, fv home infusion ([id number])  CSN Number:  [id number]

## 2020-03-26 ENCOUNTER — MEDICAL CORRESPONDENCE (OUTPATIENT)
Dept: HEALTH INFORMATION MANAGEMENT | Facility: CLINIC | Age: 35
End: 2020-03-26

## 2020-03-26 ENCOUNTER — HOME INFUSION (PRE-WILLOW HOME INFUSION) (OUTPATIENT)
Dept: PHARMACY | Facility: CLINIC | Age: 35
End: 2020-03-26

## 2020-03-27 ENCOUNTER — APPOINTMENT (OUTPATIENT)
Dept: INTERPRETER SERVICES | Facility: CLINIC | Age: 35
End: 2020-03-27
Payer: MEDICAID

## 2020-03-27 NOTE — PROGRESS NOTES
This is a recent snapshot of the patient's [facility] medical record.  For current drug dose and complete information and questions, call [phone] or In Basket pool, fv home infusion ([id number])  CSN Number:  [id number]

## 2020-03-30 ENCOUNTER — HOME INFUSION (PRE-WILLOW HOME INFUSION) (OUTPATIENT)
Dept: PHARMACY | Facility: CLINIC | Age: 35
End: 2020-03-30

## 2020-03-30 ENCOUNTER — APPOINTMENT (OUTPATIENT)
Dept: INTERPRETER SERVICES | Facility: CLINIC | Age: 35
End: 2020-03-30
Payer: MEDICAID

## 2020-03-30 ENCOUNTER — TELEPHONE (OUTPATIENT)
Dept: OBGYN | Facility: CLINIC | Age: 35
End: 2020-03-30

## 2020-03-30 NOTE — TELEPHONE ENCOUNTER
Spoke with Dai with  on line to discuss scheduled visit for 3/30.     Per patient, this was scheduled by a nurse for her. Patient has no specific concerns.     RN reviewed typical OB visits with patient. Given intake visit was on 3/20, the patient is comfortable rescheduling this visit for a phone visit at 12 weeks. Patient gave verbal ok to cancel visit for 3/31.

## 2020-03-30 NOTE — TELEPHONE ENCOUNTER
----- Message from Vicky Salazar MD sent at 3/30/2020  4:09 PM CDT -----  Regarding: Patient added on by call center  I am not sure why this patient is scheduled for tomorrow 3/31- was seen for NOB on 3/20 and is 11wks pregnant. Should have telephone visit 15-16wks.     Thanks!

## 2020-03-31 NOTE — PROGRESS NOTES
This is a recent snapshot of the patient's [facility] medical record.  For current drug dose and complete information and questions, call [phone] or In Basket pool, fv home infusion ([id number])  CSN Number:  [id number]

## 2020-04-03 ENCOUNTER — TELEPHONE (OUTPATIENT)
Dept: OBGYN | Facility: CLINIC | Age: 35
End: 2020-04-03

## 2020-04-03 ENCOUNTER — APPOINTMENT (OUTPATIENT)
Dept: INTERPRETER SERVICES | Facility: CLINIC | Age: 35
End: 2020-04-03
Payer: COMMERCIAL

## 2020-04-03 DIAGNOSIS — O99.810 ABNORMAL MATERNAL GLUCOSE TOLERANCE, ANTEPARTUM: ICD-10-CM

## 2020-04-03 DIAGNOSIS — R73.09 ELEVATED GLUCOSE: ICD-10-CM

## 2020-04-03 DIAGNOSIS — J32.9 SINUSITIS: Primary | ICD-10-CM

## 2020-04-03 RX ORDER — AMOXICILLIN 500 MG/1
500 CAPSULE ORAL 3 TIMES DAILY
Qty: 21 CAPSULE | Refills: 0 | Status: SHIPPED | OUTPATIENT
Start: 2020-04-03 | End: 2020-04-15

## 2020-04-03 NOTE — TELEPHONE ENCOUNTER
Call pt with Tae gallardo. Pt called with next steps after elevated one hour glucose test Given directions that she will test once daily fasting before breakfast .  Will return in two weeks to review blood sugars over the phone with the physician.  Reviewed it is critical that she write each blood sugar done with date and time.    She states her  understands english and will go with her to the pharmacy where the pharmacist will teach her how to use her glucometer (confirmed with [facility] pharmacy this is something they do at pharmacy)     She reports she has had a headache x 2 days described as the front and back of her head.  She also reports pain at her gums and teeth.  Nasal drainage is mucus that is dark and blood.   She is taking tylenol 500 mg every 6 hours which provides no relief she states.  Reviewed she could increase her tylenol to be  mg Tylenol= 1000 mg every 6-8 for her headache.     Denies Fever, no shortness of breath, no cough .  States NKDA.    Discussed with Dr. Marsh symptoms above as noted:  Orders received for Amoxicillin 500 mg TID PO  x 7 days.     Pt called back with tae gallardo, reviewed that prescription was sent for amoxicillin 500 mg TID orally x 7 days.  If her symptoms worsen, develops a fever of 100.4 or greater, has bright red bleeding from nose, or other concerns such as cough,, shortness of breath she needs to speak with someone directly, if after hours call [phone].  Voices understanding.    Right before we  discontinued the call, she report that she has vaginal itching.  Denies any vaginal discharge, no spotting.  No change in laundry, no change in clothing, no change in pads.  Discussed she could try Monistat over the counter 7 day cream and apply topically to vagina at night to see if brings relief.  If it does not, she will need to call and discuss.    Rx for meter and supplies sent to pharmacy.

## 2020-04-03 NOTE — RESULT ENCOUNTER NOTE
Left message by  interp services for Dai to please return call to speak with a nurse to discuss lab results.     NOTE:  Since pt is receiving IV hydration for hyperemesis discussed checking  blood sugars instead of 3 hour GTTwith Dr. Marsh. She agreed with plan but stated only need to check morning fasting blood sugars.    Spoke with pharmacy [facility] and they are able to do meter teaching with Dai.

## 2020-04-04 ENCOUNTER — TELEPHONE (OUTPATIENT)
Dept: OBGYN | Facility: CLINIC | Age: 35
End: 2020-04-04

## 2020-04-04 DIAGNOSIS — O21.9 NAUSEA AND VOMITING IN PREGNANCY: ICD-10-CM

## 2020-04-04 DIAGNOSIS — O99.810 ABNORMAL MATERNAL GLUCOSE TOLERANCE, ANTEPARTUM: Primary | ICD-10-CM

## 2020-04-04 NOTE — TELEPHONE ENCOUNTER
Received call from patient who picked up glucometer at [facility] yesterday, but they told her they were unable to provide education on its use that day.  Patient is unable to figure our how to use it.  I advise that perhaps our doctor of pharmacy could have a telephone visit with her to explain.  She is doubtful that this will work and feels she needs someone to come to her home to instruct.  I let her know that we will try to come up with a solution that will work and perhaps a video call can be made which may help.  I will send this message to Dr. Ernandez and our RN team to assist patient on Monday.  Patient was agreeable to this plan.    Entire phone call done with assistance of [employer] .    Joya Beck MD

## 2020-04-06 ENCOUNTER — APPOINTMENT (OUTPATIENT)
Dept: INTERPRETER SERVICES | Facility: CLINIC | Age: 35
End: 2020-04-06
Payer: COMMERCIAL

## 2020-04-06 ENCOUNTER — HOME INFUSION (PRE-WILLOW HOME INFUSION) (OUTPATIENT)
Dept: PHARMACY | Facility: CLINIC | Age: 35
End: 2020-04-06

## 2020-04-06 NOTE — TELEPHONE ENCOUNTER
Note from Noreen Ernandez who had received information from diabetes education that there is  potential for video visits with diabetic education by the end of this week.      Routing to Dr. Marsh and Noreen Ernandez to  advise if Dai should come into clinic for glucometer teaching now or if it's  ok to wait until end of the week to have a visit with  diabetic education via video visit for glucometer training.

## 2020-04-07 NOTE — TELEPHONE ENCOUNTER
Will contact Dai after Noreen Ernandez noted that either a nurse visit for meter education or diabetic ed video visit at end of week would be ok.    Left message on diabetic education's voicemail today at 0910 am to see if they are able to coordinate a video visit with a b[facility] interp for diabetic ed at the end of this week.  Ask that they please call back to discuss with a nurse at [phone].    Will need to contact Dai after discussion with Diabetic ed with plan.  Also noted that she has an appointment tomorrow that needs to be rescheduled two weeks after blood sugars recorded.  Will contact Dai after speaking with diabetic ed.    NOTE:  Dai will need to be called with updated plan for diabetic education and meter instruction.  Also will need appointment rescheduled from 4/8/2020 to two weeks after  Fasting morning blood sugars recorded.

## 2020-04-07 NOTE — TELEPHONE ENCOUNTER
Left message on Diabetic ed after message was left by [facility] that they will be able to do a Telephone visit by the end of this week with a [place] interp if we send orders.  Left message on [facility]'s voice mail that orders have been faxed.    Diabetic ed orders faxed    Attempted to reach Dai to reschedule appointment for tomorrow 4/8/2020, left message by MONSE gallardo to not come in for an appointment tomorrow, please call RN at [phone].

## 2020-04-07 NOTE — PROGRESS NOTES
This is a recent snapshot of the patient's [facility] medical record.  For current drug dose and complete information and questions, call [phone] or In Basket pool, fv home infusion ([id number])  CSN Number:  [id number]

## 2020-04-08 ENCOUNTER — TELEPHONE (OUTPATIENT)
Dept: OBGYN | Facility: CLINIC | Age: 35
End: 2020-04-08

## 2020-04-08 ENCOUNTER — APPOINTMENT (OUTPATIENT)
Dept: INTERPRETER SERVICES | Facility: CLINIC | Age: 35
End: 2020-04-08
Payer: COMMERCIAL

## 2020-04-08 NOTE — TELEPHONE ENCOUNTER
Pt called to discuss appt scheduled today, desires in-clinic appt if appropriate. Pt has history multiple complications in prior pregnancies. Pt has been educated on blood glucose testing by home infusion nurse and began testing at home on Monday. Pt states she is testing QID and will continue this as instructed. Discussed with Dr. Louis who advised can have telephone visit with Dr. eLyva to discuss glucose results and FREDY in clinic next week. Gave patient # to call MFM to schedule first trimester screen. Scheduled appts this week and next. Pt expressed understanding and has no further questions at this time.

## 2020-04-08 NOTE — TELEPHONE ENCOUNTER
Left message on Diabetic ed voice mail that glucometer was taught by Homecare RN and pt has been checking her blood sugars at home.  No appointment is needed.  Please call if questions to [phone].    Left message with MONSE interp to keep telephone visit on Friday 04/10/2020 as scheduled.  Call if questions [phone]

## 2020-04-08 NOTE — TELEPHONE ENCOUNTER
Received phone call from Dr. Leyva that she is not in clinic 4/10 in the afternoon so pt appointment needs to be changed.    Tried to reach Dai via   Services and received spouse, Perry, on the phone. Asked for Dai to callback to discuss need to change appointment - apologized for so many changes to appointments and how confusing it has become. Perry stated she is sleeping but he can take the information regarding the appointment change.    Informed him that there is no need for 4/10 phone call - follow-up in clinic on 4/15 with Dr. Marsh as scheduled. He agreed with plan.    Perry reports Dai is feeling better and is eating without vomiting. She is checking her blood sugars before breakfast and 1 hr after breakfast and 1 hr after lunch.     Perry asked for where clinic is located and phone number - this was provided. He had no further questions.    Sent note to Diabetes Education  that they do not need to try to schedule Dai at this time as she is using glucometer.

## 2020-04-08 NOTE — TELEPHONE ENCOUNTER
Received message from NEFTALY Dugan that patient no longer needs appointment. She has been taught how to use glucose meter so she no longer needs a visit with Diabetes Education.     Kaylyn DOMINIQUE  Central Scheduler

## 2020-04-08 NOTE — TELEPHONE ENCOUNTER
Diabetes Education Scheduling Outreach #1:    Call to patient to schedule. Left message with phone number to call to schedule.    Plan for 2nd outreach attempt within 1 business day.    Kaylyn Bashir OnCall  Diabetes and Nutrition Scheduling

## 2020-04-08 NOTE — TELEPHONE ENCOUNTER
Left message on diabetic education to confirm that orders faxed on 4/7/2020 have been received and Dai and able to schedule with sami for a phone visit this week for glucometer teaching.  Ask that they please return call with questions and/or confirmation at [phone].    Called Dai with [facility] inter, left message on voice mail to please return a call to nurses at [phone].

## 2020-04-13 ENCOUNTER — HOME INFUSION (PRE-WILLOW HOME INFUSION) (OUTPATIENT)
Dept: PHARMACY | Facility: CLINIC | Age: 35
End: 2020-04-13

## 2020-04-13 ENCOUNTER — APPOINTMENT (OUTPATIENT)
Dept: INTERPRETER SERVICES | Facility: CLINIC | Age: 35
End: 2020-04-13
Payer: COMMERCIAL

## 2020-04-14 ENCOUNTER — VIRTUAL VISIT (OUTPATIENT)
Dept: MATERNAL FETAL MEDICINE | Facility: CLINIC | Age: 35
End: 2020-04-14
Attending: ADVANCED PRACTICE MIDWIFE
Payer: COMMERCIAL

## 2020-04-14 ENCOUNTER — APPOINTMENT (OUTPATIENT)
Dept: INTERPRETER SERVICES | Facility: CLINIC | Age: 35
End: 2020-04-14
Payer: COMMERCIAL

## 2020-04-14 DIAGNOSIS — R73.09 ELEVATED HEMOGLOBIN A1C: ICD-10-CM

## 2020-04-14 DIAGNOSIS — O09.529 AMA (ADVANCED MATERNAL AGE) MULTIGRAVIDA 35+: ICD-10-CM

## 2020-04-14 DIAGNOSIS — Z36.9 ENCOUNTER FOR ANTENATAL SCREENING OF MOTHER: Primary | ICD-10-CM

## 2020-04-14 DIAGNOSIS — O26.90 PREGNANCY RELATED CONDITION, ANTEPARTUM: ICD-10-CM

## 2020-04-14 PROCEDURE — 40000072 ZZH STATISTIC GENETIC COUNSELING, < 16 MIN: Mod: TEL,ZF | Performed by: GENETIC COUNSELOR, MS

## 2020-04-14 NOTE — PROGRESS NOTES
This is a recent snapshot of the patient's [facility] medical record.  For current drug dose and complete information and questions, call [phone] or In Basket pool, fv home infusion ([id number])  CSN Number:  [id number]

## 2020-04-14 NOTE — PROGRESS NOTES
[facility] [place]  Genetic Counseling Consult    Patient: Dai Guadarrama YOB: 1985   Date of Service: 20      Dai Guadarrama was evaluated via a billable telephone visit at [facility] for genetic consultation given advanced maternal age and a history of a previous intrauterine fetal demise. Dai was unaccompanied for today's telephone visit.  Chong (ID: [id number]) assisted with today's telephone visit. The patient previously met with genetic counselor Cassandra Correa MS, [facility] on 2018. Please see Cassandra's note for additional information.     The patient has been notified of following:    This telephone visit will be conducted via a call between you and your physician/provider. We have found that certain health care needs can be provided without the need for a physical exam. This service lets us provide the care you need with a short phone conversation. If a prescription is necessary we can send it directly to your pharmacy. If lab work is needed we can place an order for that and you can then stop by our lab to have the test done at a later time.     If during the course of the call the provider feels a telephone visit is not appropriate, you will not be charged for this service.       Impression/Plan:   1.  Dai has not had serum screening in this pregnancy. The option of aneuploidy screening in this pregnancy was discussed and declined.     2.  Maternal serum AFP (single marker screen) is recommended after 15 weeks to screen for open neural tube defects. A quad screen should not be performed.    3.  An 18-20 week comprehensive ultrasound is standard of care for all women 35 or older at delivery.    Pregnancy History:   /Parity:   Age at Delivery: 35 year old  HORTENCIA: 10/20/2020, by Ultrasound  Gestational Age: 13w0d    No significant complications or exposures were reported in the  current pregnancy.    Moose pregnancy history is significant for:  ? Two first trimester spontaneous miscarriages   ? IUFD at 35 weeks; normal karyotype (46, XX) and normal placental pathology and autopsy. The reasons for fetal demise remains unknown.   ? One healthy daughter born in 2/2017   ? One healthy daughter born in 11/2018    All pregnancies are reported to be with the patient's partner, Perry.    Medical History:   Moose reported medical history is not expected to impact pregnancy management or risks to fetal development. Dai reports frequent migraines and feeling quite nauseous in the current pregnancy. She reports that her cerebral aneurysm is the cause of her migraines.        Family History:   A three-generation pedigree was obtained and updated by Cassandra Correa MS, [facility] in 2018. We reviewed this pedigree at today's consult and updated the family history accordingly. This updated pedigree is scanned under the  Media  tab in the patient's chart.     No new significant findings in the family history were reported by Dai at today's consult. This included births, deaths, or other medical diagnoses.     The patient and her partner's reported family history is negative for multiple miscarriages, stillbirths, birth defects, intellectual disabilities, known genetic conditions, and consanguinity.       Carrier Screening:   The patient reports that she and the father of the pregnancy have  ancestry:       Expanded carrier screening for mutations in a large panel of genes associated with autosomal recessive conditions including cystic fibrosis, spinal muscular atrophy, and others, is now available.      The patient has declined the carrier screening options reviewed today.       Risk Assessment for Chromosome Conditions:   We explained that the risk for fetal chromosome abnormalities increases with maternal age. We discussed specific features of common chromosome abnormalities, including Down  syndrome, trisomy 13, trisomy 18, and sex chromosome trisomies.      At age 35 at midtrimester, the risk to have a baby with Down syndrome is 1 in 168.     At age 35 at midtrimester, the risk to have a baby with any chromosome abnormality is 1 in 82.     Testing Options:   We discussed the following options:     First trimester screening    First trimester ultrasound with nuchal translucency and nasal bone assessments, maternal plasma hCG, ELLY-A, and AFP measurement    Screens for fetal trisomy 21, trisomy 13, and trisomy 18    Cannot screen for open neural tube defects; maternal serum AFP after 15 weeks is recommended    The patient inquired about the possibility of having an NT ultrasound, only, performed before 14 weeks gestation. We reviewed the benfits and limitations of proceeding with an NT ultrasound at today's consult. After consulting with Dr. Don Darden MD and in light of [facility]'s COVID-19 protocol, it was felt that undergoing serum screening would provide the most information and benefit the patient at this time. The patient verbalized understanding and has declined genetic serum screening at this time.      Non-invasive Prenatal Testing (NIPT)    Maternal plasma cell-free DNA testing; first trimester ultrasound with nuchal translucency and nasal bone assessment is recommended, when appropriate    Screens for fetal trisomy 21, trisomy 13, trisomy 18, and sex chromosome aneuploidy    Cannot screen for open neural tube defects; maternal serum AFP after 15 weeks is recommended     Comprehensive (Level II) ultrasound: Detailed ultrasound performed between 18-22 weeks gestation to screen for major birth defects and markers for aneuploidy.    We reviewed the benefits and limitations of this testing.  Screening tests provide a risk assessment specific to the pregnancy for certain fetal chromosome abnormalities, but cannot definitively diagnose or exclude a fetal chromosome abnormality.   Follow-up genetic counseling and consideration of diagnostic testing is recommended with any abnormal screening result.     Diagnostic tests carry inherent risks- including risk of miscarriage- that require careful consideration.  These tests can detect fetal chromosome abnormalities with greater than 99% certainty.  Results can be compromised by maternal cell contamination or mosaicism, and are limited by the resolution of cytogenetic G-banding technology.  There is no screening nor diagnostic test that can detect all forms of birth defects or mental disability.     It was a pleasure to be involved with Dai rowland care.     Total phone call contact time  Call started at 10:15AM  Call ended at 10:52AM      Anali Gamez MS, [facility]  Genetic Counselor  [facility]  Maternal Fetal Medicine  Ph: [phone]  [email or web address]    Attestation:  Patient seen, evaluated and discussed with the Genetic Counseling Intern. I have verified the content of the note, which accurately reflects my assessment of the patient and the plan of care. I did attempt to  contact the patient with a  to review the above plan.The patient did not answer her phone. I left a voicemail with my direct call back number if she has any additional questions or concerns.    Supervising Genetic Counselor  Naima Gibson MS, [facility]  Maternal Fetal Medicine  [facility]  Phone:[phone]  Email: [email or web address]

## 2020-04-15 ENCOUNTER — OFFICE VISIT (OUTPATIENT)
Dept: OBGYN | Facility: CLINIC | Age: 35
End: 2020-04-15
Attending: OBSTETRICS & GYNECOLOGY
Payer: COMMERCIAL

## 2020-04-15 VITALS
SYSTOLIC BLOOD PRESSURE: 113 MMHG | BODY MASS INDEX: 41.6 KG/M2 | WEIGHT: 222 LBS | DIASTOLIC BLOOD PRESSURE: 76 MMHG | HEART RATE: 89 BPM

## 2020-04-15 DIAGNOSIS — O09.92 HIGH-RISK PREGNANCY IN SECOND TRIMESTER: Primary | ICD-10-CM

## 2020-04-15 ASSESSMENT — PAIN SCALES - GENERAL: PAINLEVEL: NO PAIN (0)

## 2020-04-15 NOTE — LETTER
4/15/2020       RE: Dai Guadarrama  [address]  [place] [address]     Dear Colleague,    Thank you for referring your patient, Dai Guadarrama, to the [facility] at [facility]. Please see a copy of my visit note below.    OB VISIT NOTE    S: Feeling well. Nausea improving. Still getting IVF hydration and feels is still needed. Has been checking BS as failed early GCT. Has been checking for >1 week and all BS wnl. Denies vaginal bleeding since LMP, has some mild pelvic cramping.     O: /76   Pulse 89   Wt 100.7 kg (222 lb)   LMP 2019   Breastfeeding No   BMI 41.60 kg/m      BS: Fasting <95, 1hr PP <120    A/P: 34 year old  @13w1d HRP  Failed early GCT: Checking BS x 1 week, no diagnosis of DM. Will reevaluate at 28 wks. Reviewed that she is at risk for developing GDM.  Nausea/vomiting: Getting IVF hydration. Will continue at this time.   History of IUFD: @32wks, will start  testing between 30-32 weeks.   H/o CS: Will discuss route of delivery at later date.   AMA: S/p genetics consult, Declined genetic screening- will plan Level 2.   FREDY via virtual visit in 4 weeks    Vicky Salazar MD

## 2020-04-15 NOTE — PROGRESS NOTES
OB VISIT NOTE    S: Feeling well. Nausea improving. Still getting IVF hydration and feels is still needed. Has been checking BS as failed early GCT. Has been checking for >1 week and all BS wnl. Denies vaginal bleeding since LMP, has some mild pelvic cramping.     O: /76   Pulse 89   Wt 100.7 kg (222 lb)   LMP 2019   Breastfeeding No   BMI 41.60 kg/m      BS: Fasting <95, 1hr PP <120    A/P: 34 year old  @13w1d HRP  Failed early GCT: Checking BS x 1 week, no diagnosis of DM. Will reevaluate at 28 wks. Reviewed that she is at risk for developing GDM.  Nausea/vomiting: Getting IVF hydration. Will continue at this time.   History of IUFD: @32wks, will start  testing between 30-32 weeks.   H/o CS: Will discuss route of delivery at later date.   AMA: S/p genetics consult, Declined genetic screening- will plan Level 2.   FREDY via virtual visit in 4 weeks    Vicky Salazar MD

## 2020-04-20 ENCOUNTER — TELEPHONE (OUTPATIENT)
Dept: PHARMACY | Facility: CLINIC | Age: 35
End: 2020-04-20

## 2020-04-20 ENCOUNTER — HOME INFUSION (PRE-WILLOW HOME INFUSION) (OUTPATIENT)
Dept: PHARMACY | Facility: CLINIC | Age: 35
End: 2020-04-20

## 2020-04-20 NOTE — TELEPHONE ENCOUNTER
[facility] PRIOR AUTHORIZATION REQUEST      Drug including DOSE: Infuvite 10ml qd  J Code:  NDC: [address]-69  ICD 10 code: O21.0    Date(s) of Service: 3/11/20-3/11/21     Insurance Name:[employer]  Insurance ID:[id number]     Provider: Merry Clayton  Provider NPI: [id number]        [facility]  NPI: [id number]

## 2020-04-21 NOTE — TELEPHONE ENCOUNTER
PA Initiation    Medication: INFUVITE  Insurance Company: [employer] - Phone [phone] Fax [phone]  Pharmacy Filling the Rx: [facility]  Filling Pharmacy Phone: [phone]  Filling Pharmacy Fax:    Start Date: 4/21/2020    Central Prior Authorization Team   Phone: [phone]      Manually faxed prior authorization form to "[employer] at fax# [phone]

## 2020-04-21 NOTE — PROGRESS NOTES
This is a recent snapshot of the patient's [facility] medical record.  For current drug dose and complete information and questions, call [phone] or In Basket pool, fv home infusion ([id number])  CSN Number:  [id number]

## 2020-04-22 NOTE — TELEPHONE ENCOUNTER
Received fax back from [employer] that they were unable to find patient.   Member no longer has active coverage under ID# [id number], new ID # is [id number]- re-faxed auth request with this ID information to [employer] at fax# [phone]      Active ins information:       Inactive insurance info:

## 2020-04-27 ENCOUNTER — TELEPHONE (OUTPATIENT)
Dept: OBGYN | Facility: CLINIC | Age: 35
End: 2020-04-27

## 2020-04-27 ENCOUNTER — HOME INFUSION (PRE-WILLOW HOME INFUSION) (OUTPATIENT)
Dept: PHARMACY | Facility: CLINIC | Age: 35
End: 2020-04-27

## 2020-04-27 ENCOUNTER — APPOINTMENT (OUTPATIENT)
Dept: ULTRASOUND IMAGING | Facility: CLINIC | Age: 35
End: 2020-04-27
Attending: EMERGENCY MEDICINE
Payer: COMMERCIAL

## 2020-04-27 ENCOUNTER — HOSPITAL ENCOUNTER (EMERGENCY)
Facility: CLINIC | Age: 35
Discharge: HOME | End: 2020-04-27
Attending: EMERGENCY MEDICINE | Admitting: EMERGENCY MEDICINE
Payer: COMMERCIAL

## 2020-04-27 VITALS
WEIGHT: 229 LBS | HEART RATE: 88 BPM | RESPIRATION RATE: 18 BRPM | OXYGEN SATURATION: 100 % | TEMPERATURE: 97.5 F | BODY MASS INDEX: 42.92 KG/M2 | DIASTOLIC BLOOD PRESSURE: 92 MMHG | SYSTOLIC BLOOD PRESSURE: 129 MMHG

## 2020-04-27 DIAGNOSIS — M79.601 PAIN OF RIGHT UPPER EXTREMITY: ICD-10-CM

## 2020-04-27 PROCEDURE — 99284 EMERGENCY DEPT VISIT MOD MDM: CPT | Mod: 25

## 2020-04-27 PROCEDURE — 93971 EXTREMITY STUDY: CPT | Mod: RT

## 2020-04-27 PROCEDURE — 99283 EMERGENCY DEPT VISIT LOW MDM: CPT | Mod: Z6 | Performed by: EMERGENCY MEDICINE

## 2020-04-27 ASSESSMENT — ENCOUNTER SYMPTOMS
SHORTNESS OF BREATH: 0
ABDOMINAL PAIN: 0
COUGH: 0
NUMBNESS: 0
WEAKNESS: 0
FEVER: 0

## 2020-04-27 NOTE — TELEPHONE ENCOUNTER
Spoke with HC nurse stating that they pulled patient's midline today because of 10/10 pain. Worried about DVT. Nurse directed to ED to rule out DVT.

## 2020-04-27 NOTE — DISCHARGE INSTRUCTIONS
Apply warm packs 2-3 times per day.  You may take acetaminophen as needed for pain.    Return to the emergency department if fever, rash, arm swelling, or other concerns.    Follow-up with your clinic as planned.

## 2020-04-27 NOTE — ED AVS SNAPSHOT
[facility], [place], Emergency Department  [address]  [place] [address]  Phone:  [phone]  Fax:  [phone]                                    Dai Guadarrama   MRN: [id number]    Department:  [facility], Emergency Department   Date of Visit:  4/27/2020           After Visit Summary Signature Page    I have received my discharge instructions, and my questions have been answered. I have discussed any challenges I see with this plan with the nurse or doctor.    ..........................................................................................................................................  Patient/Patient Representative Signature      ..........................................................................................................................................  Patient Representative Print Name and Relationship to Patient    ..................................................               ................................................  Date                                   Time    ..........................................................................................................................................  Reviewed by Signature/Title    ...................................................              ..............................................  Date                                               Time          22EPIC Rev 08/18

## 2020-04-27 NOTE — ED NOTES
Pt states that she has had arm pain for 4 days where she has had a picc line. The picc was removed today, but she states her arm is in pain and is swollen. She was told to come to the ER to r/o a blood clot.

## 2020-04-27 NOTE — ED PROVIDER NOTES
[facility] EMERGENCY DEPARTMENT ([facility])     2020    History     Chief Complaint   Patient presents with     Arm Pain     left arm pain started 4 days ago for which she had a picc line for IV fluids for her pregnancy.  PICC line was pulled today and her doctor wanted her here for r/o of blood clot.       HPI  Dai Guadarrama is a 34 year old  @14w6d female w/ PMH of aneurysm, stillbirth at 32 weeks, hyperemesis gravidarum, and morbid obesity who presents with right arm pain for the past 4 days at the site of Midline insertion.  Patient had a Midline placed for IV fluids during pregnancy.  Home care nurse removed midline today due to patient reporting 10 out of 10 pain in the arm.  Patient referred to the ED to rule out of DVT.  Patient denies any chest pain or dyspnea.  No fever.  She denies any hand swelling.      PAST MEDICAL HISTORY:   Past Medical History:   Diagnosis Date     Aneurysm (H)      Migraine headache without aura      Pregnancy related nausea and vomiting, antepartum 2016    two ED visits.     Recurrent pregnancy loss (CODE)      Retained placenta 2014     Stillbirth 2014    32 weeks     Tachycardia        PAST SURGICAL HISTORY:   Past Surgical History:   Procedure Laterality Date     C EACH ADD TOOTH EXTRACTION      bad reaction to anesthia for local       SECTION N/A 2018    Procedure:  SECTION;  Surgeon: Noreen Harris MD;  Location: [facility] L+D     INSERT PICC LINE Right 3/12/2020    Procedure: Midline Picc Placement;  Surgeon: Ugo Webb PA-C;  Location: [facility] OR     IR PICC PLACEMENT > 5 YRS OF AGE  3/12/2020       Past medical history, past surgical history, medications, and allergies were reviewed with the patient. Additional pertinent items: None    FAMILY HISTORY:   Family History   Problem Relation Age of Onset     Anxiety Disorder No family hx of      Mental Illness No family hx of      Substance Abuse No family hx of       Anesthesia Reaction No family hx of      Asthma No family hx of      Osteoporosis No family hx of      Genetic Disorder No family hx of      Thyroid Disease No family hx of      Hyperlipidemia No family hx of      Cerebrovascular Disease No family hx of      Breast Cancer No family hx of      Colon Cancer No family hx of      Prostate Cancer No family hx of      Other Cancer No family hx of      Depression No family hx of      Diabetes No family hx of      Coronary Artery Disease No family hx of      Hypertension No family hx of        SOCIAL HISTORY:   Social History     Tobacco Use     Smoking status: Never Smoker     Smokeless tobacco: Never Used   Substance Use Topics     Alcohol use: Not Currently     Alcohol/week: 0.0 standard drinks     Comment: rare alcohol use now nothing in pregnancy     Social history was reviewed with the patient. Additional pertinent items: None      Patient's Medications   New Prescriptions    No medications on file   Previous Medications    BLOOD GLUCOSE (NO BRAND SPECIFIED) TEST STRIP    Use to test blood sugar once  daily or as directed.    BLOOD GLUCOSE MONITORING (ACCU-CHEK COMPACT CARE KIT) METER DEVICE KIT    Test once daily before breakfast FASTING    BLOOD GLUCOSE MONITORING (ACCU-CHEK MULTICLIX) LANCETS    Test once daily before breakfast FASTING    CHOLECALCIFEROL (VITAMIN D3) 5000 UNITS (125 MCG) CAPSULE    Take 1 capsule (5,000 Units) by mouth daily Take one capsule daily.    DOXYLAMINE (UNISOM) 25 MG TABS TABLET    Take 1 tablet (25 mg) by mouth At Bedtime Take with 3rd dose of Vitamin B6 for nausea in pregnancy    METOCLOPRAMIDE (REGLAN) 10 MG TABLET    Take 1 tablet (10 mg) by mouth every 6 hours as needed (nausea)    OMEPRAZOLE (PRILOSEC) 20 MG DR CAPSULE    Take 2 capsules (40 mg) by mouth daily    ONDANSETRON (ZOFRAN ODT) 4 MG ODT TAB    Take 1 tablet (4 mg) by mouth every 8 hours as needed for nausea    PRENATAL VIT-FE FUMARATE-FA ([facility] PRENATAL VITAMINS)  28-0.8 MG TABS    Take 1 tablet by mouth daily    PRENATAL VIT-FE FUMARATE-FA (PRENATAL VITAMIN) 27-0.8 MG TABS        PROMETHAZINE (PHENERGAN) 25 MG SUPPOSITORY    Place 1 suppository (25 mg) rectally every 6 hours as needed for nausea    PYRIDOXINE (VITAMIN B6) 25 MG TABLET    Take 1 tablet (25 mg) by mouth every 8 hours    SENNA-DOCUSATE (SENOKOT-S/PERICOLACE) 8.6-50 MG TABLET    Take 1 tablet by mouth daily   Modified Medications    No medications on file   Discontinued Medications    No medications on file        No Known Allergies     Review of Systems   Constitutional: Negative for fever.   Respiratory: Negative for cough and shortness of breath.    Cardiovascular: Negative for chest pain.   Gastrointestinal: Negative for abdominal pain.   Musculoskeletal:        See HPI   Neurological: Negative for weakness and numbness.   All other systems reviewed and are negative.    A complete review of systems was performed with pertinent positives and negatives noted in the HPI, and all other systems negative.    Physical Exam   BP: 110/72  Pulse: 122  Temp: 98.8  F (37.1  C)  Resp: 18  Weight: 103.9 kg (229 lb)  SpO2: 99 %      Physical Exam  Vitals signs and nursing note reviewed.   Constitutional:       Appearance: Normal appearance.   HENT:      Head: Normocephalic.   Neck:      Musculoskeletal: Normal range of motion.   Cardiovascular:      Rate and Rhythm: Normal rate.      Pulses: Normal pulses.   Pulmonary:      Effort: Pulmonary effort is normal. No respiratory distress.   Musculoskeletal:      Right elbow: She exhibits normal range of motion and no swelling. Tenderness found.      Right wrist: Normal.      Right forearm: Normal.        Arms:       Right hand: Normal. She exhibits normal range of motion and normal capillary refill. Normal sensation noted. Normal strength noted.   Skin:     General: Skin is warm and dry.      Capillary Refill: Capillary refill takes less than 2 seconds.      Findings: No rash.    Neurological:      Mental Status: She is alert.      Sensory: Sensation is intact.      Motor: Motor function is intact.         ED Course        Procedures          Results for orders placed or performed during the hospital encounter of 04/27/20   US Upper Extremity Venous Duplex Right     Status: None (Preliminary result)    Narrative    ULTRASOUND UPPER EXTREMITY VENOUS DUPLEX RIGHT 4/27/2020 2:59 PM     HISTORY: Pain, swelling status post midline.     COMPARISON: Right upper extremity venous Doppler ultrasound 3/14/2020.    FINDINGS: The deep veins in the right upper extremity are compressible  throughout. The deep veins demonstrate normal venous augmentation,  waveforms and color Doppler flow. The subclavian and internal jugular  veins demonstrate normal color Doppler flow without intraluminal  thrombus. No evidence of superficial thrombophlebitis. PICC line has  been removed in the interval since prior study.      Impression    IMPRESSION: No evidence of DVT.        Results for orders placed or performed during the hospital encounter of 04/27/20 (from the past 24 hour(s))   US Upper Extremity Venous Duplex Right    Narrative    ULTRASOUND UPPER EXTREMITY VENOUS DUPLEX RIGHT 4/27/2020 2:59 PM     HISTORY: Pain, swelling status post midline.     COMPARISON: Right upper extremity venous Doppler ultrasound 3/14/2020.    FINDINGS: The deep veins in the right upper extremity are compressible  throughout. The deep veins demonstrate normal venous augmentation,  waveforms and color Doppler flow. The subclavian and internal jugular  veins demonstrate normal color Doppler flow without intraluminal  thrombus. No evidence of superficial thrombophlebitis. PICC line has  been removed in the interval since prior study.      Impression    IMPRESSION: No evidence of DVT.     Medications - No data to display          Assessments & Plan (with Medical Decision Making)   34 year old female department with right antecubital fossa  pain at site of midline removal earlier today.  She does not have any objective swelling or palpable cord on exam.  Ultrasound does not reveal any DVT.  Patient will be discharged home.  Warm packs advised.  Return if arm swelling or other concerns.    I have reviewed the nursing notes.    I have reviewed the findings, diagnosis, plan and need for follow up with the patient.    New Prescriptions    No medications on file       Final diagnoses:   Pain of right upper extremity       4/27/2020   [facility], [place], EMERGENCY DEPARTMENT     Davon Simpson MD  04/27/20 0234

## 2020-04-27 NOTE — ED TRIAGE NOTES
left arm pain started 4 days ago for which she had a picc line for IV fluids for her pregnancy.  PICC line was pulled today and her doctor wanted her here for r/o of blood clot.      2. - bloody but yesterday but it stopped.

## 2020-04-28 NOTE — TELEPHONE ENCOUNTER
Prior Authorization Approval    Authorization Effective Date: 3/29/2020  Authorization Expiration Date: 4/28/2021  Medication: INFUVITE  Approved Dose/Quantity: 10ml  Reference #:     Insurance Company: [employer] - Phone [phone] Fax [phone]  Which Pharmacy is filling the prescription (Not needed for infusion/clinic administered): [facility]

## 2020-04-28 NOTE — TELEPHONE ENCOUNTER
Received fax back from [employer] they need clarification on prescriber, re-faxed updated form.

## 2020-04-28 NOTE — PROGRESS NOTES
This is a recent snapshot of the patient's [facility] medical record.  For current drug dose and complete information and questions, call [phone] or In Basket pool, fv home infusion ([id number])  CSN Number:  [id number]

## 2020-05-04 ENCOUNTER — APPOINTMENT (OUTPATIENT)
Dept: INTERPRETER SERVICES | Facility: CLINIC | Age: 35
End: 2020-05-04
Payer: COMMERCIAL

## 2020-05-04 ENCOUNTER — HOME INFUSION (PRE-WILLOW HOME INFUSION) (OUTPATIENT)
Dept: PHARMACY | Facility: CLINIC | Age: 35
End: 2020-05-04

## 2020-05-05 NOTE — PROGRESS NOTES
This is a recent snapshot of the patient's [facility] medical record.  For current drug dose and complete information and questions, call [phone] or In Basket pool, fv home infusion ([id number])  CSN Number:  [id number]

## 2020-05-06 ENCOUNTER — HOME INFUSION (PRE-WILLOW HOME INFUSION) (OUTPATIENT)
Dept: PHARMACY | Facility: CLINIC | Age: 35
End: 2020-05-06

## 2020-05-07 NOTE — PROGRESS NOTES
This is a recent snapshot of the patient's [facility] medical record.  For current drug dose and complete information and questions, call [phone] or In Basket pool, fv home infusion ([id number])  CSN Number:  [id number]

## 2020-05-13 ENCOUNTER — HOME INFUSION (PRE-WILLOW HOME INFUSION) (OUTPATIENT)
Dept: PHARMACY | Facility: CLINIC | Age: 35
End: 2020-05-13

## 2020-05-18 NOTE — PROGRESS NOTES
This is a recent snapshot of the patient's [facility] medical record.  For current drug dose and complete information and questions, call [phone] or In Basket pool, fv home infusion ([id number])  CSN Number:  [id number]

## 2020-05-26 ENCOUNTER — APPOINTMENT (OUTPATIENT)
Dept: INTERPRETER SERVICES | Facility: CLINIC | Age: 35
End: 2020-05-26
Payer: COMMERCIAL

## 2020-05-26 ENCOUNTER — HOME INFUSION (PRE-WILLOW HOME INFUSION) (OUTPATIENT)
Dept: PHARMACY | Facility: CLINIC | Age: 35
End: 2020-05-26

## 2020-05-27 ENCOUNTER — APPOINTMENT (OUTPATIENT)
Dept: INTERPRETER SERVICES | Facility: CLINIC | Age: 35
End: 2020-05-27
Payer: COMMERCIAL

## 2020-05-27 ENCOUNTER — TELEPHONE (OUTPATIENT)
Dept: OBGYN | Facility: CLINIC | Age: 35
End: 2020-05-27

## 2020-05-27 NOTE — PROGRESS NOTES
This is a recent snapshot of the patient's [facility] medical record.  For current drug dose and complete information and questions, call [phone] or In Basket pool, fv home infusion ([id number])  CSN Number:  [id number]

## 2020-05-30 ENCOUNTER — HOSPITAL ENCOUNTER (OUTPATIENT)
Facility: CLINIC | Age: 35
Discharge: HOME | End: 2020-05-30
Attending: OBSTETRICS & GYNECOLOGY | Admitting: OBSTETRICS & GYNECOLOGY
Payer: COMMERCIAL

## 2020-05-30 ENCOUNTER — HOSPITAL ENCOUNTER (OUTPATIENT)
Facility: CLINIC | Age: 35
End: 2020-05-30
Admitting: OBSTETRICS & GYNECOLOGY
Payer: COMMERCIAL

## 2020-05-30 VITALS
HEART RATE: 115 BPM | OXYGEN SATURATION: 97 % | TEMPERATURE: 97.7 F | DIASTOLIC BLOOD PRESSURE: 74 MMHG | RESPIRATION RATE: 18 BRPM | SYSTOLIC BLOOD PRESSURE: 107 MMHG

## 2020-05-30 PROBLEM — R10.9 ABDOMINAL PAIN: Status: ACTIVE | Noted: 2020-05-30

## 2020-05-30 PROCEDURE — 40000268 ZZH STATISTIC NO CHARGES

## 2020-05-30 PROCEDURE — G0463 HOSPITAL OUTPT CLINIC VISIT: HCPCS

## 2020-05-31 NOTE — PROGRESS NOTES
Obstetrics Triage Note    CC: abdominal pain     HPI:  Dai Guadarrama is a 35 year old  female at 19w4d by LMP c/w 6w1d US here with complaints of abdominal pain more in the left side of her abdomen. Began two days ago. Hurts more when she is moving around and walking. No cramping, contractions, vaginal bleeding, loss of fluid. Has not started feeling much movement yet.       She states that she has otherwise been feeling well. She denies fever, N/V, chest pain, SOB, dysuria, constipation.    Pregnancy complications:  Hyperemesis - s/p IVF in early pregnancy  Hx IUFD at 32w  Hx CS x1   Hx PPH   Obesity       ROS:  Negative except as mentioned in HPI.    PMH:  Past Medical History:   Diagnosis Date     Aneurysm (H)      Migraine headache without aura      Pregnancy related nausea and vomiting, antepartum 2016    two ED visits.     Recurrent pregnancy loss (CODE)      Retained placenta 2014     Stillbirth     32 weeks     Tachycardia        PSHx:  Past Surgical History:   Procedure Laterality Date     C EACH ADD TOOTH EXTRACTION      bad reaction to anesthia for local       SECTION N/A 2018    Procedure:  SECTION;  Surgeon: Noreen Harris MD;  Location: [facility]     INSERT PICC LINE Right 3/12/2020    Procedure: Midline Picc Placement;  Surgeon: Ugo Webb PA-C;  Location: [facility]     IR PICC PLACEMENT > 5 YRS OF AGE  3/12/2020       Medications:  No current facility-administered medications for this encounter.        Allergies:   No Known Allergies    Physical Exam:   Vitals:    20 1941   BP: 107/74   Pulse: 115   Resp: 18   Temp: 97.7  F (36.5  C)   TempSrc: Tympanic   SpO2: 97%      Gen: resting comfortably, in NAD  CV: RR  Pulm: no increased work of breathing  Abd: soft, gravid, tender to b/l lower quadrants increased with shifting of uterus c/w round ligament pain. No other tenderness   Cx: not examined     NST:  [place] 150s   [place]: quiet        Assessment/Plan: 35 year old female  at 19w4d here with abdominal pain. Exam consistent with round ligament pain. No contractions on tocometry and no palpable ctx by patient. Appropriate FHTs. No other obstetric or systemic complaints.     Reviewed pathophysiology of ligament pain and advised tylenol/heat packs as needed.     F/u with ultrasound scheduled this week.     Discharge home    Shanice Dale MD PGY3  Department of OB/GYN  2020 8:45 PM     Course and findings reviewed with the resident. I agree with the assessment, plan, and note.   Sharmin Louis MD

## 2020-05-31 NOTE — DISCHARGE INSTRUCTIONS
Discharge Instruction for Undelivered Patients      You were seen for: Left sided abdominal pain  We Consulted: Dr. Dale       Diet:   Drink 8 to 12 glasses of liquids (milk, juice, water) every day.  You may eat meals and snacks.     Activity:  Call your doctor or nurse midwife if your baby is moving less than usual.     Call your provider if you notice:  Swelling in your face or increased swelling in your hands or legs.  Headaches that are not relieved by Tylenol (acetaminophen).  Changes in your vision (blurring: seeing spots or stars.)  Nausea (sick to your stomach) and vomiting (throwing up).   Weight gain of 5 pounds or more per week.  Heartburn that doesn't go away.  Signs of bladder infection: pain when you urinate (use the toilet), need to go more often and more urgently.  The bag of parada (rupture of membranes) breaks, or you notice leaking in your underwear.  Bright red blood in your underwear.  Abdominal (lower belly) or stomach pain.  Contractions (tightenings) more than 6 times in one hour.  Increase or change in vaginal discharge (note the color and amount)  Other: Anything that concerns you.    Follow-up:  As scheduled in the clinic

## 2020-05-31 NOTE — PLAN OF CARE
Dai ,  19w4d arrived to ED reporting pain on her left side for the past 2 days. Came up to birthplace triage @ 1934. Pain is worse with standing, walking and position changes. No loss of fluid, mucus or blood. Also reports less fetal movement today.    VSS, afebrile. Asymptomatic for COVID-19. FHT WNL on monitor, no contractions per TOCO. Abdomen soft. Assessed by Dr. Dale and educated regarding round ligament pain and expected amount of fetal movement before 20 weeks.    Plan to discharge to home and continue regularly scheduled PNV and 20wk US next week.

## 2020-05-31 NOTE — ED NOTES
Pt arrived to ED with complaint of abdominal pain .  Pt reports 21 weeks pregnant.   Pt is having contractions Yes.   Pt feels urge to push No.   Pt reports water broke No.   Report was called and pt was transferred to L&D Yes.

## 2020-06-01 ENCOUNTER — HOME INFUSION (PRE-WILLOW HOME INFUSION) (OUTPATIENT)
Dept: PHARMACY | Facility: CLINIC | Age: 35
End: 2020-06-01

## 2020-06-02 ENCOUNTER — TELEPHONE (OUTPATIENT)
Dept: OBGYN | Facility: CLINIC | Age: 35
End: 2020-06-02

## 2020-06-02 NOTE — TELEPHONE ENCOUNTER
Reached out to [facility] to let them know patient has visit in clinic on 6/4 and we will assess at that time if home infusion is still needed.

## 2020-06-02 NOTE — PROGRESS NOTES
This is a recent snapshot of the patient's [facility] medical record.  For current drug dose and complete information and questions, call [phone] or In Basket pool, [facility] ([id number])  CSN Number:  [id number]

## 2020-06-03 ENCOUNTER — PRE VISIT (OUTPATIENT)
Dept: MATERNAL FETAL MEDICINE | Facility: CLINIC | Age: 35
End: 2020-06-03

## 2020-06-03 NOTE — PROGRESS NOTES
"[facility]  Prenatal Visit    SUBJECTIVE  She and baby are doing well. Having a lot of reflux. Was taking omeprazole, ran out 3 weeks ago, and so symptoms have returned. Nausea is much better, only using reglan now, no longer needing IV fluids. Rare contraction, no VB or LOF, normal FM.     She desires delivery at 37 weeks like her prior children because of her IUFD. She desires a tubal ligation.    OBJECTIVE  /71   Pulse 88   Ht 1.556 m (5' 1.25\")   Wt 103.2 kg (227 lb 9.6 oz)   LMP 2019   BMI 42.65 kg/m      See OB Flowsheet    ASSESSMENT/PLAN  Dai Guadarrama is a 35 year old  who is 20w2d, here for FREDY.    1. Hyperemesis:   -nausea improved, gaining weight, continue reglan PRN    2. History of IUFD:   -will begin weekly  testing at 32 weeks    3. History of prior :   -desires RCS rather than vaginal delivery so that she may optimally plan to be away from her other children    4. PP Contraception:   -is clear that her family will be complete with this child. Desires tubal. Sign federal tubal papers at next visit.    5. AMA:   -declined genetic screening, level 2 US performed today, results pending    RTC for phone visit in 4 weeks and in person visit in 8 weeks for EOB.    Yvrose Goldstein MD, MSCI    [facility]/OBGYN  "

## 2020-06-04 ENCOUNTER — HOSPITAL ENCOUNTER (OUTPATIENT)
Dept: ULTRASOUND IMAGING | Facility: CLINIC | Age: 35
End: 2020-06-04
Attending: ADVANCED PRACTICE MIDWIFE
Payer: COMMERCIAL

## 2020-06-04 ENCOUNTER — OFFICE VISIT (OUTPATIENT)
Dept: MATERNAL FETAL MEDICINE | Facility: CLINIC | Age: 35
End: 2020-06-04
Attending: ADVANCED PRACTICE MIDWIFE
Payer: COMMERCIAL

## 2020-06-04 ENCOUNTER — OFFICE VISIT (OUTPATIENT)
Dept: OBGYN | Facility: CLINIC | Age: 35
End: 2020-06-04
Attending: OBSTETRICS & GYNECOLOGY
Payer: COMMERCIAL

## 2020-06-04 VITALS
HEIGHT: 61 IN | DIASTOLIC BLOOD PRESSURE: 71 MMHG | WEIGHT: 227.6 LBS | BODY MASS INDEX: 42.97 KG/M2 | HEART RATE: 88 BPM | SYSTOLIC BLOOD PRESSURE: 107 MMHG

## 2020-06-04 DIAGNOSIS — O09.522 MULTIGRAVIDA OF ADVANCED MATERNAL AGE IN SECOND TRIMESTER: Primary | ICD-10-CM

## 2020-06-04 DIAGNOSIS — O26.90 PREGNANCY RELATED CONDITION, ANTEPARTUM: ICD-10-CM

## 2020-06-04 DIAGNOSIS — O09.292 HISTORY OF STILLBIRTH IN CURRENTLY PREGNANT PATIENT, SECOND TRIMESTER: ICD-10-CM

## 2020-06-04 DIAGNOSIS — K21.00 GASTROESOPHAGEAL REFLUX DISEASE WITH ESOPHAGITIS: Primary | ICD-10-CM

## 2020-06-04 DIAGNOSIS — O99.212 MATERNAL OBESITY SYNDROME, ANTEPARTUM, SECOND TRIMESTER: ICD-10-CM

## 2020-06-04 DIAGNOSIS — O09.92 HIGH-RISK PREGNANCY IN SECOND TRIMESTER: ICD-10-CM

## 2020-06-04 PROCEDURE — G0463 HOSPITAL OUTPT CLINIC VISIT: HCPCS | Mod: 25

## 2020-06-04 PROCEDURE — 76811 OB US DETAILED SNGL FETUS: CPT

## 2020-06-04 ASSESSMENT — MIFFLIN-ST. JEOR: SCORE: 1658.73

## 2020-06-04 NOTE — PROGRESS NOTES
"Please see \"Imaging\" tab under \"Chart Review\" for details of today's visit.    Fabby Quezada    "

## 2020-06-04 NOTE — LETTER
"2020       RE: Dai Guadarrama  [address]  [facility] [address]     Dear Colleague,    Thank you for referring your patient, Dai Guadarrama, to the [facility] at [facility]. Please see a copy of my visit note below.    [facility]  Prenatal Visit    SUBJECTIVE  She and baby are doing well. Having a lot of reflux. Was taking omeprazole, ran out 3 weeks ago, and so symptoms have returned. Nausea is much better, only using reglan now, no longer needing IV fluids. Rare contraction, no VB or LOF, normal FM.     She desires delivery at 37 weeks like her prior children because of her IUFD. She desires a tubal ligation.    OBJECTIVE  /71   Pulse 88   Ht 1.556 m (5' 1.25\")   Wt 103.2 kg (227 lb 9.6 oz)   LMP 2019   BMI 42.65 kg/m      See OB Flowsheet    ASSESSMENT/PLAN  Dai Guadarrama is a 35 year old  who is 20w2d, here for FREDY.    1. Hyperemesis:   -nausea improved, gaining weight, continue reglan PRN    2. History of IUFD:   -will begin weekly  testing at 32 weeks        3. History of prior :   -desires RCS rather than vaginal delivery so that she may optimally plan to be away from her other children    4. PP Contraception:   -is clear that her family will be complete with this child. Desires tubal. Sign federal tubal papers at next visit.    5. AMA:   -declined genetic screening, level 2 US performed today, results pending    RTC for phone visit in 4 weeks and in person visit in 8 weeks for EOB.    Yvrose Goldstein MD, MSCI    [facility]/OB[facility]N          "

## 2020-06-04 NOTE — NURSING NOTE
Pt had  Ursula on the phone from  to help interpret. Dr. Quezada in to give U/S results.  See consult note. Noreen Domínguez RN

## 2020-06-04 NOTE — PATIENT INSTRUCTIONS
BIRTH AND 17-alpha hydroxyprogesterone caproate (17P) TREATMENT    What is  birth?      birth is the delivery of a baby before 37 weeks of gestation.  Approximately 1 in every 12 babies in [place] is born premature (that s approximately 6,000 babies every year)!     birth is often unexpected, but can happen for many reasons. There are some known risk factors, which include:   -pregnancy with twins or triplets   -being  race  -some problems with the uterus or cervix   -some medical problems like high blood pressure   -smoking, drinking, or using illegal drugs while pregnant   -infections like urinary tract infection, bacterial vaginosis and chlamydia while    pregnant  -depression, stress, and anxiety    But the biggest risk factor is having a previous  baby. In fact, women who have one  birth have a 30-50% chance of their next baby coming early too.     What are the risks to a baby that delivers prematurely?     birth is the number one cause of  death worldwide. This risk increases the earlier the baby is born.  Prematurity can also cause serious long term health problems for babies such as breathing problems, infections, bleeding into the brain, as well as long term developmental problems like cerebral palsy, learning impairments, hearing and vision problems.    How can I prevent  birth in my pregnancy?  Overall, the best thing to prevent  delivery is to stay healthy during your pregnancy, and follow up with your doctor for your regular visits and screening tests.  If you have a history of  birth in one of your past pregnancies, you may benefit from weekly injections of 17P.     What is 17P?  The full name is 17-alpha hydroxyprogesterone caproate. This is a form of your body s natural  pregnancy hormone , progesterone. The brand name of this is Tuyet, and it is FDA approved for prevention of  birth.  This medication  is given in once weekly injections from week 16-20 through the 36th week of pregnancy. Research shows that women taking 17P can reduce their risk of  birth from 50% to 36%. The treatment has also been shown to reduce the risk of complications after birth, such as bleeding in the brain and need for supplemental oxygen.    It is important to complete the treatment once you start, as the risk of  birth goes up if you stop the injections early.    How can I get started on the Tuyet treatment?  First, you should talk with your doctor to find out if this is a good option for you.  It is safe for pregnant women and for the fetus, but if you have some medical problems like uncontrolled blood pressure, breast cancer, or liver disease you should talk about it with your doctor first.  Your clinic will work with you to help determine insurance coverage and preauthorization if needed.  Then you will schedule weekly visits for 17P injections in addition to your routine prenatal visits with your doctor.    Side Effects of Mekena  The most common side effects  reported by [place] users are   injection site reactions (pain [35%], swelling  [17%], pruritus (itching) [6%], nodule [5%]), urticaria (rash) (12%), pruritus (generalized itching (8%), nausea (6%), and diarrhea (2%).

## 2020-06-08 ENCOUNTER — HOME INFUSION (PRE-WILLOW HOME INFUSION) (OUTPATIENT)
Dept: PHARMACY | Facility: CLINIC | Age: 35
End: 2020-06-08

## 2020-06-09 NOTE — PROGRESS NOTES
This is a recent snapshot of the patient's [facility] medical record.  For current drug dose and complete information and questions, call [phone] or In Basket pool, [facility] ([id number])  CSN Number:  [id number]

## 2020-06-12 ENCOUNTER — HOME INFUSION (PRE-WILLOW HOME INFUSION) (OUTPATIENT)
Dept: PHARMACY | Facility: CLINIC | Age: 35
End: 2020-06-12

## 2020-06-15 NOTE — PROGRESS NOTES
This is a recent snapshot of the patient's [facility] medical record.  For current drug dose and complete information and questions, call [phone] or In Basket pool, [facility] ([id number])  CSN Number:  [id number]

## 2020-06-30 ENCOUNTER — APPOINTMENT (OUTPATIENT)
Dept: INTERPRETER SERVICES | Facility: CLINIC | Age: 35
End: 2020-06-30
Payer: COMMERCIAL

## 2020-07-01 ENCOUNTER — OFFICE VISIT (OUTPATIENT)
Dept: MATERNAL FETAL MEDICINE | Facility: CLINIC | Age: 35
End: 2020-07-01
Attending: OBSTETRICS & GYNECOLOGY
Payer: COMMERCIAL

## 2020-07-01 ENCOUNTER — HOSPITAL ENCOUNTER (OUTPATIENT)
Dept: ULTRASOUND IMAGING | Facility: CLINIC | Age: 35
End: 2020-07-01
Attending: OBSTETRICS & GYNECOLOGY
Payer: COMMERCIAL

## 2020-07-01 DIAGNOSIS — O26.90 PREGNANCY RELATED CONDITION, ANTEPARTUM: ICD-10-CM

## 2020-07-01 DIAGNOSIS — O35.9XX0 SUSPECTED FETAL ANOMALY, ANTEPARTUM, SINGLE OR UNSPECIFIED FETUS: Primary | ICD-10-CM

## 2020-07-01 DIAGNOSIS — O44.00 PLACENTA PREVIA ANTEPARTUM: ICD-10-CM

## 2020-07-01 PROCEDURE — 76816 OB US FOLLOW-UP PER FETUS: CPT

## 2020-07-01 PROCEDURE — 76817 TRANSVAGINAL US OBSTETRIC: CPT | Performed by: OBSTETRICS & GYNECOLOGY

## 2020-07-01 NOTE — PROGRESS NOTES
"Please see \"Imaging\" tab under \"Chart Review\" for details of today's US at the [facility].    Joesph Quintero MD  Maternal-Fetal Medicine      "

## 2020-07-05 PROBLEM — O44.32 MARGINAL PLACENTA PREVIA WITH INTRAPARTUM HEMORRHAGE IN SECOND TRIMESTER: Status: ACTIVE | Noted: 2020-07-05

## 2020-08-04 ENCOUNTER — APPOINTMENT (OUTPATIENT)
Dept: INTERPRETER SERVICES | Facility: CLINIC | Age: 35
End: 2020-08-04
Payer: COMMERCIAL

## 2020-08-12 ENCOUNTER — OFFICE VISIT (OUTPATIENT)
Dept: OBGYN | Facility: CLINIC | Age: 35
End: 2020-08-12
Attending: MIDWIFE
Payer: COMMERCIAL

## 2020-08-12 ENCOUNTER — HOSPITAL ENCOUNTER (OUTPATIENT)
Dept: ULTRASOUND IMAGING | Facility: CLINIC | Age: 35
End: 2020-08-12
Attending: OBSTETRICS & GYNECOLOGY
Payer: COMMERCIAL

## 2020-08-12 ENCOUNTER — OFFICE VISIT (OUTPATIENT)
Dept: MATERNAL FETAL MEDICINE | Facility: CLINIC | Age: 35
End: 2020-08-12
Attending: OBSTETRICS & GYNECOLOGY
Payer: COMMERCIAL

## 2020-08-12 VITALS
HEIGHT: 61 IN | SYSTOLIC BLOOD PRESSURE: 100 MMHG | BODY MASS INDEX: 44.92 KG/M2 | WEIGHT: 237.9 LBS | DIASTOLIC BLOOD PRESSURE: 59 MMHG

## 2020-08-12 DIAGNOSIS — O09.93 HRP (HIGH RISK PREGNANCY), THIRD TRIMESTER: Primary | ICD-10-CM

## 2020-08-12 DIAGNOSIS — Z09 NEED FOR IMMUNIZATION FOLLOW-UP: ICD-10-CM

## 2020-08-12 DIAGNOSIS — O44.32 MARGINAL PLACENTA PREVIA WITH INTRAPARTUM HEMORRHAGE IN SECOND TRIMESTER: ICD-10-CM

## 2020-08-12 DIAGNOSIS — O44.00 PLACENTA PREVIA ANTEPARTUM: Primary | ICD-10-CM

## 2020-08-12 DIAGNOSIS — O44.00 PLACENTA PREVIA ANTEPARTUM: ICD-10-CM

## 2020-08-12 DIAGNOSIS — O43.103 PLACENTAL ABNORMALITY IN THIRD TRIMESTER: ICD-10-CM

## 2020-08-12 DIAGNOSIS — O09.292 HISTORY OF STILLBIRTH IN CURRENTLY PREGNANT PATIENT, SECOND TRIMESTER: ICD-10-CM

## 2020-08-12 DIAGNOSIS — O44.20 PLACENTA PREVIA, MARGINAL: ICD-10-CM

## 2020-08-12 PROCEDURE — 90472 IMMUNIZATION ADMIN EACH ADD: CPT | Mod: ZF

## 2020-08-12 PROCEDURE — G0463 HOSPITAL OUTPT CLINIC VISIT: HCPCS | Mod: 25,ZF

## 2020-08-12 PROCEDURE — 76817 TRANSVAGINAL US OBSTETRIC: CPT | Performed by: OBSTETRICS & GYNECOLOGY

## 2020-08-12 PROCEDURE — 90715 TDAP VACCINE 7 YRS/> IM: CPT | Mod: ZF

## 2020-08-12 PROCEDURE — G0010 ADMIN HEPATITIS B VACCINE: HCPCS

## 2020-08-12 PROCEDURE — 25000128 H RX IP 250 OP 636: Mod: ZF

## 2020-08-12 PROCEDURE — 90746 HEPB VACCINE 3 DOSE ADULT IM: CPT | Mod: ZF

## 2020-08-12 PROCEDURE — 76816 OB US FOLLOW-UP PER FETUS: CPT

## 2020-08-12 ASSESSMENT — PAIN SCALES - GENERAL: PAINLEVEL: NO PAIN (0)

## 2020-08-12 ASSESSMENT — MIFFLIN-ST. JEOR: SCORE: 1705.45

## 2020-08-12 NOTE — LETTER
2020       RE: Dai Guadarrama  [address]  [facility] [address]     Dear Colleague,    Thank you for referring your patient, Dai Guadarrama, to the [facility] at [facility]. Please see a copy of my visit note below.     35 year old, , 30w1d,   The patient presents with the following concerns:  Marginal previa continues on MFM US today.  Placental lake noted, BPP ordered for next visit. Did not complete follow up 3hr glucose in first trimester.  Will have patient complete fasting labs with the remainder of 3rd trimester labs.   Patient did do home glucose monitoring-she reports normal values in early pregnancy.  Federal consent information given for PPTL today-needs consent signed at next visit.  Declines SW consult to discuss resources for  with admission.  Patient states she has no family in area, feels safer being alone at hospital and have  care for children at home vs. Emergency day care services.   PHQ-9 SCORE 2016   PHQ-9 Total Score 8 7 5     Education completed today includes breast feeding, contraception, counting movements, signs of pre-term labor, post partum depression, GBS and getting enough iron.  Labor support:     Feeding plans : Bottlefed   Contraception planned:  tubal ligation    Blood type:   ABO   Date Value Ref Range Status   2020 O  Final     RH(D)   Date Value Ref Range Status   2020 Pos  Final     Antibody Screen   Date Value Ref Range Status   2020 Neg  Final   Rhogam  was not indicated  TDAP  wasgiven.  A/P:  Encounter Diagnoses   Name Primary?     HRP (high risk pregnancy), third trimester Yes     Placenta previa, marginal      Placental abnormality in third trimester      Orders Placed This Encounter   Procedures     US OB Fetal Biophys Prf wo NonStrs Singls Sgl     25- OH-Vitamin D     CBC with platelets      Treponema Abs w Reflex to RPR and Titer       Continue scheduled prenatal care  Sophia Gorman, APRN CNM

## 2020-08-12 NOTE — PROGRESS NOTES
35 year old, , 30w1d,   The patient presents with the following concerns:  Marginal previa continues on MFM US today.  Placental lake noted, BPP ordered for next visit. Did not complete follow up 3hr glucose in first trimester.  Will have patient complete fasting labs with the remainder of 3rd trimester labs.   Patient did do home glucose monitoring-she reports normal values in early pregnancy.  Federal consent information given for PPTL today-needs consent signed at next visit.  Declines SW consult to discuss resources for  with admission.  Patient states she has no family in area, feels safer being alone at hospital and have  care for children at home vs. Emergency day care services.   PHQ-9 SCORE 2016   PHQ-9 Total Score 8 7 5     Education completed today includes breast feeding, contraception, counting movements, signs of pre-term labor, post partum depression, GBS and getting enough iron.  Labor support:    [place] Feeding plans : Bottlefed   Contraception planned:  tubal ligation    Blood type:   ABO   Date Value Ref Range Status   2020 O  Final     RH(D)   Date Value Ref Range Status   2020 Pos  Final     Antibody Screen   Date Value Ref Range Status   2020 Neg  Final   Rhogam  was not indicated  TDAP  wasgiven.  A/P:  Encounter Diagnoses   Name Primary?     HRP (high risk pregnancy), third trimester Yes     Placenta previa, marginal      Placental abnormality in third trimester      Orders Placed This Encounter   Procedures     US OB Fetal Biophys Prf wo NonStrs Singls Sgl     25- OH-Vitamin D     CBC with platelets     Treponema Abs w Reflex to RPR and Titer       Continue scheduled prenatal care  CHANELLE RayM

## 2020-08-13 ENCOUNTER — APPOINTMENT (OUTPATIENT)
Dept: INTERPRETER SERVICES | Facility: CLINIC | Age: 35
End: 2020-08-13
Payer: COMMERCIAL

## 2020-08-13 NOTE — PROGRESS NOTES
"Please see \"Imaging\" tab under \"Chart Review\" for details of today's US at the [facility].    Joesph Quintero MD  Maternal-Fetal Medicine      "

## 2020-08-14 ENCOUNTER — TELEPHONE (OUTPATIENT)
Dept: OBGYN | Facility: CLINIC | Age: 35
End: 2020-08-14

## 2020-08-14 NOTE — TELEPHONE ENCOUNTER
lvm (int) pt requesting to see MD's not CNM, see if patient would like to r/s apt sched for 8/26/20 it with a CNM.

## 2020-08-17 DIAGNOSIS — O09.93 HRP (HIGH RISK PREGNANCY), THIRD TRIMESTER: ICD-10-CM

## 2020-08-17 DIAGNOSIS — O24.410 DIET CONTROLLED GESTATIONAL DIABETES MELLITUS (GDM) IN THIRD TRIMESTER: Primary | ICD-10-CM

## 2020-08-17 DIAGNOSIS — R73.09 ELEVATED HEMOGLOBIN A1C: ICD-10-CM

## 2020-08-17 LAB
DEPRECATED CALCIDIOL+CALCIFEROL SERPL-MC: 32 UG/L (ref 20–75)
ERYTHROCYTE [DISTWIDTH] IN BLOOD BY AUTOMATED COUNT: 15.4 % (ref 10–15)
GLUCOSE 1H P 100 G GLC PO SERPL-MCNC: 200 MG/DL (ref 60–179)
GLUCOSE 2H P 100 G GLC PO SERPL-MCNC: 133 MG/DL (ref 60–154)
GLUCOSE 3H P 100 G GLC PO SERPL-MCNC: 159 MG/DL (ref 60–139)
GLUCOSE P FAST SERPL-MCNC: 110 MG/DL (ref 60–94)
HCT VFR BLD AUTO: 33.7 % (ref 35–47)
HGB BLD-MCNC: 10.6 G/DL (ref 11.7–15.7)
MCH RBC QN AUTO: 27.5 PG (ref 26.5–33)
MCHC RBC AUTO-ENTMCNC: 31.5 G/DL (ref 31.5–36.5)
MCV RBC AUTO: 88 FL (ref 78–100)
PLATELET # BLD AUTO: 345 10E9/L (ref 150–450)
RBC # BLD AUTO: 3.85 10E12/L (ref 3.8–5.2)
T PALLIDUM AB SER QL: NONREACTIVE
WBC # BLD AUTO: 10.1 10E9/L (ref 4–11)

## 2020-08-17 PROCEDURE — 82306 VITAMIN D 25 HYDROXY: CPT | Performed by: ADVANCED PRACTICE MIDWIFE

## 2020-08-17 PROCEDURE — 82951 GLUCOSE TOLERANCE TEST (GTT): CPT | Performed by: ADVANCED PRACTICE MIDWIFE

## 2020-08-17 PROCEDURE — 82952 GTT-ADDED SAMPLES: CPT | Performed by: ADVANCED PRACTICE MIDWIFE

## 2020-08-17 PROCEDURE — 36415 COLL VENOUS BLD VENIPUNCTURE: CPT | Performed by: OBSTETRICS & GYNECOLOGY

## 2020-08-17 PROCEDURE — 86780 TREPONEMA PALLIDUM: CPT | Performed by: ADVANCED PRACTICE MIDWIFE

## 2020-08-17 PROCEDURE — 36415 COLL VENOUS BLD VENIPUNCTURE: CPT | Performed by: ADVANCED PRACTICE MIDWIFE

## 2020-08-17 PROCEDURE — 85027 COMPLETE CBC AUTOMATED: CPT | Performed by: ADVANCED PRACTICE MIDWIFE

## 2020-08-19 ENCOUNTER — APPOINTMENT (OUTPATIENT)
Dept: INTERPRETER SERVICES | Facility: CLINIC | Age: 35
End: 2020-08-19
Payer: COMMERCIAL

## 2020-08-19 ENCOUNTER — TELEPHONE (OUTPATIENT)
Dept: OBGYN | Facility: CLINIC | Age: 35
End: 2020-08-19

## 2020-08-19 DIAGNOSIS — O09.93 HRP (HIGH RISK PREGNANCY), THIRD TRIMESTER: Primary | ICD-10-CM

## 2020-08-19 DIAGNOSIS — D50.8 OTHER IRON DEFICIENCY ANEMIA: ICD-10-CM

## 2020-08-19 RX ORDER — FERROUS SULFATE 325(65) MG
325 TABLET ORAL
Qty: 90 TABLET | Refills: 1 | Status: SHIPPED | OUTPATIENT
Start: 2020-08-19 | End: 2020-11-17

## 2020-08-19 RX ORDER — MULTIVIT WITH MINERALS/LUTEIN
250 TABLET ORAL DAILY
Qty: 90 TABLET | Refills: 1 | Status: SHIPPED | OUTPATIENT
Start: 2020-08-19

## 2020-08-19 RX ORDER — LANOLIN ALCOHOL/MO/W.PET/CERES
1000 CREAM (GRAM) TOPICAL DAILY
Qty: 90 TABLET | Refills: 1 | Status: SHIPPED | OUTPATIENT
Start: 2020-08-19

## 2020-08-25 ENCOUNTER — APPOINTMENT (OUTPATIENT)
Dept: INTERPRETER SERVICES | Facility: CLINIC | Age: 35
End: 2020-08-25
Payer: COMMERCIAL

## 2020-08-26 ENCOUNTER — OFFICE VISIT (OUTPATIENT)
Dept: OBGYN | Facility: CLINIC | Age: 35
End: 2020-08-26
Attending: ADVANCED PRACTICE MIDWIFE
Payer: COMMERCIAL

## 2020-08-26 ENCOUNTER — ALLIED HEALTH/NURSE VISIT (OUTPATIENT)
Dept: INTERPRETER SERVICES | Facility: CLINIC | Age: 35
End: 2020-08-26

## 2020-08-26 ENCOUNTER — OFFICE VISIT (OUTPATIENT)
Dept: MATERNAL FETAL MEDICINE | Facility: CLINIC | Age: 35
End: 2020-08-26
Attending: OBSTETRICS & GYNECOLOGY
Payer: COMMERCIAL

## 2020-08-26 ENCOUNTER — HOSPITAL ENCOUNTER (OUTPATIENT)
Dept: ULTRASOUND IMAGING | Facility: CLINIC | Age: 35
End: 2020-08-26
Attending: OBSTETRICS & GYNECOLOGY
Payer: COMMERCIAL

## 2020-08-26 VITALS
SYSTOLIC BLOOD PRESSURE: 102 MMHG | WEIGHT: 240.6 LBS | HEART RATE: 121 BPM | BODY MASS INDEX: 45.42 KG/M2 | HEIGHT: 61 IN | DIASTOLIC BLOOD PRESSURE: 61 MMHG

## 2020-08-26 DIAGNOSIS — O09.93 HRP (HIGH RISK PREGNANCY), THIRD TRIMESTER: Primary | ICD-10-CM

## 2020-08-26 DIAGNOSIS — O24.419 GESTATIONAL DIABETES MELLITUS (GDM), ANTEPARTUM, GESTATIONAL DIABETES METHOD OF CONTROL UNSPECIFIED: ICD-10-CM

## 2020-08-26 DIAGNOSIS — O09.292 HISTORY OF STILLBIRTH IN CURRENTLY PREGNANT PATIENT, SECOND TRIMESTER: ICD-10-CM

## 2020-08-26 DIAGNOSIS — O09.299 HISTORY OF INTRAUTERINE FETAL DEATH, CURRENTLY PREGNANT: Primary | ICD-10-CM

## 2020-08-26 DIAGNOSIS — O24.419 GESTATIONAL DIABETES MELLITUS (GDM) IN THIRD TRIMESTER, GESTATIONAL DIABETES METHOD OF CONTROL UNSPECIFIED: ICD-10-CM

## 2020-08-26 PROBLEM — O21.9 NAUSEA AND VOMITING IN PREGNANCY: Status: RESOLVED | Noted: 2020-02-26 | Resolved: 2020-08-26

## 2020-08-26 PROCEDURE — T1013 SIGN LANG/ORAL INTERPRETER: HCPCS | Mod: U3,ZF

## 2020-08-26 PROCEDURE — 76819 FETAL BIOPHYS PROFIL W/O NST: CPT

## 2020-08-26 PROCEDURE — G0463 HOSPITAL OUTPT CLINIC VISIT: HCPCS | Mod: ZF

## 2020-08-26 ASSESSMENT — MIFFLIN-ST. JEOR: SCORE: 1713.73

## 2020-08-26 NOTE — PROGRESS NOTES
Please see ultrasound report under Imaging tab for details of today's ultrasound.    Katina Gomez MD  Maternal-Fetal Medicine

## 2020-08-26 NOTE — PROGRESS NOTES
"Subjective:     35 year old  at 32w1d presents for routine prenatal visit. Highly desires to see MD for care, anxious to discuss  section.             Denies vaginal bleeding or leakage of fluid.  Irregular contractions.  Reports regular fetal movement.        No HA, visual changes, RUQ or epigastric pain.   Patient concerns: BPP 8/8,   Feeling well overall. Reviewed abnormal 3hr GTT, Has diabetic education planned for tomorrow, has not started checking blood sugars yet. Reports ongoing pain with gall stones, wondering about surgery.    Objective:  Vitals:    20 1540   BP: 102/61   BP Location: Left arm   Patient Position: Chair   Pulse: 121   Weight: 109.1 kg (240 lb 9.6 oz)   Height: 1.549 m (5' 1\")    See OB flowsheet  Assessment/Plan     Encounter Diagnosis   Name Primary?     HRP (high risk pregnancy), third trimester Yes     -PPTL federal consent signed and sent to be scanned.  - Reviewed why/how to contact provider if headache/visual changes/RUQ or epigastric pain, decreased fetal movement, vaginal bleeding, leakage of fluid or strong/regular contractions.  Return to clinic in 2 weeks and prn if questions or concerns.   CHANELLE Ray CNM    "

## 2020-08-26 NOTE — LETTER
01/07/20                            Domingo Gomez  [address] [address]    To Whom It May Concern:    This is to certify Lizzyclarence Dickinsonn was evaluated with Pratibha June MD on 01/07/20 and can return to regular work on 1/10/2020.     RESTRICTIONS: vertigo / DM               Pratibha June MD  [address] [address]  Phone: [phone]     "2020       RE: aDi Guadarrama  [address]  [facility] [address]     Dear Colleague,    Thank you for referring your patient, Dai Guadarrama, to the [facility] at [facility]. Please see a copy of my visit note below.    Subjective:     35 year old  at 32w1d presents for routine prenatal visit. Highly desires to see MD for care, anxious to discuss  section.             Denies vaginal bleeding or leakage of fluid.  Irregular contractions.  Reports regular fetal movement.        No HA, visual changes, RUQ or epigastric pain.   Patient concerns: BPP 8/8,   Feeling well overall. Reviewed abnormal 3hr GTT, Has diabetic education planned for tomorrow, has not started checking blood sugars yet. Reports ongoing pain with gall stones, wondering about surgery.    Objective:  Vitals:    20 1540   BP: 102/61   BP Location: Left arm   Patient Position: Chair   Pulse: 121   Weight: 109.1 kg (240 lb 9.6 oz)   Height: 1.549 m (5' 1\")    See OB flowsheet  Assessment/Plan     Encounter Diagnosis   Name Primary?     HRP (high risk pregnancy), third trimester Yes     -PPTL federal consent signed and sent to be scanned.  - Reviewed why/how to contact provider if headache/visual changes/RUQ or epigastric pain, decreased fetal movement, vaginal bleeding, leakage of fluid or strong/regular contractions.  Return to clinic in 2 weeks and prn if questions or concerns.   CHANELLE Ray CNM    "

## 2020-08-31 ENCOUNTER — APPOINTMENT (OUTPATIENT)
Dept: INTERPRETER SERVICES | Facility: CLINIC | Age: 35
End: 2020-08-31
Payer: COMMERCIAL

## 2020-09-01 ENCOUNTER — HOSPITAL ENCOUNTER (OUTPATIENT)
Dept: ULTRASOUND IMAGING | Facility: CLINIC | Age: 35
End: 2020-09-01
Attending: OBSTETRICS & GYNECOLOGY
Payer: COMMERCIAL

## 2020-09-01 ENCOUNTER — OFFICE VISIT (OUTPATIENT)
Dept: MATERNAL FETAL MEDICINE | Facility: CLINIC | Age: 35
End: 2020-09-01
Attending: OBSTETRICS & GYNECOLOGY
Payer: COMMERCIAL

## 2020-09-01 DIAGNOSIS — O24.419 GESTATIONAL DIABETES MELLITUS (GDM) IN THIRD TRIMESTER, GESTATIONAL DIABETES METHOD OF CONTROL UNSPECIFIED: ICD-10-CM

## 2020-09-01 DIAGNOSIS — O09.292 HISTORY OF STILLBIRTH IN CURRENTLY PREGNANT PATIENT, SECOND TRIMESTER: ICD-10-CM

## 2020-09-01 DIAGNOSIS — O09.299 HISTORY OF INTRAUTERINE FETAL DEATH, CURRENTLY PREGNANT: Primary | ICD-10-CM

## 2020-09-01 PROCEDURE — 76819 FETAL BIOPHYS PROFIL W/O NST: CPT

## 2020-09-01 NOTE — PROGRESS NOTES
"Please see \"Imaging\" tab under \"Chart Review\" for details of today's US at the [facility].    Joesph Quintero MD  Maternal-Fetal Medicine      "

## 2020-09-08 ENCOUNTER — OFFICE VISIT (OUTPATIENT)
Dept: MATERNAL FETAL MEDICINE | Facility: CLINIC | Age: 35
End: 2020-09-08
Attending: OBSTETRICS & GYNECOLOGY
Payer: COMMERCIAL

## 2020-09-08 ENCOUNTER — HOSPITAL ENCOUNTER (OUTPATIENT)
Dept: ULTRASOUND IMAGING | Facility: CLINIC | Age: 35
End: 2020-09-08
Attending: OBSTETRICS & GYNECOLOGY
Payer: COMMERCIAL

## 2020-09-08 DIAGNOSIS — O24.419 GESTATIONAL DIABETES MELLITUS (GDM) IN THIRD TRIMESTER, GESTATIONAL DIABETES METHOD OF CONTROL UNSPECIFIED: Primary | ICD-10-CM

## 2020-09-08 DIAGNOSIS — O09.299 HISTORY OF INTRAUTERINE FETAL DEATH, CURRENTLY PREGNANT: ICD-10-CM

## 2020-09-08 DIAGNOSIS — O09.292 HISTORY OF STILLBIRTH IN CURRENTLY PREGNANT PATIENT, SECOND TRIMESTER: ICD-10-CM

## 2020-09-08 PROCEDURE — 76819 FETAL BIOPHYS PROFIL W/O NST: CPT

## 2020-09-09 ENCOUNTER — APPOINTMENT (OUTPATIENT)
Dept: INTERPRETER SERVICES | Facility: CLINIC | Age: 35
End: 2020-09-09
Payer: COMMERCIAL

## 2020-09-14 ENCOUNTER — APPOINTMENT (OUTPATIENT)
Dept: INTERPRETER SERVICES | Facility: CLINIC | Age: 35
End: 2020-09-14
Payer: COMMERCIAL

## 2020-09-15 ENCOUNTER — HOSPITAL ENCOUNTER (OUTPATIENT)
Dept: ULTRASOUND IMAGING | Facility: CLINIC | Age: 35
End: 2020-09-15
Attending: OBSTETRICS & GYNECOLOGY
Payer: COMMERCIAL

## 2020-09-15 ENCOUNTER — OFFICE VISIT (OUTPATIENT)
Dept: MATERNAL FETAL MEDICINE | Facility: CLINIC | Age: 35
End: 2020-09-15
Attending: OBSTETRICS & GYNECOLOGY
Payer: COMMERCIAL

## 2020-09-15 ENCOUNTER — VIRTUAL VISIT (OUTPATIENT)
Dept: INTERNAL MEDICINE | Facility: CLINIC | Age: 35
End: 2020-09-15
Attending: INTERNAL MEDICINE
Payer: COMMERCIAL

## 2020-09-15 DIAGNOSIS — O09.292 HISTORY OF STILLBIRTH IN CURRENTLY PREGNANT PATIENT, SECOND TRIMESTER: ICD-10-CM

## 2020-09-15 DIAGNOSIS — O24.419 GESTATIONAL DIABETES MELLITUS (GDM) IN THIRD TRIMESTER, GESTATIONAL DIABETES METHOD OF CONTROL UNSPECIFIED: Primary | ICD-10-CM

## 2020-09-15 DIAGNOSIS — O44.00 PLACENTA PREVIA ANTEPARTUM: ICD-10-CM

## 2020-09-15 PROCEDURE — 76816 OB US FOLLOW-UP PER FETUS: CPT

## 2020-09-15 PROCEDURE — 40001009 ZZH VIDEO/TELEPHONE VISIT; NO CHARGE

## 2020-09-15 PROCEDURE — 76819 FETAL BIOPHYS PROFIL W/O NST: CPT | Performed by: OBSTETRICS & GYNECOLOGY

## 2020-09-15 NOTE — LETTER
"9/15/2020       RE: Dai Guadarrama  [address]  [facility] [address]     Dear Colleague,    Thank you for referring your patient, Dai Guadarrama, to the [facility]  at [facility]. Please see a copy of my visit note below.    Dai Guadarrama is a 35 year old female who is being evaluated via a billable telephone visit.      The patient has been notified of following:     \"This telephone visit will be conducted via a call between you and your physician/provider. We have found that certain health care needs can be provided without the need for a physical exam.  This service lets us provide the care you need with a short phone conversation.  If a prescription is necessary we can send it directly to your pharmacy.  If lab work is needed we can place an order for that and you can then stop by our lab to have the test done at a later time.    Telephone visits are billed at different rates depending on your insurance coverage. During this emergency period, for some insurers they may be billed the same as an in-person visit.  Please reach out to your insurance provider with any questions.    If during the course of the call the physician/provider feels a telephone visit is not appropriate, you will not be charged for this service.\"    Patient has given verbal consent for Telephone visit?  Yes    What phone number would you like to be contacted at? [phone]    How would you like to obtain your AVS? Mail a copy    Subjective     Dai Guadarrama is a 35 year old female who presents via phone visit today for the following health issues:    HPI        [facility]    HPI:  Dai Guadarrama is a 35 year old  at 35w0d with a new diagnosis of gestational diabetes.     She has not seen diabetes education.   She does not have a history of gestational diabetes.   She does not have a " history of Type 2 Diabetes.   She does not have a family history of Type 2 Diabetes.   She does  have a history of pre-term labor.   She does not have a history of chronic hypertension, gestational hypertension or pre-eclampsia.   She does not have a history of LGA (infant >10 lbs).     She is not checking her blood sugars regularly.    Blood sugars are as follows: (complete table or insert clinical media)    Date Fasting Post-Breakfast Post-Lunch Post-Dinner                                                      Diabetes Symptoms:   none      Past Medical History:   Diagnosis Date     Aneurysm (H)      Migraine headache without aura      Pregnancy related nausea and vomiting, antepartum     two ED visits.     Recurrent pregnancy loss (CODE)      Retained placenta 2014     Stillbirth     32 weeks     Tachycardia      Past Surgical History:   Procedure Laterality Date     C EACH ADD TOOTH EXTRACTION      bad reaction to anesthia for local       SECTION N/A 2018    Procedure:  SECTION;  Surgeon: Noreen Harris MD;  [facility]     INSERT PICC LINE Right 3/12/2020    Procedure: Midline Picc Placement;  Surgeon: Ugo Webb PA-C;  [facility]     IR PICC PLACEMENT > 5 YRS OF AGE  3/12/2020     Family History   Problem Relation Age of Onset     Anxiety Disorder No family hx of      Mental Illness No family hx of      Substance Abuse No family hx of      Anesthesia Reaction No family hx of      Asthma No family hx of      Osteoporosis No family hx of      Genetic Disorder No family hx of      Thyroid Disease No family hx of      Hyperlipidemia No family hx of      Cerebrovascular Disease No family hx of      Breast Cancer No family hx of      Colon Cancer No family hx of      Prostate Cancer No family hx of      Other Cancer No family hx of      Depression No family hx of      Diabetes No family hx of      Coronary Artery Disease No family hx of      Hypertension No  family hx of      Social History     Socioeconomic History     Marital status: Single     Spouse name: Perry     Number of children: 2     Years of education: 16     Highest education level: Not on file   Occupational History     Occupation:      Comment: studied psychology    Social Needs     Financial resource strain: Not on file     Food insecurity     Worry: Not on file     Inability: Not on file     Transportation needs     Medical: Not on file     Non-medical: Not on file   Tobacco Use     Smoking status: Never Smoker     Smokeless tobacco: Never Used   Substance and Sexual Activity     Alcohol use: Not Currently     Alcohol/week: 0.0 standard drinks     Comment: rare alcohol use now nothing in pregnancy     Drug use: No     Sexual activity: Yes     Partners: Male   Lifestyle     Physical activity     Days per week: Not on file     Minutes per session: Not on file     Stress: Not on file   Relationships     Social connections     Talks on phone: Not on file     Gets together: Not on file     Attends [employer] service: Not on file     Active member of club or organization: Not on file     Attends meetings of clubs or organizations: Not on file     Relationship status: Not on file     Intimate partner violence     Fear of current or ex partner: Not on file     Emotionally abused: Not on file     Physically abused: Not on file     Forced sexual activity: Not on file   Other Topics Concern      Service No     Blood Transfusions No     Caffeine Concern Yes     Comment: coffee   stopped in pregnancy     Occupational Exposure No     Hobby Hazards No     Sleep Concern No     Stress Concern No     Weight Concern No     Special Diet No     Back Care Yes     Comment: has back pain in this pregnancy     Exercise Yes     Comment: walking actvities     Bike Helmet Yes     Seat Belt Yes     Self-Exams No   Social History Narrative      Of  [employer]ry [address]- 84     How much exercise per week? Active  daily    How much calcium per day? Does not eat dairy, PNV       How much caffeine per day? 1 cup e/o day     How much vitamin D per day?  PNV    Do you/your family wear seatbelts?  Yes    Do you/your family use safety helmets? Yes    Do you/your family use sunscreen? Yes    Do you/your family keep firearms in the home? No    Do you/your family have a smoke detector(s)? Yes            2020 Jered Crespo LPN       10 point ROS of systems including Constitutional, Eyes, Respiratory, Cardiovascular, Gastroenterology, Genitourinary, Integumentary, Muscularskeletal, Psychiatric were all negative except for pertinent positives noted in my HPI.    Physical Exam:   LMP 2019   Vitals:  No vitals were obtained today due to virtual visit.    healthy, alert and no distress  PSYCH: Alert and oriented times 3; coherent speech, normal   rate and volume, able to articulate logical thoughts, able   to abstract reason, no tangential thoughts, no hallucinations   or delusions  Her affect is normal  RESP: No cough, no audible wheezing, able to talk in full sentences  Remainder of exam unable to be completed due to telephone visits    Assessment:     Dai Guadarrama is a 35 year old  at 35w0d with a new diagnosis of gestational diabetes. She has the following risk factors that indicate early need for insulin therapy, including: Age >30 and Fasting Blood Glucose >95. This is not 6 or 7 risk factors. Patient is not checking her blood glucose at home.  I recommend Continued dietary and life-style modifications, starting regular glycemic monitoring. .     Plan:   Continue dietary modifications, regular exercise as able  Continue to check blood glucose 4x daily  Bring glucose log to all appointments  Needs 2-hr GTT at 6 wks post-partum  Follow-up with Dr. Ernandez in 1 week(s)    Lisa Leyva MD         Phone call duration:  21 minutes

## 2020-09-15 NOTE — PROGRESS NOTES
"Dai Guadarrama is a 35 year old female who is being evaluated via a billable telephone visit.      The patient has been notified of following:     \"This telephone visit will be conducted via a call between you and your physician/provider. We have found that certain health care needs can be provided without the need for a physical exam.  This service lets us provide the care you need with a short phone conversation.  If a prescription is necessary we can send it directly to your pharmacy.  If lab work is needed we can place an order for that and you can then stop by our lab to have the test done at a later time.    Telephone visits are billed at different rates depending on your insurance coverage. During this emergency period, for some insurers they may be billed the same as an in-person visit.  Please reach out to your insurance provider with any questions.    If during the course of the call the physician/provider feels a telephone visit is not appropriate, you will not be charged for this service.\"    Patient has given verbal consent for Telephone visit?  Yes    What phone number would you like to be contacted at? [phone]    How would you like to obtain your AVS? Mail a copy    Subjective     Dai Guadarrama is a 35 year old female who presents via phone visit today for the following health issues:    HPI        [facility]    HPI:  Dai Guadarrama is a 35 year old  at 35w0d with a new diagnosis of gestational diabetes.     She has not seen diabetes education.   She does not have a history of gestational diabetes.   She does not have a history of Type 2 Diabetes.   She does not have a family history of Type 2 Diabetes.   She does  have a history of pre-term labor.   She does not have a history of chronic hypertension, gestational hypertension or pre-eclampsia.   She does not have a history of LGA (infant >10 lbs).     She is not checking her blood " sugars regularly.    Blood sugars are as follows: (complete table or insert clinical media)    Date Fasting Post-Breakfast Post-Lunch Post-Dinner                                                      Diabetes Symptoms:   none      Past Medical History:   Diagnosis Date     Aneurysm (H)      Migraine headache without aura      Pregnancy related nausea and vomiting, antepartum 2016    two ED visits.     Recurrent pregnancy loss (CODE)      Retained placenta      Stillbirth     32 weeks     Tachycardia      Past Surgical History:   Procedure Laterality Date     C EACH ADD TOOTH EXTRACTION      bad reaction to anesthia for local       SECTION N/A 2018    Procedure:  SECTION;  Surgeon: Noreen Harris MD;  [facility]     INSERT PICC LINE Right 3/12/2020    Procedure: Midline Picc Placement;  Surgeon: Ugo Webb PA-C;  [facility]     IR PICC PLACEMENT > 5 YRS OF AGE  3/12/2020     Family History   Problem Relation Age of Onset     Anxiety Disorder No family hx of      Mental Illness No family hx of      Substance Abuse No family hx of      Anesthesia Reaction No family hx of      Asthma No family hx of      Osteoporosis No family hx of      Genetic Disorder No family hx of      Thyroid Disease No family hx of      Hyperlipidemia No family hx of      Cerebrovascular Disease No family hx of      Breast Cancer No family hx of      Colon Cancer No family hx of      Prostate Cancer No family hx of      Other Cancer No family hx of      Depression No family hx of      Diabetes No family hx of      Coronary Artery Disease No family hx of      Hypertension No family hx of      Social History     Socioeconomic History     Marital status: Single     Spouse name: Perry     Number of children: 2     Years of education: 16     Highest education level: Not on file   Occupational History     Occupation:      Comment: studied psychology    Social Needs     Financial  resource strain: Not on file     Food insecurity     Worry: Not on file     Inability: Not on file     Transportation needs     Medical: Not on file     Non-medical: Not on file   Tobacco Use     Smoking status: Never Smoker     Smokeless tobacco: Never Used   Substance and Sexual Activity     Alcohol use: Not Currently     Alcohol/week: 0.0 standard drinks     Comment: rare alcohol use now nothing in pregnancy     Drug use: No     Sexual activity: Yes     Partners: Male   Lifestyle     Physical activity     Days per week: Not on file     Minutes per session: Not on file     Stress: Not on file   Relationships     Social connections     Talks on phone: Not on file     Gets together: Not on file     Attends [employer] service: Not on file     Active member of club or organization: Not on file     Attends meetings of clubs or organizations: Not on file     Relationship status: Not on file     Intimate partner violence     Fear of current or ex partner: Not on file     Emotionally abused: Not on file     Physically abused: Not on file     Forced sexual activity: Not on file   Other Topics Concern      Service No     Blood Transfusions No     Caffeine Concern Yes     Comment: coffee   stopped in pregnancy     Occupational Exposure No     Hobby Hazards No     Sleep Concern No     Stress Concern No     Weight Concern No     Special Diet No     Back Care Yes     Comment: has back pain in this pregnancy     Exercise Yes     Comment: walking actvities     Bike Helmet Yes     Seat Belt Yes     Self-Exams No   Social History Narrative      Of  [employer]ry [address]- 84     How much exercise per week? Active daily    How much calcium per day? Does not eat dairy, PNV       How much caffeine per day? 1 cup e/o day     How much vitamin D per day?  PNV    Do you/your family wear seatbelts?  Yes    Do you/your family use safety helmets? Yes    Do you/your family use sunscreen? Yes    Do you/your family keep firearms in the  home? No    Do you/your family have a smoke detector(s)? Yes            2020 Jered Crespo LPN       10 point ROS of systems including Constitutional, Eyes, Respiratory, Cardiovascular, Gastroenterology, Genitourinary, Integumentary, Muscularskeletal, Psychiatric were all negative except for pertinent positives noted in my HPI.    Physical Exam:   LMP 2019   Vitals:  No vitals were obtained today due to virtual visit.    healthy, alert and no distress  PSYCH: Alert and oriented times 3; coherent speech, normal   rate and volume, able to articulate logical thoughts, able   to abstract reason, no tangential thoughts, no hallucinations   or delusions  Her affect is normal  RESP: No cough, no audible wheezing, able to talk in full sentences  Remainder of exam unable to be completed due to telephone visits          Assessment:     Dai Guadarrama is a 35 year old  at 35w0d with a new diagnosis of gestational diabetes. She has the following risk factors that indicate early need for insulin therapy, including: Age >30 and Fasting Blood Glucose >95. This is not 6 or 7 risk factors. Patient is not checking her blood glucose at home.  I recommend Continued dietary and life-style modifications, starting regular glycemic monitoring. .     Plan:   Continue dietary modifications, regular exercise as able  Continue to check blood glucose 4x daily  Bring glucose log to all appointments  Needs 2-hr GTT at 6 wks post-partum  Follow-up with Dr. Ernandez in 1 week(s)    Lisa Leyva MD           Phone call duration:  21 minutes

## 2020-09-15 NOTE — PROGRESS NOTES
The patient was seen for an ultrasound in the [facility] at the [facility] today.  For a detailed report of the ultrasound examination, please see the ultrasound report which can be found under the imaging tab.      Tanya Pederson MD  Maternal-Fetal Medicine

## 2020-09-16 ENCOUNTER — VIRTUAL VISIT (OUTPATIENT)
Dept: EDUCATION SERVICES | Facility: CLINIC | Age: 35
End: 2020-09-16
Payer: COMMERCIAL

## 2020-09-16 DIAGNOSIS — O24.419 GESTATIONAL DIABETES MELLITUS (GDM), ANTEPARTUM, GESTATIONAL DIABETES METHOD OF CONTROL UNSPECIFIED: Primary | ICD-10-CM

## 2020-09-16 PROCEDURE — G0108 DIAB MANAGE TRN  PER INDIV: HCPCS | Mod: 95

## 2020-09-16 NOTE — PROGRESS NOTES
Diabetes Self-Management Education & Support  Patient verbally consented to the telephone visit service today: yes      SUBJECTIVE/OBJECTIVE:  Presents for education related to gestational diabetes.    Accompanied by: Self  Gestational weeks: 35  Hospital planned for delivery: [facility] OB Visit Date: 20  Number of previous preganancies: 2  Had any babies over 9 lbs: No  Previously had Gestational Diabetes: No  Have you ever had thyroid problems or taken thyroid medication?: No    Cultural Influences/Ethnic Background:  [place]      Estimated Date of Delivery:   1 hour OGTT  Lab Results   Component Value Date    GLU1 141 (H) 2020       3 hour OGTT    Fasting  Lab Results   Component Value Date     (H) 2020       1 hour  Lab Results   Component Value Date    GL1 200 (H) 2020       2 hour  Lab Results   Component Value Date    GL2 133 2020       3 hour  Lab Results   Component Value Date    GL3 159 (H) 2020       Lifestyle and Health Behaviors:  Pre-pregnancy weight (lbs): 190  Exercise:: Currently not exercising  Barrier to exercise: Time  Meals include: Breakfast, Lunch, Dinner  Breakfast: greens, bread, eggs, OR banana strawberry shake OR bread, cheese, butter, coffee w/ sugar  Lunch: soup, rice  Dinner: meat, beans, rice, cabbage/cucumber/salad  Snacks: Fruit (banana, plum)  Beverages: Water, Coffee  Pre-emilia vitamin?: Yes  Supplements?: Yes  List supplements currently taking: Vitamin C, B12, Iron  Experiencing heartburn?: Yes    Healthy Coping:  Emotional response to diabetes: Ready to learn  Stage of change: PREPARATION (Decided to change - considering how)    Current Management:       ASSESSMENT:  Patient needs education on GDM pathophysiology, BG testing and goals, and GDM meal plan. Patient has missed a few appointments with our department.  She reports her baby is measuring large and doctors are considering a  at 36 weeks.  Patient reports  just starting BG testing yesterday.  Her 2 hr post-dinner result was 177 (ate both rice and pasta).  Fasting BG today was 104.      Recommend follow-up early next week to review BGs and meal plan.     INTERVENTION:    Educational topics covered today:  GDM diagnosis, pathophysiology, Risks and Complications of GDM, Means of controlling GDM, Using a Blood Glucose Monitor, Blood Glucose Goals, Logging and Interpreting Glucose Results, Ketone Testing, When to Call a Diabetes Educator or OB Provider, Healthy Eating During Pregnancy, Counting Carbohydrates, Meal Planning for GDM, and Physical Activity    Educational materials provided today:   [facility] Understanding Gestational Diabetes  GDM Log Book  Sharps Disposal  Care After Delivery    Pt verbalized understanding of concepts discussed and recommendations provided today.     PLAN:  Check glucose 4 times daily, before breakfast and 1 hour after each meal.     Check Ketones daily for one week, if negative, reduce testing to once a week.     Physical activity recommended: as able.    Meal plan: 2-3 carbs at breakfast, 3-4 carbs at lunch, 3-4 carbs at supper, 1-2 carbs at 3 snacks a day.  Follow consistent CHO meal plan, eat CHO and protein/fat at all meals/snacks.    Call/e-mail/MyChart message diabetes educator if 3 or more blood sugars are above the goal in 1 week, if ketones are positive, or with questions/concerns.    JEANINE Hanley CDE    Time Spent: 60 minutes  Encounter Type: Individual    Any diabetes medication dose changes were made via the CDE Protocol and Collaborative Practice Agreement with the patient's OB/GYN provider. A copy of this encounter was shared with the provider.

## 2020-09-16 NOTE — Clinical Note
FYI - GDM follow-up on your schedule next week.  She N/S'd numerous appts.  Just started testing her BG yesterday.  Baby is large so may need  at 36 weeks.  Due to time constraints, we talked about testing goals, risks, and a little bit about diet.

## 2020-09-18 ENCOUNTER — TELEPHONE (OUTPATIENT)
Dept: OBGYN | Facility: CLINIC | Age: 35
End: 2020-09-18

## 2020-09-18 ENCOUNTER — OFFICE VISIT (OUTPATIENT)
Dept: MATERNAL FETAL MEDICINE | Facility: CLINIC | Age: 35
End: 2020-09-18
Attending: OBSTETRICS & GYNECOLOGY
Payer: COMMERCIAL

## 2020-09-18 ENCOUNTER — HOSPITAL ENCOUNTER (OUTPATIENT)
Dept: ULTRASOUND IMAGING | Facility: CLINIC | Age: 35
End: 2020-09-18
Attending: OBSTETRICS & GYNECOLOGY
Payer: COMMERCIAL

## 2020-09-18 DIAGNOSIS — O24.419 GESTATIONAL DIABETES MELLITUS (GDM) IN THIRD TRIMESTER, GESTATIONAL DIABETES METHOD OF CONTROL UNSPECIFIED: ICD-10-CM

## 2020-09-18 DIAGNOSIS — O24.419 GESTATIONAL DIABETES MELLITUS (GDM) IN THIRD TRIMESTER, GESTATIONAL DIABETES METHOD OF CONTROL UNSPECIFIED: Primary | ICD-10-CM

## 2020-09-18 PROCEDURE — 40000068 ZZH STATISTIC EVAL-PTS ADMITTED TO OTHER DEPTS: Mod: ZF

## 2020-09-18 PROCEDURE — 76819 FETAL BIOPHYS PROFIL W/O NST: CPT

## 2020-09-18 NOTE — NURSING NOTE
Ipad  used for MFM appt- ANIA Mora    Pt reports not feeling well x 2 days- reports nausea, pain by her cervix and feeling faint.  Writer spoke with NEFTALY Dugan at [facility].  Per Kailee pt to present to [facility] for evaluation after [facility].  Discussed with pt- who is willing to go after she goes and talks with her  and daughters who are waiting in the car.  Writer offered wheelchair ride due to pt feeling faint- pt declined.  Pt will have  drop her off at hospital entrance- aware her daughters can not accompany her into the hospital.  Charge RN notified.  Kailee to notify CNM on call.

## 2020-09-18 NOTE — PROGRESS NOTES
".Please see \"Imaging\" tab under \"Chart Review\" for details of today's visit.    Fabby Quezada    "

## 2020-09-18 NOTE — TELEPHONE ENCOUNTER
Received call from Umm at [facility] that patient is there for BPP and is not feeling well - having nausea, feeling faint, and has pain around her cervix.    Advised she go to [facility] for evaluation. They will call transport for her per .    Called [facility] and gave report to Bobbi. Pt sees the midwives.

## 2020-09-19 ENCOUNTER — HOSPITAL ENCOUNTER (INPATIENT)
Facility: CLINIC | Age: 35
LOS: 2 days | Discharge: HOME | End: 2020-09-21
Attending: OBSTETRICS & GYNECOLOGY | Admitting: OBSTETRICS & GYNECOLOGY
Payer: COMMERCIAL

## 2020-09-19 DIAGNOSIS — O23.03 PYELONEPHRITIS AFFECTING PREGNANCY IN THIRD TRIMESTER: Primary | ICD-10-CM

## 2020-09-19 LAB
ALBUMIN UR-MCNC: 30 MG/DL
AMPHETAMINES UR QL SCN: NEGATIVE
APPEARANCE UR: ABNORMAL
BACTERIA #/AREA URNS HPF: ABNORMAL /HPF
BILIRUB UR QL STRIP: NEGATIVE
CANNABINOIDS UR QL: NEGATIVE
CAOX CRY #/AREA URNS HPF: ABNORMAL /HPF
COCAINE UR QL: NEGATIVE
COLOR UR AUTO: YELLOW
ERYTHROCYTE [DISTWIDTH] IN BLOOD BY AUTOMATED COUNT: 16.9 % (ref 10–15)
GLUCOSE BLDC GLUCOMTR-MCNC: 108 MG/DL (ref 70–99)
GLUCOSE UR STRIP-MCNC: NEGATIVE MG/DL
HCT VFR BLD AUTO: 37.2 % (ref 35–47)
HGB BLD-MCNC: 11.7 G/DL (ref 11.7–15.7)
HGB UR QL STRIP: ABNORMAL
KETONES UR STRIP-MCNC: >150 MG/DL
LABORATORY COMMENT REPORT: NORMAL
LEUKOCYTE ESTERASE UR QL STRIP: ABNORMAL
MCH RBC QN AUTO: 27.9 PG (ref 26.5–33)
MCHC RBC AUTO-ENTMCNC: 31.5 G/DL (ref 31.5–36.5)
MCV RBC AUTO: 89 FL (ref 78–100)
MUCOUS THREADS #/AREA URNS LPF: PRESENT /LPF
NITRATE UR QL: POSITIVE
OPIATES UR QL SCN: NEGATIVE
PCP UR QL SCN: NEGATIVE
PH UR STRIP: 6 PH (ref 5–7)
PLATELET # BLD AUTO: 359 10E9/L (ref 150–450)
RBC # BLD AUTO: 4.2 10E12/L (ref 3.8–5.2)
RBC #/AREA URNS AUTO: 12 /HPF (ref 0–2)
SARS-COV-2 RNA SPEC QL NAA+PROBE: NEGATIVE
SARS-COV-2 RNA SPEC QL NAA+PROBE: NORMAL
SOURCE: ABNORMAL
SP GR UR STRIP: 1.02 (ref 1–1.03)
SPECIMEN SOURCE: NORMAL
SQUAMOUS #/AREA URNS AUTO: 7 /HPF (ref 0–1)
UROBILINOGEN UR STRIP-MCNC: NORMAL MG/DL (ref 0–2)
WBC # BLD AUTO: 13.4 10E9/L (ref 4–11)
WBC #/AREA URNS AUTO: 102 /HPF (ref 0–5)
WET PREP SPEC: NORMAL

## 2020-09-19 PROCEDURE — 87186 SC STD MICRODIL/AGAR DIL: CPT | Performed by: OBSTETRICS & GYNECOLOGY

## 2020-09-19 PROCEDURE — 87210 SMEAR WET MOUNT SALINE/INK: CPT | Performed by: STUDENT IN AN ORGANIZED HEALTH CARE EDUCATION/TRAINING PROGRAM

## 2020-09-19 PROCEDURE — 25800030 ZZH RX IP 258 OP 636

## 2020-09-19 PROCEDURE — 25000128 H RX IP 250 OP 636

## 2020-09-19 PROCEDURE — 87591 N.GONORRHOEAE DNA AMP PROB: CPT | Performed by: STUDENT IN AN ORGANIZED HEALTH CARE EDUCATION/TRAINING PROGRAM

## 2020-09-19 PROCEDURE — 12000001 ZZH R&B MED SURG/OB UMMC

## 2020-09-19 PROCEDURE — 87086 URINE CULTURE/COLONY COUNT: CPT | Performed by: OBSTETRICS & GYNECOLOGY

## 2020-09-19 PROCEDURE — 96360 HYDRATION IV INFUSION INIT: CPT

## 2020-09-19 PROCEDURE — G0463 HOSPITAL OUTPT CLINIC VISIT: HCPCS

## 2020-09-19 PROCEDURE — 86900 BLOOD TYPING SEROLOGIC ABO: CPT | Performed by: OBSTETRICS & GYNECOLOGY

## 2020-09-19 PROCEDURE — 86780 TREPONEMA PALLIDUM: CPT | Performed by: OBSTETRICS & GYNECOLOGY

## 2020-09-19 PROCEDURE — 81001 URINALYSIS AUTO W/SCOPE: CPT | Performed by: STUDENT IN AN ORGANIZED HEALTH CARE EDUCATION/TRAINING PROGRAM

## 2020-09-19 PROCEDURE — U0003 INFECTIOUS AGENT DETECTION BY NUCLEIC ACID (DNA OR RNA); SEVERE ACUTE RESPIRATORY SYNDROME CORONAVIRUS 2 (SARS-COV-2) (CORONAVIRUS DISEASE [COVID-19]), AMPLIFIED PROBE TECHNIQUE, MAKING USE OF HIGH THROUGHPUT TECHNOLOGIES AS DESCRIBED BY CMS-2020-01-R: HCPCS | Performed by: OBSTETRICS & GYNECOLOGY

## 2020-09-19 PROCEDURE — 87653 STREP B DNA AMP PROBE: CPT | Performed by: STUDENT IN AN ORGANIZED HEALTH CARE EDUCATION/TRAINING PROGRAM

## 2020-09-19 PROCEDURE — 40000268 ZZH STATISTIC NO CHARGES

## 2020-09-19 PROCEDURE — 87491 CHLMYD TRACH DNA AMP PROBE: CPT | Performed by: STUDENT IN AN ORGANIZED HEALTH CARE EDUCATION/TRAINING PROGRAM

## 2020-09-19 PROCEDURE — 86850 RBC ANTIBODY SCREEN: CPT | Performed by: OBSTETRICS & GYNECOLOGY

## 2020-09-19 PROCEDURE — 86901 BLOOD TYPING SEROLOGIC RH(D): CPT | Performed by: OBSTETRICS & GYNECOLOGY

## 2020-09-19 PROCEDURE — 86923 COMPATIBILITY TEST ELECTRIC: CPT | Performed by: OBSTETRICS & GYNECOLOGY

## 2020-09-19 PROCEDURE — 25000132 ZZH RX MED GY IP 250 OP 250 PS 637

## 2020-09-19 PROCEDURE — 87088 URINE BACTERIA CULTURE: CPT | Performed by: OBSTETRICS & GYNECOLOGY

## 2020-09-19 PROCEDURE — 96361 HYDRATE IV INFUSION ADD-ON: CPT

## 2020-09-19 PROCEDURE — 80307 DRUG TEST PRSMV CHEM ANLYZR: CPT | Performed by: STUDENT IN AN ORGANIZED HEALTH CARE EDUCATION/TRAINING PROGRAM

## 2020-09-19 PROCEDURE — 85027 COMPLETE CBC AUTOMATED: CPT | Performed by: OBSTETRICS & GYNECOLOGY

## 2020-09-19 PROCEDURE — 36415 COLL VENOUS BLD VENIPUNCTURE: CPT | Performed by: OBSTETRICS & GYNECOLOGY

## 2020-09-19 PROCEDURE — 00000146 ZZHCL STATISTIC GLUCOSE BY METER IP

## 2020-09-19 RX ORDER — DIPHENHYDRAMINE HYDROCHLORIDE 50 MG/ML
25 INJECTION INTRAMUSCULAR; INTRAVENOUS EVERY 6 HOURS PRN
Status: DISCONTINUED | OUTPATIENT
Start: 2020-09-19 | End: 2020-09-21 | Stop reason: HOSPADM

## 2020-09-19 RX ORDER — DEXTROSE MONOHYDRATE 25 G/50ML
25-50 INJECTION, SOLUTION INTRAVENOUS
Status: DISCONTINUED | OUTPATIENT
Start: 2020-09-19 | End: 2020-09-21 | Stop reason: HOSPADM

## 2020-09-19 RX ORDER — ACETAMINOPHEN 325 MG/1
975 TABLET ORAL EVERY 4 HOURS PRN
Status: DISCONTINUED | OUTPATIENT
Start: 2020-09-19 | End: 2020-09-21 | Stop reason: HOSPADM

## 2020-09-19 RX ORDER — CEFTRIAXONE 1 G/1
1 INJECTION, POWDER, FOR SOLUTION INTRAMUSCULAR; INTRAVENOUS EVERY 24 HOURS
Status: DISCONTINUED | OUTPATIENT
Start: 2020-09-19 | End: 2020-09-21

## 2020-09-19 RX ORDER — SODIUM CHLORIDE, SODIUM LACTATE, POTASSIUM CHLORIDE, CALCIUM CHLORIDE 600; 310; 30; 20 MG/100ML; MG/100ML; MG/100ML; MG/100ML
INJECTION, SOLUTION INTRAVENOUS
Status: COMPLETED
Start: 2020-09-19 | End: 2020-09-19

## 2020-09-19 RX ORDER — NICOTINE POLACRILEX 4 MG
15-30 LOZENGE BUCCAL
Status: DISCONTINUED | OUTPATIENT
Start: 2020-09-19 | End: 2020-09-21 | Stop reason: HOSPADM

## 2020-09-19 RX ORDER — DIPHENHYDRAMINE HCL 25 MG
25 CAPSULE ORAL EVERY 6 HOURS PRN
Status: DISCONTINUED | OUTPATIENT
Start: 2020-09-19 | End: 2020-09-21 | Stop reason: HOSPADM

## 2020-09-19 RX ORDER — ONDANSETRON 2 MG/ML
4 INJECTION INTRAMUSCULAR; INTRAVENOUS EVERY 6 HOURS PRN
Status: DISCONTINUED | OUTPATIENT
Start: 2020-09-19 | End: 2020-09-21 | Stop reason: HOSPADM

## 2020-09-19 RX ADMIN — SODIUM CHLORIDE, POTASSIUM CHLORIDE, SODIUM LACTATE AND CALCIUM CHLORIDE 500 ML: 600; 310; 30; 20 INJECTION, SOLUTION INTRAVENOUS at 16:51

## 2020-09-19 RX ADMIN — SODIUM CHLORIDE, POTASSIUM CHLORIDE, SODIUM LACTATE AND CALCIUM CHLORIDE: 600; 310; 30; 20 INJECTION, SOLUTION INTRAVENOUS at 22:00

## 2020-09-19 RX ADMIN — CEFTRIAXONE SODIUM 1 G: 1 INJECTION, POWDER, FOR SOLUTION INTRAMUSCULAR; INTRAVENOUS at 19:46

## 2020-09-19 RX ADMIN — ACETAMINOPHEN 975 MG: 325 TABLET, FILM COATED ORAL at 19:46

## 2020-09-19 RX ADMIN — OMEPRAZOLE 40 MG: 20 CAPSULE, DELAYED RELEASE ORAL at 19:46

## 2020-09-19 NOTE — PROVIDER NOTIFICATION
09/19/20 1527   Provider Notification   Provider Name/Title [place]   Method of Notification Electronic Page   Notification Reason Patient Arrived     Provider notified of patient arrival.

## 2020-09-19 NOTE — H&P
Labor & Delivery  History and Physical    *History obtained with iPad *    CC: Abdominal Pain    HPI: Ms. Dai Guadarrama is a 36yo  at 35w4d by LMP c/w 6w1d US who presents with abdominal pain that started at 1 pm today. States she was cooking in the kitchen when she started to feel slightly lightheaded, nauseous and then the abdominal pain started. States it feels like labor contractions. The pain is strong, states it is all over her abdomen. Was coming and going, now feeling more constant. States she last ate a few bites of eggs at 1pm, otherwise not much PO intake. Denies vaginal bleeding, LOF. Endorses good fetal movement.     Pregnancy Complicated By  - Hx c/s x1, desires repeat  - Hx IUFD at 32w  - Posterior placenta previa  - GDMA1  - Anemia    Past Medical History:   Diagnosis Date     Aneurysm (H)      Migraine headache without aura      Pregnancy related nausea and vomiting, antepartum     two ED visits.     Recurrent pregnancy loss (CODE)      Retained placenta      Stillbirth     32 weeks     Tachycardia      Past Surgical History:   Procedure Laterality Date     C EACH ADD TOOTH EXTRACTION      bad reaction to anesthia for local       SECTION N/A 2018    Procedure:  SECTION;  Surgeon: Noreen Harris MD;  Location: [facility] L[facility]     INSERT PICC LINE Right 3/12/2020    Procedure: Midline Picc Placement;  Surgeon: Ugo Webb PA-C;  Location: [facility]     IR PICC PLACEMENT > 5 YRS OF AGE  3/12/2020     No current facility-administered medications on file prior to encounter.   cholecalciferol (VITAMIN D3) 5000 units (125 mcg) capsule, Take 1 capsule (5,000 Units) by mouth daily Take one capsule daily.  cyanocobalamin (VITAMIN B-12) 1000 MCG tablet, Take 1 tablet (1,000 mcg) by mouth daily  ferrous sulfate (FEROSUL) 325 (65 Fe) MG tablet, Take 1 tablet (325 mg) by mouth daily (with breakfast)  omeprazole (PRILOSEC) 20 MG DR capsule, Take 2  "capsules (40 mg) by mouth daily  Prenatal Vit-Fe Fumarate-FA ([facility] PRENATAL VITAMINS) 28-0.8 MG TABS, Take 1 tablet by mouth daily  vitamin C (ASCORBIC ACID) 250 MG tablet, Take 1 tablet (250 mg) by mouth daily at same time as iron pill.  blood glucose (NO BRAND SPECIFIED) test strip, Use to test blood sugar once  daily or as directed. (Patient not taking: Reported on 8/26/2020)  blood glucose monitoring (ACCU-CHEK COMPACT CARE KIT) meter device kit, Test once daily before breakfast FASTING  blood glucose monitoring (ACCU-CHEK MULTICLIX) lancets, Test once daily before breakfast FASTING  omeprazole (PRILOSEC) 20 MG DR capsule, Take 1 capsule (20 mg) by mouth daily (Patient not taking: Reported on 8/26/2020)  ondansetron (ZOFRAN ODT) 4 MG ODT tab, Take 1 tablet (4 mg) by mouth every 8 hours as needed for nausea (Patient not taking: Reported on 8/26/2020)  Prenatal Vit-Fe Fumarate-FA (PRENATAL VITAMIN) 27-0.8 MG TABS,   promethazine (PHENERGAN) 25 MG suppository, Place 1 suppository (25 mg) rectally every 6 hours as needed for nausea (Patient not taking: Reported on 8/26/2020)  pyridOXINE (VITAMIN B6) 25 MG tablet, Take 1 tablet (25 mg) by mouth every 8 hours (Patient not taking: Reported on 8/26/2020)  senna-docusate (SENOKOT-S/PERICOLACE) 8.6-50 MG tablet, Take 1 tablet by mouth daily (Patient not taking: Reported on 8/26/2020)      Allergies: NKDA    Fam Hx: No known history of bleeding or clotting disorders    Soc Hx: denies EtOH, tobacco, drug use in pregnancy    ROS: 10-point ROS negative other than per HPI    Physical exam:  Vital signs:  Temp: 97.8  F (36.6  C) Temp src: Oral BP: 120/63     Resp: 20            Estimated body mass index is 45.46 kg/m  as calculated from the following:    Height as of 8/26/20: 1.549 m (5' 1\").    Weight as of 8/26/20: 109.1 kg (240 lb 9.6 oz).  GA: uncomfortable appearing but NAD  CV: regular rhythm and rate with normal s1, s2  Pulm: CTAB  Abd: diffusely tender to " palpation  /SSE: no vaginal or uterine bleeding, some physiologic appearing discharge in vault, cervix appears closed  Extr: minimal BLE edema    FHT: Baseline 150 bmp, mod variability, accels present, no decels; cat I  [place]: 0-2 Q 10 minutes    Assessment/Plan: Ms. Guadarrama is a 34yo  at 35w4d by 6w1d US who presents with diffuse abdominal pain. Differential is large, including but not limited to  labor, urinary tract infection, vaginitis, or GI etiology. UA is concerning for infection with large leukocyte esterase and positive nitrites. Patient also has a leukocytosis with WBC to 13.4, as well as tachycardia. Given these findings suspect developing pyelonephritis. Patient is having contraction on monitoring, however cervix remains closed and no vaginal bleeding noted with placenta previa. There were > 150 ketones on UA, so suspect dehydration with subsequent uterine irritability. Will re-hydrate and continue to monitor contractions closely.    #Suspected Pyelonephritis  - WBC 13.4, Tachycardia  - Afebrile  - D#1 Ceftriaxone 1 g Q24 hours  - Repeat CBC in AM  -  mL/hr    #Fetal Wellbeing  - Cat I, reactive  - GBS pending  - Cephalic by BSUS    #GDMA1  - Fasting glucose, 2 hour postprandials  - MSSI as needed    #History of C/S x 1  #Placenta Previa  # Contractions  - Will plan for IVF hydration, and continuous monitoring with toco  - If patient starts to have vaginal bleeding, or worsening pain/contractions will repeat speculum exam  - Delivery via  section if delivery is indicated    Patient evaluated with Dr. Harris.    Concepción Conway MD  OB/GYN PGY-3  20 7:18 PM     Staff MD Note    I appreciate the note by Dr. Conway.  Any necessary changes have been made by me.  I saw and evaluated the patient and agree with the findings and plan of care as documented in the note.    Noreen Harris MD

## 2020-09-19 NOTE — PLAN OF CARE
Data: Patient presented to [facility] at 1445.   Reason for maternal/fetal assessment per patient is No chief complaint on file.  .  Patient is a . Prenatal record reviewed.      OB History    Para Term  AB Living   6 3 2 1 2 2   SAB TAB Ectopic Multiple Live Births   2 0 0 0 3      # Outcome Date GA Lbr Spike/2nd Weight Sex Delivery Anes PTL Lv   6 Current            5 Term 18 37w1d  2.74 kg (6 lb 0.7 oz) F CS-LTranv Spinal N KASEY      Birth Comments: unstable fetal lie, hx IUFD      Name: KINJAL COCHRAN,BABY1 LAURA      Apgar1: 8  Apgar5: 9   4 Term 17 37w5d 02:30 / 00:13 3.374 kg (7 lb 7 oz) F Vag-Spont EPI N KASEY      Birth Comments: IOL for oligo, PP hemorrhage 914 mL      Name: Marjorie       Apgar1: 9  Apgar5: 9   3  14 32w0d   F I.U. FETAL D   ND   2 SAB            1 SAB               Obstetric Comments      2nd pregnancy oligohydramnios IOL 37.5    . Medical history:   Past Medical History:   Diagnosis Date     Aneurysm (H)      Migraine headache without aura      Pregnancy related nausea and vomiting, antepartum 2016    two ED visits.     Recurrent pregnancy loss (CODE)      Retained placenta 2014     Stillbirth 2014    32 weeks     Tachycardia    . Gestational Age 35w4d. VSS. Fetal movement present. Patient denies vaginal discharge, GI problems, bloody show, vaginal bleeding, edema, headache, visual disturbances, epigastric or URQ pain, abdominal pain, rupture of membranes. Support persons not present.  Action: Verbal consent for EFM. Triage assessment completed. EFM applied. Uterine assessment frequent contractions and irritability. Fetal assessment: Presumed adequate fetal oxygenation documented (see flow record).   Response: Dr. Haynes, Dr. Conway and Dr. Harris informed of arrival, complaints, lab results. Plan per provider is admit for IV antibiotics and PTL observation. Patient verbalized agreement with plan. Patient transferred to room 422 ambulatory,  oriented to room and call light.

## 2020-09-20 ENCOUNTER — APPOINTMENT (OUTPATIENT)
Dept: INTERPRETER SERVICES | Facility: CLINIC | Age: 35
End: 2020-09-20
Payer: COMMERCIAL

## 2020-09-20 LAB
BACTERIA SPEC CULT: ABNORMAL
BACTERIA SPEC CULT: ABNORMAL
BASOPHILS # BLD AUTO: 0 10E9/L (ref 0–0.2)
BASOPHILS NFR BLD AUTO: 0.1 %
C TRACH DNA SPEC QL NAA+PROBE: NEGATIVE
DIFFERENTIAL METHOD BLD: ABNORMAL
EOSINOPHIL # BLD AUTO: 0.1 10E9/L (ref 0–0.7)
EOSINOPHIL NFR BLD AUTO: 1 %
ERYTHROCYTE [DISTWIDTH] IN BLOOD BY AUTOMATED COUNT: 16.9 % (ref 10–15)
GLUCOSE BLDC GLUCOMTR-MCNC: 107 MG/DL (ref 70–99)
GLUCOSE BLDC GLUCOMTR-MCNC: 120 MG/DL (ref 70–99)
GLUCOSE BLDC GLUCOMTR-MCNC: 126 MG/DL (ref 70–99)
GLUCOSE BLDC GLUCOMTR-MCNC: 88 MG/DL (ref 70–99)
GP B STREP DNA SPEC QL NAA+PROBE: NEGATIVE
HCT VFR BLD AUTO: 34.1 % (ref 35–47)
HGB BLD-MCNC: 10.9 G/DL (ref 11.7–15.7)
IMM GRANULOCYTES # BLD: 0.1 10E9/L (ref 0–0.4)
IMM GRANULOCYTES NFR BLD: 1.2 %
LYMPHOCYTES # BLD AUTO: 2.1 10E9/L (ref 0.8–5.3)
LYMPHOCYTES NFR BLD AUTO: 22.9 %
Lab: ABNORMAL
MCH RBC QN AUTO: 28.5 PG (ref 26.5–33)
MCHC RBC AUTO-ENTMCNC: 32 G/DL (ref 31.5–36.5)
MCV RBC AUTO: 89 FL (ref 78–100)
MONOCYTES # BLD AUTO: 0.6 10E9/L (ref 0–1.3)
MONOCYTES NFR BLD AUTO: 6 %
N GONORRHOEA DNA SPEC QL NAA+PROBE: NEGATIVE
NEUTROPHILS # BLD AUTO: 6.3 10E9/L (ref 1.6–8.3)
NEUTROPHILS NFR BLD AUTO: 68.8 %
NRBC # BLD AUTO: 0 10*3/UL
NRBC BLD AUTO-RTO: 0 /100
PLATELET # BLD AUTO: 347 10E9/L (ref 150–450)
RBC # BLD AUTO: 3.82 10E12/L (ref 3.8–5.2)
SPECIMEN SOURCE: ABNORMAL
SPECIMEN SOURCE: NORMAL
T PALLIDUM AB SER QL: NONREACTIVE
WBC # BLD AUTO: 9.1 10E9/L (ref 4–11)

## 2020-09-20 PROCEDURE — 36415 COLL VENOUS BLD VENIPUNCTURE: CPT

## 2020-09-20 PROCEDURE — 25800030 ZZH RX IP 258 OP 636

## 2020-09-20 PROCEDURE — 00000146 ZZHCL STATISTIC GLUCOSE BY METER IP

## 2020-09-20 PROCEDURE — 25000132 ZZH RX MED GY IP 250 OP 250 PS 637

## 2020-09-20 PROCEDURE — 12000001 ZZH R&B MED SURG/OB UMMC

## 2020-09-20 PROCEDURE — 85025 COMPLETE CBC W/AUTO DIFF WBC: CPT

## 2020-09-20 PROCEDURE — 25000128 H RX IP 250 OP 636

## 2020-09-20 RX ORDER — CALCIUM CARBONATE 500 MG/1
500-1000 TABLET, CHEWABLE ORAL 3 TIMES DAILY PRN
Status: DISCONTINUED | OUTPATIENT
Start: 2020-09-20 | End: 2020-09-21 | Stop reason: HOSPADM

## 2020-09-20 RX ORDER — SODIUM CHLORIDE, SODIUM LACTATE, POTASSIUM CHLORIDE, CALCIUM CHLORIDE 600; 310; 30; 20 MG/100ML; MG/100ML; MG/100ML; MG/100ML
INJECTION, SOLUTION INTRAVENOUS CONTINUOUS
Status: DISCONTINUED | OUTPATIENT
Start: 2020-09-20 | End: 2020-09-21 | Stop reason: HOSPADM

## 2020-09-20 RX ADMIN — SODIUM CHLORIDE, POTASSIUM CHLORIDE, SODIUM LACTATE AND CALCIUM CHLORIDE: 600; 310; 30; 20 INJECTION, SOLUTION INTRAVENOUS at 05:48

## 2020-09-20 RX ADMIN — SODIUM CHLORIDE, POTASSIUM CHLORIDE, SODIUM LACTATE AND CALCIUM CHLORIDE: 600; 310; 30; 20 INJECTION, SOLUTION INTRAVENOUS at 13:55

## 2020-09-20 RX ADMIN — OMEPRAZOLE 40 MG: 20 CAPSULE, DELAYED RELEASE ORAL at 22:14

## 2020-09-20 RX ADMIN — CEFTRIAXONE SODIUM 1 G: 1 INJECTION, POWDER, FOR SOLUTION INTRAMUSCULAR; INTRAVENOUS at 18:39

## 2020-09-20 RX ADMIN — SODIUM CHLORIDE, POTASSIUM CHLORIDE, SODIUM LACTATE AND CALCIUM CHLORIDE: 600; 310; 30; 20 INJECTION, SOLUTION INTRAVENOUS at 23:12

## 2020-09-20 RX ADMIN — ACETAMINOPHEN 975 MG: 325 TABLET, FILM COATED ORAL at 12:42

## 2020-09-20 NOTE — PROGRESS NOTES
[facility] Antepartum Progress Note    Patient was seen with an iPad      Subjective: The pain in her back has resolved, but her lower abdominal pain is still there; she describes it as a pressure, occasionally cramping, and constant.  Feels better temporarily when she uses the bathroom.  Denies dysuria, does report frequency.  She denies vaginal bleeding, loss of fluid or abnormal discharge. She feels like her throat and mouth are dry, especially since she was NPO yesterday.    Objective:  Vitals:    20 2208 20 0311 20 0745 20 1135   BP: 101/55 110/57 98/53 103/57   Resp:     Temp: 97.6  F (36.4  C) 98.1  F (36.7  C) 97.9  F (36.6  C)    TempSrc: Oral Oral Oral      Gen: Resting comfortably in bed, NAD  CV: RRR, no murmurs  Resp: CTAB, no wheezes, no crackles  Abd: Gravid, non-distended, suprapubic tenderness  Back: No CVA tenderness    FHT: , moderate variability, accels present, no decels  [place]: 0 contraction in 10 minutes, occasional irritability     I/O last 3 completed shifts:  In: 1925 [I.V.:]  Out: 550 [Urine:550]    Recent Labs   Lab 20  0723 20  1630   WBC 9.1 13.4*   HGB 10.9* 11.7   MCV 89 89    359     Recent Labs   Lab 20  1339 20  0708 20  2325   * 88 108*     Assessment/Plan: Dai Guadarrama is a 35 year old  @ 35w5d by 6w1d U/S, admitted for pyelonephritis. Pregnancy is complicated by: posterior placenta previa, history of  delivery, class III obesity, GDMA1, anemia and hx IUFD at 32w.  No evidence of PTL, symptoms likely related to infection    Pyelonephritis  - D#2 Ceftriaxone 1g Q24hrs, plan for transition to PO antibiotics tomorrow if stable and plan for 14-day total course  - Maintenance IVF given ongoing tachycardia  - Vitals/O2 monitoring q4hrs   - Repeat CBC wnl this morning, repeat if clinically indicated     GDMA1  - Fasting, 2 hour postprandial glucose checks  - MSSI as  "needed    History of CD/Placenta Previa  - If patient starts to have vaginal bleeding, or worsening pain/contractions will repeat speculum exam. Subjective cramping likely due to infection but will continue to monitor  - Delivery via  section if delivery is indicated, goal for 37 weeks if clinically stable and infection resolves  - Increased risk for morbidly adherent placenta, ultrasounds have been reassuring    FWB:   - Cat I FHT  - TID EFM/[place] monitoring   - Cephalic by BSUS     PNC:   - Rh+/RI  - GBS pending     Dispo: Will plan to monitor for at least another day pending resolution of symptoms and reassuring FHT.      Michelle Archibald, MS4     Resident/Fellow Attestation   I, Cassia Barreto, was present with the medical student who participated in the service and in the documentation of the note.  I have verified the history and personally performed the physical exam and medical decision making.  I agree with the assessment and plan of care as documented in the note.  I have reviewed the note and made changes as necessary.    Key findings; 35 year old  at 35w5d by LMP c/w 6w1d US admitted for pyelonephritis. Clinically improved this AM although with residual tachycardia and abdominal pain, likely due to infection. Will continue IV fluids and plan for one more dose of IV ceftriaxone tonight with transition to PO antibiotics tomorrow if stable, total of 14-day antibiotic course.    Cassia Barreto MD  Ob/Gyn Resident, PGY-3  Pager: [phone]  20 11:53 AM     MFM Attending Attestation  Late entry, patient seen several hours ago.  Note delayed due to clinical care.    /57      Temp 97.9  F (36.6  C) (Oral)   Resp 20   Ht 1.549 m (5' 1\")   LMP 2019   SpO2 97%   BMI 45.46 kg/m      FHT: 135 moderate variability, + accelerations, no decelerations, some loss of contact  TOCO: no contractions  INTERPRETATION: appropriate for gestational age  Results for orders placed or " performed during the hospital encounter of 09/19/20 (from the past 24 hour(s))   Urine Culture    Specimen: Urine Midstream; Midstream Urine   Result Value Ref Range    Specimen Description Midstream Urine     Special Requests Specimen received in preservative     Culture Micro (A)      >100,000 colonies/mL  Escherichia coli  Susceptibility testing in progress      Culture Micro Culture in progress    UA reflex to Microscopic   Result Value Ref Range    Color Urine Yellow     Appearance Urine Slightly Cloudy     Glucose Urine Negative NEG^Negative mg/dL    Bilirubin Urine Negative NEG^Negative    Ketones Urine >150 (A) NEG^Negative mg/dL    Specific Gravity Urine 1.017 1.003 - 1.035    Blood Urine Small (A) NEG^Negative    pH Urine 6.0 5.0 - 7.0 pH    Protein Albumin Urine 30 (A) NEG^Negative mg/dL    Urobilinogen mg/dL Normal 0.0 - 2.0 mg/dL    Nitrite Urine Positive (A) NEG^Negative    Leukocyte Esterase Urine Large (A) NEG^Negative    Source Midstream Urine     RBC Urine 12 (H) 0 - 2 /HPF    WBC Urine 102 (H) 0 - 5 /HPF    Bacteria Urine Moderate (A) NEG^Negative /HPF    Squamous Epithelial /HPF Urine 7 (H) 0 - 1 /HPF    Mucous Urine Present (A) NEG^Negative /LPF    Calcium Oxalate Few (A) NEG^Negative /HPF   CBC with platelets   Result Value Ref Range    WBC 13.4 (H) 4.0 - 11.0 10e9/L    RBC Count 4.20 3.8 - 5.2 10e12/L    Hemoglobin 11.7 11.7 - 15.7 g/dL    Hematocrit 37.2 35.0 - 47.0 %    MCV 89 78 - 100 fl    MCH 27.9 26.5 - 33.0 pg    MCHC 31.5 31.5 - 36.5 g/dL    RDW 16.9 (H) 10.0 - 15.0 %    Platelet Count 359 150 - 450 10e9/L   CBC with platelets differential   Result Value Ref Range    WBC 9.1 4.0 - 11.0 10e9/L    RBC Count 3.82 3.8 - 5.2 10e12/L    Hemoglobin 10.9 (L) 11.7 - 15.7 g/dL    Hematocrit 34.1 (L) 35.0 - 47.0 %    MCV 89 78 - 100 fl    MCH 28.5 26.5 - 33.0 pg    MCHC 32.0 31.5 - 36.5 g/dL    RDW 16.9 (H) 10.0 - 15.0 %    Platelet Count 347 150 - 450 10e9/L    Diff Method Automated Method     %  Neutrophils 68.8 %    % Lymphocytes 22.9 %    % Monocytes 6.0 %    % Eosinophils 1.0 %    % Basophils 0.1 %    % Immature Granulocytes 1.2 %    Nucleated RBCs 0 0 /100    Absolute Neutrophil 6.3 1.6 - 8.3 10e9/L    Absolute Lymphocytes 2.1 0.8 - 5.3 10e9/L    Absolute Monocytes 0.6 0.0 - 1.3 10e9/L    Absolute Eosinophils 0.1 0.0 - 0.7 10e9/L    Absolute Basophils 0.0 0.0 - 0.2 10e9/L    Abs Immature Granulocytes 0.1 0 - 0.4 10e9/L    Absolute Nucleated RBC 0.0    Glucose by meter   Result Value Ref Range    Glucose 107 (H) 70 - 99 mg/dL     I have seen and evaluated Ms. Darwin Guadarrama myself. I reviewed her chart, including imaging/labs and fetal monitoring.  I spent a total of 10 minutes face-to-face with Dai Guadarrama today. Over 50% of this time was spent counseling the patient and/or coordinating care regarding pyelonephritis, placenta previa. See note for details; I have made the necessary edits/additions.  Overall stable maternal-fetal status.  Continue in patient monitoring, monitor vital signs.    Date of service (when I saw the patient): September 20, 2020      Noreen Norton MD  , OB/GYN  Maternal-Fetal Medicine  [email or web address]  [phone] (Academic office)  [phone] (Pager)

## 2020-09-20 NOTE — DISCHARGE SUMMARY
[facility] Discharge Summary    Dai Guadarrama MRN# [id number]   Age: 35 year old YOB: 1985     Date of Admission:  9/19/2020  Date of Discharge::  9/21/2020   Admitting Physician:  Noreen Harris MD  Discharge Physician:  Sophia Moe MD          Admission Diagnoses:   - IUP at 35w5d  - Abdominal pain  - Hx of C/S x 1  - Hx of IUFD at 32 w  - GDMA1  - Anemia          Discharge Diagnosis:   - IUP at 35w6d   - Pyelonephritis  - Same as above          Procedures:   Procedure(s): IV antibitoics          Medications Prior to Admission:     Medications Prior to Admission   Medication Sig Dispense Refill Last Dose     cholecalciferol (VITAMIN D3) 5000 units (125 mcg) capsule Take 1 capsule (5,000 Units) by mouth daily Take one capsule daily. 90 capsule 3 9/19/2020 at Unknown time     cyanocobalamin (VITAMIN B-12) 1000 MCG tablet Take 1 tablet (1,000 mcg) by mouth daily 90 tablet 1 9/19/2020 at Unknown time     ferrous sulfate (FEROSUL) 325 (65 Fe) MG tablet Take 1 tablet (325 mg) by mouth daily (with breakfast) 90 tablet 1 9/19/2020 at Unknown time     omeprazole (PRILOSEC) 20 MG DR capsule Take 2 capsules (40 mg) by mouth daily 30 capsule 1 9/18/2020 at Unknown time     Prenatal Vit-Fe Fumarate-FA ([facility] PRENATAL VITAMINS) 28-0.8 MG TABS Take 1 tablet by mouth daily 100 tablet 2 9/18/2020 at Unknown time     vitamin C (ASCORBIC ACID) 250 MG tablet Take 1 tablet (250 mg) by mouth daily at same time as iron pill. 90 tablet 1 9/19/2020 at Unknown time     blood glucose (NO BRAND SPECIFIED) test strip Use to test blood sugar once  daily or as directed. (Patient not taking: Reported on 8/26/2020) 30 strip 0      blood glucose monitoring (ACCU-CHEK COMPACT CARE KIT) meter device kit Test once daily before breakfast FASTING 1 kit 0      blood glucose monitoring (ACCU-CHEK MULTICLIX) lancets Test once daily before breakfast FASTING 30 each 0      omeprazole (PRILOSEC) 20 MG DR capsule Take 1  capsule (20 mg) by mouth daily (Patient not taking: Reported on 8/26/2020) 30 capsule 3      ondansetron (ZOFRAN ODT) 4 MG ODT tab Take 1 tablet (4 mg) by mouth every 8 hours as needed for nausea (Patient not taking: Reported on 8/26/2020) 60 tablet 3      Prenatal Vit-Fe Fumarate-FA (PRENATAL VITAMIN) 27-0.8 MG TABS         promethazine (PHENERGAN) 25 MG suppository Place 1 suppository (25 mg) rectally every 6 hours as needed for nausea (Patient not taking: Reported on 8/26/2020) 60 suppository 3      pyridOXINE (VITAMIN B6) 25 MG tablet Take 1 tablet (25 mg) by mouth every 8 hours (Patient not taking: Reported on 8/26/2020) 90 tablet 4      senna-docusate (SENOKOT-S/PERICOLACE) 8.6-50 MG tablet Take 1 tablet by mouth daily (Patient not taking: Reported on 8/26/2020) 90 tablet 0              Discharge Medications:        Review of your medicines      UNREVIEWED medicines. Ask your doctor about these medicines      Dose / Directions   cholecalciferol 125 mcg (5000 units) capsule  Commonly known as:  VITAMIN D3  Used for:  Vitamin D deficiency      Dose:  5,000 Units  Take 1 capsule (5,000 Units) by mouth daily Take one capsule daily.  Quantity:  90 capsule  Refills:  3     cyanocobalamin 1000 MCG tablet  Commonly known as:  VITAMIN B-12  Used for:  HRP (high risk pregnancy), third trimester, Other iron deficiency anemia      Dose:  1,000 mcg  Take 1 tablet (1,000 mcg) by mouth daily  Quantity:  90 tablet  Refills:  1     ferrous sulfate 325 (65 Fe) MG tablet  Commonly known as:  FEROSUL  Used for:  HRP (high risk pregnancy), third trimester, Other iron deficiency anemia      Dose:  325 mg  Take 1 tablet (325 mg) by mouth daily (with breakfast)  Quantity:  90 tablet  Refills:  1     * omeprazole 20 MG DR capsule  Commonly known as:  priLOSEC  Used for:  Gastroesophageal reflux disease without esophagitis      Dose:  40 mg  Take 2 capsules (40 mg) by mouth daily  Quantity:  30 capsule  Refills:  1     * omeprazole 20  MG DR capsule  Commonly known as:  priLOSEC  Used for:  Gastroesophageal reflux disease with esophagitis      Dose:  20 mg  Take 1 capsule (20 mg) by mouth daily  Quantity:  30 capsule  Refills:  3     ondansetron 4 MG ODT tab  Commonly known as:  Zofran ODT  Used for:  Nausea and vomiting in pregnancy      Dose:  4 mg  Take 1 tablet (4 mg) by mouth every 8 hours as needed for nausea  Quantity:  60 tablet  Refills:  3     * Prenatal Vitamin 27-0.8 MG Tabs      Refills:  0     * [employer] Prenatal Vitamins 28-0.8 MG Tabs  Used for:  History of intrauterine fetal death, currently pregnant      Dose:  1 tablet  Take 1 tablet by mouth daily  Quantity:  100 tablet  Refills:  2     promethazine 25 MG suppository  Commonly known as:  PHENERGAN  Used for:  Nausea and vomiting in pregnancy      Dose:  25 mg  Place 1 suppository (25 mg) rectally every 6 hours as needed for nausea  Quantity:  60 suppository  Refills:  3     pyridOXINE 25 MG tablet  Commonly known as:  VITAMIN B6  Used for:  Nausea and vomiting in pregnancy      Dose:  25 mg  Take 1 tablet (25 mg) by mouth every 8 hours  Quantity:  90 tablet  Refills:  4     senna-docusate 8.6-50 MG tablet  Commonly known as:  SENOKOT-S/PERICOLACE  Used for:  Nausea and vomiting in pregnancy      Dose:  1 tablet  Take 1 tablet by mouth daily  Quantity:  90 tablet  Refills:  0     vitamin C 250 MG tablet  Commonly known as:  ASCORBIC ACID  Used for:  HRP (high risk pregnancy), third trimester, Other iron deficiency anemia      Dose:  250 mg  Take 1 tablet (250 mg) by mouth daily at same time as iron pill.  Quantity:  90 tablet  Refills:  1         * This list has 4 medication(s) that are the same as other medications prescribed for you. Read the directions carefully, and ask your doctor or other care provider to review them with you.            CONTINUE these medicines which have NOT CHANGED      Dose / Directions   blood glucose monitoring lancets  Used for:  Elevated glucose,  Abnormal maternal glucose tolerance, antepartum      Test once daily before breakfast FASTING  Quantity:  30 each  Refills:  0     blood glucose monitoring meter device kit  Used for:  Elevated glucose, Abnormal maternal glucose tolerance, antepartum      Test once daily before breakfast FASTING  Quantity:  1 kit  Refills:  0     blood glucose test strip  Commonly known as:  NO BRAND SPECIFIED  Used for:  Elevated glucose, Abnormal maternal glucose tolerance, antepartum      Use to test blood sugar once  daily or as directed.  Quantity:  30 strip  Refills:  0                    Consultations:   None          Brief Admission History     Ms. Guadarrama is a 34yo  at 35w4d by 6w1d US who presented for evaluation due to diffuse abdominal pain. Urinalysis was concerning for infection with large leukocyte esterase and positive nitrites. In addition, patient had a leukocytosis with WBC to 13.4, and notable tachycardia. Given these findings, there was a suspicion for pyelonephritis.           Hospital Course:     On admission, she was started on Ceftriaxone. Urine culture was sent for susceptibility testing. She received IV hydration as well to assist with ongoing tachycardia. By HD#3, the patient endorsed improvement in all of her symptoms. Susceptibilities returned and she was transitioned to oral Keflex for an additional 12 days. She was counseled on when to return for evaluation, including ongoing fever, dysuria, or CVA tenderness.    She wished to follow-up with her primary OBGYN this afternoon. Surgery scheduling was contacted regarding scheduling her planned repeat  section on 10/29/20 at 8:30 am with Dr. Louis. This was yet to be finalized on discharge.          Discharge Instructions and Follow-Up:   Discharge diet: Regular   Discharge activity: Regular activity   Discharge follow-up: Follow up with primary OBGYN per routine           Discharge Disposition:   Discharged to home      Loretta Mccall  MD  Maternal Fetal Medicine Fellow  9/21/2020 8:54 AM

## 2020-09-20 NOTE — PLAN OF CARE
VSS. Afebrile. Contractions improved. Still feeling some low abdominal and flank pain. Resting overnight. IV fluids running.

## 2020-09-20 NOTE — PROVIDER NOTIFICATION
Dr. Golden at bedside to evaluate contractions with previa for [facility] team. Plan to continue to monitor.

## 2020-09-20 NOTE — PLAN OF CARE
Dai reports feeling much better today. Back pain is resolved. Intermittent lower abdominal pain, improves after voiding, and resolved with tylenol x1. Eating and drinking without difficulty. Remains sightly tachycardic with pulse mostly in 110s. B/P WNL. Afebrile. SpO2 in upper 90s. Denies vaginal bleeding, LOF, SOB, difficulty breathing, and chest pain. Has requested to go home as she misses her child but is accepting to stay one more night for further observation. Plan for last dose of IV ABx tonight and switch to oral Abx tomorrow. Plan to continue to monitor for maternal/fetal compromise.

## 2020-09-21 ENCOUNTER — PREP FOR PROCEDURE (OUTPATIENT)
Dept: OBGYN | Facility: CLINIC | Age: 35
End: 2020-09-21

## 2020-09-21 ENCOUNTER — OFFICE VISIT (OUTPATIENT)
Dept: INTERNAL MEDICINE | Facility: CLINIC | Age: 35
End: 2020-09-21
Attending: INTERNAL MEDICINE
Payer: COMMERCIAL

## 2020-09-21 ENCOUNTER — OFFICE VISIT (OUTPATIENT)
Dept: OBGYN | Facility: CLINIC | Age: 35
End: 2020-09-21
Attending: OBSTETRICS & GYNECOLOGY
Payer: COMMERCIAL

## 2020-09-21 ENCOUNTER — VIRTUAL VISIT (OUTPATIENT)
Dept: EDUCATION SERVICES | Facility: CLINIC | Age: 35
End: 2020-09-21
Payer: COMMERCIAL

## 2020-09-21 VITALS
SYSTOLIC BLOOD PRESSURE: 99 MMHG | TEMPERATURE: 98 F | OXYGEN SATURATION: 99 % | BODY MASS INDEX: 45.46 KG/M2 | DIASTOLIC BLOOD PRESSURE: 61 MMHG | HEIGHT: 61 IN | RESPIRATION RATE: 18 BRPM

## 2020-09-21 VITALS
WEIGHT: 241 LBS | BODY MASS INDEX: 45.5 KG/M2 | HEIGHT: 61 IN | HEART RATE: 112 BPM | SYSTOLIC BLOOD PRESSURE: 95 MMHG | DIASTOLIC BLOOD PRESSURE: 63 MMHG

## 2020-09-21 VITALS
DIASTOLIC BLOOD PRESSURE: 63 MMHG | HEART RATE: 112 BPM | WEIGHT: 241 LBS | SYSTOLIC BLOOD PRESSURE: 95 MMHG | BODY MASS INDEX: 45.54 KG/M2

## 2020-09-21 DIAGNOSIS — O24.410 DIET CONTROLLED GESTATIONAL DIABETES MELLITUS (GDM) IN THIRD TRIMESTER: Primary | ICD-10-CM

## 2020-09-21 DIAGNOSIS — O44.03 PLACENTA PREVIA IN THIRD TRIMESTER: Primary | ICD-10-CM

## 2020-09-21 DIAGNOSIS — Z11.59 ENCOUNTER FOR SCREENING FOR OTHER VIRAL DISEASES: Primary | ICD-10-CM

## 2020-09-21 DIAGNOSIS — O24.419 GESTATIONAL DIABETES MELLITUS (GDM), ANTEPARTUM, GESTATIONAL DIABETES METHOD OF CONTROL UNSPECIFIED: Primary | ICD-10-CM

## 2020-09-21 DIAGNOSIS — O09.93 HRP (HIGH RISK PREGNANCY), THIRD TRIMESTER: Primary | ICD-10-CM

## 2020-09-21 PROBLEM — O44.01 COMPLETE PLACENTA PREVIA NOS OR WITHOUT HEMORRHAGE, FIRST TRIMESTER: Status: RESOLVED | Noted: 2020-09-21 | Resolved: 2020-09-21

## 2020-09-21 PROBLEM — O44.01 COMPLETE PLACENTA PREVIA NOS OR WITHOUT HEMORRHAGE, FIRST TRIMESTER: Status: ACTIVE | Noted: 2020-09-21

## 2020-09-21 LAB
ABO + RH BLD: NORMAL
ABO + RH BLD: NORMAL
BLD GP AB SCN SERPL QL: NORMAL
BLD PROD TYP BPU: NORMAL
BLOOD BANK CMNT PATIENT-IMP: NORMAL
GLUCOSE BLDC GLUCOMTR-MCNC: 100 MG/DL (ref 70–99)
NUM BPU REQUESTED: 2
SPECIMEN EXP DATE BLD: NORMAL

## 2020-09-21 PROCEDURE — 25000132 ZZH RX MED GY IP 250 OP 250 PS 637: Performed by: STUDENT IN AN ORGANIZED HEALTH CARE EDUCATION/TRAINING PROGRAM

## 2020-09-21 PROCEDURE — G0463 HOSPITAL OUTPT CLINIC VISIT: HCPCS | Mod: ZF

## 2020-09-21 PROCEDURE — 25000128 H RX IP 250 OP 636: Performed by: OBSTETRICS & GYNECOLOGY

## 2020-09-21 PROCEDURE — 90686 IIV4 VACC NO PRSV 0.5 ML IM: CPT | Performed by: OBSTETRICS & GYNECOLOGY

## 2020-09-21 PROCEDURE — 00000146 ZZHCL STATISTIC GLUCOSE BY METER IP

## 2020-09-21 RX ORDER — CITRIC ACID/SODIUM CITRATE 334-500MG
30 SOLUTION, ORAL ORAL
Status: CANCELLED | OUTPATIENT
Start: 2020-09-21

## 2020-09-21 RX ORDER — LIDOCAINE 40 MG/G
CREAM TOPICAL
Status: CANCELLED | OUTPATIENT
Start: 2020-09-21

## 2020-09-21 RX ORDER — SODIUM CHLORIDE, SODIUM LACTATE, POTASSIUM CHLORIDE, CALCIUM CHLORIDE 600; 310; 30; 20 MG/100ML; MG/100ML; MG/100ML; MG/100ML
INJECTION, SOLUTION INTRAVENOUS CONTINUOUS
Status: CANCELLED | OUTPATIENT
Start: 2020-09-21

## 2020-09-21 RX ORDER — CEFAZOLIN SODIUM 2 G/100ML
2 INJECTION, SOLUTION INTRAVENOUS
Status: CANCELLED | OUTPATIENT
Start: 2020-09-21

## 2020-09-21 RX ORDER — CEPHALEXIN 500 MG/1
500 CAPSULE ORAL EVERY 6 HOURS
Qty: 47 CAPSULE | Refills: 0 | Status: SHIPPED | OUTPATIENT
Start: 2020-09-21 | End: 2020-10-03

## 2020-09-21 RX ORDER — BLOOD SUGAR DIAGNOSTIC
STRIP MISCELLANEOUS
COMMUNITY
Start: 2020-04-03 | End: 2020-11-17

## 2020-09-21 RX ORDER — CEFAZOLIN SODIUM 1 G/3ML
1 INJECTION, POWDER, FOR SOLUTION INTRAMUSCULAR; INTRAVENOUS SEE ADMIN INSTRUCTIONS
Status: CANCELLED | OUTPATIENT
Start: 2020-09-21

## 2020-09-21 RX ADMIN — INFLUENZA A VIRUS A/GUANGDONG-MAONAN/SWL1536/2019 CNIC-1909 (H1N1) ANTIGEN (FORMALDEHYDE INACTIVATED), INFLUENZA A VIRUS A/HONG KONG/2671/2019 (H3N2) ANTIGEN (FORMALDEHYDE INACTIVATED), INFLUENZA B VIRUS B/PHUKET/3073/2013 ANTIGEN (FORMALDEHYDE INACTIVATED), AND INFLUENZA B VIRUS B/WASHINGTON/02/2019 ANTIGEN (FORMALDEHYDE INACTIVATED) 0.5 ML: 15; 15; 15; 15 INJECTION, SUSPENSION INTRAMUSCULAR at 11:30

## 2020-09-21 RX ADMIN — CEPHALEXIN 500 MG: 250 CAPSULE ORAL at 10:02

## 2020-09-21 ASSESSMENT — MIFFLIN-ST. JEOR: SCORE: 1715.55

## 2020-09-21 ASSESSMENT — PAIN SCALES - GENERAL: PAINLEVEL: NO PAIN (0)

## 2020-09-21 NOTE — PROGRESS NOTES
Patient did not answer call for scheduled follow up. Of note, she is scheduled with Dr Leyva this afternoon for GDm f/u as well. Left rescheduling line for her on voice mail.    Kalie Perla RD LD CDE

## 2020-09-21 NOTE — LETTER
"2020       RE: Dai Guadarrama  [address]  [facility] [address]     Dear Colleague,    Thank you for referring your patient, Dai Guadarrama, to the [facility] at [facility]. Please see a copy of my visit note below.    Pt feeling well. D/c'ed from hospital this AM with pyelonephritis.  On abx orally now for 12 more days. No n/v. No fever. No pain.  No ctx, lof or bleeding. +FM.  Had appt with Dr. Leyva today for DM f/u.  BS have been 'so so'.  No insulin. Has scheduled Rc/s on .     BP 95/63   Pulse 112   Ht 1.549 m (5' 1\")   Wt 109.3 kg (241 lb)   LMP 2019   BMI 45.54 kg/m    See flow  NST this AM, reactive and reassuring.     A/P:  34 yo   At 35+6 wks, FREDY.      1. PNC: utd.  GBS done neg in hospital.     2. DM. BS ~ 20% values mildly elevated. No meds. Continue BPPs as planned.     3. Marginal previa. Scheduled at 37 wks on  at 1030 AM for Rc/s and bilateral tubal ligation. Federal papers signed  and in media tab.     4. Pyelonepritis. Cont abx x 12 more days.  cx sensitive to cefalosporins    5. ERAS protocol discussed, soap given.  Pt advised sugar free drink up to 2 hrs prior to surgery on     Mariana Marsh MD, FACOG  [facility]  OB/GYN    2020  8:31 PM  "

## 2020-09-21 NOTE — PROGRESS NOTES
Brief MFM progress Note:  Pharmacy called to discuss ongoing antibiotic therapy. Reviewed that Cefazolin was at ANGELI breakpoint, but discussed that with Keflex 500 mg Q6H dosing that this should be appropriate coverage.    Loretta Mccall MD  Maternal Fetal Medicine Fellow  9/21/2020 9:50 AM

## 2020-09-21 NOTE — PLAN OF CARE
Pt stable for discharge per Dr. Moe/Dr. Mccall.  Pt agrees and is ready for discharge.  Discharge instructions reviewed using Ayleen HIGGINBOTHAM and  via ipad. Pt received discharge Rx and flu vaccine prior to discharge. Pt will return for [facility] prenatal appointment this afternoon to discuss up coming c/s.  Pt discharged via wheelchair and her  picked her up.  Discontinue 1230.

## 2020-09-21 NOTE — NURSING NOTE
Chief Complaint   Patient presents with     RECHECK     OB 35 weeks and 6 days GDM   Alis Arora , GARYN

## 2020-09-21 NOTE — PLAN OF CARE
Pt stable throughout this shift. Afebrile, BP WNL, -115 and O2 sats 94-96%. Pt denies contractions, leaking of fluid, or bleeding. Denies pain or discomfort. Active fetal movement present. FHR Category 1 (see flow sheets). IV fluids running. I/O documented. Blood glucose 100-126. Pt offers no needs at this time. Will continue to monitor.

## 2020-09-21 NOTE — PROGRESS NOTES
"[facility] Antepartum Progress Note  Patient was seen with an iPad      Subjective:   She was able to rest overnight. Her pain has overall resolved. She denies any contractions, leakage of fluid, or vaginal bleeding. She confirms active fetal movement.    Objective:  /56   Temp 98.1  F (36.7  C) (Oral)   Resp 18   Ht 1.549 m (5' 1\")   LMP 2019   SpO2 95%   BMI 45.46 kg/m       Gen: Resting comfortably in bed, NAD  CV: Tachycardia  Resp: Normal respiratory effort  Abd: Gravid, non-distended  Back: No CVA tenderness    FHT: BL 140s, moderate variability, accels present, no decels  [place]: 0 contraction in 10 minutes, occasional irritability     Intake/Output Summary (Last 24 hours) at 2020 0755  Last data filed at 2020 0600  Gross per 24 hour   Intake 4708 ml   Output 2575 ml   Net 2133 ml     WBC   Date Value Ref Range Status   2020 9.1 4.0 - 11.0 10e9/L Final     Recent Labs   Lab 20  0611 20  2303 20  1848 20  1339 20  0708 20  2325   * 120* 126* 107* 88 108*     Assessment/Plan:   Dai Guadarrama is a 35 year old  female at 35w6d by 6w1d U/S, admitted for pyelonephritis. Pregnancy is otherwise complicated by: posterior placenta previa, history of  delivery, class III obesity, GDMA1, anemia and hx IUFD at 32w.    Pyelonephritis  - D#3 Ceftriaxone 1g Q24hrs. Urine culture with susceptibility to Keflex. Plan to transition to orals today and plan for 14 day course total.  - Discontinue IVF fluids today and encourage oral hydration  - Maintenance IVF given ongoing tachycardia  - Vitals/O2 monitoring per routine    GDMA1  - Fasting, 2 hour postprandial glucose checks  - MSSI as needed    History of CD/Placenta Previa  - If patient starts to have vaginal bleeding, or worsening pain/contractions will repeat speculum exam. Subjective cramping likely due to infection but will continue to monitor  - Delivery via "  section if delivery is indicated, goal for 37 weeks if clinically stable and infection resolves  - Increased risk for morbidly adherent placenta, ultrasounds have been reassuring    FWB:    - Cat I FHT  - TID EFM/[place] monitoring   - Cephalic by BSUS     PNC:   - Rh+/RI  - GBS pending     Dispo: Home today    Seen and staffed with Dr. Payal Mccall MD  Maternal Fetal Medicine Fellow  2020 7:59 AM

## 2020-09-21 NOTE — NURSING NOTE
Chief Complaint   Patient presents with     Prenatal Care     35w6d       See TESSA Sheriff 9/21/2020

## 2020-09-21 NOTE — PROVIDER NOTIFICATION
09/20/20 7180   Provider Notification   Provider Name/Title Dr Sneed   Method of Notification In Department   Notification Reason Maternal Vital Sign Change   SpO2 93% to 96$ RA on routine vital sign check. Pt denies chest pain or difficulty breathing, but does report feeling a lump in her throat when her heart beats faster. Tachycardia is slightly improved from this morning. Plan to continue to monitor SpO2. RN to listen to lungs. Will update provider if sats drop to 92% or below or any other respiratory symptoms begin to develop.

## 2020-09-21 NOTE — LETTER
2020       RE: Dai Guadarrama  [address]  [facility] [address]     Dear Colleague,    Thank you for referring your patient, Dai Guadarrama, to the [facility]  at [facility]. Please see a copy of my visit note below.      [facility] - Internal Medicine    HPI:  Dai Guadarrama is a 35 year old  at 35w6d for follow-up of gestational diabetes for glycemic log review.    Patient reports that she was recently in the hospital for kidney infection. She is feeling better. She denies flank pain, fever, or chills.     Current therapy:   Nutritional Therapy    She is checking her blood sugars regularly, including Fasting and 2-hours post-prandial.    Blood sugars are as follows: (complete table or insert clinical media)    Date Fasting Post-Breakfast Post-Lunch Post-Dinner     100      104 100 109 116    91 95 119     104 115 157 151   9/15    177     Diabetes Symptoms:   none    10 point ROS of systems including Constitutional, Eyes, Respiratory, Cardiovascular, Gastroenterology, Genitourinary, Integumentary, Muscularskeletal, Psychiatric were all negative except for pertinent positives noted in my HPI.    Physical Exam:   BP 95/63   Pulse 112   Wt 109.3 kg (241 lb)   LMP 2019   Breastfeeding No   BMI 45.54 kg/m    Gen: Pleasant female, in NAD  Resp: lungs CAB  CV: Heart RRR, no MRG  Abd: Gravid abdomen, non-tender, nl bowel sounds  Ext: WWP, no LE edema  Neuro: AOx3, no focal deficits      Assessment:     Dai Guadarrama is a 35 year old  at 35w6d for follow-up of gestational diabetes for glycemic log review.  After reviewing her blood sugars, less than 50% are at target. Based on this, I recommend Continued dietary and life-style modifications, follow up in 2-3 days for review of glycemic log due to insufficient information for initiation  of insulin.     Plan:   Continue dietary modifications, regular exercise as able  Continue to check blood glucose 4x daily  Bring glucose log to all appointments  Needs 2-hr GTT at 6 wks post-partum  Follow-up with Dr. Ernandez in 1 week(s)    Lisa Leyva MD

## 2020-09-21 NOTE — PROGRESS NOTES
[facility]    HPI:  Dai Guadarrama is a 35 year old  at 35w6d for follow-up of gestational diabetes for glycemic log review.    Patient reports that she was recently in the hospital for kidney infection. She is feeling better. She denies flank pain, fever, or chills.     Current therapy:   Nutritional Therapy    She is checking her blood sugars regularly, including Fasting and 2-hours post-prandial.    Blood sugars are as follows: (complete table or insert clinical media)    Date Fasting Post-Breakfast Post-Lunch Post-Dinner     100      104 100 109 116    91 95 119     104 115 157 151   9/15    177     Diabetes Symptoms:   none    10 point ROS of systems including Constitutional, Eyes, Respiratory, Cardiovascular, Gastroenterology, Genitourinary, Integumentary, Muscularskeletal, Psychiatric were all negative except for pertinent positives noted in my HPI.    Physical Exam:   BP 95/63   Pulse 112   Wt 109.3 kg (241 lb)   LMP 2019   Breastfeeding No   BMI 45.54 kg/m    Gen: Pleasant female, in NAD  Resp: lungs CAB  CV: Heart RRR, no MRG  Abd: Gravid abdomen, non-tender, nl bowel sounds  Ext: WWP, no LE edema  Neuro: AOx3, no focal deficits      Assessment:     Dai Guadarrama is a 35 year old  at 35w6d for follow-up of gestational diabetes for glycemic log review.  After reviewing her blood sugars, less than 50% are at target. Based on this, I recommend Continued dietary and life-style modifications, follow up in 2-3 days for review of glycemic log due to insufficient information for initiation of insulin.     Plan:   Continue dietary modifications, regular exercise as able  Continue to check blood glucose 4x daily  Bring glucose log to all appointments  Needs 2-hr GTT at 6 wks post-partum  Follow-up with Dr. Ernandez in 1 week(s)    Lisa Leyva MD

## 2020-09-21 NOTE — DISCHARGE INSTRUCTIONS
Discharge Instruction for Undelivered Patients      You were seen for: Kidney infection  We Consulted: Dr. Moe/Dr. Mccall  You had (Test or Medicine): antibiotics, fetal monitoring      Diet:   To manager your diabetes, follow the guidelines for eating and drinking given to you by your Clinic Provider or Diabetes Educator.       Activity:  Count fetal kicks everyday (see handout)  Call your doctor or nurse midwife if your baby is moving less than usual.     Call your provider if you notice:  Swelling in your face or increased swelling in your hands or legs.  Headaches that are not relieved by Tylenol (acetaminophen).  Changes in your vision (blurring: seeing spots or stars.)  Nausea (sick to your stomach) and vomiting (throwing up).   Weight gain of 5 pounds or more per week.  Heartburn that doesn't go away.  Signs of bladder infection: pain when you urinate (use the toilet), need to go more often and more urgently.  The bag of parada (rupture of membranes) breaks, or you notice leaking in your underwear.  Bright red blood in your underwear.  Abdominal (lower belly) or stomach pain.  For first baby: Contractions (tightening) less than 5 minutes apart for one hour or more.  Second (plus) baby: Contractions (tightening) less than 10 minutes apart and getting stronger.  *If less than 34 weeks: Contractions (tightenings) more than 6 times in one hour.  Increase or change in vaginal discharge (note the color and amount)      Follow-up:  As scheduled in the clinic

## 2020-09-22 ENCOUNTER — OFFICE VISIT (OUTPATIENT)
Dept: MATERNAL FETAL MEDICINE | Facility: CLINIC | Age: 35
End: 2020-09-22
Attending: OBSTETRICS & GYNECOLOGY
Payer: COMMERCIAL

## 2020-09-22 ENCOUNTER — HOSPITAL ENCOUNTER (OUTPATIENT)
Dept: ULTRASOUND IMAGING | Facility: CLINIC | Age: 35
End: 2020-09-22
Attending: OBSTETRICS & GYNECOLOGY
Payer: COMMERCIAL

## 2020-09-22 DIAGNOSIS — O24.419 GESTATIONAL DIABETES MELLITUS (GDM) IN THIRD TRIMESTER, GESTATIONAL DIABETES METHOD OF CONTROL UNSPECIFIED: Primary | ICD-10-CM

## 2020-09-22 DIAGNOSIS — O24.419 GESTATIONAL DIABETES MELLITUS (GDM) IN THIRD TRIMESTER, GESTATIONAL DIABETES METHOD OF CONTROL UNSPECIFIED: ICD-10-CM

## 2020-09-22 PROCEDURE — 76819 FETAL BIOPHYS PROFIL W/O NST: CPT

## 2020-09-22 NOTE — PROGRESS NOTES
"Pt feeling well. D/c'ed from hospital this AM with pyelonephritis.  On abx orally now for 12 more days. No n/v. No fever. No pain.  No ctx, lof or bleeding. +FM.  Had appt with Dr. Leyva today for DM f/u.  BS have been 'so so'.  No insulin. Has scheduled Rc/s on .     BP 95/63   Pulse 112   Ht 1.549 m (5' 1\")   Wt 109.3 kg (241 lb)   LMP 2019   BMI 45.54 kg/m    See flow  NST this AM, reactive and reassuring.     A/P:  34 yo   At 35+6 wks, FREDY.      1. PNC: utd.  GBS done neg in hospital.     2. DM. BS ~ 20% values mildly elevated. No meds. Continue BPPs as planned.     3. Marginal previa. Scheduled at 37 wks on  at 1030 AM for Rc/s and bilateral tubal ligation. Federal papers signed  and in media tab.     4. Pyelonepritis. Cont abx x 12 more days.  cx sensitive to cefalosporins    5. ERAS protocol discussed, soap given.  Pt advised sugar free drink up to 2 hrs prior to surgery on     Mariana Marsh MD, FACOG  [facility] Staff  OB/GYN    2020  8:31 PM      "

## 2020-09-23 LAB
BLD PROD TYP BPU: NORMAL
BLD PROD TYP BPU: NORMAL
BLD UNIT ID BPU: 0
BLD UNIT ID BPU: 0
BLOOD PRODUCT CODE: NORMAL
BLOOD PRODUCT CODE: NORMAL
BPU ID: NORMAL
BPU ID: NORMAL
TRANSFUSION STATUS PATIENT QL: NORMAL

## 2020-09-25 ENCOUNTER — OFFICE VISIT (OUTPATIENT)
Dept: MATERNAL FETAL MEDICINE | Facility: CLINIC | Age: 35
End: 2020-09-25
Attending: OBSTETRICS & GYNECOLOGY
Payer: COMMERCIAL

## 2020-09-25 ENCOUNTER — HOSPITAL ENCOUNTER (OUTPATIENT)
Dept: ULTRASOUND IMAGING | Facility: CLINIC | Age: 35
End: 2020-09-25
Attending: OBSTETRICS & GYNECOLOGY
Payer: COMMERCIAL

## 2020-09-25 DIAGNOSIS — O44.00 PLACENTA PREVIA ANTEPARTUM: ICD-10-CM

## 2020-09-25 DIAGNOSIS — O24.419 GESTATIONAL DIABETES MELLITUS (GDM) IN THIRD TRIMESTER, GESTATIONAL DIABETES METHOD OF CONTROL UNSPECIFIED: Primary | ICD-10-CM

## 2020-09-25 DIAGNOSIS — O44.03 PLACENTA PREVIA IN THIRD TRIMESTER: ICD-10-CM

## 2020-09-25 DIAGNOSIS — O24.419 GESTATIONAL DIABETES MELLITUS (GDM) IN THIRD TRIMESTER, GESTATIONAL DIABETES METHOD OF CONTROL UNSPECIFIED: ICD-10-CM

## 2020-09-25 DIAGNOSIS — Z11.59 ENCOUNTER FOR SCREENING FOR OTHER VIRAL DISEASES: ICD-10-CM

## 2020-09-25 LAB
ABO + RH BLD: NORMAL
ABO + RH BLD: NORMAL
BLD GP AB SCN SERPL QL: NORMAL
BLOOD BANK CMNT PATIENT-IMP: NORMAL
ERYTHROCYTE [DISTWIDTH] IN BLOOD BY AUTOMATED COUNT: 17 % (ref 10–15)
HCT VFR BLD AUTO: 35.7 % (ref 35–47)
HGB BLD-MCNC: 11.4 G/DL (ref 11.7–15.7)
MCH RBC QN AUTO: 27.9 PG (ref 26.5–33)
MCHC RBC AUTO-ENTMCNC: 31.9 G/DL (ref 31.5–36.5)
MCV RBC AUTO: 87 FL (ref 78–100)
PLATELET # BLD AUTO: 367 10E9/L (ref 150–450)
RBC # BLD AUTO: 4.09 10E12/L (ref 3.8–5.2)
SPECIMEN EXP DATE BLD: NORMAL
WBC # BLD AUTO: 7.5 10E9/L (ref 4–11)

## 2020-09-25 PROCEDURE — 76819 FETAL BIOPHYS PROFIL W/O NST: CPT

## 2020-09-26 LAB
SARS-COV-2 RNA SPEC QL NAA+PROBE: NOT DETECTED
SPECIMEN SOURCE: NORMAL

## 2020-09-29 ENCOUNTER — HOSPITAL ENCOUNTER (INPATIENT)
Facility: CLINIC | Age: 35
LOS: 2 days | Discharge: HOME | End: 2020-10-01
Attending: OBSTETRICS & GYNECOLOGY | Admitting: OBSTETRICS & GYNECOLOGY
Payer: COMMERCIAL

## 2020-09-29 ENCOUNTER — ANESTHESIA EVENT (OUTPATIENT)
Dept: OBGYN | Facility: CLINIC | Age: 35
End: 2020-09-29
Payer: COMMERCIAL

## 2020-09-29 ENCOUNTER — ANESTHESIA (OUTPATIENT)
Dept: OBGYN | Facility: CLINIC | Age: 35
End: 2020-09-29
Payer: COMMERCIAL

## 2020-09-29 DIAGNOSIS — G89.18 ACUTE POST-OPERATIVE PAIN: Primary | ICD-10-CM

## 2020-09-29 DIAGNOSIS — O44.03 PLACENTA PREVIA IN THIRD TRIMESTER: ICD-10-CM

## 2020-09-29 PROBLEM — Z98.891 S/P CESAREAN SECTION: Status: ACTIVE | Noted: 2018-11-24

## 2020-09-29 LAB
ABO + RH BLD: NORMAL
ABO + RH BLD: NORMAL
BLD GP AB SCN SERPL QL: NORMAL
BLD PROD TYP BPU: NORMAL
BLOOD BANK CMNT PATIENT-IMP: NORMAL
GLUCOSE BLDC GLUCOMTR-MCNC: 79 MG/DL (ref 70–99)
GLUCOSE BLDC GLUCOMTR-MCNC: 82 MG/DL (ref 70–99)
HGB BLD-MCNC: 11.4 G/DL (ref 11.7–15.7)
NUM BPU REQUESTED: 2
SPECIMEN EXP DATE BLD: NORMAL
T PALLIDUM AB SER QL: NONREACTIVE

## 2020-09-29 PROCEDURE — 40000170 ZZH STATISTIC PRE-PROCEDURE ASSESSMENT II: Performed by: OBSTETRICS & GYNECOLOGY

## 2020-09-29 PROCEDURE — 00000146 ZZHCL STATISTIC GLUCOSE BY METER IP

## 2020-09-29 PROCEDURE — 86900 BLOOD TYPING SEROLOGIC ABO: CPT | Performed by: OBSTETRICS & GYNECOLOGY

## 2020-09-29 PROCEDURE — 86850 RBC ANTIBODY SCREEN: CPT | Performed by: OBSTETRICS & GYNECOLOGY

## 2020-09-29 PROCEDURE — 88302 TISSUE EXAM BY PATHOLOGIST: CPT | Performed by: STUDENT IN AN ORGANIZED HEALTH CARE EDUCATION/TRAINING PROGRAM

## 2020-09-29 PROCEDURE — 71000015 ZZH RECOVERY PHASE 1 LEVEL 2 EA ADDTL HR: Performed by: OBSTETRICS & GYNECOLOGY

## 2020-09-29 PROCEDURE — 27110028 ZZH OR GENERAL SUPPLY NON-STERILE: Performed by: OBSTETRICS & GYNECOLOGY

## 2020-09-29 PROCEDURE — 37000009 ZZH ANESTHESIA TECHNICAL FEE, EACH ADDTL 15 MIN: Performed by: OBSTETRICS & GYNECOLOGY

## 2020-09-29 PROCEDURE — 0UT70ZZ RESECTION OF BILATERAL FALLOPIAN TUBES, OPEN APPROACH: ICD-10-PCS | Performed by: OBSTETRICS & GYNECOLOGY

## 2020-09-29 PROCEDURE — 37000008 ZZH ANESTHESIA TECHNICAL FEE, 1ST 30 MIN: Performed by: OBSTETRICS & GYNECOLOGY

## 2020-09-29 PROCEDURE — 36000057 ZZH SURGERY LEVEL 3 1ST 30 MIN - UMMC: Performed by: OBSTETRICS & GYNECOLOGY

## 2020-09-29 PROCEDURE — 25800030 ZZH RX IP 258 OP 636: Performed by: OBSTETRICS & GYNECOLOGY

## 2020-09-29 PROCEDURE — C9290 INJ, BUPIVACAINE LIPOSOME: HCPCS | Performed by: OBSTETRICS & GYNECOLOGY

## 2020-09-29 PROCEDURE — 25800030 ZZH RX IP 258 OP 636: Performed by: STUDENT IN AN ORGANIZED HEALTH CARE EDUCATION/TRAINING PROGRAM

## 2020-09-29 PROCEDURE — 25000125 ZZHC RX 250: Performed by: STUDENT IN AN ORGANIZED HEALTH CARE EDUCATION/TRAINING PROGRAM

## 2020-09-29 PROCEDURE — 25000132 ZZH RX MED GY IP 250 OP 250 PS 637: Performed by: STUDENT IN AN ORGANIZED HEALTH CARE EDUCATION/TRAINING PROGRAM

## 2020-09-29 PROCEDURE — 86901 BLOOD TYPING SEROLOGIC RH(D): CPT | Performed by: OBSTETRICS & GYNECOLOGY

## 2020-09-29 PROCEDURE — 85018 HEMOGLOBIN: CPT | Performed by: OBSTETRICS & GYNECOLOGY

## 2020-09-29 PROCEDURE — 71000014 ZZH RECOVERY PHASE 1 LEVEL 2 FIRST HR: Performed by: OBSTETRICS & GYNECOLOGY

## 2020-09-29 PROCEDURE — 25000128 H RX IP 250 OP 636: Performed by: STUDENT IN AN ORGANIZED HEALTH CARE EDUCATION/TRAINING PROGRAM

## 2020-09-29 PROCEDURE — 36000059 ZZH SURGERY LEVEL 3 EA 15 ADDTL MIN UMMC: Performed by: OBSTETRICS & GYNECOLOGY

## 2020-09-29 PROCEDURE — 86923 COMPATIBILITY TEST ELECTRIC: CPT | Performed by: OBSTETRICS & GYNECOLOGY

## 2020-09-29 PROCEDURE — 27210794 ZZH OR GENERAL SUPPLY STERILE: Performed by: OBSTETRICS & GYNECOLOGY

## 2020-09-29 PROCEDURE — 86780 TREPONEMA PALLIDUM: CPT | Performed by: OBSTETRICS & GYNECOLOGY

## 2020-09-29 PROCEDURE — 12000001 ZZH R&B MED SURG/OB UMMC

## 2020-09-29 PROCEDURE — 25000128 H RX IP 250 OP 636: Performed by: OBSTETRICS & GYNECOLOGY

## 2020-09-29 RX ORDER — FENTANYL CITRATE 50 UG/ML
10 INJECTION, SOLUTION INTRAMUSCULAR; INTRAVENOUS ONCE
Status: DISCONTINUED | OUTPATIENT
Start: 2020-09-29 | End: 2020-09-29

## 2020-09-29 RX ORDER — ONDANSETRON 2 MG/ML
4 INJECTION INTRAMUSCULAR; INTRAVENOUS EVERY 30 MIN PRN
Status: DISCONTINUED | OUTPATIENT
Start: 2020-09-29 | End: 2020-09-29 | Stop reason: HOSPADM

## 2020-09-29 RX ORDER — NALOXONE HYDROCHLORIDE 0.4 MG/ML
.1-.4 INJECTION, SOLUTION INTRAMUSCULAR; INTRAVENOUS; SUBCUTANEOUS
Status: DISCONTINUED | OUTPATIENT
Start: 2020-09-29 | End: 2020-09-29

## 2020-09-29 RX ORDER — CEFAZOLIN SODIUM 1 G/3ML
1 INJECTION, POWDER, FOR SOLUTION INTRAMUSCULAR; INTRAVENOUS SEE ADMIN INSTRUCTIONS
Status: DISCONTINUED | OUTPATIENT
Start: 2020-09-29 | End: 2020-09-29

## 2020-09-29 RX ORDER — CEFAZOLIN SODIUM 2 G/100ML
2 INJECTION, SOLUTION INTRAVENOUS
Status: COMPLETED | OUTPATIENT
Start: 2020-09-29 | End: 2020-09-29

## 2020-09-29 RX ORDER — AMOXICILLIN 250 MG
1 CAPSULE ORAL 2 TIMES DAILY
Status: DISCONTINUED | OUTPATIENT
Start: 2020-09-29 | End: 2020-10-01 | Stop reason: HOSPADM

## 2020-09-29 RX ORDER — ONDANSETRON 2 MG/ML
INJECTION INTRAMUSCULAR; INTRAVENOUS PRN
Status: DISCONTINUED | OUTPATIENT
Start: 2020-09-29 | End: 2020-09-30

## 2020-09-29 RX ORDER — ONDANSETRON 2 MG/ML
4 INJECTION INTRAMUSCULAR; INTRAVENOUS EVERY 6 HOURS PRN
Status: DISCONTINUED | OUTPATIENT
Start: 2020-09-29 | End: 2020-10-01 | Stop reason: HOSPADM

## 2020-09-29 RX ORDER — MISOPROSTOL 200 UG/1
800 TABLET ORAL
Status: COMPLETED | OUTPATIENT
Start: 2020-09-29 | End: 2020-09-29

## 2020-09-29 RX ORDER — MORPHINE SULFATE 1 MG/ML
150 INJECTION, SOLUTION EPIDURAL; INTRATHECAL; INTRAVENOUS ONCE
Status: DISCONTINUED | OUTPATIENT
Start: 2020-09-29 | End: 2020-09-29

## 2020-09-29 RX ORDER — HYDROCORTISONE 2.5 %
CREAM (GRAM) TOPICAL 3 TIMES DAILY PRN
Status: DISCONTINUED | OUTPATIENT
Start: 2020-09-29 | End: 2020-10-01 | Stop reason: HOSPADM

## 2020-09-29 RX ORDER — MORPHINE SULFATE 1 MG/ML
INJECTION, SOLUTION EPIDURAL; INTRATHECAL; INTRAVENOUS PRN
Status: DISCONTINUED | OUTPATIENT
Start: 2020-09-29 | End: 2020-09-30

## 2020-09-29 RX ORDER — CARBOPROST TROMETHAMINE 250 UG/ML
250 INJECTION, SOLUTION INTRAMUSCULAR
Status: DISCONTINUED | OUTPATIENT
Start: 2020-09-29 | End: 2020-10-01 | Stop reason: HOSPADM

## 2020-09-29 RX ORDER — METHYLERGONOVINE MALEATE 0.2 MG/ML
INJECTION INTRAVENOUS PRN
Status: DISCONTINUED | OUTPATIENT
Start: 2020-09-29 | End: 2020-09-30

## 2020-09-29 RX ORDER — LIDOCAINE 40 MG/G
CREAM TOPICAL
Status: DISCONTINUED | OUTPATIENT
Start: 2020-09-29 | End: 2020-09-29

## 2020-09-29 RX ORDER — LIDOCAINE 40 MG/G
CREAM TOPICAL
Status: CANCELLED | OUTPATIENT
Start: 2020-09-29

## 2020-09-29 RX ORDER — OXYTOCIN/0.9 % SODIUM CHLORIDE 30/500 ML
100 PLASTIC BAG, INJECTION (ML) INTRAVENOUS CONTINUOUS
Status: DISCONTINUED | OUTPATIENT
Start: 2020-09-29 | End: 2020-10-01 | Stop reason: HOSPADM

## 2020-09-29 RX ORDER — BUPIVACAINE HYDROCHLORIDE 2.5 MG/ML
INJECTION, SOLUTION EPIDURAL; INFILTRATION; INTRACAUDAL PRN
Status: DISCONTINUED | OUTPATIENT
Start: 2020-09-29 | End: 2020-09-30

## 2020-09-29 RX ORDER — NALOXONE HYDROCHLORIDE 0.4 MG/ML
.1-.4 INJECTION, SOLUTION INTRAMUSCULAR; INTRAVENOUS; SUBCUTANEOUS
Status: DISCONTINUED | OUTPATIENT
Start: 2020-09-29 | End: 2020-10-01 | Stop reason: HOSPADM

## 2020-09-29 RX ORDER — LIDOCAINE 40 MG/G
CREAM TOPICAL
Status: DISCONTINUED | OUTPATIENT
Start: 2020-09-29 | End: 2020-10-01 | Stop reason: HOSPADM

## 2020-09-29 RX ORDER — DEXTROSE, SODIUM CHLORIDE, SODIUM LACTATE, POTASSIUM CHLORIDE, AND CALCIUM CHLORIDE 5; .6; .31; .03; .02 G/100ML; G/100ML; G/100ML; G/100ML; G/100ML
INJECTION, SOLUTION INTRAVENOUS CONTINUOUS
Status: DISCONTINUED | OUTPATIENT
Start: 2020-09-29 | End: 2020-10-01 | Stop reason: HOSPADM

## 2020-09-29 RX ORDER — KETOROLAC TROMETHAMINE 30 MG/ML
30 INJECTION, SOLUTION INTRAMUSCULAR; INTRAVENOUS EVERY 6 HOURS
Status: DISPENSED | OUTPATIENT
Start: 2020-09-29 | End: 2020-09-30

## 2020-09-29 RX ORDER — ACETAMINOPHEN 325 MG/1
975 TABLET ORAL EVERY 6 HOURS
Status: DISCONTINUED | OUTPATIENT
Start: 2020-09-29 | End: 2020-10-01 | Stop reason: HOSPADM

## 2020-09-29 RX ORDER — NALBUPHINE HYDROCHLORIDE 20 MG/ML
2.5-5 INJECTION, SOLUTION INTRAMUSCULAR; INTRAVENOUS; SUBCUTANEOUS EVERY 6 HOURS PRN
Status: DISCONTINUED | OUTPATIENT
Start: 2020-09-29 | End: 2020-09-29

## 2020-09-29 RX ORDER — METHYLERGONOVINE MALEATE 0.2 MG/ML
200 INJECTION INTRAVENOUS
Status: DISCONTINUED | OUTPATIENT
Start: 2020-09-29 | End: 2020-10-01 | Stop reason: HOSPADM

## 2020-09-29 RX ORDER — EPHEDRINE SULFATE 50 MG/ML
5 INJECTION, SOLUTION INTRAMUSCULAR; INTRAVENOUS; SUBCUTANEOUS
Status: DISCONTINUED | OUTPATIENT
Start: 2020-09-29 | End: 2020-09-29

## 2020-09-29 RX ORDER — IBUPROFEN 800 MG/1
800 TABLET, FILM COATED ORAL EVERY 6 HOURS
Status: DISCONTINUED | OUTPATIENT
Start: 2020-09-30 | End: 2020-10-01 | Stop reason: HOSPADM

## 2020-09-29 RX ORDER — OXYTOCIN/0.9 % SODIUM CHLORIDE 30/500 ML
340 PLASTIC BAG, INJECTION (ML) INTRAVENOUS CONTINUOUS PRN
Status: DISCONTINUED | OUTPATIENT
Start: 2020-09-29 | End: 2020-10-01 | Stop reason: HOSPADM

## 2020-09-29 RX ORDER — ONDANSETRON 4 MG/1
4 TABLET, ORALLY DISINTEGRATING ORAL EVERY 30 MIN PRN
Status: DISCONTINUED | OUTPATIENT
Start: 2020-09-29 | End: 2020-09-29 | Stop reason: HOSPADM

## 2020-09-29 RX ORDER — TRANEXAMIC ACID 100 MG/ML
INJECTION, SOLUTION INTRAVENOUS
Status: DISCONTINUED
Start: 2020-09-29 | End: 2020-09-29 | Stop reason: HOSPADM

## 2020-09-29 RX ORDER — SODIUM CHLORIDE, SODIUM LACTATE, POTASSIUM CHLORIDE, CALCIUM CHLORIDE 600; 310; 30; 20 MG/100ML; MG/100ML; MG/100ML; MG/100ML
INJECTION, SOLUTION INTRAVENOUS CONTINUOUS
Status: DISCONTINUED | OUTPATIENT
Start: 2020-09-29 | End: 2020-09-29 | Stop reason: HOSPADM

## 2020-09-29 RX ORDER — AMOXICILLIN 250 MG
2 CAPSULE ORAL 2 TIMES DAILY
Status: DISCONTINUED | OUTPATIENT
Start: 2020-09-29 | End: 2020-10-01 | Stop reason: HOSPADM

## 2020-09-29 RX ORDER — DIPHENHYDRAMINE HYDROCHLORIDE 50 MG/ML
25 INJECTION INTRAMUSCULAR; INTRAVENOUS EVERY 6 HOURS PRN
Status: DISCONTINUED | OUTPATIENT
Start: 2020-09-29 | End: 2020-10-01 | Stop reason: HOSPADM

## 2020-09-29 RX ORDER — DIPHENHYDRAMINE HCL 25 MG
25 CAPSULE ORAL EVERY 6 HOURS PRN
Status: DISCONTINUED | OUTPATIENT
Start: 2020-09-29 | End: 2020-10-01 | Stop reason: HOSPADM

## 2020-09-29 RX ORDER — BUPIVACAINE HYDROCHLORIDE 7.5 MG/ML
INJECTION, SOLUTION INTRASPINAL PRN
Status: DISCONTINUED | OUTPATIENT
Start: 2020-09-29 | End: 2020-09-30

## 2020-09-29 RX ORDER — FENTANYL CITRATE 50 UG/ML
INJECTION, SOLUTION INTRAMUSCULAR; INTRAVENOUS PRN
Status: DISCONTINUED | OUTPATIENT
Start: 2020-09-29 | End: 2020-09-30

## 2020-09-29 RX ORDER — CITRIC ACID/SODIUM CITRATE 334-500MG
30 SOLUTION, ORAL ORAL
Status: COMPLETED | OUTPATIENT
Start: 2020-09-29 | End: 2020-09-29

## 2020-09-29 RX ORDER — MISOPROSTOL 200 UG/1
TABLET ORAL
Status: DISCONTINUED
Start: 2020-09-29 | End: 2020-09-29 | Stop reason: HOSPADM

## 2020-09-29 RX ORDER — OXYCODONE HYDROCHLORIDE 5 MG/1
5 TABLET ORAL EVERY 4 HOURS PRN
Status: DISCONTINUED | OUTPATIENT
Start: 2020-09-29 | End: 2020-10-01 | Stop reason: HOSPADM

## 2020-09-29 RX ORDER — FENTANYL CITRATE-0.9 % NACL/PF 10 MCG/ML
PLASTIC BAG, INJECTION (ML) INTRAVENOUS CONTINUOUS PRN
Status: DISCONTINUED | OUTPATIENT
Start: 2020-09-29 | End: 2020-09-30

## 2020-09-29 RX ORDER — NALOXONE HYDROCHLORIDE 0.4 MG/ML
.1-.4 INJECTION, SOLUTION INTRAMUSCULAR; INTRAVENOUS; SUBCUTANEOUS
Status: ACTIVE | OUTPATIENT
Start: 2020-09-29 | End: 2020-09-30

## 2020-09-29 RX ORDER — MODIFIED LANOLIN
OINTMENT (GRAM) TOPICAL
Status: DISCONTINUED | OUTPATIENT
Start: 2020-09-29 | End: 2020-10-01 | Stop reason: HOSPADM

## 2020-09-29 RX ORDER — OXYTOCIN/0.9 % SODIUM CHLORIDE 30/500 ML
PLASTIC BAG, INJECTION (ML) INTRAVENOUS CONTINUOUS PRN
Status: DISCONTINUED | OUTPATIENT
Start: 2020-09-29 | End: 2020-09-30

## 2020-09-29 RX ORDER — BISACODYL 10 MG
10 SUPPOSITORY, RECTAL RECTAL DAILY PRN
Status: DISCONTINUED | OUTPATIENT
Start: 2020-10-01 | End: 2020-10-01 | Stop reason: HOSPADM

## 2020-09-29 RX ORDER — KETOROLAC TROMETHAMINE 30 MG/ML
INJECTION, SOLUTION INTRAMUSCULAR; INTRAVENOUS PRN
Status: DISCONTINUED | OUTPATIENT
Start: 2020-09-29 | End: 2020-09-30

## 2020-09-29 RX ORDER — OXYTOCIN 10 [USP'U]/ML
10 INJECTION, SOLUTION INTRAMUSCULAR; INTRAVENOUS
Status: DISCONTINUED | OUTPATIENT
Start: 2020-09-29 | End: 2020-10-01 | Stop reason: HOSPADM

## 2020-09-29 RX ORDER — SIMETHICONE 80 MG
80 TABLET,CHEWABLE ORAL 4 TIMES DAILY PRN
Status: DISCONTINUED | OUTPATIENT
Start: 2020-09-29 | End: 2020-10-01 | Stop reason: HOSPADM

## 2020-09-29 RX ORDER — SODIUM CHLORIDE, SODIUM LACTATE, POTASSIUM CHLORIDE, CALCIUM CHLORIDE 600; 310; 30; 20 MG/100ML; MG/100ML; MG/100ML; MG/100ML
INJECTION, SOLUTION INTRAVENOUS CONTINUOUS
Status: DISCONTINUED | OUTPATIENT
Start: 2020-09-29 | End: 2020-09-29

## 2020-09-29 RX ADMIN — ONDANSETRON 4 MG: 2 INJECTION INTRAMUSCULAR; INTRAVENOUS at 12:48

## 2020-09-29 RX ADMIN — KETOROLAC TROMETHAMINE 30 MG: 30 INJECTION, SOLUTION INTRAMUSCULAR at 13:18

## 2020-09-29 RX ADMIN — BUPIVACAINE HYDROCHLORIDE 20 ML: 2.5 INJECTION, SOLUTION EPIDURAL; INFILTRATION; INTRACAUDAL at 13:39

## 2020-09-29 RX ADMIN — KETOROLAC TROMETHAMINE 30 MG: 30 INJECTION, SOLUTION INTRAMUSCULAR at 20:19

## 2020-09-29 RX ADMIN — TRANEXAMIC ACID 1 G: 1 INJECTION, SOLUTION INTRAVENOUS at 12:23

## 2020-09-29 RX ADMIN — MISOPROSTOL 400 MCG: 200 TABLET ORAL at 14:20

## 2020-09-29 RX ADMIN — SODIUM CHLORIDE, POTASSIUM CHLORIDE, SODIUM LACTATE AND CALCIUM CHLORIDE: 600; 310; 30; 20 INJECTION, SOLUTION INTRAVENOUS at 09:30

## 2020-09-29 RX ADMIN — FENTANYL CITRATE 10 MCG: 50 INJECTION, SOLUTION INTRAMUSCULAR; INTRAVENOUS at 11:44

## 2020-09-29 RX ADMIN — DOCUSATE SODIUM 50 MG AND SENNOSIDES 8.6 MG 2 TABLET: 8.6; 5 TABLET, FILM COATED ORAL at 20:19

## 2020-09-29 RX ADMIN — BUPIVACAINE HYDROCHLORIDE IN DEXTROSE 1.4 ML: 7.5 INJECTION, SOLUTION SUBARACHNOID at 11:44

## 2020-09-29 RX ADMIN — OXYCODONE HYDROCHLORIDE 5 MG: 5 TABLET ORAL at 15:30

## 2020-09-29 RX ADMIN — BUPIVACAINE 20 ML: 13.3 INJECTION, SUSPENSION, LIPOSOMAL INFILTRATION at 13:39

## 2020-09-29 RX ADMIN — SODIUM CITRATE AND CITRIC ACID MONOHYDRATE 30 ML: 500; 334 SOLUTION ORAL at 11:10

## 2020-09-29 RX ADMIN — Medication 2 G: at 11:45

## 2020-09-29 RX ADMIN — ACETAMINOPHEN 975 MG: 325 TABLET, FILM COATED ORAL at 17:12

## 2020-09-29 RX ADMIN — MORPHINE SULFATE 0.15 MG: 1 INJECTION, SOLUTION EPIDURAL; INTRATHECAL; INTRAVENOUS at 11:44

## 2020-09-29 RX ADMIN — Medication 50 MCG/MIN: at 11:44

## 2020-09-29 RX ADMIN — OXYTOCIN-SODIUM CHLORIDE 0.9% IV SOLN 30 UNIT/500ML 600 ML/HR: 30-0.9/5 SOLUTION at 12:18

## 2020-09-29 RX ADMIN — SODIUM CHLORIDE, POTASSIUM CHLORIDE, SODIUM LACTATE AND CALCIUM CHLORIDE: 600; 310; 30; 20 INJECTION, SOLUTION INTRAVENOUS at 11:31

## 2020-09-29 RX ADMIN — METHYLERGONOVINE MALEATE 200 MCG: 0.2 INJECTION INTRAMUSCULAR; INTRAVENOUS at 12:23

## 2020-09-29 NOTE — ANESTHESIA PREPROCEDURE EVALUATION
Anesthesia Pre-Procedure Evaluation    Patient: Dai Guadarrama   MRN:     [id number] Gender:   female   Age:    35 year old :      1985        Preoperative Diagnosis: Placenta previa in third trimester [O44.03]   Procedure(s):   SECTION, WITH POSTPARTUM TUBAL LIGATION     LABS:  CBC:   Lab Results   Component Value Date    WBC 7.5 2020    WBC 9.1 2020    HGB 11.4 (L) 2020    HGB 10.9 (L) 2020    HCT 35.7 2020    HCT 34.1 (L) 2020     2020     2020     BMP:   Lab Results   Component Value Date     2020     2020    POTASSIUM 3.4 2020    POTASSIUM 3.5 2020    CHLORIDE 105 2020    CHLORIDE 106 2020    CO2 25 2020    CO2 28 2020    BUN 9 2020    BUN 12 2020    CR 0.49 (L) 2020    CR 0.52 2020    GLC 87 2020    GLC 93 2020     COAGS:   Lab Results   Component Value Date    PTT 32 2017    INR 1.05 2017    FIBR 608 (H) 2017     POC:   Lab Results   Component Value Date     (H) 2020    HCG Positive 2020     OTHER:   Lab Results   Component Value Date    LACT 1.0 2018    A1C 5.7 (H) 2020    DAGO 8.9 2020    MAG 2.2 2020    ALBUMIN 3.8 2020    PROTTOTAL 7.8 2020    ALT 38 2020    AST 14 2020    ALKPHOS 86 2020    BILITOTAL 0.3 2020    LIPASE 175 2020    TSH 1.23 2020        Preop Vitals    BP Readings from Last 3 Encounters:   20 95/63   20 95/63   20 99/61    Pulse Readings from Last 3 Encounters:   20 112   20 112   20 121      Resp Readings from Last 3 Encounters:   20 18   20 18   20 18    SpO2 Readings from Last 3 Encounters:   20 99%   20 97%   20 100%      Temp Readings from Last 1 Encounters:   20 36.7  C (98  F) (Oral)    Ht Readings from Last 1 Encounters:  "  20 1.549 m (5' 1\")      Wt Readings from Last 1 Encounters:   20 109.3 kg (241 lb)    Estimated body mass index is 45.54 kg/m  as calculated from the following:    Height as of 20: 1.549 m (5' 1\").    Weight as of 20: 109.3 kg (241 lb).     LDA:        Past Medical History:   Diagnosis Date     Aneurysm (H)      Migraine headache without aura      Pregnancy related nausea and vomiting, antepartum 2016    two ED visits.     Recurrent pregnancy loss (CODE)      Retained placenta      Stillbirth     32 weeks     Tachycardia       Past Surgical History:   Procedure Laterality Date     C EACH ADD TOOTH EXTRACTION      bad reaction to anesthia for local       SECTION N/A 2018    Procedure:  SECTION;  Surgeon: Noreen Harris MD;  Location: [facility] L+D     INSERT PICC LINE Right 3/12/2020    Procedure: Midline Picc Placement;  Surgeon: Ugo Webb PA-C;  Location: [facility] OR     IR PICC PLACEMENT > 5 YRS OF AGE  3/12/2020      No Known Allergies     Anesthesia Evaluation     .             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 - neg cardiovascular ROS       METS/Exercise Tolerance:     Hematologic:  - neg hematologic  ROS       Musculoskeletal:  - neg musculoskeletal ROS       GI/Hepatic:  - neg GI/hepatic ROS       Renal/Genitourinary:  - ROS Renal section negative       Endo:         Psychiatric:  - neg psychiatric ROS       Infectious Disease:  - neg infectious disease ROS       Malignancy:      - no malignancy   Other:                         PHYSICAL EXAM:   Mental Status/Neuro: A/A/O   Airway: Facies: Feasible  Mallampati: II  Mouth/Opening: Full  TM distance: > 6 cm  Neck ROM: Full   Respiratory: Auscultation: CTAB     Resp. Rate: Normal     Resp. Effort: Normal      CV: Rhythm: Regular  Rate: Age " appropriate  Heart: Normal Sounds  Edema: None   Comments:      Dental: Normal Dentition                Assessment:   ASA SCORE: 2      Smoking Status:  Non-Smoker/Unknown   NPO Status: NPO Appropriate     Plan:   Anes. Type:  Spinal   Pre-Medication: None   Induction:  IV (Standard)   Airway: ETT; Oral   Access/Monitoring: PIV   Maintenance: Balanced     Postop Plan:   Postop Pain: Opioids  Postop Sedation/Airway: Not planned     PONV Management:   Adult Risk Factors: Female, Non-Smoker, Postop Opioids                   Enrique Golden MD

## 2020-09-29 NOTE — PLAN OF CARE
Data: Patient presented to [facility] at 0825. Reason for maternal/fetal assessment per patient is C/S + tubal ligation  Patient is a . Prenatal record reviewed.      OB History    Para Term  AB Living   6 3 2 1 2 2   SAB TAB Ectopic Multiple Live Births   2 0 0 0 3      # Outcome Date GA Lbr Spike/2nd Weight Sex Delivery Anes PTL Lv   6 Current            5 Term 18 37w1d  2.74 kg (6 lb 0.7 oz) F CS-LTranv Spinal N KASEY      Birth Comments: unstable fetal lie, hx IUFD      Name: KINJAL COCHRAN,BABY1 LAURA      Apgar1: 8  Apgar5: 9   4 Term 17 37w5d 02:30 / 00:13 3.374 kg (7 lb 7 oz) F Vag-Spont EPI N KASEY      Birth Comments: IOL for oligo, PP hemorrhage 914 mL      Name: Marjorie       Apgar1: 9  Apgar5: 9   3  14 32w0d   F I.U. FETAL D   ND   2 SAB            1 SAB               Obstetric Comments      2nd pregnancy oligohydramnios IOL 37.5    . Medical history:   Past Medical History:   Diagnosis Date    Aneurysm (H)     Migraine headache without aura     Pregnancy related nausea and vomiting, antepartum 2016    two ED visits.    Recurrent pregnancy loss (CODE)     Retained placenta 2014    Stillbirth 2014    32 weeks    Tachycardia    Gestational Age 37w0d. VSS. Fetal movement present. Patient denies cramping, backache, vaginal discharge, pelvic pressure, UTI symptoms, GI problems, bloody show, vaginal bleeding, edema, headache, visual disturbances, epigastric or URQ pain, abdominal pain, rupture of membranes. Support persons none present.  at home with kids.   Action: EFM applied for monitoring. Uterine assessment none. Fetal assessment: Presumed adequate fetal oxygenation documented (see flow record).   Response: Orders released. Prepare for C/S.

## 2020-09-29 NOTE — DISCHARGE SUMMARY
DELIVERY DISCHARGE SUMMARY    Dai Guadarrama  : 1985  MRN: [id number]    Admit date: 2020  Discharge date: 10/1/2020    Admit Dx:   - 35 year old  at 37w0d  - posterior placenta previa  - hx of  x1 for breech  - hx of IUFD at 32 weeks  - pyelonephritis in 3rd trimester  - GDMA1  - hep B non-immune (s/p 2/3)  - Obesity; BMI 45.5  - small periophthalmic aneurysm (diagnosed 2016)  - AMA    Discharge Dx:  - Same as above, now  s/p procedure below    Procedures:  - Repeat classical  section with double layer closure via Pfannenstiel incision, bilateral salpingectomy  - Spinal anesthesia  - TAP block    Admit HPI:  Dai Guadarrama is a 35 year old  at 37w0d who was admitted on 2020 for a scheduled repeat  section.   Please see her admit H&P for full details of her PMH, PSH, Meds, Allergies and exam on admit.    Hospital course:  Operative Findings:  1. Clear amniotic fluid. Hysterotomy initially transverse but extended with bandage scissors. Hysterotomy noted to be classical after delivery of the baby.  2. Liveborn male infant in left occiput transverse presentation. Apgars 8 at 1 minute & 9 at 5 minutes. Weight 3580g.  3. Moderate to severe rectofascial adhesions. No intraperitoneal adhesions. Normal uterus, fallopian tubes, and ovaries. Hysterotomy and ligated edges after bilateral salpingectomy hemostatic at the end of the case.    QBL from the delivery was 1220mL. Please see her  Section Operative Note for full details regarding her delivery.    Her postoperative course was uncomplicated. Her fasting blood glucose on POD#2 was 83. On POD#2, she was meeting all of her postpartum goals and deemed stable for discharge. She was voiding without difficulty, tolerating a regular diet without nausea and vomiting, her pain was well controlled on oral pain medicines and her lochia was appropriate. Her hemoglobin prior to delivery was  11.4 and after delivery was 9.9. She was discharged with PO iron. Her Rh status was positive and Rhogam was not indicated.      Discharge Medications:     Review of your medicines      UNREVIEWED medicines. Ask your doctor about these medicines      Dose / Directions   cephALEXin 500 MG capsule  Commonly known as: KEFLEX  Indication: UTI/Pyelo  Used for: Pyelonephritis affecting pregnancy in third trimester      Dose: 500 mg  Take 1 capsule (500 mg) by mouth every 6 hours for 12 days  Quantity: 47 capsule  Refills: 0     cholecalciferol 125 mcg (5000 units) capsule  Commonly known as: VITAMIN D3  Used for: Vitamin D deficiency      Dose: 5,000 Units  Take 1 capsule (5,000 Units) by mouth daily Take one capsule daily.  Quantity: 90 capsule  Refills: 3     cyanocobalamin 1000 MCG tablet  Commonly known as: VITAMIN B-12  Used for: HRP (high risk pregnancy), third trimester, Other iron deficiency anemia      Dose: 1,000 mcg  Take 1 tablet (1,000 mcg) by mouth daily  Quantity: 90 tablet  Refills: 1     ferrous sulfate 325 (65 Fe) MG tablet  Commonly known as: FEROSUL  Used for: HRP (high risk pregnancy), third trimester, Other iron deficiency anemia      Dose: 325 mg  Take 1 tablet (325 mg) by mouth daily (with breakfast)  Quantity: 90 tablet  Refills: 1     GoodSense Prenatal Vitamins 28-0.8 MG Tabs  Used for: History of intrauterine fetal death, currently pregnant      Dose: 1 tablet  Take 1 tablet by mouth daily  Quantity: 100 tablet  Refills: 2     vitamin C 250 MG tablet  Commonly known as: ASCORBIC ACID  Used for: HRP (high risk pregnancy), third trimester, Other iron deficiency anemia      Dose: 250 mg  Take 1 tablet (250 mg) by mouth daily at same time as iron pill.  Quantity: 90 tablet  Refills: 1        CONTINUE these medicines which have NOT CHANGED      Dose / Directions   Accu-Chek Parisa Plus test strip  Generic drug: blood glucose      USE AS DIRECTED TO TEST BLOOD SUGAR ONCE DAILY  Refills: 0     blood  glucose monitoring lancets  Used for: Elevated glucose, Abnormal maternal glucose tolerance, antepartum      Test once daily before breakfast FASTING  Quantity: 30 each  Refills: 0     blood glucose monitoring meter device kit  Used for: Elevated glucose, Abnormal maternal glucose tolerance, antepartum      Test once daily before breakfast FASTING  Quantity: 1 kit  Refills: 0            Discharge/Disposition:  Dai Guadarrama was discharged to home in stable condition with the following instructions/medications:  1) Call for temperature > 100.4, bright red vaginal bleeding >1 pad an hour x 2 hours, foul smelling vaginal discharge, pain not controlled by usual oral pain meds, persistent nausea and vomiting not controlled on medications, drainage or redness from incision site  2) She is s/p bilateral salpingectomy for contraception.  3) For feeding she decided to formula feed.  4) She was instructed to follow-up with her primary OB in 6 weeks for a routine postpartum visit and 2h GTT.  5) Discharge activity:  No heavy lifting >15 lbs or strenuous activity for 6 weeks, pelvic rest for 6 weeks, no driving or operating machinery while on narcotics.      Cassia Barreto MD  Ob/Gyn Resident, PGY-3  Pager: [phone]  10/01/20 8:27 AM       I have seen, examined, and counseled the patient on the day of discharge. I have reviewed and edited the summary.  Sharmin Louis

## 2020-09-29 NOTE — ANESTHESIA PROCEDURE NOTES
Spinal/LP Procedure Note    Spinal Block      Staff -   Anesthesiologist:  Jduy Serna MD  Resident/Fellow: Enrique Golden MD  Performed By: resident  Location: OB  Procedure Start/Stop Times:      9/29/2020 11:45 AM    patient identified, IV checked, risks and benefits discussed, informed consent, monitors and equipment checked, pre-op evaluation, at physician/surgeon's request and post-op pain management      Correct Patient: Yes      Correct Position: Yes      Correct Site: Yes      Correct Procedure: Yes      Correct Laterality:  Yes    Site Marked:  Yes and No  Procedure:     Procedure:  Intrathecal    ASA:  2    Position:  Sitting    Sterile Prep: chloraprep      Insertion site:  L4-5    Approach:  Midline    Needle Type:  Naga    Needle gauge (G):  25    Local Skin Infiltration:  2% lidocaine    amount (ml):  3    Needle Length (in):  3.5    Introducer used: Yes      Introducer gauge:  20 G    Attempts:  1    Redirects:  0    CSF:  Clear    Paresthesias:  No  Assessment/Narrative:      Patient tolerated well

## 2020-09-29 NOTE — PLAN OF CARE
OR to PACU Transfer Note  Data: Pt to OB PACU at 1346 via cart. PIV infusing NS with pitocin. Pt without complications, cook with clear urine to gravity, pt does not complain of pain and/or nausea.   Interventions: IV to pump, monitors and alarms on, warm blankets, SCD on.  Response: stable.  Plan: Patient instructed to notify RN for pain or nausea.

## 2020-09-29 NOTE — PLAN OF CARE
Patient arrived to [facility] unit via zoom cart at 1545,with belongings, accompanied by Staff, with infant in arms. Received report from Donald and checked bands. Unit and room orientation completd. Call light within arms reach; no concerns present at this time. Continue with plan of care.

## 2020-09-29 NOTE — OP NOTE
[facility]  Full Operative Progress Note     Surgery Date:  2020    Surgeon:  Joya Hicks MD    Assistants:  Baudilio Llamas MD PGY-2    Sarai Vince, MS3      Pre-op Diagnosis:    - Intrauterine pregnancy at 37w0d  - history of  section x1  - posterior placenta previa  - history of intrauterine fetal demise  - obesity with BMI 45.5   - desires sterility  - gestational diabietes    Post-op Diagnosis:    - Same   - Liveborn male infant     Procedure: Repeat classical  section with double layer uterine closure via pfannenstiel incision, bilateral salpingectomy    Anesthesia: Spinal  EBL: 1220 cc  IVF:  1000 mL crystalloid  UOP: 250 mL clear urine at the end of the case  Drains: Clemens Catheter   Specimens: Routine cord blood and cord segment  Complications: None apparent    Indications:   Dai Guadarrama is a 35 year old  at 37w0d admitted for scheduled repeat  section and salpingectomy. A  section was recommended. The risks, benefits, and alternatives of  section were discussed with the patient including bleeding, infection, injury to surrounding structures (nerves, blood vessels, uterus, cervix, tubes, ovaries, bladder, bowel, rarely baby). Dicussed salpingectomy vs partial salpingectomy and the benefit of decreased risk of ovarian cancer with complete salpingectomy Additional risk of salpingectomy were discussed including regret and risk of ectopic pregnancy. She agreed to proceed and written consent obtained.     Findings:   1. Clear amniotic fluid. Hysterotomy initially transverse but extended with bandage scissors. Hysterotomy noted to be in active segment of uterus after delivery of the baby.  2. Liveborn male infant in left occiput transverse presentation. Apgars 8 at 1 minute & 9 at 5 minutes. Weight 3580g.  3. Moderate to severe rectofascial adhesions. No intraperitoneal adhesions. Normal uterus, fallopian tubes, and  ovaries. Hysterotomy and ligated edges after bilateral salpingectomy hemostatic at the end of the case.    Procedure Details:   The patient was brought to the OR, where adequate spinal anesthesia was administered.  She was placed in the dorsal supine position with a slight leftward tilt. She was prepped and draped in the usual sterile fashion. A surgical time out was performed. A pfannenstiel skin incision was made with the scalpel, and carried down to the underlying fascia with sharp and blunt dissection. The fascia was incised in the midline, and the incision was extended laterally with the Tim scissors. The superior aspect of the fascia was grasped with the Kocher clamps and dissected off of the underlying rectus muscles with blunt and sharp dissection. Attention was then turned to the inferior aspect of the fascia, which was similarly dissected off of the underlying rectus muscles. The rectus muscles were  in the midline, and the peritoneum was entered bluntly, and the opening was extended with digital pressure and electrosurgery. The bladder blade was placed. The vesicouterine peritoneum was incised in the midline, and the incision was extended laterally with the Metzenbaum scissors. A bladder flap was created digitally and the bladder blade was replaced. A transverse hysterotomy was made with the scalpel in the lower uterine segment, and the incision was extended with digital pressure. Bandage scissors were used to extend the hysterotomy superiorly bilaterally. The hysterotomy was again digitally extended. The infant was noted to be in the left occiput transverse position, and was delivered atraumatically. The shoulders delivered easily.  No nuchal cord was noted. The cord was doubly clamped and cut after 60 seconds, and the infant was handed off to the awaiting nursery staff. A segment of cord was cut and collected. The placenta was delivered with gentle traction on the umbilical cord and uterine  massage. The uterus was exteriorized and cleared of all clots and debris. Uterine tone was noted to be firm pitocin given through the running IV and uterine massage. However, methergine and TXA were given prophylactically due to brisk bleeding. The hysterotomy was closed with a running locked suture of 0 Vicryl.  The hysterotomy was then imbricated using an 0 Monocryl in a baseball stitch fashion. The hysterotomy was noted to be hemostatic. Attention was then turned to the right fallopian tube. Dale clamps were used to grasp the tube. A Ligasure device was used to sequentially clamp the mesosalpinx, cauterize and cut from the fimbriated end to the cornua. The same procedure was completed on the left fallopian tube. The ligated edges were noted to be hemostatic after further electrocautery. The posterior cul-de-sac was cleared of all clots and debris. The uterus was returned to the abdomen. The pericolic gutters were cleared of all clots and debris. The hysterotomy and ligated edges were reexamined and noted to be hemostatic.  The fascia and rectus muscles were examined and areas of oozing were controlled with electrocautery. A FISH Edyta viscera retainer was used in order to tuck omentum away. The fascia was closed with a running 0 Vicryl suture. The subcutaneous tissue was irrigated and areas of oozing were controlled with electrocautery. The subcutaneous tissue was more than 2 cm in thickness, and was therefore closed with 2-0 Plain gut. The skin was closed with 4-0 Monocryl and covered with dermabond, steri-strips, and a sterile dressing.    All sponge, needle, and instrument counts were correct. The patient tolerated the procedure well, and was transferred to recovery in stable condition. Dr. Hicks was present for the procedure.     Baudilio Llamas MD  OB/GYN PGY-2  09/29/20 1:46 PM    Joya Hicks MD

## 2020-09-29 NOTE — H&P
History and Physical   2020  Laura Guadarrama  [id number]      HPI: Laura Guadarrama is a 35 year old  at 37w0d by 6w1d US who presents for scheduled repeat  and bilateral salpingectomy.    She denies vaginal bleeding or loss of fluid, contractions and is feeling normal fetal movement.      ROS: No headaches, vision changes, vomiting, fevers, chills, chest pain, SOB, dysuria, and foul-odor discharge. + for diffuse abdominal pain for 2 weeks and nausea    Her pregnancy is complicated by:  - posterior placenta previa  - hx of  x1 for breech  - hx of IUFD at 32 weeks  - pyelonephritis in 3rd trimester  - GDMA1  - hep B non-immune (s/p 2/3)  - Obesity; BMI 45.5  - small periophthamic aneurysm (diagnosed )  - AMA    OBHX:   OB History    Para Term  AB Living   6 3 2 1 2 2   SAB TAB Ectopic Multiple Live Births   2 0 0 0 3      # Outcome Date GA Lbr Spike/2nd Weight Sex Delivery Anes PTL Lv   6 Current            5 Term 18 37w1d  2.74 kg (6 lb 0.7 oz) F CS-LTranv Spinal N KASEY      Birth Comments: unstable fetal lie, hx IUFD      Name: KINJAL GUADARRAMA,BABY1 LAURA      Apgar1: 8  Apgar5: 9   4 Term 17 37w5d 02:30 / 00:13 3.374 kg (7 lb 7 oz) F Vag-Spont EPI N KASEY      Birth Comments: IOL for oligo, PP hemorrhage 914 mL      Name: Marjorie       Apgar1: 9  Apgar5: 9   3  /14 32w0d   F I.U. FETAL D   ND   2 SAB            1 SAB               Obstetric Comments      2nd pregnancy oligohydramnios IOL 37.5        MedicalHX:   Past Medical History:   Diagnosis Date     Aneurysm (H)      Migraine headache without aura      Pregnancy related nausea and vomiting, antepartum 2016    two ED visits.     Recurrent pregnancy loss (CODE)      Retained placenta 2014     Stillbirth 2014    32 weeks     Tachycardia        SurgicalHX:   Past Surgical History:   Procedure Laterality Date     C EACH ADD TOOTH EXTRACTION      bad reaction to anesthia for  local       SECTION N/A 2018    Procedure:  SECTION;  Surgeon: Noreen Harris MD;  Location: [facility] L+D     INSERT PICC LINE Right 3/12/2020    Procedure: Midline Picc Placement;  Surgeon: Ugo Webb PA-C;  Location: [facility] OR     IR PICC PLACEMENT > 5 YRS OF AGE  3/12/2020       Medications:   No current facility-administered medications on file prior to encounter.   blood glucose monitoring (ACCU-CHEK COMPACT CARE KIT) meter device kit, Test once daily before breakfast FASTING  blood glucose monitoring (ACCU-CHEK MULTICLIX) lancets, Test once daily before breakfast FASTING  cyanocobalamin (VITAMIN B-12) 1000 MCG tablet, Take 1 tablet (1,000 mcg) by mouth daily  ferrous sulfate (FEROSUL) 325 (65 Fe) MG tablet, Take 1 tablet (325 mg) by mouth daily (with breakfast)  Prenatal Vit-Fe Fumarate-FA (LeisureLogixENSE PRENATAL VITAMINS) 28-0.8 MG TABS, Take 1 tablet by mouth daily  vitamin C (ASCORBIC ACID) 250 MG tablet, Take 1 tablet (250 mg) by mouth daily at same time as iron pill.  cholecalciferol (VITAMIN D3) 5000 units (125 mcg) capsule, Take 1 capsule (5,000 Units) by mouth daily Take one capsule daily.        Allergies:  No Known Allergies    Family Hx:  None pertinent; denies bleeding, clotting disorders    SocialHX:   Denies drinking, smoking, drug use in pregnancy    ROS: 10-point ROS negative except as indicated in HPI.    Physical Exam:  Vitals:    20 0912 20 0928   BP: 102/56 104/59   Resp: 20    Temp: 98.3  F (36.8  C)    TempSrc: Oral      General: alert, oriented female, resting in bed in NAD  CV: regular rate and rhythm  Lungs: clear bilaterally, no crackles or wheezes  Abdomen: soft, gravid, non-tender  Extremities: bilateral lower extremities non-tender with no edema    FHT: baseline 140, moderate variability, +accelerations, -decelerations  [place]: 0-2 contractions in 10 mins    Prenatal Labs:      Lab Results   Component Value Date    ABO O 2020    RH Pos  2020    AS Neg 2020    HEPBANG Nonreactive 2020    CHPCRT Negative 2020    GCPCRT Negative 2020    TREPAB Negative 2017    HGB 11.4 (L) 2020       GBS Status:   Lab Results   Component Value Date    GBS Negative 2020       Lab Results   Component Value Date    PAP NIL 2016         Assessment: 35 year old  at 37w0d by 6w1d US, here for scheduled repeat  with bilateral tubal ligation. Pregnancy complicated by hx of  for breech, hx of IUFD at 32 weeks, pyelonephritis in 3rd trimester, GDMA1, hep B non-immune, obesity with BMI of 46, small periophthalmic aneurysm, migraines, AMA.    Plan:    # Scheduled repeat  with bilateral tubal ligation  - Admit to labor and delivery; collected CBC, type and screen; COVID neg  - ERAS protocol  - FTP signed 2020    # FWB/PNC  - Reactive and reassuring FHT  - Rh pos, rubella immune, infectious disease labs wnl, GBS neg  - US 9/15: EFW 3549g 95%tile, AC >99%tile     # GDMA1  - failed early GCTl; no early GTT  - failed 3rd trimester GTT; no meds  - FBG POD#1; recommend 2 hr GTT 6 weeks postpartum     # hep B non-immune  - s/p 2 vaccines (3/20, )  - will order 3rd vaccine postpartum    # pyelonephritis  - will discontinue PTA antibiotic suppression postpartum    # periophthalmic aneurysm, migraines  - diagnosed in 2016 (during pregnancy)  - s/p Neurology previously ; no surgical interventions recommended    Staffed with Dr. Fabiola Llamas MD  OB/GYN PGY-2  20 1:31 PM    I saw and evaluated the patient. I agree with the   findings and the plan of care as documented in the resident's note.  MD Bertha

## 2020-09-29 NOTE — ANESTHESIA CARE TRANSFER NOTE
Patient: Dai Guadarrama    Procedure(s):   SECTION, WITH POSTPARTUM TUBAL LIGATION    Diagnosis: Placenta previa in third trimester [O44.03]  Diagnosis Additional Information: No value filed.    Anesthesia Type:   Spinal     Note:  Airway :Room Air  Patient transferred to:PACU  Comments: VSS. Patient denies any pain or N/V. All questions answered to RN. Handoff Report: Identifed the Patient, Identified the Reponsible Provider, Reviewed the pertinent medical history, Discussed the surgical course, Reviewed Intra-OP anesthesia mangement and issues during anesthesia, Set expectations for post-procedure period and Allowed opportunity for questions and acknowledgement of understanding      Vitals: (Last set prior to Anesthesia Care Transfer)    CRNA VITALS  2020 1314 - 2020 1414      2020             Ht Rate:  92                Electronically Signed By: Enrique Golden MD  2020  4:53 PM

## 2020-09-29 NOTE — ANESTHESIA PROCEDURE NOTES
Peripheral Nerve Block Procedure Note      Staff -   Anesthesiologist:  Judy Serna MD  Resident/Fellow: Enrique Golden MD  Performed By: resident  Location: OB  Procedure Start/Stop TImes:     patient identified, IV checked, site marked, risks and benefits discussed, informed consent, monitors and equipment checked, pre-op evaluation, at physician/surgeon's request and post-op pain management      Correct Patient: Yes      Correct Position: Yes      Correct Site: Yes      Correct Procedure: Yes      Correct Laterality:  Yes    Site Marked:  Yes and No  Procedure details:     Procedure:  TAP    ASA:  2    Laterality:  Bilateral    Position:  Supine    Sterile Prep: chloraprep      Local skin infiltration:  None    Needle:  Touhy needle    Needle gauge:  21    Ultrasound: Yes      Ultrasound used to identify targeted nerve, plexus, or vascular structure and placed a needle adjacent to it      Permanent Image entered into patiient's record      Infusion Method:  Single Shot    Complications:  None  Assessment/Narrative:      TAP BLOCK

## 2020-09-30 LAB — HGB BLD-MCNC: 9.9 G/DL (ref 11.7–15.7)

## 2020-09-30 PROCEDURE — 90746 HEPB VACCINE 3 DOSE ADULT IM: CPT | Performed by: STUDENT IN AN ORGANIZED HEALTH CARE EDUCATION/TRAINING PROGRAM

## 2020-09-30 PROCEDURE — 25000132 ZZH RX MED GY IP 250 OP 250 PS 637: Performed by: STUDENT IN AN ORGANIZED HEALTH CARE EDUCATION/TRAINING PROGRAM

## 2020-09-30 PROCEDURE — 36415 COLL VENOUS BLD VENIPUNCTURE: CPT | Performed by: STUDENT IN AN ORGANIZED HEALTH CARE EDUCATION/TRAINING PROGRAM

## 2020-09-30 PROCEDURE — 85018 HEMOGLOBIN: CPT | Performed by: STUDENT IN AN ORGANIZED HEALTH CARE EDUCATION/TRAINING PROGRAM

## 2020-09-30 PROCEDURE — 12000001 ZZH R&B MED SURG/OB UMMC

## 2020-09-30 PROCEDURE — 25000128 H RX IP 250 OP 636: Performed by: STUDENT IN AN ORGANIZED HEALTH CARE EDUCATION/TRAINING PROGRAM

## 2020-09-30 RX ADMIN — OXYCODONE HYDROCHLORIDE 5 MG: 5 TABLET ORAL at 06:11

## 2020-09-30 RX ADMIN — HEPATITIS B VACCINE (RECOMBINANT) 20 MCG: 20 INJECTION, SUSPENSION INTRAMUSCULAR at 14:08

## 2020-09-30 RX ADMIN — OXYCODONE HYDROCHLORIDE 5 MG: 5 TABLET ORAL at 16:34

## 2020-09-30 RX ADMIN — OXYCODONE HYDROCHLORIDE 5 MG: 5 TABLET ORAL at 11:11

## 2020-09-30 RX ADMIN — ACETAMINOPHEN 975 MG: 325 TABLET, FILM COATED ORAL at 00:12

## 2020-09-30 RX ADMIN — IBUPROFEN 800 MG: 800 TABLET ORAL at 23:38

## 2020-09-30 RX ADMIN — ACETAMINOPHEN 975 MG: 325 TABLET, FILM COATED ORAL at 20:29

## 2020-09-30 RX ADMIN — ENOXAPARIN SODIUM 40 MG: 40 INJECTION SUBCUTANEOUS at 00:14

## 2020-09-30 RX ADMIN — OXYCODONE HYDROCHLORIDE 5 MG: 5 TABLET ORAL at 20:29

## 2020-09-30 RX ADMIN — OXYCODONE HYDROCHLORIDE 5 MG: 5 TABLET ORAL at 00:12

## 2020-09-30 RX ADMIN — DOCUSATE SODIUM 50 MG AND SENNOSIDES 8.6 MG 2 TABLET: 8.6; 5 TABLET, FILM COATED ORAL at 08:05

## 2020-09-30 RX ADMIN — IBUPROFEN 800 MG: 800 TABLET ORAL at 16:33

## 2020-09-30 RX ADMIN — DOCUSATE SODIUM 50 MG AND SENNOSIDES 8.6 MG 2 TABLET: 8.6; 5 TABLET, FILM COATED ORAL at 20:29

## 2020-09-30 RX ADMIN — ACETAMINOPHEN 975 MG: 325 TABLET, FILM COATED ORAL at 14:08

## 2020-09-30 RX ADMIN — IBUPROFEN 800 MG: 800 TABLET ORAL at 11:11

## 2020-09-30 RX ADMIN — ACETAMINOPHEN 975 MG: 325 TABLET, FILM COATED ORAL at 06:11

## 2020-09-30 RX ADMIN — SIMETHICONE 80 MG: 80 TABLET, CHEWABLE ORAL at 14:08

## 2020-09-30 RX ADMIN — KETOROLAC TROMETHAMINE 30 MG: 30 INJECTION, SOLUTION INTRAMUSCULAR at 04:58

## 2020-09-30 RX ADMIN — ENOXAPARIN SODIUM 40 MG: 40 INJECTION SUBCUTANEOUS at 14:08

## 2020-09-30 NOTE — PROGRESS NOTES
[facility] Obstetrics   Postpartum Progress Note    Name: Dai Guadarrama  : 1985  MRN: [id number]    S: Patient doing well, would like to go home today.  Pain well controlled, assisted with Tylenol, ibuprofen, and PRN oxycodone.  Bleeding like menstrual cycle.  Tolerating regular diet.  Urinating without issues since cook removed.  Passing flatus.  Formula feeding.  Baby doing well.    O:  Patient Vitals for the past 24 hrs:   BP Temp Temp src Pulse Resp   20 2330 100/77 98  F (36.7  C) Oral 87 18   20 1632 102/63 97.5  F (36.4  C) Oral 97 18     Gen: NAD. Alert, oriented. Resting comfortably in bed.  CV: well perfused   Resp:  no increased work of breathing  Abd: Soft, appropriately tender, fundus firm; mild distension  Incision: dressing removed, previous minimal serosanguinous drainage but no active drainage or bleeding. Steri strips intact  Ext: Trace lower extremity edema bilaterally    Labs:   HGB  Recent Labs   Lab 20  0708 20  0845 20  1634   HGB 9.9* 11.4* 11.4*       A/P: Dai Guadarrama is a 35 year old  female, now POD#2 s/p RCCS, BS. Doing well postpartum.    # Postoperative care  - Continue routine postoperative management  - Rh+, Rubella immune, Hep BSAg -  - Pain: Continue ibuprofen/Tylenol scheduled. oxycodone PRN for breakthrough.  - Heme: Hgb 11.4> QBL 1220mL (TXA miso and Methergine)> 9.9, asymptomatic.  Acute blood loss anemia secondary to procedural blood loss. Will discharge with PO Fe.  - FEN/GI: regular diet, stool softeners PRN  - : Cook out, voiding spontaneously  - Contraception: S/P BS  - Formula feeding  - Baby doing well    # GDMA1  - FBG 83  - recommend 2 hr GTT 6 weeks postpartum     # hep B non-immune  - s/p 2 vaccines (3/20, )  - 3rd vaccine ordered to be given prior to discharge    Dispo: anticipate discharge to home today     Cassia Barreto MD  Ob/Gyn Resident, PGY-3  Pager: [phone]  10/01/20 8:31 AM     I have  seen and examined the patient without the resident. I have reviewed, edited, and agree with the note.   Patient sleeping soundly.  Vitals, labs, progress reviewed. Likely discharge later today.  Hemoglobin   Date Value Ref Range Status   09/30/2020 9.9 (L) 11.7 - 15.7 g/dL Final   09/29/2020 11.4 (L) 11.7 - 15.7 g/dL Final       Sharmin Louis MD

## 2020-09-30 NOTE — PLAN OF CARE
Data: Vital signs and postpartum checks WDL  Patient eating and drinking normally. Patient able to empty bladder with assist and is up ambulating in the room  Patient performing self cares and is able to care for infant. Formula feeding only. Oxygen saturation dropped when sleeping that needed oxygen at 1L.  Action: Patient medicated during the shift for pain with Tylenol, Oxycodone and Toradol with relief after 1 hour. Patient education done see education record.  Response: Positive attachment behaviors observed with infant. Support person not present.    Plan: Continue with the plan of care

## 2020-09-30 NOTE — CONSULTS
Diabetes CNS Consult  Received consult request to see this 35 year old female for patient education.  Patient admitted 2020 for a scheduled repeat  section and bilateral salpingectomy.  Patient - failed early GCTl; no early GTT  - failed 3rd trimester GTT; no meds    She was referred for diabetes education, but was no show x2 appointments.  She was then seen on 2020 for education.  Per OB note of , she was monitoring blood glucoses, with fasting readings , post-meal readings  mg/dL.    Post-delivery, her fasting glucose was 79 mg/dL 2020.  She is not receiving any insulin or oral antihyperglycemic agents.    Per OB note, recommend 2 hr GTT at 6 weeks postpartum.    No new learning needs identified.  Will sign off.  Madelyn Brady MS, APRN, CNS, CDE, CDTC  [phone]

## 2020-09-30 NOTE — PLAN OF CARE
Patient's vss. Postpartum assessment is wdl. Patient's folye discontinued at 1600, patient is pending to void. Patient does not feel the urge to void. Patient's pain is well managed, minimal lochia. Will continue with plan of care.

## 2020-09-30 NOTE — PROGRESS NOTES
[facility] Obstetrics   Postpartum Progress Note    Name: Dai Guadarrama  : 1985  MRN: [id number]    S: Patient doing well.  Pain well controlled, assisted with medications.  Bleeding like menstrual cycle.  Tolerating regular diet.  Urinating without issues since cook removed.  Passing flatus.  Formula feeding.  Baby doing well.  Was on O2 over night for low O2 sats, likely related to ALLIE.    O:  Patient Vitals for the past 24 hrs:   BP Temp Temp src Pulse Resp SpO2   20 0800 92/68 98.5  F (36.9  C) Oral 86 16 98 %   20 0655 -- -- -- -- -- 95 %   20 0600 -- -- -- -- -- 96 %   20 0530 -- -- -- -- -- (!) 88 %   20 0458 100/57 98.5  F (36.9  C) Oral 89 17 97 %   20 0315 -- -- -- -- -- 96 %   20 0300 -- -- -- -- -- (!) 88 %   20 2358 99/56 98.7  F (37.1  C) Oral 88 18 98 %   20 2019 99/61 97.9  F (36.6  C) Oral 85 17 97 %   20 1900 (!) 88/60 97.9  F (36.6  C) -- 85 18 95 %   20 1800 104/68 -- -- 86 18 98 %   20 1700 103/64 -- -- 85 18 98 %   20 1600 98/61 98.4  F (36.9  C) Oral 92 18 97 %   20 1534 -- -- -- -- 28 --   20 1530 96/61 -- -- 79 20 98 %   20 1515 96/64 -- -- 85 29 --   20 1505 96/69 -- -- 83 22 97 %   20 1500 96/69 -- -- 78 (!) 31 --   20 1450 108/74 -- -- 84 19 97 %   20 1435 99/63 -- -- 86 26 98 %   20 1420 99/63 -- -- 86 22 98 %   20 1405 105/69 -- -- 90 21 98 %   20 1350 100/60 98.3  F (36.8  C) Oral 98 26 --   20 1347 (!) 89/36 -- -- 96 -- --   20 1330 105/70 -- -- -- -- 94 %       Gen: NAD. Alert, oriented. Resting comfortably in bed.  CV: well perfused   Resp:  no increased work of breathing  Abd: Soft, appropriately tender, fundus firm;mild disntesion  Incision: c/d/i, no erythema or induration, dressing in place  Ext: Trace lower extremity edema bilaterally    Labs:   HGB  Recent Labs   Lab 20  0708 20  0845 20  1634   HGB  9.9* 11.4* 11.4*       A/P: Dai Guadarrama is a 35 year old  female, now POD#1 s/p RCCS, BS. Doing well postpartum.    # Postoperative care  - Continue routine postoperative management  - Rh+, Rubella immune +, Hep BSAg -  - Pain: Continue ibuprofen/Tylenol scheduled. Roxicodone PRN for breakthrough.  - Heme: Hgb 11.4> QBL 1220mL (TXA miso and Methergine) > 9.9, asymptomatic.  Acute blood loss anemia secondary to procedural blood loss. Will discharge with PO Fe.  - FEN/GI: regular diet, stool softeners PRN  - : Clemens out, voiding spontaneously  - Contraception: S/P BS  - Formula feeding  - Baby doing well    # GDMA1  - FBG not checked this morning, communicated to RN and placed order for completion tomorrow morning  - failed early GCT; no early GTT  - failed 3rd trimester GTT; no meds  - FBG POD#2; recommend 2 hr GTT 6 weeks postpartum     # pyelonephritis  - will discontinue PTA antibiotic suppression postpartum    # hep B non-immune  - s/p 2 vaccines (3/20, )  - 3rd vaccine ordered to be given prior to discharge    Dispo: anticipate discharge to home on POD#2.    Noreen Harris MD

## 2020-10-01 ENCOUNTER — ALLIED HEALTH/NURSE VISIT (OUTPATIENT)
Dept: INTERPRETER SERVICES | Facility: CLINIC | Age: 35
End: 2020-10-01

## 2020-10-01 ENCOUNTER — ALLIED HEALTH/NURSE VISIT (OUTPATIENT)
Dept: INTERPRETER SERVICES | Facility: CLINIC | Age: 35
End: 2020-10-01
Payer: COMMERCIAL

## 2020-10-01 VITALS
OXYGEN SATURATION: 98 % | DIASTOLIC BLOOD PRESSURE: 74 MMHG | RESPIRATION RATE: 18 BRPM | TEMPERATURE: 97.9 F | HEART RATE: 81 BPM | SYSTOLIC BLOOD PRESSURE: 105 MMHG

## 2020-10-01 LAB — GLUCOSE BLDC GLUCOMTR-MCNC: 83 MG/DL (ref 70–99)

## 2020-10-01 PROCEDURE — 250N000011 HC RX IP 250 OP 636: Performed by: STUDENT IN AN ORGANIZED HEALTH CARE EDUCATION/TRAINING PROGRAM

## 2020-10-01 PROCEDURE — 999N001017 HC STATISTIC GLUCOSE BY METER IP

## 2020-10-01 PROCEDURE — T1013 SIGN LANG/ORAL INTERPRETER: HCPCS | Mod: U3,TEL

## 2020-10-01 PROCEDURE — 250N000013 HC RX MED GY IP 250 OP 250 PS 637: Performed by: STUDENT IN AN ORGANIZED HEALTH CARE EDUCATION/TRAINING PROGRAM

## 2020-10-01 RX ORDER — IBUPROFEN 800 MG/1
800 TABLET, FILM COATED ORAL EVERY 6 HOURS
Qty: 30 TABLET | Refills: 1 | Status: SHIPPED | OUTPATIENT
Start: 2020-10-01 | End: 2020-11-17

## 2020-10-01 RX ORDER — OXYCODONE HYDROCHLORIDE 5 MG/1
5 TABLET ORAL EVERY 6 HOURS PRN
Qty: 15 TABLET | Refills: 0 | Status: SHIPPED | OUTPATIENT
Start: 2020-10-01 | End: 2020-11-17

## 2020-10-01 RX ORDER — ACETAMINOPHEN 325 MG/1
975 TABLET ORAL EVERY 6 HOURS
Qty: 30 TABLET | Refills: 1 | Status: SHIPPED | OUTPATIENT
Start: 2020-10-01

## 2020-10-01 RX ORDER — AMOXICILLIN 250 MG
2 CAPSULE ORAL 2 TIMES DAILY
Qty: 30 TABLET | Refills: 1 | Status: SHIPPED | OUTPATIENT
Start: 2020-10-01 | End: 2020-11-17

## 2020-10-01 RX ADMIN — SIMETHICONE 80 MG: 80 TABLET, CHEWABLE ORAL at 00:29

## 2020-10-01 RX ADMIN — OXYCODONE HYDROCHLORIDE 5 MG: 5 TABLET ORAL at 00:29

## 2020-10-01 RX ADMIN — ENOXAPARIN SODIUM 40 MG: 40 INJECTION SUBCUTANEOUS at 02:55

## 2020-10-01 RX ADMIN — DOCUSATE SODIUM 50 MG AND SENNOSIDES 8.6 MG 2 TABLET: 8.6; 5 TABLET, FILM COATED ORAL at 08:46

## 2020-10-01 RX ADMIN — IBUPROFEN 800 MG: 800 TABLET ORAL at 05:39

## 2020-10-01 RX ADMIN — ACETAMINOPHEN 975 MG: 325 TABLET, FILM COATED ORAL at 02:55

## 2020-10-01 RX ADMIN — SIMETHICONE 80 MG: 80 TABLET, CHEWABLE ORAL at 09:58

## 2020-10-01 RX ADMIN — OXYCODONE HYDROCHLORIDE 5 MG: 5 TABLET ORAL at 05:39

## 2020-10-01 RX ADMIN — IBUPROFEN 800 MG: 800 TABLET ORAL at 11:43

## 2020-10-01 RX ADMIN — OXYCODONE HYDROCHLORIDE 5 MG: 5 TABLET ORAL at 09:58

## 2020-10-01 RX ADMIN — ACETAMINOPHEN 975 MG: 325 TABLET, FILM COATED ORAL at 08:46

## 2020-10-01 NOTE — PROVIDER NOTIFICATION
10/01/20 0945   Provider Notification   Provider Name/Title Dr. Louis   Method of Notification Electronic Page   Request Evaluate-Remote   Notification Reason Medication Request     Please order discharge medications.

## 2020-10-01 NOTE — DISCHARGE INSTRUCTIONS
Birth Discharge Instructions: [place] citizen  Actividad    No levante más de 10 libras lilibeth las 6 semanas posteriores a martin cirugía. Pida a los miembros de martin charlene y amigos que la ayuden cuando lo necesite.    No conduzca si está tomando píldoras para el dolor recetadas por martin médico. Puede conducir si está tomando píldoras de venta mark para el dolor.    No samson ejercicio ni actividades intensas por 6 semanas. No samson nada que requiera un esfuerzo en el sitio de martin cirugía.    No samson fuerza al usar el baño. Martin equipo de atención podría recetarle un laxante si tiene problemas para  el intestino (estreñimiento).    Para cuidar de martin incisión:    Mantenga la incisión limpia y seca    No empape martin incisión en agua. No nade ni use la elizabeth ni jacuzzis hasta que martin incisión haya cicatrizado por completo. Puede sentarse en la elizabeth si el nivel del agua está por debajo de martin incisión.    Después de lavarse, seque elissa martin incisión. Incluya la piel que podría entrar en contacto con martin incisión.    No use agua oxigenada, gel, cremas, lociones ni ungüentos sobre martin incisión.    Ajuste martin ropa para evitar la presión en el sitio de martin cirugía (compruebe el elástico en martin ropa interior, por ejemplo)    Si tiene Steri-Strips, deje las pequeñas tiras de cinta en martin sitio. Se caerán solas o puede quitárselas después de justa semana.    Podría jayden justa pequeña cantidad de secreción transparente o rosada y esto es normal. Consulte con martin proveedor de atención médica:    Si el drenaje aumenta o tiene olor.    Si tiene hinchazón, enrojecimiento o justa erupción alrededor de la incisión.    Si tiene más dolor y martin medicamento para el dolor no ayuda    Si tiene fiebre de 100.4  F (38  C) o más (temperatura tomada bajo martin lengua) con o sin escalofríos     El área alrededor de martin incisión (herida de la cirugía) se sentirá adormecida. [place] es normal. El adormecimiento debería desaparecer en menos de un año.       Mantenga avelina celestina  limpias:  Siempre lávese las celestina antes de tocar martin incisión. [place] ayuda a reducir martin riesgo de infección. Si avelina celestina no están sucias, puede usar un gel de alcohol para limpiarse las celestina. Mantenga avelina uñas cortas y limpias.   Llame a martin proveedor de atención médica si tiene alguno de estos síntomas:    Empapa justa toalla femenina con ermelinda en el correr de 1 hora o ve coágulos más grandes que justa pelota de golf.    Sangrado que dura más de 6 semanas.    Tiene justa secreción vaginal que huele mal.    Dolor, calambres o sensibilidad graves en la región inferior de martin vientre.    Necesidad más frecuente o urgente de orinar (hacer pis), o ardor al hacerlo.    Náuseas y vómitos    Enrojecimiento, hinchazón o dolor alrededor de justa vena en martin pierna.    Problemas para amamantar o un área enrojecida o dolorosa en martin pecho.    Si tiene dolor en el pecho y tos o dificultad para respirar.    Problemas para manejar la tristeza, ansiedad o depresión.     Si le preocupa hacerse daño o hacerle daño al bebé, llame al médico de inmediato.    Tiene preguntas o inquietudes después de regresar a casa.       Birth Discharge Instructions  Activity    Do not lift more than 10 pounds for 6 weeks after surgery. Ask family and friends for help when you need it.    Do not drive while taking pain pills prescribed by your doctor. You may drive if taking over-the-counter pain pills.    No heavy exercise or activity for 6 weeks. Don t do anything that will put a strain on your surgery site.    Don t strain when using the toilet. Your care team may prescribe a stool softener if you have problems with your bowel movements (constipation).    To care for your incision:    Keep the incision clean and dry    Do not soak your incision in water. No swimming or hot tubs until your incision has fully healed. You may soak in the bathtub if the water level is below your incision.    After washing, dry your incision well. Include the skin that may  come in contact with your incision.    Do not use any peroxide, gel, cream, lotion, or ointment on your incision.     Adjust your clothes to avoid pressure on your surgery site (check the elastic in your underwear for example)    If you have Steri-Strips, leave the small strips of tape in place. They will fall off on their own, or you can remove them after one week.    You may see a small amount of clear or pink drainage and this is normal. Check with your health care provider:    If the drainage increases or has an odor.    If you have swelling, redness, or a rash around the incision.    If you have increased pain and your pain medicine doesn t help    If you have a fever of 100.4  F (38  C) or higher (temperature taken under your tongue), with or without chills   The area around your incision (surgery wound) will feel numb. This is normal. The numbness should go away in less than a year.   Keep your hands clean:   Always wash your hands before touching your incision. This helps reduce your risk of infection. If your hands aren t dirty, you may use an alcohol hand-rub to clean your hands. Keep your nails clean and short.     Call your health care provider if you have any of these symptoms:    You soak a sanitary pad with blood within 1 hour, or you see blood clots larger than a golf ball.    Bleeding that lasts more than 6 weeks.    You have vaginal discharge that smells bad.    Severe pain, cramping or tenderness in your lower belly area.    A more frequent or urgent need to urinate (pee), or it burns when you pee.    Nausea and vomiting.    Redness, swelling or pain around a vein in your leg.    Problems breastfeeding, or a red or painful area on your breast.    If you have chest pain and cough or are gasping for air.    Problems coping with sadness, anxiety, or depression.     If you have any concerns about hurting yourself of the baby, call your doctor right away.      You have questions or concerns after you  return home.

## 2020-10-01 NOTE — PLAN OF CARE
Data: Vital signs and postpartum checks WDL  Patient eating and drinking normally. Patient able to empty bladder independently and is up ambulating.  Patient performing self cares and is able to care for infant. Formula feeding only. Fasting BG = 83.  Action: Patient medicated during the shift for pain with Tylenol, Ibuprofen and Oxycodone with relief after 1 hour. Patient education done see education record.  Response: Positive attachment behaviors observed with infant. Support persons not present.    Plan: Continue with the plan of care

## 2020-10-01 NOTE — PLAN OF CARE
Pt stable.    Pt exclusively formula feeding infant.    Reviewed discharge education and instructions with patient. Patient instructed to follow up with provider in 6 weeks for postpartum check and 2 hours glucose test.    Pt discharged to home with infant.

## 2020-10-02 LAB — COPATH REPORT: NORMAL

## 2020-10-03 NOTE — ANESTHESIA POSTPROCEDURE EVALUATION
Anesthesia POST Procedure Evaluation    Patient: Dai Guadarrama   MRN:     [id number] Gender:   female   Age:    35 year old :      1985        Preoperative Diagnosis: Placenta previa in third trimester [O44.03]   Procedure(s):   SECTION, WITH POSTPARTUM TUBAL LIGATION   Postop Comments: No value filed.     Anesthesia Type: Spinal          Postop Pain Control: Uneventful            Sign Out: Well controlled pain   PONV: No   Neuro/Psych: Uneventful            Sign Out: Acceptable/Baseline neuro status   Airway/Respiratory: Uneventful            Sign Out: Acceptable/Baseline resp. status   CV/Hemodynamics: Uneventful            Sign Out: Acceptable CV status   Other NRE: NONE   DID A NON-ROUTINE EVENT OCCUR? No         Last Anesthesia Record Vitals:  CRNA VITALS  2020 1314 - 2020 1414      2020             Ht Rate:  92          Last PACU Vitals:  Vitals Value Taken Time   BP 96/61 20 1530   Temp 36.8  C (98.3  F) 20 1350   Pulse 79 20 1530   Resp 28 20 1534   SpO2 98 % 20 1530   Temp src     NIBP     Pulse     SpO2     Resp     Temp     Ht Rate     Temp 2           Electronically Signed By: Judy Ocasio MD, October 3, 2020, 7:15 AM

## 2020-10-12 ENCOUNTER — APPOINTMENT (OUTPATIENT)
Dept: INTERPRETER SERVICES | Facility: CLINIC | Age: 35
End: 2020-10-12
Payer: COMMERCIAL

## 2020-10-12 ENCOUNTER — TELEPHONE (OUTPATIENT)
Dept: OBGYN | Facility: CLINIC | Age: 35
End: 2020-10-12

## 2020-10-12 NOTE — TELEPHONE ENCOUNTER
"Called patient with  per her request.    Pt requesting refill oxycodone. S/p repeat classical  section with double layer closure via Pfannenstiel incision, bilateral salpingectomy on .     Pt states she had been utilizing oxycodone Q 6 hours during waking hours (2-3 tabs daily) to control post op pain. Ran out 3 days ago but pain has not improved and unable to complete normal daily functions. Has been taking ibuprofen with no improvement. Pt describes pain which is burning/aching, located 4 finger breadths above  site. Describes small amount of \"brown liquid\" discharge from incision site. Denies fever, denies redness, swelling, odor.     Advised pt should be evaluated in clinic for continued post-op pain. Pt will check to see if can get ride tomorrow morning. Requests call back later this afternoon to confirm. Will call pt at 3:15  "

## 2020-10-12 NOTE — TELEPHONE ENCOUNTER
Called patient again with . Reached voicemail. Advised to call back to confirm or change appointment with Dr. Salazar tomorrow at 8:30. At this time, appointment is held, awaiting confirmation.

## 2020-10-26 ENCOUNTER — MEDICAL CORRESPONDENCE (OUTPATIENT)
Dept: HEALTH INFORMATION MANAGEMENT | Facility: CLINIC | Age: 35
End: 2020-10-26

## 2020-11-13 ENCOUNTER — APPOINTMENT (OUTPATIENT)
Dept: INTERPRETER SERVICES | Facility: CLINIC | Age: 35
End: 2020-11-13
Payer: COMMERCIAL

## 2020-11-17 ENCOUNTER — OFFICE VISIT (OUTPATIENT)
Dept: OBGYN | Facility: CLINIC | Age: 35
End: 2020-11-17
Payer: COMMERCIAL

## 2020-11-17 VITALS
BODY MASS INDEX: 42.39 KG/M2 | HEIGHT: 61 IN | HEART RATE: 90 BPM | SYSTOLIC BLOOD PRESSURE: 132 MMHG | DIASTOLIC BLOOD PRESSURE: 84 MMHG | WEIGHT: 224.5 LBS

## 2020-11-17 DIAGNOSIS — Z98.891 S/P CESAREAN SECTION: Primary | ICD-10-CM

## 2020-11-17 DIAGNOSIS — O24.410 DIET CONTROLLED GESTATIONAL DIABETES MELLITUS (GDM), ANTEPARTUM: ICD-10-CM

## 2020-11-17 DIAGNOSIS — Z13.1 SCREENING FOR DIABETES MELLITUS: ICD-10-CM

## 2020-11-17 DIAGNOSIS — N93.9 ABNORMAL UTERINE BLEEDING (AUB): ICD-10-CM

## 2020-11-17 DIAGNOSIS — M54.6 MIDLINE THORACIC BACK PAIN, UNSPECIFIED CHRONICITY: ICD-10-CM

## 2020-11-17 DIAGNOSIS — R51.9 NONINTRACTABLE EPISODIC HEADACHE, UNSPECIFIED HEADACHE TYPE: ICD-10-CM

## 2020-11-17 PROCEDURE — 90686 IIV4 VACC NO PRSV 0.5 ML IM: CPT

## 2020-11-17 PROCEDURE — 250N000011 HC RX IP 250 OP 636

## 2020-11-17 PROCEDURE — G0008 ADMIN INFLUENZA VIRUS VAC: HCPCS

## 2020-11-17 PROCEDURE — 99024 POSTOP FOLLOW-UP VISIT: CPT | Mod: GE | Performed by: OBSTETRICS & GYNECOLOGY

## 2020-11-17 PROCEDURE — 99213 OFFICE O/P EST LOW 20 MIN: CPT | Mod: 24 | Performed by: OBSTETRICS & GYNECOLOGY

## 2020-11-17 ASSESSMENT — MIFFLIN-ST. JEOR: SCORE: 1640.71

## 2020-11-17 NOTE — NURSING NOTE
SUBJECTIVE:   Dai Guadarrama is here for her 6-week postpartum checkup.     PHQ-9 score: 0  Hx of Abuse:  No    Delivery Date: 20.    Delivering provider:  Joya Hicks MD.    Type of delivery:  Repeat .     Delivery complications: None  Infant gender:  boy, weight 7 pounds 14 oz.  Feeding Method:  Bottlefed.  Complications reported with feeding:  none, infant thriving .    Bleeding:  None.  Duration:  8 weeks  Menses resumed:  No  Bowel/Urinary problems:  No    Contraception Planned:  tubal ligation  She  has had intercourse since delivery and experienced  trace discomfort.  .

## 2020-11-17 NOTE — PROGRESS NOTES
"6 week Postpartum Visit Note    S:  Ms. Dai Guadarrama is a 35 year old  here for her 6-week postpartum checkup. She had a baby boy via RCCS on 2020. Has been having pink-brownish discharge and associated intermittent abdominal pain. The pain is there when she picks up her baby and when it is very cold outside.   - Infant gender: boy, weight 3580g  - Feeding Method:  Bottlefed. Complications reported with feeding:  none, infant thriving .    - Bleeding:  Light.  Duration: 8 weeks. Continuing to have bleeding- there for a few days and then absent for a few days. The bleeding is very light and only present on tissue with wiping. Has never had heavier bleeding than this since surgery.  Menses resumed:  No  - Bowel/Urinary problems:  No   Denies urinary or fecal incontinence. Occasionally reports dysuria, but none today or this week and it has never been persistent.     Reports headache which she attributes to lack of sleep. She reports that headaches are new. Reports blurry vision due to not using glasses, however no other vision changes. Reports nausea and emesis with headaches. Reports phonophobia, denies photophobia. Has followed with Dr. Sony Renee with Neurology previously with diagnosis of tension headaches.     - Contraception Planned:  S/p bilateral salpingectomy at time of  section  - She  has had intercourse since delivery and experienced  mild discomfort.   Reports very mild pain and is not concerned about this.   - Current tobacco use:  No  - Hx of Abuse:  No. Denies concern for abuse- physical, mental/emotional, verbal, sexual.   ================================================================  ROS: 10 point ROS neg other than the symptoms noted above in the HPI.     O:  /84   Pulse 90   Ht 1.549 m (5' 1\")   Wt 101.8 kg (224 lb 8 oz)   LMP 2019   BMI 42.42 kg/m    Gen: Well-appearing, NAD  Psych:  Normal mood and affect  Breast: No concerns, exam deferred   Abd: "  Soft, non-tender, non-distended, no masses  Inc:   well healed, no drainage, no surrounding erythema or induration     A/P:  Ms. Dai Guadarrama is a 35 year old  here for 6 week postpartum visit after RCCS + BS on 2020. Doing well postpartum.     # Headaches  Symptoms consistent with migraine headaches versus previously diagnosed tension headache. Discussed trial of 600 mg ibuprofen or 975 mg acetaminophen with next headache. Referral provided to neurology. Given history of cerebral aneurysm recommended close follow up with Neurology and to proceed immediately to the emergency department should she develop a severe headache.     # Vaginal spotting  Overall unconcerning. Bleeding minimal and not bothersome to patient. No associated symptoms. Discussed recommendation for follow up in 2 weeks for TVUS if spotting continues. Pelvic exam deferred today.    # Back pain  - Recommended trial of heat and ibuprofen/tylenol. If persistent, then recommend follow up with PCP for evaluation.     # Postpartum Care  - Contraception: S/p bilateral salpingectomy   - Feeding: Bottle feeding, no issues   - GDMA1: Ordered 2 hour GTT and fasting glucose to screen for T2DM. Patient will complete when able.   - Immunizations: Influenza vaccine given today, completed Hep B vaccine series postpartum during hospitalization   - Health maintenance: Pap up to date (2016 NILM/HPV neg), will be due for repeat at annual exam  - Follow-up: in 1 year for annual exam, in 2 weeks for TVUS if postpartum vaginal spotting continues    Visit was conducted using a telephone [place]      Tacos Parod MD  Ob/Gyn Resident, PGY-2  20 3:47 PM     The Patient was seen in [facility] by Dr. Pardo.   I reviewed the history & exam. Assessment and plan were jointly made.   Noreen Harris MD, MPH

## 2020-11-23 ENCOUNTER — MEDICAL CORRESPONDENCE (OUTPATIENT)
Dept: HEALTH INFORMATION MANAGEMENT | Facility: CLINIC | Age: 35
End: 2020-11-23

## 2020-11-24 ENCOUNTER — APPOINTMENT (OUTPATIENT)
Dept: INTERPRETER SERVICES | Facility: CLINIC | Age: 35
End: 2020-11-24
Payer: COMMERCIAL

## 2020-11-27 ENCOUNTER — PRE VISIT (OUTPATIENT)
Dept: NEUROLOGY | Facility: CLINIC | Age: 35
End: 2020-11-27

## 2020-11-27 NOTE — TELEPHONE ENCOUNTER
FUTURE VISIT INFORMATION      FUTURE VISIT INFORMATION:    Date: 11/30/2020    Time: 11am    Location: [facility]  REFERRAL INFORMATION:    Referring provider:  Dr. Pardo    Referring providers clinic:  [facility]    Reason for visit/diagnosis  Headaches    RECORDS REQUESTED FROM:       Clinic name Comments Records Status Imaging Status   Internal Dr. Pardo-11/17/2020    Dr. Renee 12/1/2016 Epic N/A

## 2020-11-30 ENCOUNTER — VIRTUAL VISIT (OUTPATIENT)
Dept: NEUROLOGY | Facility: CLINIC | Age: 35
End: 2020-11-30
Payer: COMMERCIAL

## 2020-11-30 DIAGNOSIS — I72.9 ANEURYSM (H): Primary | ICD-10-CM

## 2020-11-30 PROCEDURE — 99215 OFFICE O/P EST HI 40 MIN: CPT | Mod: 95 | Performed by: PSYCHIATRY & NEUROLOGY

## 2020-11-30 NOTE — LETTER
2020       RE: Dai Guadarrama  [address]  [facility] [facility] [address]     Dear Colleague,    Thank you for referring your patient, Dai Guadarrama, to the [facility] [place] [facility]. Please see a copy of my visit note below.    Tel consent obtained. tel visit x 35 min. fiol    Service Date: 2020      Tacos Pardo MD   [facility]   [address]   [place]  [address]      RE: Dai Guadarrama   MRN: [id number]   : 1985      Dear Dr. Pardo:      We had the privilege of doing a telephone visit over 45 minutes as a consultation for Dai Guadarrama, who was seen regarding her headaches.  We have seen this patient in 2016 and evaluation at that time was negative except she did show a very small 3-4 mm aneurysm, which we chose to observe.  She says her headaches come and go and they have increased in the last 2 weeks and perhaps in the last 2 months.  She has a child who is 2 months old and it seems that after delivery of the child she has experienced an increase in headaches.  The headaches are said to come and go during the day.  She may wake up with them and they may happen later and they are mostly in the frontal and occipital area with no significant other symptoms except occasionally she has nausea and vomiting.  She has had no significant focal neurological signs or symptoms associated with these headaches.  At the present time, she is only taking Excedrin and a medication from her country which includes diclofenac and another name I could not understand or be able to look it up at this time.  She says she has been spending a lot of time in bed lately and is not working and she is taking care of the new child, which is 2 months, plus the older children that she has, 2 other children.  We did review her previous evaluation 4  years ago and at that time the headaches seemed to be of the same nature.  She was referred to Dr. 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  The patient did not have a CT angiogram.        PAST MEDICAL HISTORY:  The past medical history on this patient includes pregnancy and delivery.  She has had now a tubal ligation.  She carries a history of chronic pain, abdominal pain, pyelonephritis, placenta previa, gestational diabetes, malignant placenta previa, vitamin deficiency, elevated hemoglobin A1c, status post  section, history of fetal death, palpitations.  She had a  for the last of her children on .      ALLERGIES:  None known.      FAMILY HISTORY:  Negative for any stroke, aneurysm, CNS bleed or the other that I referred to her.      REVIEW OF SYSTEMS:  She has had a tubal ligation.  She is now breastfeeding the baby.      PHYSICAL EXAMINATION:  Her exam was not possible, as it was a video.  The total time of the video was approximately 45 minutes.  She said she could see fine and has had no focal deficits with the headaches.  She now reports normal eye movements.  Coordination, gait and station without deficit.      In summary, this is probably a tension headache associated with the COVID epidemic or her child or may be a manifestation of postpartum depression.  She is not on an antidepressant now, and I did not pursue that possibility but will at the next visit.  In the meantime, it is of the essence to see if the aneurysm has grown, which I doubt it, but it is possible.  The headaches are not lateralized to the left where seemingly the aneurysm is in the internal carotid near the carotid siphon.  No medication will be recommended for now.  She is using Excedrin in a small amount and that is okay.  We will see her after the MRA is ordered and done.         Sincerely,      Sony Renee MD         D: 2020   T: 2020   MT: GRACIE      Name:     LAURA RAMÍREZ   MRN:      [id number]        Account:      [id number]   :      1985           Service Date: 2020      Document: [id number]

## 2020-12-02 NOTE — PROGRESS NOTES
Service Date: 2020      Tacos Pardo MD   [facility]   [address] [facility] [address]   [place]  [address]      RE: Dai Guadarrama   MRN: [id number]   : 1985      Dear Dr. Pardo:      We had the privilege of doing a telephone visit over 45 minutes as a consultation for Dai Guadarrama, who was seen regarding her headaches.  We have seen this patient in 2016 and evaluation at that time was negative except she did show a very small 3-4 mm aneurysm, which we chose to observe.  She says her headaches come and go and they have increased in the last 2 weeks and perhaps in the last 2 months.  She has a child who is 2 months old and it seems that after delivery of the child she has experienced an increase in headaches.  The headaches are said to come and go during the day.  She may wake up with them and they may happen later and they are mostly in the frontal and occipital area with no significant other symptoms except occasionally she has nausea and vomiting.  She has had no significant focal neurological signs or symptoms associated with these headaches.  At the present time, she is only taking Excedrin and a medication from her country which includes diclofenac and another name I could not understand or be able to look it up at this time.  She says she has been spending a lot of time in bed lately and is not working and she is taking care of the new child, which is 2 months, plus the older children that she has, 2 other children.  We did review her previous evaluation 4 years ago and at that time the headaches seemed to be of the same nature.  She was referred to Dr. 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  The patient did not have a CT angiogram.        PAST MEDICAL HISTORY:  The past medical  history on this patient includes pregnancy and delivery.  She has had now a tubal ligation.  She carries a history of chronic pain, abdominal pain, pyelonephritis, placenta previa, gestational diabetes, malignant placenta previa, vitamin deficiency, elevated hemoglobin A1c, status post  section, history of fetal death, palpitations.  She had a  for the last of her children on .      ALLERGIES:  None known.      FAMILY HISTORY:  Negative for any stroke, aneurysm, CNS bleed or the other that I referred to her.      REVIEW OF SYSTEMS:  She has had a tubal ligation.  She is now breastfeeding the baby.      PHYSICAL EXAMINATION:  Her exam was not possible, as it was a video.  The total time of the video was approximately 45 minutes.  She said she could see fine and has had no focal deficits with the headaches.  She now reports normal eye movements.  Coordination, gait and station without deficit.      In summary, this is probably a tension headache associated with the COVID epidemic or her child or may be a manifestation of postpartum depression.  She is not on an antidepressant now, and I did not pursue that possibility but will at the next visit.  In the meantime, it is of the essence to see if the aneurysm has grown, which I doubt it, but it is possible.  The headaches are not lateralized to the left where seemingly the aneurysm is in the internal carotid near the carotid siphon.  No medication will be recommended for now.  She is using Excedrin in a small amount and that is okay.  We will see her after the MRA is ordered and done.        Sincerely,            MD TEZ Lieberman MD             D: 2020   T: 2020   MT: GRACIE      Name:     LAURA RAMÍREZ   MRN:      [id number]        Account:      [id number]   :      1985           Service Date: 2020      Document: [id number]

## 2020-12-03 DIAGNOSIS — I72.9 ANEURYSM (H): Primary | ICD-10-CM

## 2020-12-04 ENCOUNTER — APPOINTMENT (OUTPATIENT)
Dept: INTERPRETER SERVICES | Facility: CLINIC | Age: 35
End: 2020-12-04
Payer: COMMERCIAL

## 2020-12-29 ENCOUNTER — ANCILLARY PROCEDURE (OUTPATIENT)
Dept: MRI IMAGING | Facility: CLINIC | Age: 35
End: 2020-12-29
Attending: PSYCHIATRY & NEUROLOGY
Payer: COMMERCIAL

## 2020-12-29 DIAGNOSIS — I72.9 ANEURYSM (H): ICD-10-CM

## 2020-12-29 PROCEDURE — 70544 MR ANGIOGRAPHY HEAD W/O DYE: CPT | Performed by: RADIOLOGY

## 2021-04-24 ENCOUNTER — HEALTH MAINTENANCE LETTER (OUTPATIENT)
Age: 36
End: 2021-04-24

## 2021-05-03 ENCOUNTER — MEDICAL CORRESPONDENCE (OUTPATIENT)
Dept: HEALTH INFORMATION MANAGEMENT | Facility: CLINIC | Age: 36
End: 2021-05-03

## 2021-09-07 NOTE — IP AVS SNAPSHOT
[facility]    [address]    [place] [address]    Phone:  [phone]                                       After Visit Summary   10/18/2018    Dai Guadarrama    MRN: [id number]           After Visit Summary Signature Page     I have received my discharge instructions, and my questions have been answered. I have discussed any challenges I see with this plan with the nurse or doctor.    ..........................................................................................................................................  Patient/Patient Representative Signature      ..........................................................................................................................................  Patient Representative Print Name and Relationship to Patient    ..................................................               ................................................  Date                                   Time    ..........................................................................................................................................  Reviewed by Signature/Title    ...................................................              ..............................................  Date                                               Time          22EPIC Rev 08/18         17

## 2021-10-04 ENCOUNTER — HEALTH MAINTENANCE LETTER (OUTPATIENT)
Age: 36
End: 2021-10-04

## 2021-11-27 ENCOUNTER — HOSPITAL ENCOUNTER (EMERGENCY)
Facility: CLINIC | Age: 36
Discharge: HOME | End: 2021-11-27
Attending: EMERGENCY MEDICINE | Admitting: EMERGENCY MEDICINE

## 2021-11-27 ENCOUNTER — APPOINTMENT (OUTPATIENT)
Dept: CT IMAGING | Facility: CLINIC | Age: 36
End: 2021-11-27
Attending: EMERGENCY MEDICINE

## 2021-11-27 VITALS
DIASTOLIC BLOOD PRESSURE: 80 MMHG | TEMPERATURE: 97.7 F | OXYGEN SATURATION: 93 % | RESPIRATION RATE: 16 BRPM | SYSTOLIC BLOOD PRESSURE: 129 MMHG | HEART RATE: 96 BPM

## 2021-11-27 DIAGNOSIS — R51.9 NONINTRACTABLE HEADACHE, UNSPECIFIED CHRONICITY PATTERN, UNSPECIFIED HEADACHE TYPE: ICD-10-CM

## 2021-11-27 LAB
ANION GAP SERPL CALCULATED.3IONS-SCNC: 3 MMOL/L (ref 3–14)
APPEARANCE CSF: ABNORMAL
APPEARANCE CSF: ABNORMAL
BASOPHILS # BLD AUTO: 0 10E3/UL (ref 0–0.2)
BASOPHILS NFR BLD AUTO: 0 %
BUN SERPL-MCNC: 11 MG/DL (ref 7–30)
CALCIUM SERPL-MCNC: 9.1 MG/DL (ref 8.5–10.1)
CHLORIDE BLD-SCNC: 108 MMOL/L (ref 94–109)
CO2 SERPL-SCNC: 28 MMOL/L (ref 20–32)
COLOR CSF: ABNORMAL
COLOR CSF: ABNORMAL
CREAT SERPL-MCNC: 0.52 MG/DL (ref 0.52–1.04)
CRP SERPL-MCNC: 5.2 MG/L (ref 0–8)
EOSINOPHIL # BLD AUTO: 0.2 10E3/UL (ref 0–0.7)
EOSINOPHIL NFR BLD AUTO: 2 %
ERYTHROCYTE [DISTWIDTH] IN BLOOD BY AUTOMATED COUNT: 14 % (ref 10–15)
ERYTHROCYTE [SEDIMENTATION RATE] IN BLOOD BY WESTERGREN METHOD: 16 MM/HR (ref 0–20)
GFR SERPL CREATININE-BSD FRML MDRD: >90 ML/MIN/1.73M2
GLUCOSE BLD-MCNC: 106 MG/DL (ref 70–99)
GLUCOSE CSF-MCNC: 61 MG/DL (ref 40–70)
HCT VFR BLD AUTO: 39.2 % (ref 35–47)
HGB BLD-MCNC: 12.3 G/DL (ref 11.7–15.7)
IMM GRANULOCYTES # BLD: 0 10E3/UL
IMM GRANULOCYTES NFR BLD: 0 %
LYMPHOCYTES # BLD AUTO: 2.7 10E3/UL (ref 0.8–5.3)
LYMPHOCYTES NFR BLD AUTO: 37 %
MCH RBC QN AUTO: 26.5 PG (ref 26.5–33)
MCHC RBC AUTO-ENTMCNC: 31.4 G/DL (ref 31.5–36.5)
MCV RBC AUTO: 85 FL (ref 78–100)
MONOCYTES # BLD AUTO: 0.4 10E3/UL (ref 0–1.3)
MONOCYTES NFR BLD AUTO: 5 %
NEUTROPHILS # BLD AUTO: 4.1 10E3/UL (ref 1.6–8.3)
NEUTROPHILS NFR BLD AUTO: 56 %
NRBC # BLD AUTO: 0 10E3/UL
NRBC BLD AUTO-RTO: 0 /100
PLATELET # BLD AUTO: 367 10E3/UL (ref 150–450)
POTASSIUM BLD-SCNC: 3.4 MMOL/L (ref 3.4–5.3)
PROT CSF-MCNC: 33 MG/DL (ref 15–60)
RBC # BLD AUTO: 4.64 10E6/UL (ref 3.8–5.2)
RBC # CSF MANUAL: 2000 /UL (ref 0–2)
RBC # CSF MANUAL: 3732 /UL (ref 0–2)
SODIUM SERPL-SCNC: 139 MMOL/L (ref 133–144)
TUBE # CSF: 3
TUBE # CSF: 4
WBC # BLD AUTO: 7.4 10E3/UL (ref 4–11)
WBC # CSF MANUAL: 3 /UL (ref 0–5)
WBC # CSF MANUAL: 3 /UL (ref 0–5)

## 2021-11-27 PROCEDURE — 84157 ASSAY OF PROTEIN OTHER: CPT | Performed by: EMERGENCY MEDICINE

## 2021-11-27 PROCEDURE — 70450 CT HEAD/BRAIN W/O DYE: CPT

## 2021-11-27 PROCEDURE — 62270 DX LMBR SPI PNXR: CPT | Performed by: EMERGENCY MEDICINE

## 2021-11-27 PROCEDURE — 85025 COMPLETE CBC W/AUTO DIFF WBC: CPT | Performed by: EMERGENCY MEDICINE

## 2021-11-27 PROCEDURE — 89050 BODY FLUID CELL COUNT: CPT | Performed by: EMERGENCY MEDICINE

## 2021-11-27 PROCEDURE — 86140 C-REACTIVE PROTEIN: CPT | Performed by: EMERGENCY MEDICINE

## 2021-11-27 PROCEDURE — 96375 TX/PRO/DX INJ NEW DRUG ADDON: CPT | Performed by: EMERGENCY MEDICINE

## 2021-11-27 PROCEDURE — 80048 BASIC METABOLIC PNL TOTAL CA: CPT | Performed by: EMERGENCY MEDICINE

## 2021-11-27 PROCEDURE — 99285 EMERGENCY DEPT VISIT HI MDM: CPT | Mod: 25 | Performed by: EMERGENCY MEDICINE

## 2021-11-27 PROCEDURE — 250N000011 HC RX IP 250 OP 636: Performed by: EMERGENCY MEDICINE

## 2021-11-27 PROCEDURE — 85652 RBC SED RATE AUTOMATED: CPT | Performed by: EMERGENCY MEDICINE

## 2021-11-27 PROCEDURE — 96376 TX/PRO/DX INJ SAME DRUG ADON: CPT | Performed by: EMERGENCY MEDICINE

## 2021-11-27 PROCEDURE — 36415 COLL VENOUS BLD VENIPUNCTURE: CPT | Performed by: EMERGENCY MEDICINE

## 2021-11-27 PROCEDURE — 96374 THER/PROPH/DIAG INJ IV PUSH: CPT | Performed by: EMERGENCY MEDICINE

## 2021-11-27 PROCEDURE — 87205 SMEAR GRAM STAIN: CPT | Performed by: EMERGENCY MEDICINE

## 2021-11-27 PROCEDURE — 82945 GLUCOSE OTHER FLUID: CPT | Performed by: EMERGENCY MEDICINE

## 2021-11-27 PROCEDURE — 96365 THER/PROPH/DIAG IV INF INIT: CPT | Performed by: EMERGENCY MEDICINE

## 2021-11-27 RX ORDER — DEXAMETHASONE SODIUM PHOSPHATE 10 MG/ML
10 INJECTION, SOLUTION INTRAMUSCULAR; INTRAVENOUS ONCE
Status: COMPLETED | OUTPATIENT
Start: 2021-11-27 | End: 2021-11-27

## 2021-11-27 RX ORDER — LIDOCAINE HYDROCHLORIDE 10 MG/ML
INJECTION, SOLUTION EPIDURAL; INFILTRATION; INTRACAUDAL; PERINEURAL
Status: DISCONTINUED
Start: 2021-11-27 | End: 2021-11-27 | Stop reason: HOSPADM

## 2021-11-27 RX ORDER — MAGNESIUM SULFATE HEPTAHYDRATE 40 MG/ML
2 INJECTION, SOLUTION INTRAVENOUS ONCE
Status: COMPLETED | OUTPATIENT
Start: 2021-11-27 | End: 2021-11-27

## 2021-11-27 RX ADMIN — PROCHLORPERAZINE EDISYLATE 10 MG: 5 INJECTION INTRAMUSCULAR; INTRAVENOUS at 14:26

## 2021-11-27 RX ADMIN — DEXAMETHASONE SODIUM PHOSPHATE 10 MG: 10 INJECTION, SOLUTION INTRAMUSCULAR; INTRAVENOUS at 17:29

## 2021-11-27 RX ADMIN — MAGNESIUM SULFATE 2 G: 2 INJECTION INTRAVENOUS at 17:27

## 2021-11-27 RX ADMIN — PROCHLORPERAZINE EDISYLATE 10 MG: 5 INJECTION INTRAMUSCULAR; INTRAVENOUS at 17:29

## 2021-11-27 ASSESSMENT — ENCOUNTER SYMPTOMS
FEVER: 0
VOMITING: 1
HEADACHES: 1
DIZZINESS: 1
NECK PAIN: 1
NAUSEA: 1
CHILLS: 0

## 2021-11-27 NOTE — ED PROVIDER NOTES
"ED Provider Note  [facility]      History     Chief Complaint   Patient presents with     Headache     Onset 5 days ago with headache, no relief with medications at home.     HPI  Dai Guadarrama is a 36 year old female with a PMH of GDM, placenta previa, pyelonephritis in pregnancy, hepatitis B nonimmune, intrauterine fetal death, h/o SP  section, chronic migraine and aneurysm who presents the ED today complaining of headache.  Patient states she is had this headache for the past 5 days.  She states it came on strong and suddenly early Tuesday morning and woke her from her sleep.  She states the pain has been constant since.  She states she has had headaches like this in the past.  She states that it feels like there is a \"boiling liquid\" inside her brain when she moves her head to the left.  She states this pain feels similar to pain she had with her aneurysm about 5 years ago.  She reports his aneurysm was not surgically corrected and she was told to be careful with headaches in the future.  She is requesting a CT scan.  Patient also endorses neck pain.  She states this is her first time seeing a doctor for headache like this since her MRI 3 years ago.  She also endorses facial pain, dental pain, nausea, vomiting and dizziness.  Patient denies fever or chills.    Past Medical History  Past Medical History:   Diagnosis Date     Aneurysm (H)      Migraine headache without aura      Pregnancy related nausea and vomiting, antepartum     two ED visits.     Recurrent pregnancy loss (CODE)      Retained placenta 2014     Stillbirth 2014    32 weeks     Tachycardia      Past Surgical History:   Procedure Laterality Date     C EACH ADD TOOTH EXTRACTION      bad reaction to anesthia for local       SECTION N/A 2018    Procedure:  SECTION;  Surgeon: Noreen Harris MD;  Location: [facility]      SECTION, TUBAL LIGATION, COMBINED N/A 2020    " Procedure:  SECTION, WITH POSTPARTUM TUBAL LIGATION;  Surgeon: Joya iHcks MD;  Location: [facility]     INSERT PICC LINE Right 3/12/2020    Procedure: Midline Picc Placement;  Surgeon: Ugo Webb PA-C;  Location: [facility]     IR PICC PLACEMENT > 5 YRS OF AGE  3/12/2020     acetaminophen (TYLENOL) 325 MG tablet  cholecalciferol (VITAMIN D3) 5000 units (125 mcg) capsule  cyanocobalamin (VITAMIN B-12) 1000 MCG tablet  vitamin C (ASCORBIC ACID) 250 MG tablet      No Known Allergies  Family History  Family History   Problem Relation Age of Onset     Anxiety Disorder No family hx of      Mental Illness No family hx of      Substance Abuse No family hx of      Anesthesia Reaction No family hx of      Asthma No family hx of      Osteoporosis No family hx of      Genetic Disorder No family hx of      Thyroid Disease No family hx of      Hyperlipidemia No family hx of      Cerebrovascular Disease No family hx of      Breast Cancer No family hx of      Colon Cancer No family hx of      Prostate Cancer No family hx of      Other Cancer No family hx of      Depression No family hx of      Diabetes No family hx of      Coronary Artery Disease No family hx of      Hypertension No family hx of      Social History   Social History     Tobacco Use     Smoking status: Never Smoker     Smokeless tobacco: Never Used   Substance Use Topics     Alcohol use: Not Currently     Alcohol/week: 0.0 standard drinks     Comment: rare alcohol use now nothing in pregnancy     Drug use: No      Past medical history, past surgical history, medications, allergies, family history, and social history were reviewed with the patient. No additional pertinent items.       Review of Systems   Constitutional: Negative for chills and fever.   Gastrointestinal: Positive for nausea and vomiting.   Musculoskeletal: Positive for neck pain.   Neurological: Positive for dizziness and headaches.   All other systems reviewed and are  negative.    A complete review of systems was performed with pertinent positives and negatives noted in the HPI, and all other systems negative.    Physical Exam   BP: (!) 144/106  Pulse: 96  Temp: 97.7  F (36.5  C)  Resp: 16  SpO2: 96 %  Physical Exam  Vitals and nursing note reviewed.   Constitutional:       Appearance: Normal appearance.   HENT:      Head: Atraumatic.   Eyes:      Pupils: Pupils are equal, round, and reactive to light.   Cardiovascular:      Rate and Rhythm: Regular rhythm.      Heart sounds: Normal heart sounds.   Pulmonary:      Effort: No respiratory distress.      Breath sounds: Normal breath sounds.   Chest:      Chest wall: No tenderness.   Abdominal:      Tenderness: There is no abdominal tenderness.   Musculoskeletal:      Cervical back: No tenderness.      Thoracic back: No tenderness.      Lumbar back: No tenderness.   Neurological:      General: No focal deficit present.      Mental Status: She is alert and oriented to person, place, and time.           ED Course     1:49 PM  The patient was seen and examined by Noemí Cagle MD in Room ED18[facility]    -Lumbar Puncture    Date/Time: 11/28/2021 4:49 PM  Performed by: Noemí Cagle MD  Authorized by: Noemí Cagle MD     Risks, benefits and alternatives discussed.      PRE-PROCEDURE DETAILS:     Procedure purpose:  Diagnostic    ANESTHESIA (see MAR for exact dosages):     Anesthesia method:  Local infiltration    Local anesthetic:  Lidocaine 1% w/o epi      PROCEDURE DETAILS:     Lumbar space:  L4-L5 interspace    Patient position:  L lateral decubitus    Needle gauge:  20    Needle type:  Spinal needle - Quincke tip    Needle length (in):  3.5    Ultrasound guidance: no      Number of attempts:  3    Fluid appearance:  Blood-tinged then clearing    Tubes of fluid:  4    Total volume (ml):  5    POST-PROCEDURE:     Puncture site:  Adhesive bandage  applied        PROCEDURE    Patient Tolerance:  Patient tolerated the procedure well with no immediate complications            No results found for any visits on 21.  Medications - No data to display     Assessments & Plan (with Medical Decision Making)     36 year old female with a PMH of GDM, placenta previa, pyelonephritis in pregnancy, hepatitis B nonimmune, intrauterine fetal death, h/o SP  section, chronic migraine and aneurysm who presents the ED today complaining of headache and left-sided weakness and numbness for the past 5 days.  Patient presentation concerning for possible complex migraine, subarachnoid hemorrhage, less likely meningitis.  IV established, labs drawn sent reviewed document epic essentially all unremarkable with normal CBC electrolytes and normal inflammatory markers.  Patient was initially treated with a dose of Compazine 10 mg IV and upon repeat assessment patient's headache had not improved.  She was sent to CT for imaging the head which revealed no evidence of acute process.  Patient was subsequently consented for lumbar puncture which was obtained regarding the above procedure was returned of CSF suggesting likely traumatic tap.  Patient was subsequently given Decadron Compazine along with dexamethasone and magnesium sulfate 2 g IV piggyback. CSF results pending at time of this dictation, signed out to evening staff to review CSF labs and reassess patient with potential likelihood for further imaging with MRI.      I have reviewed the nursing notes. I have reviewed the findings, diagnosis, plan and need for follow up with the patient.    New Prescriptions    No medications on file       Final diagnoses:   Nonintractable headache, unspecified chronicity pattern, unspecified headache type   Mathew SOL, am serving as a trained medical scribe to document services personally performed by Noemí Cagle MD, based on the provider's statements to me.     I, Noemí Cagle,  MD, was physically present and have reviewed and verified the accuracy of this note documented by Mathew Johns.      --  Noemí Cagle MD  [facility] EMERGENCY DEPARTMENT  11/27/2021     Noemí Cagle MD  11/28/21 6817

## 2021-11-28 ENCOUNTER — APPOINTMENT (OUTPATIENT)
Dept: INTERPRETER SERVICES | Facility: CLINIC | Age: 36
End: 2021-11-28

## 2021-11-28 ENCOUNTER — TELEPHONE (OUTPATIENT)
Dept: EMERGENCY MEDICINE | Facility: CLINIC | Age: 36
End: 2021-11-28

## 2021-11-28 LAB
ATRIAL RATE - MUSE: 80 BPM
DIASTOLIC BLOOD PRESSURE - MUSE: NORMAL MMHG
INTERPRETATION ECG - MUSE: NORMAL
P AXIS - MUSE: 49 DEGREES
PR INTERVAL - MUSE: 164 MS
QRS DURATION - MUSE: 76 MS
QT - MUSE: 402 MS
QTC - MUSE: 463 MS
R AXIS - MUSE: 45 DEGREES
SYSTOLIC BLOOD PRESSURE - MUSE: NORMAL MMHG
T AXIS - MUSE: 4 DEGREES
VENTRICULAR RATE- MUSE: 80 BPM

## 2021-11-28 NOTE — ED NOTES
Pt signed out to me by Dr. Cagle at  5pm    Situation: Is a very nice 36-year-old female with a history of migraines as well as a history of an aneurysm that she says that her primary care doctor is watching who presents to the ER due to headache.  Patient had received IV Compazine Benadryl and Decadron for treatment of her headache.  Patient had a CT head which was negative.  Patient had a lumbar puncture done the ended up being a traumatic tap whose results are pending.    Plan: Plan is to wait for the CSF results to see if they were consistent with a possible subarachnoid hemorrhage.  Since the trap was traumatic we were expecting to see a significant amount of blood in the CSF.  If the CSF was as expected bloody plan would be to get an MRI to make sure that there is no sign of aneurysm.    Shift Report:    Patient had a CSF done on tube 3 that showed that there was a significant RBCs.  I called lab and talk to multiple people in the tube 4 had already been sent to [facility] to [employer].  I called and asked to see if they could do the cell count on tube 4 to see if the blood was decreasing.      I also went to speak to the patient and her sister.  Patient's headache had completely resolved and she was feeling back to normal.  I told him that according to Dr. Calero's recommendations would recommend doing an MRI brain to make sure that there is no signs of an aneurysm.  Patient initially agreed to get this testing done.  The MRI was ordered and patient was taken down to MRI.  Patient however is a Estonian speaker and we do not have an in person  so the MRI tech refused to do the MRI.  The patient was brought back to the emergency department and she decided that she did not want to wait to get any further testing done.  We did call and ask for an in person  we are waiting for the person to arrive.  When I went and spoke to the patient she said that her headache had resolved and she wanted  to be discharged home.  Patient was having some concerns regarding her child that was at home with her .  I told the patient that we recommend that we do an MRI or a CT angio of the head to make sure that there is no other concerns.  Patient was adamant that she wanted to be discharged home and that she would follow-up with her primary care doctor  On Monday for further care.  Plan will be to send the patient home AGAINST MEDICAL ADVICE.    Dai Guadarrama has made the decision to leave the current treatment against medical advice.  She has been told that her symptoms may possibly be caused by aneursym. She has been informed and understands the inherent risks, including death, permanent disability or death.  She has decided to accept the responsibility for her decision.  She has the capacity to make this informed decision.  She is alert and oriented x 3, understands these instructions, and is able to ambulate.  Dai Guadarrama and all necessary parties have been advised that they may return at any time for further evaluation or treatment.    Signed:  Analy Jackson MD  November 27, 2021 at 9:23 PM       Analy Jackson MD  11/27/21 2123

## 2021-11-28 NOTE — ED NOTES
"Pt transported to MRI for scan, pt [place] speaking and MRI tech informed staff we need an in-person  for all scans. Pt returned to ED and informed via  ipad. Pt stating she now \"no longer wants an MRI as she has a young baby at home\". MD notified. Requested time estimate of in-person .  "

## 2021-11-28 NOTE — DISCHARGE INSTRUCTIONS
You came to the ER for a headache.     Your CT head was normal.     The lumbar puncture was not able to determine whether the headache was a complication of your aneurysm.     We recommend you have an MRI Brain or a CT angio of your head to make sure your aneurysm is stable but you want to be discharged home and do not want to wait for the testing in the Emergency department.     You are taking the risk of something happening prior to the testing.     Please follow up with your doctor of Monday.     Return to the ER if symptoms worsen.

## 2021-11-28 NOTE — TELEPHONE ENCOUNTER
"[facility] Emergency Department Lab result notification [Adult-Female]    [place] ED lab result protocol used  Blood and CSF cultures    Reason for call  Notify of lab results, assess symptoms,  review ED providers recommendations/discharge instructions (if necessary) and advise per ED lab result f/u protocol    Lab Result (including Rx patient on, if applicable)  Abnormal Result.  Final Cell Count CSF Tube number 4.  Resulted after [facility] Emergency Dept visit on this date 21.  Information table from Emergency Dept Provider visit on 21  Symptoms reported at ED visit (Chief complaint, HPI) Headache        Onset 5 days ago with headache, no relief with medications at home.      HPI  Dai Guadarrama is a 36 year old female with a PMH of GDM, placenta previa, pyelonephritis in pregnancy, hepatitis B nonimmune, intrauterine fetal death, h/o SP  section, chronic migraine and aneurysm who presents the ED today complaining of headache.  Patient states she is had this headache for the past 5 days.  She states it came on strong and suddenly early Tuesday morning and woke her from her sleep.  She states the pain has been constant since.  She states she has had headaches like this in the past.  She states that it feels like there is a \"boiling liquid\" inside her brain when she moves her head to the left.  She states this pain feels similar to pain she had with her aneurysm about 5 years ago.  She reports his aneurysm was not surgically corrected and she was told to be careful with headaches in the future.  She is requesting a CT scan.  Patient also endorses neck pain.  She states this is her first time seeing a doctor for headache like this since her MRI 3 years ago.  She also endorses facial pain, dental pain, nausea, vomiting and dizziness.  Patient denies fever or chills     Significant Medical hx, if applicable (i.e. CKD, diabetes) Reviewed   Allergies No Known " Allergies   Weight, if applicable Wt Readings from Last 2 Encounters:   20 101.8 kg (224 lb 8 oz)   20 109.3 kg (241 lb)      Coumadin/Warfarin [Yes /No] No   Creatinine Level (mg/dl) Creatinine   Date Value Ref Range Status   2021 0.52 0.52 - 1.04 mg/dL Final   2020 0.49 (L) 0.52 - 1.04 mg/dL Final      Creatinine clearance (ml/min), if applicable Creatinine clearance cannot be calculated (Unknown ideal weight.)   ED providers Impression and Plan (applicable information) 36 year old female with a PMH of GDM, placenta previa, pyelonephritis in pregnancy, hepatitis B nonimmune, intrauterine fetal death, h/o SP  section, chronic migraine and aneurysm who presents the ED today complaining of headache.  Patient had CT head which revealed no acute process.  LP obtained with CSF results pending at time of this dictation, signed out to evening staff to review patient and reassess.    Pt signed out to me by Dr. Cagle at  5pm     Situation: Is a very nice 36-year-old female with a history of migraines as well as a history of an aneurysm that she says that her primary care doctor is watching who presents to the ER due to headache.  Patient had received IV Compazine Benadryl and Decadron for treatment of her headache.  Patient had a CT head which was negative.  Patient had a lumbar puncture done the ended up being a traumatic tap whose results are pending.     Plan: Plan is to wait for the CSF results to see if they were consistent with a possible subarachnoid hemorrhage.  Since the trap was traumatic we were expecting to see a significant amount of blood in the CSF.  If the CSF was as expected bloody plan would be to get an MRI to make sure that there is no sign of aneurysm.     Shift Report:     Patient had a CSF done on tube 3 that showed that there was a significant RBCs.  I called lab and talk to multiple people in the tube 4 had already been sent to [email or web address] [facility] [email or web address].  I called and asked  to see if they could do the cell count on tube 4 to see if the blood was decreasing.       I also went to speak to the patient and her sister.  Patient's headache had completely resolved and she was feeling back to normal.  I told him that according to Dr. Calero's recommendations would recommend doing an MRI brain to make sure that there is no signs of an aneurysm.  Patient initially agreed to get this testing done.  The MRI was ordered and patient was taken down to MRI.  Patient however is a Setswana speaker and we do not have an in person  so the MRI tech refused to do the MRI.  The patient was brought back to the emergency department and she decided that she did not want to wait to get any further testing done.  We did call and ask for an in person  we are waiting for the person to arrive.  When I went and spoke to the patient she said that her headache had resolved and she wanted to be discharged home.  Patient was having some concerns regarding her child that was at home with her .  I told the patient that we recommend that we do an MRI or a CT angio of the head to make sure that there is no other concerns.  Patient was adamant that she wanted to be discharged home and that she would follow-up with her primary care doctor  On Monday for further care.  Plan will be to send the patient home AGAINST MEDICAL ADVICE.     Dai Guadarrama has made the decision to leave the current treatment against medical advice.  She has been told that her symptoms may possibly be caused by aneursym. She has been informed and understands the inherent risks, including death, permanent disability or death.  She has decided to accept the responsibility for her decision.  She has the capacity to make this informed decision.  She is alert and oriented x 3, understands these instructions, and is able to ambulate.  Dai Granados Chiara and all necessary parties have been advised that they may  return at any time for further evaluation or treatment     ED diagnosis  Nonintractable headache, unspecified chronicity pattern, unspecified headache type     ED provider  Analy Jackson MD       [place] Emergency Department Provider Name & Recommendations (included time consulted)  9:49AM: Consulted with [facility] ED provider Christina King CNP. Reviewed patient's history and lab result. The provider will consult with Dr. Jackson and call this RN back.   10:28AM: Mynor King CNP called this RN back. She has consulted with Dr. Jackson who reviewed the CSF result and is fine with the results. The patient should follow up with her PCP tomorrow as directed.     RN Assessment (Patient s current Symptoms), include time called.  [Insert Left message here if message left]  10:34AM: Left voicemail message with assistance of [place] Samuel Vergara requesting a call back to [facility] ED Lab Result RN at [phone].  RN is available every day between 9 a.m. and 5:30 p.m.     Chelo Hoffmann RN  [facility] [employer] [place]  Emergency Dept Lab Result RN  Ph# [phone]     Copy of Lab result   CSF Cell Count with Differential:  Order: [id number]   Status: Final result     Visible to patient: No (blocked)     0 Result Notes            Specimen Collected: 11/27/21  4:34 PM Last Resulted: 11/27/21 10:09 PM

## 2021-11-28 NOTE — ED NOTES
Pt signed AMA, MD discussed risks of leaving AMA with pt via  line. Pt signed form, all questions answered.

## 2021-11-29 ENCOUNTER — APPOINTMENT (OUTPATIENT)
Dept: INTERPRETER SERVICES | Facility: CLINIC | Age: 36
End: 2021-11-29

## 2021-11-30 NOTE — TELEPHONE ENCOUNTER
[facility] Emergency Department/Urgent Care Lab result notification:    Reason for call  Notify of lab results, assess symptoms,  review ED providers recommendations (if necessary) and advise per ED lab result f/u protocol.    Lab result  Abnormal Result.  Final Cell Count CSF Tube number 4.  Resulted after [facility] Emergency Dept visit on this date 11/27/21.  Patient informed of results, she states that she is feeling well. Did encourage scheduling a follow up visit but patient declined at this time.            Rhiannon Rodriguez, [employer]  [facility] Service Center Result RN  [facility] Emergency Dept Lab Result RN  Ph# [phone]

## 2021-12-02 LAB
BACTERIA CSF CULT: NO GROWTH
GRAM STAIN RESULT: NORMAL
GRAM STAIN RESULT: NORMAL

## 2021-12-05 NOTE — TELEPHONE ENCOUNTER
----- Message from Fadi Schwarz sent at 3/11/2020  7:26 AM CDT -----  Regarding: FW: IV Home Infusion    ----- Message -----  From: Ruchi Huffman  Sent: 3/10/2020   4:54 PM CDT  To: [facility]  Subject: IV Home Infusion                                 Sherri called from [facility] she is unable to get a IV line. Please call patient or [facility] office at  and ask for Emma Keating. Per Sherri she is out of town tomorrow. Home infusion was orderd by Merry Clayton on 03/06/20.     Thank you,  Ruchi call center        Statement Selected

## 2022-03-21 ENCOUNTER — APPOINTMENT (OUTPATIENT)
Dept: INTERPRETER SERVICES | Facility: CLINIC | Age: 37
End: 2022-03-21

## 2022-05-15 ENCOUNTER — HEALTH MAINTENANCE LETTER (OUTPATIENT)
Age: 37
End: 2022-05-15

## 2022-09-11 ENCOUNTER — HEALTH MAINTENANCE LETTER (OUTPATIENT)
Age: 37
End: 2022-09-11

## 2023-01-11 NOTE — PROGRESS NOTES
Infusion Nursing Note:  Dai Guadarrama presents today for venofer.    Patient seen by provider today: No   present during visit today: Yes, Dominican    Note: Patient states she did not have any adverse effects with first dose.    Intravenous Access:  Peripheral IV placed.    Post Infusion Assessment:  Patient tolerated infusion without incident.  Patient observed for 30 minutes post venofer per protocol.  Blood return noted pre and post infusion.  Site patent and intact, free from redness, edema or discomfort.  No evidence of extravasations.  Access discontinued per protocol.    Discharge Plan:   Patient discharged in stable condition accompanied by: self,  to pick her up.  Departure Mode: Ambulatory.  Will return to clinic next Tuesday  Joya Escobar RN                         Initial Size Of Lesion: 1.2

## 2023-02-10 NOTE — TELEPHONE ENCOUNTER
History and Physical    Internal Medicine    Chief Complaint   Patient presents with   • Abnormal Lab Results     Recently discharged from hospital, at home MD came and visited her this week. Labs drawn, sent to ed for low K. [place] dizzy earlier today. No pain, palpitations. Wears 4 L NC.        Admission Diagnosis  Hypokalemia [E87.6]    History of Present Illness     Ms. Hanson is a 73 y/o F w/ PMHx of COPD on 3-4L Ox at home, HFpEF, HTN, HLD, who presents to the ED for abnormal labs noted by PCP. Of note, pt was recently admitted to [facility] 3 weeks ago for CHF exacerbation & after treatment was discharged to [facility]. Pt was released back home couple days ago & since then has been home taking care of herself. She lives alone & does not have anyone to help with her meds. She notes that an RN came to her house yesterday or 2 days ago & isidro labs. Then she received a call from her PCP's clinic stating that her blood work was abnormal & that she should go to the ED. Pt denies any chest pain/pressure, SOB, nausea, vomiting, diarrhea or constipation. She denies any fevers, chills, cough, dysuria. Of note, pt appears to have some level of dementia. During my encounter, pt repeated some details & forgot a couple times about our conversation. Since she takes her medications herself, there maybe a possibility of increased medication usage due to forgetfulness. Pt says she has been eating & drinking normally since discharge from [facility].    Review of Systems   All other systems reviewed and are negative.    Histories     Current Outpatient Medications   Medication Instructions   • albuterol (VENTOLIN) 2.5 mg, Nebulization, EVERY 4 HOURS PRN   • albuterol 108 (90 Base) MCG/ACT inhaler 2 puffs, Inhalation, EVERY 6 HOURS PRN   • aspirin (ASPIRIN 81) 81 mg, Oral, DAILY   • atorvastatin (LIPITOR) 80 mg, Oral, DAILY   • ferrous sulfate (FEROSUL) 325 mg, Oral, EVERY OTHER DAY   • fluticasone-vilanterol  ----- Message from Maggie Morrow sent at 6/1/2020 12:43 PM CDT -----  Regarding: [facility]  Hello!   I received a call from [facility] from  [facility] regarding patient's orders from [facility]. S[facility]hire stated the patient was receiving Lactated ringers and multi-vitamins from [facility] and the last date the patient received these was 4/20/20.  Sapphire is wondering if the patient can be discharged from the [facility] orders.    Please call Sapphire at [phone].    Thanks!  Maggie  [facility]     (BREO ELLIPTA) 100-25 MCG/ACT inhaler 1 puff, Inhalation, DAILY, inhale 1 puff into the lungs daily.   • furosemide (LASIX) 20 mg, Oral, EVERY OTHER DAY, Take 1 tablet by mouth Every other day   • hydroCHLOROthiazide (HYDRODIURIL) 25 mg, Oral, DAILY, TAKE 1 TAB PO DAILY.   • ipratropium-albuterol (DUONEB) 0.5-2.5 (3) MG/3ML nebulizer solution 3 mLs, Nebulization, EVERY 4 HOURS RESPIRATORY PRN   • losartan (COZAAR) 100 mg, Oral, DAILY   • metoPROLOL succinate (TOPROL-XL) 25 mg, Oral, DAILY   • Respiratory Therapy Supplies (NEBULIZER/ADULT MASK) Kit To use with albuterol for COPD Code: J44.9   • sodium chloride (OCEAN) 0.65 % nasal spray 1 spray, Nasal, PRN, 2 sprays by Each Nare route if needed   • umeclidinium-vilanterol (Anoro Ellipta) 62.5-25 MCG/ACT inhaler 1 puff, Inhalation, DAILY     Allergies   Allergen Reactions   • Penicillins Other (See Comments)     unknown       Social History     Tobacco Use   • Smoking status: Every Day     Packs/day: 0.50     Types: Cigarettes     Start date: 1961   • Smokeless tobacco: Never   Substance Use Topics   • Alcohol use: Not Currently     Comment: social     Family History   Problem Relation Age of Onset   • Other Mother         accidental death: complication from ENT surgery   • Other Half-Brother         gout   • Heart disease Neg Hx      Past Surgical History:   Procedure Laterality Date   • CARDIAC CATHERIZATION     • COLONOSCOPY     • DENTOALVEOLAR FRACTURE REPAIR Right      Objective     /55 (BP Location: LUE - Left upper extremity, Patient Position: Semi-Hodges's)   Pulse 78   Temp 97.5 °F (36.4 °C) (Oral)   Resp 15   Ht 5' 3\" (1.6 m)   Wt 77.6 kg (171 lb 1.2 oz)   BMI 30.30 kg/m²   BSA 1.81 m²     Physical Exam  Constitutional:       Appearance: Normal appearance.   HENT:      Head: Normocephalic and atraumatic.   Cardiovascular:      Rate and Rhythm: Normal rate and regular rhythm.      Pulses: Normal pulses.      Heart sounds: Normal heart sounds.    Pulmonary:      Effort: Pulmonary effort is normal.      Breath sounds: Normal breath sounds.   Abdominal:      Palpations: Abdomen is soft.   Musculoskeletal:      Right lower leg: Edema present.      Left lower leg: Edema present.   Neurological:      General: No focal deficit present.      Mental Status: She is alert and oriented to person, place, and time.       Recent Results (from the past 48 hour(s))   COMPREHENSIVE METABOLIC PANEL    Collection Time: 02/08/23  3:30 PM   Result Value Ref Range    Fasting Status      Sodium 138 135 - 145 mmol/L    Potassium 2.7 (LL) 3.4 - 5.1 mmol/L    Chloride 89 (L) 97 - 110 mmol/L    Carbon Dioxide 44 (HH) 21 - 32 mmol/L    Anion Gap 8 7 - 19 mmol/L    Glucose 147 (H) 70 - 99 mg/dL    BUN 22 (H) 6 - 20 mg/dL    Creatinine 1.70 (H) 0.51 - 0.95 mg/dL    Glomerular Filtration Rate 31 (L) >=60    BUN/ Creatinine Ratio 13 7 - 25    Calcium 9.8 8.4 - 10.2 mg/dL    Bilirubin, Total 0.3 0.2 - 1.0 mg/dL    GOT/AST 16 <=37 Units/L    GPT/ALT 15 <64 Units/L    Alkaline Phosphatase 105 45 - 117 Units/L    Albumin 3.1 (L) 3.6 - 5.1 g/dL    Protein, Total 7.0 6.4 - 8.2 g/dL    Globulin 3.9 2.0 - 4.0 g/dL    A/G Ratio 0.8 (L) 1.0 - 2.4   CBC WITH AUTOMATED DIFFERENTIAL (PERFORMABLE ONLY)    Collection Time: 02/08/23  3:30 PM   Result Value Ref Range    WBC 7.8 4.2 - 11.0 K/mcL    RBC 4.34 4.00 - 5.20 mil/mcL    HGB 11.4 (L) 12.0 - 15.5 g/dL    HCT 38.2 36.0 - 46.5 %    MCV 88.0 78.0 - 100.0 fl    MCH 26.3 26.0 - 34.0 pg    MCHC 29.8 (L) 32.0 - 36.5 g/dL    RDW-CV 14.4 11.0 - 15.0 %    RDW-SD 46.2 39.0 - 50.0 fL     140 - 450 K/mcL    NRBC 0 <=0 /100 WBC    Neutrophil, Percent 71 %    Lymphocytes, Percent 17 %    Mono, Percent 8 %    Eosinophils, Percent 3 %    Basophils, Percent 1 %    Immature Granulocytes 0 %    Absolute Neutrophils 5.6 1.8 - 7.7 K/mcL    Absolute Lymphocytes 1.3 1.0 - 4.0 K/mcL    Absolute Monocytes 0.6 0.3 - 0.9 K/mcL    Absolute Eosinophils  0.2 0.0 - 0.5  K/mcL    Absolute Basophils 0.0 0.0 - 0.3 K/mcL    Absolute Immmature Granulocytes 0.0 0.0 - 0.2 K/mcL   Electrocardiogram 12-Lead    Collection Time: 02/09/23  4:26 PM   Result Value Ref Range    Ventricular Rate EKG/Min (BPM) 67     Atrial Rate (BPM) 67     CA-Interval (MSEC) 170     QRS-Interval (MSEC) 90     QT-Interval (MSEC) 382     QTc 403     P Axis (Degrees) 93     R Axis (Degrees) 18     T Axis (Degrees) 20     REPORT TEXT       Sinus rhythm  with  premature atrial complexes  with  aberrant conduction  Possible  Left atrial enlargement  Borderline ECG  When compared with ECG of  18-SEP-2022 14:40,  premature ventricular complexes  are no longer  present  aberrant conduction  is now  present  Vent. rate  has decreased  BY  68 BPM  Questionable change in  QRS axis  Non-specific change in ST segment in  Inferior leads  T wave inversion less evident in  Inferior leads     Comprehensive Metabolic Panel    Collection Time: 02/09/23  4:35 PM   Result Value Ref Range    Fasting Status      Sodium 136 135 - 145 mmol/L    Potassium 2.4 (LL) 3.4 - 5.1 mmol/L    Chloride 88 (L) 97 - 110 mmol/L    Carbon Dioxide 46 (HH) 21 - 32 mmol/L    Anion Gap 4 (L) 7 - 19 mmol/L    Glucose 122 (H) 70 - 99 mg/dL    BUN 31 (H) 6 - 20 mg/dL    Creatinine 2.55 (H) 0.51 - 0.95 mg/dL    Glomerular Filtration Rate 19 (L) >=60    BUN/ Creatinine Ratio 12 7 - 25    Calcium 10.1 8.4 - 10.2 mg/dL    Bilirubin, Total 0.3 0.2 - 1.0 mg/dL    GOT/AST 20 <=37 Units/L    GPT/ALT 15 <64 Units/L    Alkaline Phosphatase 100 45 - 117 Units/L    Albumin 3.2 (L) 3.6 - 5.1 g/dL    Protein, Total 7.5 6.4 - 8.2 g/dL    Globulin 4.3 (H) 2.0 - 4.0 g/dL    A/G Ratio 0.7 (L) 1.0 - 2.4   TROPONIN I, HIGH SENSITIVITY    Collection Time: 02/09/23  4:35 PM   Result Value Ref Range    Troponin I, High Sensitivity 27 <52 ng/L   CBC with Automated Differential (performable only)    Collection Time: 02/09/23  4:35 PM   Result Value Ref Range    WBC 7.4 4.2 - 11.0  K/mcL    RBC 4.46 4.00 - 5.20 mil/mcL    HGB 11.7 (L) 12.0 - 15.5 g/dL    HCT 38.7 36.0 - 46.5 %    MCV 86.8 78.0 - 100.0 fl    MCH 26.2 26.0 - 34.0 pg    MCHC 30.2 (L) 32.0 - 36.5 g/dL    RDW-CV 14.3 11.0 - 15.0 %    RDW-SD 44.7 39.0 - 50.0 fL     140 - 450 K/mcL    NRBC 0 <=0 /100 WBC    Neutrophil, Percent 65 %    Lymphocytes, Percent 25 %    Mono, Percent 6 %    Eosinophils, Percent 4 %    Basophils, Percent 0 %    Immature Granulocytes 0 %    Absolute Neutrophils 4.8 1.8 - 7.7 K/mcL    Absolute Lymphocytes 1.8 1.0 - 4.0 K/mcL    Absolute Monocytes 0.4 0.3 - 0.9 K/mcL    Absolute Eosinophils  0.3 0.0 - 0.5 K/mcL    Absolute Basophils 0.0 0.0 - 0.3 K/mcL    Absolute Immmature Granulocytes 0.0 0.0 - 0.2 K/mcL   Magnesium    Collection Time: 02/09/23  4:35 PM   Result Value Ref Range    Magnesium 2.3 1.7 - 2.4 mg/dL   NT proBNP    Collection Time: 02/09/23  4:35 PM   Result Value Ref Range    NT-proBNP 868 (H) <=450 pg/mL   BLOOD GAS, ARTERIAL - RESPIRATORY    Collection Time: 02/09/23  7:00 PM   Result Value Ref Range    BASE EXCESS / DEFICIT, ARTERIAL - RESPIRATORY 26 (H) -2 - 3 mmol/L    HCO3, ARTERIAL - RESPIRATORY 55 (HH) 22 - 28 mmol/L    PCO2, ARTERIAL - RESPIRATORY 79 (HH) 32 - 45 mm Hg    PH, ARTERIAL - RESPIRATORY 7.45 7.35 - 7.45 Units    PO2, ARTERIAL - RESPIRATORY 74 (L) 83 - 108 mm Hg    O2 SATURATION, ARTERIAL - RESPIRATORY 95 95 - 99 %    CONDITION - RESPIRATORY 3.5 L/M NC     FIO2 - RESPIRATORY 34 %    SITE - RESPIRATORY Left Radial     TEMPERATURE - RESPIRATORY 37.0 degrees     XR CHEST AP OR PA   Final Result   FINDINGS AND IMPRESSION: Prominent cardiac silhouette.  Hilar fullness may   represent prominent pulmonary arteries.  Aortic calcifications.  Mild hazy   opacity in the right lung base may represent atelectasis or infection.    Degenerative changes in the shoulders bilaterally.      Electronically Signed by: JOE VOGEL M.D.    Signed on: 2/9/2023 4:59 PM              Assessment  and Plan     Ms. Hanson presented to [facility] 2023 in chief complaint of Abnormal Lab Results (Recently discharged from hospital, at home MD came and visited her this week. Labs drawn, sent to ed for low K. [place] dizzy earlier today. No pain, palpitations. Wears 4 L NC. )    Here, she was found to have:    #Metabolic alkalosis  #Hypokalemia  - pt feeling well, no acute complaints  - likely 2/2 lasix  - all vitals stable, saturating well on 4L NC (at baseline home ox)  - Labs: WBC 7.8, Hb 11.4, K 2.4, CO2 46, Cl 88  - AB.45  74  79  Bicarb 55 (baseline 40)  - Chest X-R: [place] fullness may represent prominent pulmonary arteries. Aortic calcifications. Mild hazy opacity in the right lung base may represent atelectasis or infection. Degenerative changes in the shoulders bilaterally.  - EKG: Sinus rhythm with premature atrial complexes with aberrant conduction. Possible left atrial enlargement  - received 40mEq once in ED  Plan:  - f/u BMP in PM, replete K PRN  - hold nephrotoxic agents  - hold HCTZ & lasix for now  - PT/OT on consult    #KALINA  - Cr 2.55 (was 1.70 yesterday - baseline is 0.95)  - could be 2/2 dec PO intake vs med toxicity   - avoid nephrotoxic agents  - f/u urine lytes (Cr, urea, Na)  - hold lasix & thiazide until Cr improves  - consider gentle IVF tmrw if Cr not improving given recent CHF exacerbation history requiring hospitalization at     #COPD  - on 3-4L home oxygen, albuterol inh, breo ellipta inh?  - maintain SpO2 88-92%  - not in acute exacerbation,     #Elevated NT proBNP  #HFpEF  - on lasix 20mg every other day, metoprolol 25mg QD, resumed both  - proBNP 868 (chronically elevated), pt not fluid overloaded on PE, on baseline home Ox  - Last echo 2023 - EF 56%, grade I diastolic dysfunction, normal RV pressure    #HTN  #HLD  - takes hydrochlorothiazide 25mg QD, lipitor 80mg QD, ASA 81mg & metoprolol 25 QD, resumed all    #Normocytic anemia  - resumed iron sulf supps    #Checklist  DVT  ppx: heparin SQ  Diet: low sodium, consis carb/cardiac diet  Fluids: none    PCP: Charlie Jacobo MD    Code Status: Full Resuscitation    Dietary Orders (From admission, onward)     Start     Ordered    02/09/23 2009  Cardiac, Consistent Carb Moderate (45-75 gm/meal), Sodium 2 gm (Low Sodium); Fluid Restrict 1800ml (1020 from Dietary) Diet  DIET EFFECTIVE NOW        Question Answer Comment   Diet Modifiers Cardiac    Diet Modifiers Consistent Carb Moderate (45-75 gm/meal)    Diet Modifiers Sodium 2 gm (Low Sodium)    Fluid Restriction Modifier Fluid Restrict 1800ml (1020 from Dietary)        02/09/23 2008 02/09/23 1808  One Time Diet Cardiac  DIET EFFECTIVE NOW ONE TIME        Question:  Diet Modifiers  Answer:  Cardiac    02/09/23 1807    02/09/23 1808  Nutrition Communication  CONTINUOUS        Question:  Nutrition communication (specify)  Answer:  please send tray to ED, NKFA thanks    02/09/23 1807              To be discussed w/ attending in AM.     Gatito Escalante MD  Internal Medicine  PGY-1  Please contact via [employer]

## 2023-06-03 ENCOUNTER — HEALTH MAINTENANCE LETTER (OUTPATIENT)
Age: 38
End: 2023-06-03

## 2024-02-24 ENCOUNTER — HEALTH MAINTENANCE LETTER (OUTPATIENT)
Age: 39
End: 2024-02-24

## 2024-07-13 ENCOUNTER — HEALTH MAINTENANCE LETTER (OUTPATIENT)
Age: 39
End: 2024-07-13

## 2024-09-03 ENCOUNTER — HOSPITAL ENCOUNTER (EMERGENCY)
Facility: HOSPITAL | Age: 39
Discharge: HOME | End: 2024-09-04
Attending: EMERGENCY MEDICINE | Admitting: EMERGENCY MEDICINE

## 2024-09-03 ENCOUNTER — APPOINTMENT (OUTPATIENT)
Dept: CT IMAGING | Facility: HOSPITAL | Age: 39
End: 2024-09-03
Attending: EMERGENCY MEDICINE

## 2024-09-03 DIAGNOSIS — R10.31 RLQ ABDOMINAL PAIN: ICD-10-CM

## 2024-09-03 LAB
ALBUMIN SERPL BCG-MCNC: 4.2 G/DL (ref 3.5–5.2)
ALBUMIN UR-MCNC: NEGATIVE MG/DL
ALP SERPL-CCNC: 92 U/L (ref 40–150)
ALT SERPL W P-5'-P-CCNC: 13 U/L (ref 0–50)
ANION GAP SERPL CALCULATED.3IONS-SCNC: 12 MMOL/L (ref 7–15)
APPEARANCE UR: CLEAR
AST SERPL W P-5'-P-CCNC: 12 U/L (ref 0–45)
BACTERIA #/AREA URNS HPF: ABNORMAL /HPF
BASOPHILS # BLD AUTO: 0.1 10E3/UL (ref 0–0.2)
BASOPHILS NFR BLD AUTO: 1 %
BILIRUB SERPL-MCNC: 0.2 MG/DL
BILIRUB UR QL STRIP: NEGATIVE
BUN SERPL-MCNC: 11 MG/DL (ref 6–20)
CALCIUM SERPL-MCNC: 9.1 MG/DL (ref 8.8–10.4)
CHLORIDE SERPL-SCNC: 105 MMOL/L (ref 98–107)
COLOR UR AUTO: ABNORMAL
CREAT SERPL-MCNC: 0.66 MG/DL (ref 0.51–0.95)
EGFRCR SERPLBLD CKD-EPI 2021: >90 ML/MIN/1.73M2
EOSINOPHIL # BLD AUTO: 0.2 10E3/UL (ref 0–0.7)
EOSINOPHIL NFR BLD AUTO: 2 %
ERYTHROCYTE [DISTWIDTH] IN BLOOD BY AUTOMATED COUNT: 14.2 % (ref 10–15)
GLUCOSE SERPL-MCNC: 119 MG/DL (ref 70–99)
GLUCOSE UR STRIP-MCNC: NEGATIVE MG/DL
HCG UR QL: NEGATIVE
HCO3 SERPL-SCNC: 23 MMOL/L (ref 22–29)
HCT VFR BLD AUTO: 39.6 % (ref 35–47)
HGB BLD-MCNC: 12.8 G/DL (ref 11.7–15.7)
HGB UR QL STRIP: NEGATIVE
IMM GRANULOCYTES # BLD: 0 10E3/UL
IMM GRANULOCYTES NFR BLD: 0 %
KETONES UR STRIP-MCNC: NEGATIVE MG/DL
LEUKOCYTE ESTERASE UR QL STRIP: ABNORMAL
LIPASE SERPL-CCNC: 63 U/L (ref 13–60)
LYMPHOCYTES # BLD AUTO: 3.8 10E3/UL (ref 0.8–5.3)
LYMPHOCYTES NFR BLD AUTO: 38 %
MCH RBC QN AUTO: 27.2 PG (ref 26.5–33)
MCHC RBC AUTO-ENTMCNC: 32.3 G/DL (ref 31.5–36.5)
MCV RBC AUTO: 84 FL (ref 78–100)
MONOCYTES # BLD AUTO: 0.6 10E3/UL (ref 0–1.3)
MONOCYTES NFR BLD AUTO: 6 %
MUCOUS THREADS #/AREA URNS LPF: PRESENT /LPF
NEUTROPHILS # BLD AUTO: 5.3 10E3/UL (ref 1.6–8.3)
NEUTROPHILS NFR BLD AUTO: 53 %
NITRATE UR QL: NEGATIVE
NRBC # BLD AUTO: 0 10E3/UL
NRBC BLD AUTO-RTO: 0 /100
PH UR STRIP: 7 [PH] (ref 5–7)
PLATELET # BLD AUTO: 361 10E3/UL (ref 150–450)
POTASSIUM SERPL-SCNC: 3.8 MMOL/L (ref 3.4–5.3)
PROT SERPL-MCNC: 7.3 G/DL (ref 6.4–8.3)
RBC # BLD AUTO: 4.7 10E6/UL (ref 3.8–5.2)
RBC URINE: 1 /HPF
SODIUM SERPL-SCNC: 140 MMOL/L (ref 135–145)
SP GR UR STRIP: 1.02 (ref 1–1.03)
SQUAMOUS EPITHELIAL: 3 /HPF
UROBILINOGEN UR STRIP-MCNC: <2 MG/DL
WBC # BLD AUTO: 9.9 10E3/UL (ref 4–11)
WBC URINE: 5 /HPF

## 2024-09-03 PROCEDURE — 74176 CT ABD & PELVIS W/O CONTRAST: CPT

## 2024-09-03 PROCEDURE — 96374 THER/PROPH/DIAG INJ IV PUSH: CPT

## 2024-09-03 PROCEDURE — 85025 COMPLETE CBC W/AUTO DIFF WBC: CPT | Performed by: EMERGENCY MEDICINE

## 2024-09-03 PROCEDURE — 80053 COMPREHEN METABOLIC PANEL: CPT | Performed by: EMERGENCY MEDICINE

## 2024-09-03 PROCEDURE — 83690 ASSAY OF LIPASE: CPT | Performed by: EMERGENCY MEDICINE

## 2024-09-03 PROCEDURE — 250N000011 HC RX IP 250 OP 636: Performed by: EMERGENCY MEDICINE

## 2024-09-03 PROCEDURE — 81001 URINALYSIS AUTO W/SCOPE: CPT | Performed by: EMERGENCY MEDICINE

## 2024-09-03 PROCEDURE — 81025 URINE PREGNANCY TEST: CPT | Performed by: EMERGENCY MEDICINE

## 2024-09-03 PROCEDURE — 36415 COLL VENOUS BLD VENIPUNCTURE: CPT | Performed by: EMERGENCY MEDICINE

## 2024-09-03 PROCEDURE — 99285 EMERGENCY DEPT VISIT HI MDM: CPT | Mod: 25

## 2024-09-03 RX ORDER — KETOROLAC TROMETHAMINE 15 MG/ML
15 INJECTION, SOLUTION INTRAMUSCULAR; INTRAVENOUS ONCE
Status: COMPLETED | OUTPATIENT
Start: 2024-09-03 | End: 2024-09-03

## 2024-09-03 RX ADMIN — KETOROLAC TROMETHAMINE 15 MG: 15 INJECTION, SOLUTION INTRAMUSCULAR; INTRAVENOUS at 23:20

## 2024-09-03 ASSESSMENT — COLUMBIA-SUICIDE SEVERITY RATING SCALE - C-SSRS
6. HAVE YOU EVER DONE ANYTHING, STARTED TO DO ANYTHING, OR PREPARED TO DO ANYTHING TO END YOUR LIFE?: NO
1. IN THE PAST MONTH, HAVE YOU WISHED YOU WERE DEAD OR WISHED YOU COULD GO TO SLEEP AND NOT WAKE UP?: NO
2. HAVE YOU ACTUALLY HAD ANY THOUGHTS OF KILLING YOURSELF IN THE PAST MONTH?: NO

## 2024-09-03 ASSESSMENT — ACTIVITIES OF DAILY LIVING (ADL)
ADLS_ACUITY_SCORE: 35
ADLS_ACUITY_SCORE: 35

## 2024-09-04 VITALS
WEIGHT: 248 LBS | TEMPERATURE: 98 F | HEART RATE: 79 BPM | RESPIRATION RATE: 18 BRPM | BODY MASS INDEX: 46.86 KG/M2 | SYSTOLIC BLOOD PRESSURE: 138 MMHG | DIASTOLIC BLOOD PRESSURE: 84 MMHG | OXYGEN SATURATION: 95 %

## 2024-09-04 RX ORDER — HYDROCODONE BITARTRATE AND ACETAMINOPHEN 5; 325 MG/1; MG/1
1 TABLET ORAL EVERY 6 HOURS PRN
Qty: 10 TABLET | Refills: 0 | Status: SHIPPED | OUTPATIENT
Start: 2024-09-04 | End: 2024-09-07

## 2024-09-04 RX ORDER — HYDROCODONE BITARTRATE AND ACETAMINOPHEN 5; 325 MG/1; MG/1
1 TABLET ORAL EVERY 6 HOURS PRN
Qty: 10 TABLET | Refills: 0 | Status: CANCELLED | OUTPATIENT
Start: 2024-09-04 | End: 2024-09-07

## 2024-09-04 NOTE — ED TRIAGE NOTES
Patient arrives ambulatory to triage from home with .   Reports right side abdominal pain that has been ongoing for several weeks but is growing worse tonight as well as new right leg pain.       Hx gallstones and reports this pain is similar.

## 2024-09-04 NOTE — ED PROVIDER NOTES
Emergency Department Encounter     Evaluation Date & Time:   9/3/2024  9:44 PM    CHIEF COMPLAINT:  Abdominal Pain      Triage Note:Patient arrives ambulatory to triage from home with .   Reports right side abdominal pain that has been ongoing for several weeks but is growing worse tonight as well as new right leg pain.       Hx gallstones and reports this pain is similar.       FINAL IMPRESSION:  No diagnosis found.    Impression and Plan     ED COURSE & MEDICAL DECISION MAKING:    10:22 PM I met with the patient for the initial interview and physical examination. Discussed plan for treatment and workup in the ED.       ED Course as of 09/03/24 2326   Tue Sep 03, 2024   2323 Patient's description of the area of discomfort and the radiating burning type discomfort into her anterior thigh does suggest to me that this is highly likely to be a hernia.  Whether or not it is incarcerated or strangulated is less clear.  It certainly is more painful than it has been on previous episodes of pain and so I do think a CT is warranted at least for that to assure that there is no strangulation but her area of palpable discomfort is a bit higher than I would expect for hernia more so in her right lower quadrant over McBurney's point so additional imaging would be needed regardless to rule out an appendicitis.  I did order a urine pregnancy test if she had been pregnant obviously differential would include things like ectopic but currently she is not pregnant and if her CT scan is negative although this still could be from a ovarian cyst if there is not a sizable cyst, then her pain is fairly mild and would not necessarily be concerning for ovarian torsion unless pain were more intense or there was a sizable cyst seen on the CAT scan.  Patient was signed out to Dr. Banks.  She notes a previous history of gallstones while she was previously pregnant so I did include LFT testing and lipase, but her pain really is not  over the area of her gallbladder and she has a negative Caban sign .  I do not feel that ultrasound will be necessary today unless the labs were abnormal for liver function test which they are not and lipase is less than 3 times normal so without significant nausea vomiting or pain in the area of the gallbladder no ultrasound is needed today.  Dr. Banks will follow-up on imaging and disposition as appropriate.       At the conclusion of the encounter I discussed the results of all the tests and the disposition. The questions were answered. The patient or family acknowledged understanding and was agreeable with the care plan.          0 minutes of critical care time        MEDICATIONS GIVEN IN THE EMERGENCY DEPARTMENT:  Medications   ketorolac (TORADOL) injection 15 mg (15 mg Intravenous $Given 9/3/24 3058)       NEW PRESCRIPTIONS STARTED AT TODAY'S ED VISIT:  New Prescriptions    No medications on file       HPI     The history is provided by the patient. A  was used.        Dai Guadarrama is a 39 year old female with a pertinent history of chronic migraine, aneurysm, and gestational diabetes mellitus who presents to this ED by walk-in for evaluation of abdominal pain.    Patient reports an onset of intermittent RLQ abdominal pain yesterday. She characterizes her pain as a burning or pressured sensation, noting that her abdomen feels full. This pain seems to extend across the top of her right thigh as well. Patient notes that today, she has had increased urinary frequency with increased abdominal pain. She states she wasn't doing anything that could induce a hernia when she began to feel the pain, although she was once told by a doctor in her [place] that she had a hernia in her groin a long time ago which was never treated. She further mentions she was told she had gallbladder stones when she was pregnant 4 years ago; this was also never treated. Patient endorses taking  ibuprofen 800 mg at home with temporary relief of her pain.     Patient states that she is not currently pregnant; her most recent period was .    Patient denies any other symptoms at this time. She also denies a history of abdominal surgery.     REVIEW OF SYSTEMS:  Review of Systems  remainder of systems are all otherwise negative.        Medical History     Past Medical History:   Diagnosis Date    Aneurysm (H)     Migraine headache without aura     Pregnancy related nausea and vomiting, antepartum 2016    Recurrent pregnancy loss (CODE)     Retained placenta 2014    Stillbirth 2014    Tachycardia        Past Surgical History:   Procedure Laterality Date    C EACH ADD TOOTH EXTRACTION      bad reaction to anesthia for local      SECTION N/A 2018    Procedure:  SECTION;  Surgeon: Noreen Harris MD;  Location: [facility]     SECTION, TUBAL LIGATION, COMBINED N/A 2020    Procedure:  SECTION, WITH POSTPARTUM TUBAL LIGATION;  Surgeon: Joya Hicks MD;  Location: [facility]    INSERT PICC LINE Right 3/12/2020    Procedure: Midline Picc Placement;  Surgeon: Ugo Webb PA-C;  Location: [facility] OR    IR PICC PLACEMENT > 5 YRS OF AGE  3/12/2020       Family History   Problem Relation Age of Onset    Anxiety Disorder No family hx of     Mental Illness No family hx of     Substance Abuse No family hx of     Anesthesia Reaction No family hx of     Asthma No family hx of     Osteoporosis No family hx of     Genetic Disorder No family hx of     Thyroid Disease No family hx of     Hyperlipidemia No family hx of     Cerebrovascular Disease No family hx of     Breast Cancer No family hx of     Colon Cancer No family hx of     Prostate Cancer No family hx of     Other Cancer No family hx of     Depression No family hx of     Diabetes No family hx of     Coronary Artery Disease No family hx of     Hypertension No family hx of        Social History     Tobacco Use    Smoking  status: Never    Smokeless tobacco: Never   Substance Use Topics    Alcohol use: Not Currently     Alcohol/week: 0.0 standard drinks of alcohol     Comment: rare alcohol use now nothing in pregnancy    Drug use: No       acetaminophen (TYLENOL) 325 MG tablet  cholecalciferol (VITAMIN D3) 5000 units (125 mcg) capsule  cyanocobalamin (VITAMIN B-12) 1000 MCG tablet  vitamin C (ASCORBIC ACID) 250 MG tablet        Physical Exam     First Vitals:  Patient Vitals for the past 24 hrs:   BP Temp Temp src Pulse Resp SpO2 Weight   09/03/24 2105 (!) 158/104 98  F (36.7  C) Temporal 94 18 98 % 112.5 kg (248 lb)       PHYSICAL EXAM:   Constitutional:   Patient is sitting upright in bed and is conversational.    HENT:  Normocephalic, posterior pharynx wnl, mucous membranes moist and dark pink.   Eyes:  PERRL, EOMI, Conjunctiva normal, No discharge, no scleral icterus.  Respiratory:  Breathing easily, lungs clear to auscultation.   Cardiovascular:  Regular rate and rhythm, nl s1s2 0 murmurs, rubs, or gallops.  Peripheral pulses dp, pt, and radial are wnl.  No peripheral edema   GI:  Bowel sounds normal, Soft, Tenderness to RLQ, No flank tenderness, nondistended.  :No CVA tenderness.   Musculoskeletal:  Moves all extremities.  No erythematous or swollen major joints,   Integument:  Normal  Neurologic:  Alert & oriented x 3, Normal motor function, Normal sensory function, No focal deficits noted. Normal speech.  Psychiatric:  Affect normal, Judgment normal, Mood normal.     Results     LAB AND RADIOLOGY:  All pertinent labs reviewed and interpreted  Results for orders placed or performed during the hospital encounter of 09/03/24   Comprehensive metabolic panel     Status: Abnormal   Result Value Ref Range    Sodium 140 135 - 145 mmol/L    Potassium 3.8 3.4 - 5.3 mmol/L    Carbon Dioxide (CO2) 23 22 - 29 mmol/L    Anion Gap 12 7 - 15 mmol/L    Urea Nitrogen 11.0 6.0 - 20.0 mg/dL    Creatinine 0.66 0.51 - 0.95 mg/dL    GFR Estimate  >90 >60 mL/min/1.73m2    Calcium 9.1 8.8 - 10.4 mg/dL    Chloride 105 98 - 107 mmol/L    Glucose 119 (H) 70 - 99 mg/dL    Alkaline Phosphatase 92 40 - 150 U/L    AST 12 0 - 45 U/L    ALT 13 0 - 50 U/L    Protein Total 7.3 6.4 - 8.3 g/dL    Albumin 4.2 3.5 - 5.2 g/dL    Bilirubin Total 0.2 <=1.2 mg/dL   Lipase     Status: Abnormal   Result Value Ref Range    Lipase 63 (H) 13 - 60 U/L   UA with Microscopic reflex to Culture     Status: Abnormal    Specimen: Urine, Clean Catch   Result Value Ref Range    Color Urine Light Yellow Colorless, Straw, Light Yellow, Yellow    Appearance Urine Clear Clear    Glucose Urine Negative Negative mg/dL    Bilirubin Urine Negative Negative    Ketones Urine Negative Negative mg/dL    Specific Gravity Urine 1.023 1.001 - 1.030    Blood Urine Negative Negative    pH Urine 7.0 5.0 - 7.0    Protein Albumin Urine Negative Negative mg/dL    Urobilinogen Urine <2.0 <2.0 mg/dL    Nitrite Urine Negative Negative    Leukocyte Esterase Urine 75 Jones/uL (A) Negative    Bacteria Urine Few (A) None Seen /HPF    Mucus Urine Present (A) None Seen /LPF    RBC Urine 1 <=2 /HPF    WBC Urine 5 <=5 /HPF    Squamous Epithelials Urine 3 (H) <=1 /HPF    Narrative    Urine Culture not indicated   CBC with platelets and differential     Status: None   Result Value Ref Range    WBC Count 9.9 4.0 - 11.0 10e3/uL    RBC Count 4.70 3.80 - 5.20 10e6/uL    Hemoglobin 12.8 11.7 - 15.7 g/dL    Hematocrit 39.6 35.0 - 47.0 %    MCV 84 78 - 100 fL    MCH 27.2 26.5 - 33.0 pg    MCHC 32.3 31.5 - 36.5 g/dL    RDW 14.2 10.0 - 15.0 %    Platelet Count 361 150 - 450 10e3/uL    % Neutrophils 53 %    % Lymphocytes 38 %    % Monocytes 6 %    % Eosinophils 2 %    % Basophils 1 %    % Immature Granulocytes 0 %    NRBCs per 100 WBC 0 <1 /100    Absolute Neutrophils 5.3 1.6 - 8.3 10e3/uL    Absolute Lymphocytes 3.8 0.8 - 5.3 10e3/uL    Absolute Monocytes 0.6 0.0 - 1.3 10e3/uL    Absolute Eosinophils 0.2 0.0 - 0.7 10e3/uL    Absolute  Basophils 0.1 0.0 - 0.2 10e3/uL    Absolute Immature Granulocytes 0.0 <=0.4 10e3/uL    Absolute NRBCs 0.0 10e3/uL   HCG qualitative urine     Status: Normal   Result Value Ref Range    hCG Urine Qualitative Negative Negative   CBC with platelets + differential     Status: None    Narrative    The following orders were created for panel order CBC with platelets + differential.  Procedure                               Abnormality         Status                     ---------                               -----------         ------                     CBC with platelets and d...[[id number]]                      Final result                 Please view results for these tests on the individual orders.       PROCEDURES:  Procedures: None      [facility] Documentation     Medical Decision Making  Obtained supplemental history:Supplemental history obtained?: Documented in chart  Reviewed external records: External records reviewed?: Documented in chart  Care impacted by chronic illness:N/A  Care significantly affected by social determinants of health:N/A  Did you consider but not order tests?: Work up considered but not performed and documented in chart, if applicable  Did you interpret images independently?: Independent interpretation of ECG and images noted in documentation, when applicable.  Consultation discussion with other provider:Did you involve another provider (consultant, , pharmacy, etc.)?: I discussed the care with another health care provider, see documentation for details.  Admission considered. Patient was signed out to the oncoming physician, disposition pending.      I, Hernando Horowitz, am serving as a scribe to document services personally performed by Dr. Althea Truong based on my observation and the provider's statements to me. I, Althea Truong MD attest that Hernando Horowitz is acting in a scribe capacity, has observed my performance of the services and has documented them in accordance with my  direction.     Althea Truong MD  Emergency Medicine  [facility] EMERGENCY DEPARTMENT        Althea Truong MD  09/03/24 7061

## 2024-09-04 NOTE — ED NOTES
ED SIGNOUT  Date/Time:9/3/2024 10:59 PM    ED Course/MDM:    10:59 PM  Signout accepted from Dr. Truong.  Prior records were reviewed.  Labs, x-ray, CT, EKG from this visit are reviewed.  Labs independently interpreted by me with normal basic panel, normal hepatic panel, normal lipase, normal CBC.  Urinalysis not consistent with infection, pregnancy test is negative.  11:39 PM reviewed radiology interpretation of CT scan which is negative for acute findings, does have gallstones which is known.  Stable for discharge, will follow-up with general surgery for possible hernia.    I discussed with patient the utility, limitations and findings of the exam/interventions/studies done during this visit as well as the list of differential diagnosis and symptoms to monitor/return to ER for.  Additional verbal discharge instructions were provided.     DIAGNOSIS  1. RLQ abdominal pain        New Prescriptions    No medications on file       HPI    Briefly, this is a 39-year-old female with history of right lower quadrant abdominal pain off and on for years, reports that previously she has been told that she had a hernia, pain is made more consistent for the last couple of days.  No vomiting.    Physical Exam:  BP (!) 158/104   Pulse 94   Temp 98  F (36.7  C) (Temporal)   Resp 18   Wt 112.5 kg (248 lb)   LMP 08/14/2024 (Exact Date)   SpO2 98%   BMI 46.86 kg/m    Physical Exam  Vitals and nursing note reviewed.   Constitutional:       Appearance: Normal appearance.   HENT:      Head: Normocephalic and atraumatic.      Right Ear: External ear normal.      Left Ear: External ear normal.      Nose: Nose normal.   Eyes:      Extraocular Movements: Extraocular movements intact.      Conjunctiva/sclera: Conjunctivae normal.   Pulmonary:      Effort: Pulmonary effort is normal.   Musculoskeletal:         General: Normal range of motion.      Cervical back: Normal range of motion.      Right lower leg: No edema.      Left lower  leg: No edema.   Skin:     General: Skin is warm and dry.   Neurological:      General: No focal deficit present.      Mental Status: She is alert and oriented to person, place, and time. Mental status is at baseline.   Psychiatric:         Mood and Affect: Mood normal.         Behavior: Behavior normal.         Thought Content: Thought content normal.          Results for orders placed or performed during the hospital encounter of 09/03/24   CT Abdomen Pelvis w/o Contrast    Impression    IMPRESSION:   1.  No acute findings in the abdomen or pelvis.    2.  Cholelithiasis.     Comprehensive metabolic panel   Result Value Ref Range    Sodium 140 135 - 145 mmol/L    Potassium 3.8 3.4 - 5.3 mmol/L    Carbon Dioxide (CO2) 23 22 - 29 mmol/L    Anion Gap 12 7 - 15 mmol/L    Urea Nitrogen 11.0 6.0 - 20.0 mg/dL    Creatinine 0.66 0.51 - 0.95 mg/dL    GFR Estimate >90 >60 mL/min/1.73m2    Calcium 9.1 8.8 - 10.4 mg/dL    Chloride 105 98 - 107 mmol/L    Glucose 119 (H) 70 - 99 mg/dL    Alkaline Phosphatase 92 40 - 150 U/L    AST 12 0 - 45 U/L    ALT 13 0 - 50 U/L    Protein Total 7.3 6.4 - 8.3 g/dL    Albumin 4.2 3.5 - 5.2 g/dL    Bilirubin Total 0.2 <=1.2 mg/dL   Result Value Ref Range    Lipase 63 (H) 13 - 60 U/L   UA with Microscopic reflex to Culture    Specimen: Urine, Clean Catch   Result Value Ref Range    Color Urine Light Yellow Colorless, Straw, Light Yellow, Yellow    Appearance Urine Clear Clear    Glucose Urine Negative Negative mg/dL    Bilirubin Urine Negative Negative    Ketones Urine Negative Negative mg/dL    Specific Gravity Urine 1.023 1.001 - 1.030    Blood Urine Negative Negative    pH Urine 7.0 5.0 - 7.0    Protein Albumin Urine Negative Negative mg/dL    Urobilinogen Urine <2.0 <2.0 mg/dL    Nitrite Urine Negative Negative    Leukocyte Esterase Urine 75 Jones/uL (A) Negative    Bacteria Urine Few (A) None Seen /HPF    Mucus Urine Present (A) None Seen /LPF    RBC Urine 1 <=2 /HPF    WBC Urine 5 <=5 /HPF     Squamous Epithelials Urine 3 (H) <=1 /HPF   CBC with platelets and differential   Result Value Ref Range    WBC Count 9.9 4.0 - 11.0 10e3/uL    RBC Count 4.70 3.80 - 5.20 10e6/uL    Hemoglobin 12.8 11.7 - 15.7 g/dL    Hematocrit 39.6 35.0 - 47.0 %    MCV 84 78 - 100 fL    MCH 27.2 26.5 - 33.0 pg    MCHC 32.3 31.5 - 36.5 g/dL    RDW 14.2 10.0 - 15.0 %    Platelet Count 361 150 - 450 10e3/uL    % Neutrophils 53 %    % Lymphocytes 38 %    % Monocytes 6 %    % Eosinophils 2 %    % Basophils 1 %    % Immature Granulocytes 0 %    NRBCs per 100 WBC 0 <1 /100    Absolute Neutrophils 5.3 1.6 - 8.3 10e3/uL    Absolute Lymphocytes 3.8 0.8 - 5.3 10e3/uL    Absolute Monocytes 0.6 0.0 - 1.3 10e3/uL    Absolute Eosinophils 0.2 0.0 - 0.7 10e3/uL    Absolute Basophils 0.1 0.0 - 0.2 10e3/uL    Absolute Immature Granulocytes 0.0 <=0.4 10e3/uL    Absolute NRBCs 0.0 10e3/uL   HCG qualitative urine   Result Value Ref Range    hCG Urine Qualitative Negative Negative       No results found.    Mathew Banks M.D.  [facility] Emergency Department     Mathew Banks MD  09/03/24 9724

## 2024-09-05 ENCOUNTER — APPOINTMENT (OUTPATIENT)
Dept: INTERPRETER SERVICES | Facility: CLINIC | Age: 39
End: 2024-09-05

## 2024-09-05 NOTE — ED AVS SNAPSHOT
[facility], [place], Emergency Department    [address]    [facility] [place] [address]    Phone:  [phone]    Fax:  [phone]                                       Dai Guadarrama   MRN: [id number]    Department:  [facility], Emergency Department   Date of Visit:  6/6/2018           After Visit Summary Signature Page     I have received my discharge instructions, and my questions have been answered. I have discussed any challenges I see with this plan with the nurse or doctor.    ..........................................................................................................................................  Patient/Patient Representative Signature      ..........................................................................................................................................  Patient Representative Print Name and Relationship to Patient    ..................................................               ................................................  Date                                            Time    ..........................................................................................................................................  Reviewed by Signature/Title    ...................................................              ..............................................  Date                                                            Time           Called Irena. No answer left message to call office.

## 2025-03-31 NOTE — PLAN OF CARE
Health Maintenance       COVID-19 Vaccine (1 - 2024-25 season)  Never done    Depression Screening (Yearly)  Due since 3/21/2025           Following review of the above:  Patient is not proceeding with: COVID-19    Note: Refer to final orders and clinician documentation.       Patient's vss, postpartum assessment is wdl. Patient is voiding, ambulating in room without difficulty. Patient is bonding well with infant. Education and plan of care reviewed with  on the ipad. Patient anticipates to discharge tomorrow. Will continue to monitor and with plan of care .

## 2025-07-19 ENCOUNTER — HEALTH MAINTENANCE LETTER (OUTPATIENT)
Age: 40
End: 2025-07-19

## (undated) DEVICE — SOL NACL 0.9% IRRIG 1000ML BOTTLE 07138-09

## (undated) DEVICE — COVER ULTRASOUND PROBE W/GEL FLEXI-FEEL 6"X58" LF  25-FF658

## (undated) DEVICE — CAST STOCKINETTE 3"

## (undated) DEVICE — KIT INTRODUCER FLUENT MICRO 5FRX10CM ECHO TIP KIT-038-04

## (undated) DEVICE — STOCKING SLEEVE COMPRESSION CALF LG

## (undated) DEVICE — SU MONOCRYL 4-0 PS-2 18" UND Y496G

## (undated) DEVICE — SUCTION CANISTER MEDIVAC LINER 1500ML W/LID 65651-515

## (undated) DEVICE — RETR VISCERA FISH

## (undated) DEVICE — SU VICRYL 0 CT-1 36" J346H

## (undated) DEVICE — DRAPE SETUP C-SECTION INVISISHIELD 54X90" DYNJE5600

## (undated) DEVICE — GOWN XLG DISP 9545

## (undated) DEVICE — PACK C-SECTION LF PL15OTA83B

## (undated) DEVICE — STRAP KNEE/BODY 31143004

## (undated) DEVICE — DRSG BIOPATCH GERMICIDAL SPLIT SPONGE 4MM MED 4150

## (undated) DEVICE — SU VICRYL 3-0 CTX 36" UND J980H

## (undated) DEVICE — LINEN TOWEL PACK X5 5464

## (undated) DEVICE — PREP CHLORAPREP 26ML TINTED ORANGE  260815

## (undated) DEVICE — GLOVE ESTEEM POWDER FREE SMT 6.5  2D72PT65

## (undated) DEVICE — BASIN SET MAJOR

## (undated) DEVICE — SOL WATER IRRIG 1000ML BOTTLE 07139-09

## (undated) DEVICE — GLOVE PROTEXIS POWDER FREE SMT 8.0  2D72PT80X

## (undated) DEVICE — SOL ADH LIQUID BENZOIN SWAB 0.6ML C1544

## (undated) DEVICE — DRSG STERI STRIP 1/2X4" R1547

## (undated) DEVICE — SU MONOCRYL 0 CTB-1 36" YB946

## (undated) DEVICE — DECANTER BAG 2002S

## (undated) DEVICE — ADH SKIN CLOSURE PREMIERPRO EXOFIN 1.0ML 3470

## (undated) DEVICE — ESU GROUND PAD UNIVERSAL W/O CORD

## (undated) DEVICE — SU VICRYL 4-0 PS-2 18" UND J496G

## (undated) DEVICE — DRSG TEGADERM IV ADVANCED 3.5X4.5" 1685

## (undated) DEVICE — CATH TRAY FOLEY 16FR BARDEX W/DRAIN BAG STATLOCK 300316A

## (undated) DEVICE — LINEN GOWN XLG 5407

## (undated) DEVICE — GLOVE PROTEXIS BLUE W/NEU-THERA 6.5  2D73EB65

## (undated) DEVICE — Device

## (undated) DEVICE — CAP LUER LOCK MALE/FEMALE DUAL 2C6250

## (undated) DEVICE — COVER EASY EQUIP BAG W/BAND LATEX FREE EZ-28

## (undated) DEVICE — BNDG ABDOMINAL BINDER 10X26-50" 08140145

## (undated) DEVICE — CATH TRAY FOLEY 16FR SILICONE 907416

## (undated) RX ORDER — EPHEDRINE SULFATE 50 MG/ML
INJECTION, SOLUTION INTRAMUSCULAR; INTRAVENOUS; SUBCUTANEOUS
Status: DISPENSED
Start: 2018-11-24

## (undated) RX ORDER — BUPIVACAINE HYDROCHLORIDE 7.5 MG/ML
INJECTION, SOLUTION INTRASPINAL
Status: DISPENSED
Start: 2018-11-24

## (undated) RX ORDER — EPHEDRINE SULFATE 50 MG/ML
INJECTION, SOLUTION INTRAMUSCULAR; INTRAVENOUS; SUBCUTANEOUS
Status: DISPENSED
Start: 2020-09-29

## (undated) RX ORDER — FENTANYL CITRATE-0.9 % NACL/PF 10 MCG/ML
PLASTIC BAG, INJECTION (ML) INTRAVENOUS
Status: DISPENSED
Start: 2020-09-29

## (undated) RX ORDER — PHENYLEPHRINE HCL IN 0.9% NACL 1 MG/10 ML
SYRINGE (ML) INTRAVENOUS
Status: DISPENSED
Start: 2018-11-24

## (undated) RX ORDER — FENTANYL CITRATE 50 UG/ML
INJECTION, SOLUTION INTRAMUSCULAR; INTRAVENOUS
Status: DISPENSED
Start: 2020-09-29

## (undated) RX ORDER — MORPHINE SULFATE 1 MG/ML
INJECTION, SOLUTION EPIDURAL; INTRATHECAL; INTRAVENOUS
Status: DISPENSED
Start: 2020-09-29

## (undated) RX ORDER — KETOROLAC TROMETHAMINE 30 MG/ML
INJECTION, SOLUTION INTRAMUSCULAR; INTRAVENOUS
Status: DISPENSED
Start: 2020-09-29

## (undated) RX ORDER — PROPOFOL 10 MG/ML
INJECTION, EMULSION INTRAVENOUS
Status: DISPENSED
Start: 2020-09-29

## (undated) RX ORDER — ONDANSETRON 2 MG/ML
INJECTION INTRAMUSCULAR; INTRAVENOUS
Status: DISPENSED
Start: 2018-11-24

## (undated) RX ORDER — OXYTOCIN/0.9 % SODIUM CHLORIDE 30/500 ML
PLASTIC BAG, INJECTION (ML) INTRAVENOUS
Status: DISPENSED
Start: 2018-11-24

## (undated) RX ORDER — OXYTOCIN 10 [USP'U]/ML
INJECTION, SOLUTION INTRAMUSCULAR; INTRAVENOUS
Status: DISPENSED
Start: 2018-11-24

## (undated) RX ORDER — FENTANYL CITRATE 50 UG/ML
INJECTION, SOLUTION INTRAMUSCULAR; INTRAVENOUS
Status: DISPENSED
Start: 2018-11-24